# Patient Record
Sex: FEMALE | Race: WHITE | NOT HISPANIC OR LATINO
[De-identification: names, ages, dates, MRNs, and addresses within clinical notes are randomized per-mention and may not be internally consistent; named-entity substitution may affect disease eponyms.]

---

## 2017-03-08 ENCOUNTER — APPOINTMENT (OUTPATIENT)
Dept: MAMMOGRAPHY | Facility: IMAGING CENTER | Age: 77
End: 2017-03-08

## 2017-03-08 ENCOUNTER — OUTPATIENT (OUTPATIENT)
Dept: OUTPATIENT SERVICES | Facility: HOSPITAL | Age: 77
LOS: 1 days | End: 2017-03-08
Payer: MEDICARE

## 2017-03-08 DIAGNOSIS — Z00.8 ENCOUNTER FOR OTHER GENERAL EXAMINATION: ICD-10-CM

## 2017-03-08 PROCEDURE — G0202: CPT | Mod: 26

## 2017-03-08 PROCEDURE — 77063 BREAST TOMOSYNTHESIS BI: CPT | Mod: 26

## 2017-03-08 PROCEDURE — 77067 SCR MAMMO BI INCL CAD: CPT

## 2017-03-08 PROCEDURE — 77063 BREAST TOMOSYNTHESIS BI: CPT

## 2017-05-03 ENCOUNTER — APPOINTMENT (OUTPATIENT)
Dept: OTOLARYNGOLOGY | Facility: CLINIC | Age: 77
End: 2017-05-03

## 2017-05-03 VITALS
SYSTOLIC BLOOD PRESSURE: 131 MMHG | BODY MASS INDEX: 22.5 KG/M2 | DIASTOLIC BLOOD PRESSURE: 63 MMHG | HEART RATE: 85 BPM | HEIGHT: 66 IN | WEIGHT: 140 LBS

## 2017-05-03 DIAGNOSIS — Z87.891 PERSONAL HISTORY OF NICOTINE DEPENDENCE: ICD-10-CM

## 2017-05-03 DIAGNOSIS — E78.00 PURE HYPERCHOLESTEROLEMIA, UNSPECIFIED: ICD-10-CM

## 2017-05-03 DIAGNOSIS — Z87.09 PERSONAL HISTORY OF OTHER DISEASES OF THE RESPIRATORY SYSTEM: ICD-10-CM

## 2017-05-10 ENCOUNTER — APPOINTMENT (OUTPATIENT)
Dept: CT IMAGING | Facility: IMAGING CENTER | Age: 77
End: 2017-05-10

## 2017-05-10 ENCOUNTER — OUTPATIENT (OUTPATIENT)
Dept: OUTPATIENT SERVICES | Facility: HOSPITAL | Age: 77
LOS: 1 days | End: 2017-05-10
Payer: MEDICARE

## 2017-05-10 DIAGNOSIS — Z00.8 ENCOUNTER FOR OTHER GENERAL EXAMINATION: ICD-10-CM

## 2017-05-10 PROCEDURE — 71250 CT THORAX DX C-: CPT

## 2017-05-27 ENCOUNTER — OUTPATIENT (OUTPATIENT)
Dept: OUTPATIENT SERVICES | Facility: HOSPITAL | Age: 77
LOS: 1 days | End: 2017-05-27
Payer: MEDICARE

## 2017-05-27 ENCOUNTER — APPOINTMENT (OUTPATIENT)
Dept: NUCLEAR MEDICINE | Facility: IMAGING CENTER | Age: 77
End: 2017-05-27

## 2017-05-27 DIAGNOSIS — Z00.8 ENCOUNTER FOR OTHER GENERAL EXAMINATION: ICD-10-CM

## 2017-05-27 PROCEDURE — A9552: CPT

## 2017-05-27 PROCEDURE — 78815 PET IMAGE W/CT SKULL-THIGH: CPT

## 2018-02-02 ENCOUNTER — RX RENEWAL (OUTPATIENT)
Age: 78
End: 2018-02-02

## 2018-03-09 ENCOUNTER — OUTPATIENT (OUTPATIENT)
Dept: OUTPATIENT SERVICES | Facility: HOSPITAL | Age: 78
LOS: 1 days | End: 2018-03-09

## 2018-03-09 ENCOUNTER — APPOINTMENT (OUTPATIENT)
Dept: MAMMOGRAPHY | Facility: CLINIC | Age: 78
End: 2018-03-09
Payer: MEDICARE

## 2018-03-09 PROCEDURE — 77067 SCR MAMMO BI INCL CAD: CPT | Mod: 26

## 2018-03-09 PROCEDURE — 77063 BREAST TOMOSYNTHESIS BI: CPT | Mod: 26

## 2018-04-04 ENCOUNTER — NON-APPOINTMENT (OUTPATIENT)
Age: 78
End: 2018-04-04

## 2018-04-04 ENCOUNTER — APPOINTMENT (OUTPATIENT)
Dept: CARDIOLOGY | Facility: CLINIC | Age: 78
End: 2018-04-04
Payer: MEDICARE

## 2018-04-04 VITALS
HEART RATE: 84 BPM | WEIGHT: 140 LBS | SYSTOLIC BLOOD PRESSURE: 129 MMHG | RESPIRATION RATE: 16 BRPM | HEIGHT: 65 IN | DIASTOLIC BLOOD PRESSURE: 79 MMHG | BODY MASS INDEX: 23.32 KG/M2

## 2018-04-04 PROCEDURE — 99213 OFFICE O/P EST LOW 20 MIN: CPT

## 2018-05-03 ENCOUNTER — APPOINTMENT (OUTPATIENT)
Dept: OTOLARYNGOLOGY | Facility: CLINIC | Age: 78
End: 2018-05-03
Payer: MEDICARE

## 2018-05-03 VITALS
HEIGHT: 65 IN | HEART RATE: 80 BPM | DIASTOLIC BLOOD PRESSURE: 73 MMHG | WEIGHT: 140 LBS | BODY MASS INDEX: 23.32 KG/M2 | SYSTOLIC BLOOD PRESSURE: 122 MMHG

## 2018-05-03 PROCEDURE — 92550 TYMPANOMETRY & REFLEX THRESH: CPT

## 2018-05-03 PROCEDURE — 92557 COMPREHENSIVE HEARING TEST: CPT

## 2018-05-03 PROCEDURE — 99214 OFFICE O/P EST MOD 30 MIN: CPT

## 2018-08-14 ENCOUNTER — APPOINTMENT (OUTPATIENT)
Dept: CARDIOLOGY | Facility: CLINIC | Age: 78
End: 2018-08-14
Payer: MEDICARE

## 2018-08-14 VITALS
BODY MASS INDEX: 23.46 KG/M2 | HEART RATE: 76 BPM | WEIGHT: 146 LBS | HEIGHT: 66 IN | DIASTOLIC BLOOD PRESSURE: 79 MMHG | SYSTOLIC BLOOD PRESSURE: 126 MMHG | RESPIRATION RATE: 15 BRPM

## 2018-08-14 PROCEDURE — 99213 OFFICE O/P EST LOW 20 MIN: CPT

## 2018-10-22 ENCOUNTER — APPOINTMENT (OUTPATIENT)
Dept: CARDIOLOGY | Facility: CLINIC | Age: 78
End: 2018-10-22
Payer: MEDICARE

## 2018-10-22 VITALS
DIASTOLIC BLOOD PRESSURE: 78 MMHG | HEIGHT: 66 IN | RESPIRATION RATE: 16 BRPM | BODY MASS INDEX: 23.46 KG/M2 | HEART RATE: 85 BPM | SYSTOLIC BLOOD PRESSURE: 124 MMHG | WEIGHT: 146 LBS

## 2018-10-22 DIAGNOSIS — R68.84 JAW PAIN: ICD-10-CM

## 2018-10-22 PROCEDURE — ZZZZZ: CPT

## 2018-10-22 PROCEDURE — 93015 CV STRESS TEST SUPVJ I&R: CPT

## 2018-10-22 PROCEDURE — 99213 OFFICE O/P EST LOW 20 MIN: CPT | Mod: 25

## 2018-10-29 ENCOUNTER — APPOINTMENT (OUTPATIENT)
Dept: CARDIOLOGY | Facility: CLINIC | Age: 78
End: 2018-10-29
Payer: MEDICARE

## 2018-10-29 PROCEDURE — 78452 HT MUSCLE IMAGE SPECT MULT: CPT

## 2018-10-29 PROCEDURE — 93015 CV STRESS TEST SUPVJ I&R: CPT

## 2018-10-29 PROCEDURE — A9500: CPT

## 2018-11-27 ENCOUNTER — APPOINTMENT (OUTPATIENT)
Dept: CARDIOLOGY | Facility: CLINIC | Age: 78
End: 2018-11-27
Payer: MEDICARE

## 2018-11-27 VITALS
RESPIRATION RATE: 15 BRPM | HEIGHT: 66 IN | DIASTOLIC BLOOD PRESSURE: 80 MMHG | BODY MASS INDEX: 24.21 KG/M2 | HEART RATE: 84 BPM | SYSTOLIC BLOOD PRESSURE: 125 MMHG

## 2018-11-27 VITALS — BODY MASS INDEX: 24.11 KG/M2 | WEIGHT: 150 LBS | HEIGHT: 66 IN

## 2018-11-27 PROCEDURE — 99213 OFFICE O/P EST LOW 20 MIN: CPT

## 2018-11-27 RX ORDER — EZETIMIBE AND SIMVASTATIN 10; 20 MG/1; MG/1
10-20 TABLET ORAL DAILY
Qty: 90 | Refills: 1 | Status: DISCONTINUED | COMMUNITY
Start: 2018-02-02 | End: 2018-11-27

## 2018-11-27 NOTE — REASON FOR VISIT
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

## 2018-11-27 NOTE — PHYSICAL EXAM
[General Appearance - Well Developed] : well developed [Normal Appearance] : normal appearance [Well Groomed] : well groomed [General Appearance - Well Nourished] : well nourished [No Deformities] : no deformities [General Appearance - In No Acute Distress] : no acute distress [Normal Conjunctiva] : the conjunctiva exhibited no abnormalities [Normal Oral Mucosa] : normal oral mucosa [Normal Jugular Venous A Waves Present] : normal jugular venous A waves present [Normal Jugular Venous V Waves Present] : normal jugular venous V waves present [No Jugular Venous Ko A Waves] : no jugular venous ko A waves [Respiration, Rhythm And Depth] : normal respiratory rhythm and effort [Exaggerated Use Of Accessory Muscles For Inspiration] : no accessory muscle use [Auscultation Breath Sounds / Voice Sounds] : lungs were clear to auscultation bilaterally [Bowel Sounds] : normal bowel sounds [Abdomen Soft] : soft [Abnormal Walk] : normal gait [Gait - Sufficient For Exercise Testing] : the gait was sufficient for exercise testing [Nail Clubbing] : no clubbing of the fingernails [Cyanosis, Localized] : no localized cyanosis [Skin Color & Pigmentation] : normal skin color and pigmentation [Skin Turgor] : normal skin turgor [] : no rash [Oriented To Time, Place, And Person] : oriented to person, place, and time [Impaired Insight] : insight and judgment were intact [Affect] : the affect was normal [Mood] : the mood was normal [5th Left ICS - MCL] : palpated at the 5th LICS in the midclavicular line [Normal] : normal [No Precordial Heave] : no precordial heave was noted [Normal Rate] : normal [Rhythm Regular] : regular [Normal S1] : normal S1 [Normal S2] : normal S2 [No Gallop] : no gallop heard [II] : a grade 2 [I] : a grade 1 [2+] : left 2+ [No Abnormalities] : the abdominal aorta was not enlarged and no bruit was heard [No Pitting Edema] : no pitting edema present [S3] : no S3 [S4] : no S4 [Click] : no click [Right Carotid Bruit] : no bruit heard over the right carotid [Left Carotid Bruit] : no bruit heard over the left carotid [Right Femoral Bruit] : no bruit heard over the right femoral artery [Left Femoral Bruit] : no bruit heard over the left femoral artery

## 2018-11-27 NOTE — DISCUSSION/SUMMARY
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 She denies chest pain, shortness of breath, dizziness or syncope. \par \par The patient had a normal nuclear stress test on October 29, 2018. Her primary complaint today is fatigue. I has to change her metoprolol succinate and cholesterol medication to the morning. She also wakes up on a daily basis approximately 3:30 in the morning and can't go back to sleep. I am sure this is contributing to her fatigue factor. She will followup with you regarding her overall medical care. She will followup with me in 4 months.\par The patient understands that aerobic exercises must be increased to 40 minutes 4 times per week. A detailed discussion of lifestyle modification was done today. The patient has a good understanding of the diagnosis, and treatment plan. Lifestyle modification was also outlined.\par She will start on Toprol-XL 25 mg in AM and aspirin 81 mg per day. .\par She's had some personal stress in her life. \par  A detailed discussion of lifestyle modification was done today. The patient has a good understanding of the diagnosis, and treatment plan. Lifestyle modification was also outlined.

## 2019-02-11 ENCOUNTER — RX RENEWAL (OUTPATIENT)
Age: 79
End: 2019-02-11

## 2019-03-11 ENCOUNTER — RESULT REVIEW (OUTPATIENT)
Age: 79
End: 2019-03-11

## 2019-03-12 ENCOUNTER — APPOINTMENT (OUTPATIENT)
Dept: CARDIOLOGY | Facility: CLINIC | Age: 79
End: 2019-03-12

## 2019-04-16 ENCOUNTER — RX RENEWAL (OUTPATIENT)
Age: 79
End: 2019-04-16

## 2019-04-23 ENCOUNTER — APPOINTMENT (OUTPATIENT)
Dept: CARDIOLOGY | Facility: CLINIC | Age: 79
End: 2019-04-23
Payer: MEDICARE

## 2019-04-23 ENCOUNTER — NON-APPOINTMENT (OUTPATIENT)
Age: 79
End: 2019-04-23

## 2019-04-23 VITALS
HEIGHT: 66 IN | RESPIRATION RATE: 15 BRPM | BODY MASS INDEX: 23.63 KG/M2 | DIASTOLIC BLOOD PRESSURE: 82 MMHG | SYSTOLIC BLOOD PRESSURE: 127 MMHG | WEIGHT: 147 LBS | HEART RATE: 78 BPM

## 2019-04-23 PROCEDURE — 93000 ELECTROCARDIOGRAM COMPLETE: CPT

## 2019-04-23 PROCEDURE — 99213 OFFICE O/P EST LOW 20 MIN: CPT

## 2019-04-23 RX ORDER — EZETIMIBE AND SIMVASTATIN 10; 20 MG/1; MG/1
10-20 TABLET ORAL
Qty: 90 | Refills: 1 | Status: COMPLETED | COMMUNITY
Start: 2018-08-14 | End: 2019-04-23

## 2019-04-29 ENCOUNTER — APPOINTMENT (OUTPATIENT)
Dept: MAMMOGRAPHY | Facility: IMAGING CENTER | Age: 79
End: 2019-04-29
Payer: MEDICARE

## 2019-04-29 ENCOUNTER — OUTPATIENT (OUTPATIENT)
Dept: OUTPATIENT SERVICES | Facility: HOSPITAL | Age: 79
LOS: 1 days | End: 2019-04-29
Payer: MEDICARE

## 2019-04-29 DIAGNOSIS — Z00.8 ENCOUNTER FOR OTHER GENERAL EXAMINATION: ICD-10-CM

## 2019-04-29 PROCEDURE — 77063 BREAST TOMOSYNTHESIS BI: CPT

## 2019-04-29 PROCEDURE — 77067 SCR MAMMO BI INCL CAD: CPT

## 2019-04-29 PROCEDURE — 77067 SCR MAMMO BI INCL CAD: CPT | Mod: 26

## 2019-04-29 PROCEDURE — 77063 BREAST TOMOSYNTHESIS BI: CPT | Mod: 26

## 2019-07-01 ENCOUNTER — APPOINTMENT (OUTPATIENT)
Dept: OTOLARYNGOLOGY | Facility: CLINIC | Age: 79
End: 2019-07-01

## 2019-07-10 ENCOUNTER — APPOINTMENT (OUTPATIENT)
Dept: OTOLARYNGOLOGY | Facility: CLINIC | Age: 79
End: 2019-07-10
Payer: MEDICARE

## 2019-07-10 VITALS
DIASTOLIC BLOOD PRESSURE: 58 MMHG | WEIGHT: 147 LBS | HEART RATE: 97 BPM | HEIGHT: 66 IN | BODY MASS INDEX: 23.63 KG/M2 | SYSTOLIC BLOOD PRESSURE: 117 MMHG

## 2019-07-10 PROCEDURE — 99214 OFFICE O/P EST MOD 30 MIN: CPT

## 2019-07-10 NOTE — PHYSICAL EXAM
[Midline] : trachea located in midline position [Normal] : gait was normal [de-identified] : right crepitus [Hearing Loss Right Only] : normal [Hearing Loss Left Only] : normal [Nystagmus] : ~T no ~M nystagmus was seen [Fukuda Step Test] : Fukuda Step Test was Negative [Fistula Sign] : Fistula Sign: Negative

## 2019-07-10 NOTE — ASSESSMENT
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

## 2019-07-10 NOTE — REASON FOR VISIT
[Subsequent Evaluation] : a subsequent evaluation for [FreeTextEntry2] : balance issues and nasal congestion

## 2019-07-10 NOTE — HISTORY OF PRESENT ILLNESS
[No] : patient does not have a  history of radiation therapy [Hearing Loss] : hearing loss [Presbycusis] : presbycusis [None] : No risk factors have been identified. [Nasal Congestion] : nasal congestion [de-identified] : 78 y/o female who came in complaining of R sided jaw pain and chronic nasal congestion who is here for f/u. She was given a prescription nasal spray that was given to her by her PCP. She states these nasal sprays are helping her congestion. She no longer gets sinus infections. She states there are times she becomes off balance. She states her balance becomes worse when her nose and ears become congested. She states her hearing is worse in the L compared to the R. Her hearing test showed b/l SNHL. Pt has no ear pain, ear drainage, tinnitus nasal discharge, epistaxis, sinus infections, facial pain, facial pressure, throat pain, dysphagia or fevers.\par  [Tinnitus] : no tinnitus [Anxiety] : no anxiety [Dizziness] : no dizziness [Headache] : no headache [Otalgia] : no otalgia [Vertigo] : no vertigo [Visual Changes] : no visual changes [Otorrhea] : no otorrhea [Smoking] : no smoking [Loud Noise Exposure] : no history of loud noise exposure [Early Onset Hearing Loss] : no early onset hearing loss [Facial Pain] : no facial pain [Clear Rhinorrhea] : no clear rhinorrhea [Facial Pressure] : no facial pressure [Purulent Rhinorrhea] : no purulent rhinorrhea [Ear Pain] : no ear pain [Postnasal Drainage] : no postnasal drainage [Ear Pressure] : no ear pressure [Diplopia] : no diplopia [Ear Fullness] : no ear fullness [Allergic Rhinitis] : no allergic rhinitis [Environmental Allergies] : no environmental allergies [Seasonal Allergies] : no seasonal allergies [Environmental Allergens] : no environmental allergens [Adenoidectomy] : no adenoidectomy [Allergies] : no allergies [Asthma] : no asthma [Neck Pain] : no neck pain [Neck Mass] : no neck mass [Chills] : no chills [Cold Intolerance] : no cold intolerance [Cough] : no cough [Fatigue] : no fatigue [Hyperthyroidism] : no hyperthyroidism [Heat Intolerance] : no heat intolerance [Sialadenitis] : no sialadenitis [Non-Hodgkin Lymphoma] : no non-hodgkin lymphoma [Hodgkin Disease] : no hodgkin disease [Tobacco Use] : no tobacco use [Alcohol Use] : no alcohol use [Graves Disease] : no graves disease [Thyroid Cancer] : no thyroid cancer

## 2019-07-10 NOTE — REVIEW OF SYSTEMS
[Patient Intake Form Reviewed] : Patient intake form was reviewed [Ear Pain] : ear pain [Dizziness] : dizziness [Negative] : Heme/Lymph [Hearing Loss] : hearing loss [Nasal Congestion] : nasal congestion [As Noted in HPI] : as noted in HPI

## 2019-09-09 ENCOUNTER — APPOINTMENT (OUTPATIENT)
Dept: CARDIOLOGY | Facility: CLINIC | Age: 79
End: 2019-09-09
Payer: MEDICARE

## 2019-09-09 ENCOUNTER — NON-APPOINTMENT (OUTPATIENT)
Age: 79
End: 2019-09-09

## 2019-09-09 VITALS
SYSTOLIC BLOOD PRESSURE: 128 MMHG | DIASTOLIC BLOOD PRESSURE: 76 MMHG | BODY MASS INDEX: 23.3 KG/M2 | RESPIRATION RATE: 15 BRPM | HEART RATE: 84 BPM | WEIGHT: 145 LBS | HEIGHT: 66 IN

## 2019-09-09 PROCEDURE — 93000 ELECTROCARDIOGRAM COMPLETE: CPT

## 2019-09-09 PROCEDURE — 93306 TTE W/DOPPLER COMPLETE: CPT

## 2019-09-09 PROCEDURE — 99213 OFFICE O/P EST LOW 20 MIN: CPT

## 2019-09-09 PROCEDURE — 93880 EXTRACRANIAL BILAT STUDY: CPT

## 2019-10-15 ENCOUNTER — RX RENEWAL (OUTPATIENT)
Age: 79
End: 2019-10-15

## 2019-10-16 ENCOUNTER — RX CHANGE (OUTPATIENT)
Age: 79
End: 2019-10-16

## 2019-11-20 ENCOUNTER — APPOINTMENT (OUTPATIENT)
Dept: CARDIOLOGY | Facility: CLINIC | Age: 79
End: 2019-11-20
Payer: MEDICARE

## 2019-11-20 VITALS
SYSTOLIC BLOOD PRESSURE: 128 MMHG | DIASTOLIC BLOOD PRESSURE: 80 MMHG | BODY MASS INDEX: 23.3 KG/M2 | RESPIRATION RATE: 15 BRPM | WEIGHT: 145 LBS | HEIGHT: 66 IN | HEART RATE: 78 BPM

## 2019-11-20 PROCEDURE — 99213 OFFICE O/P EST LOW 20 MIN: CPT

## 2019-12-16 ENCOUNTER — APPOINTMENT (OUTPATIENT)
Dept: PULMONOLOGY | Facility: CLINIC | Age: 79
End: 2019-12-16
Payer: MEDICARE

## 2019-12-16 VITALS
OXYGEN SATURATION: 98 % | HEART RATE: 79 BPM | SYSTOLIC BLOOD PRESSURE: 140 MMHG | DIASTOLIC BLOOD PRESSURE: 60 MMHG | WEIGHT: 168 LBS | RESPIRATION RATE: 17 BRPM | BODY MASS INDEX: 27 KG/M2 | HEIGHT: 66 IN

## 2019-12-16 PROCEDURE — 94060 EVALUATION OF WHEEZING: CPT

## 2019-12-16 PROCEDURE — 99204 OFFICE O/P NEW MOD 45 MIN: CPT | Mod: 25

## 2019-12-16 PROCEDURE — 94727 GAS DIL/WSHOT DETER LNG VOL: CPT

## 2019-12-16 PROCEDURE — 94729 DIFFUSING CAPACITY: CPT

## 2019-12-16 NOTE — HISTORY OF PRESENT ILLNESS
[FreeTextEntry1] : Patioent is a 79 year old female poor historian Hx mitral valve regurgitation, palpitations, hyperlipidemia, s/p resection adenocarcinoma lung 2017, reactive airway disease, presents to [facility] for evaluation dry cough.  Patient reports she has had cough for "years."  She is followed by Dr. Conroy, Thoracic Surgery at [facility].  She reports she is having CT chest yearly at this time. She is here for symptoms dry cough.  Patient denies fever, chills weight loss, hemoptysis or other complaints.

## 2019-12-16 NOTE — REVIEW OF SYSTEMS
[Cough] : cough [Murmurs] : a ~P ~M murmur was heard [Palpitations] : palpitations [Negative] : Sleep Disorder

## 2019-12-16 NOTE — ASSESSMENT
[FreeTextEntry1] : 79 year old female Hx adenocarcinoma lung s/p resection 2017 presents for evaluation cough, normal spirometry\par \par Trial of Albuterol/Ipratropium every 4-6 hours as needed \par Continue Thoracic Surgery follow up/CT chest per Thoracic Surgery \par Obtain CT chest reports from [facility]\par \par Follow up 3-4 months

## 2019-12-16 NOTE — PHYSICAL EXAM
[General Appearance - Well Developed] : well developed [General Appearance - Well Nourished] : well nourished [General Appearance - In No Acute Distress] : no acute distress [Normal Conjunctiva] : the conjunctiva exhibited no abnormalities [Erythema] : erythema of the pharynx [Heart Sounds] : normal S1 and S2 [Murmurs] : no murmurs present [Respiration, Rhythm And Depth] : normal respiratory rhythm and effort [Auscultation Breath Sounds / Voice Sounds] : lungs were clear to auscultation bilaterally [Abdomen Soft] : soft [Abnormal Walk] : normal gait [Nail Clubbing] : no clubbing of the fingernails [Cyanosis, Localized] : no localized cyanosis [Non-Pitting] : non-pitting [Skin Color & Pigmentation] : normal skin color and pigmentation [Cranial Nerves] : cranial nerves 2-12 were intact [Oriented To Time, Place, And Person] : oriented to person, place, and time

## 2020-01-13 ENCOUNTER — APPOINTMENT (OUTPATIENT)
Dept: OTOLARYNGOLOGY | Facility: CLINIC | Age: 80
End: 2020-01-13
Payer: MEDICARE

## 2020-01-13 VITALS
HEART RATE: 89 BPM | BODY MASS INDEX: 23.14 KG/M2 | SYSTOLIC BLOOD PRESSURE: 112 MMHG | WEIGHT: 144 LBS | HEIGHT: 66 IN | DIASTOLIC BLOOD PRESSURE: 69 MMHG

## 2020-01-13 PROCEDURE — 69210 REMOVE IMPACTED EAR WAX UNI: CPT

## 2020-01-13 PROCEDURE — 99214 OFFICE O/P EST MOD 30 MIN: CPT | Mod: 25

## 2020-01-13 NOTE — HISTORY OF PRESENT ILLNESS
[No] : patient does not have a  history of radiation therapy [Hearing Loss] : hearing loss [Presbycusis] : presbycusis [Stroke] : stroke [Nasal Congestion] : nasal congestion [None] : No risk factors have been identified. [de-identified] : 78 y/o female who came in complaining of chronic nasal congestion and right ear pain who is here for f/u. She states that she is not using nasal sprays or sinus rinse. Pt states that since last visit, balance has resolved. \par Pt has no ear pain, ear drainage, tinnitus nasal discharge, epistaxis, sinus infections, facial pain, facial pressure, throat pain, dysphagia or fevers.\par  [Tinnitus] : no tinnitus [Dizziness] : no dizziness [Anxiety] : no anxiety [Headache] : no headache [Otalgia] : no otalgia [Otorrhea] : no otorrhea [Vertigo] : no vertigo [Recurrent Otitis Media] : no recurrent otitis media [Visual Changes] : no visual changes [Otitis Media with Effusion] : no otitis media with effusion [Congenital Ear Malformation] : no congenital ear malformation [Meniere Disease] : no Meniere disease [Otosclerosis] : no otosclerosis [Perilymphatic Fistula] : no perilymphatic fistula [Hypertension] : no hypertension [Loud Noise Exposure] : no history of loud noise exposure [Smoking] : no smoking [Early Onset Hearing Loss] : no early onset hearing loss [Facial Pain] : no facial pain [Clear Rhinorrhea] : no clear rhinorrhea [Facial Pressure] : no facial pressure [Purulent Rhinorrhea] : no purulent rhinorrhea [Ear Pain] : no ear pain [Postnasal Drainage] : no postnasal drainage [Ear Pressure] : no ear pressure [Ear Fullness] : no ear fullness [Diplopia] : no diplopia [Allergic Rhinitis] : no allergic rhinitis [Environmental Allergies] : no environmental allergies [Seasonal Allergies] : no seasonal allergies [Environmental Allergens] : no environmental allergens [Adenoidectomy] : no adenoidectomy [Asthma] : no asthma [Allergies] : no allergies [Neck Pain] : no neck pain [Chills] : no chills [Neck Mass] : no neck mass [Cough] : no cough [Fatigue] : no fatigue [Cold Intolerance] : no cold intolerance [Hyperthyroidism] : no hyperthyroidism [Heat Intolerance] : no heat intolerance [Sialadenitis] : no sialadenitis [Non-Hodgkin Lymphoma] : no non-hodgkin lymphoma [Hodgkin Disease] : no hodgkin disease [Graves Disease] : no graves disease [Alcohol Use] : no alcohol use [Tobacco Use] : no tobacco use [Thyroid Cancer] : no thyroid cancer

## 2020-01-13 NOTE — REVIEW OF SYSTEMS
[Patient Intake Form Reviewed] : Patient intake form was reviewed [Ear Pain] : ear pain [Hearing Loss] : hearing loss [Dizziness] : dizziness [As Noted in HPI] : as noted in HPI [Nasal Congestion] : nasal congestion [Negative] : Endocrine

## 2020-01-13 NOTE — PHYSICAL EXAM
[Midline] : trachea located in midline position [Normal] : tympanic membranes are normal in both ears [Hearing Loss Left Only] : normal [Nystagmus] : ~T no ~M nystagmus was seen [Hearing Loss Right Only] : normal [Fukuda Step Test] : Fukuda Step Test was Negative [Fistula Sign] : Fistula Sign: Negative [de-identified] : cerumen bilat [] : septum deviated bilaterally [de-identified] : congested

## 2020-01-13 NOTE — REASON FOR VISIT
[Subsequent Evaluation] : a subsequent evaluation for [Ear Pain] : ear pain [FreeTextEntry2] : nasal congestion

## 2020-01-13 NOTE — ASSESSMENT
[FreeTextEntry1] : 80 y/o female w/ R TMJ, chronic rhinitis and ear fullness\par \par wax-\par After informed verbal consent is obtained, cerumen is removed from the right and left  ear canal with a curette and suction.\par Rx: \par Debrox was prescribed and  is to be placed in both ears on a routine basis to keep them free of wax.\par Routine debridement suggested to keep the ears free of wax.\par \par bilateral sensorineural hearing loss-cleared for hearing aids\par \par DNS and Rhinitis\par Rx\par   Steam humidification and hypertonic saline nasal irrigations \par consider Flonase tx\par \par

## 2020-03-16 ENCOUNTER — APPOINTMENT (OUTPATIENT)
Dept: PULMONOLOGY | Facility: CLINIC | Age: 80
End: 2020-03-16
Payer: MEDICARE

## 2020-03-16 VITALS
WEIGHT: 144 LBS | OXYGEN SATURATION: 98 % | BODY MASS INDEX: 23.14 KG/M2 | RESPIRATION RATE: 17 BRPM | HEART RATE: 75 BPM | DIASTOLIC BLOOD PRESSURE: 70 MMHG | SYSTOLIC BLOOD PRESSURE: 122 MMHG | TEMPERATURE: 97.7 F | HEIGHT: 66 IN

## 2020-03-16 PROCEDURE — 99214 OFFICE O/P EST MOD 30 MIN: CPT

## 2020-03-16 NOTE — REASON FOR VISIT
[Follow-Up] : a follow-up visit [Lung Cancer] : lung cancer [Asthma] : asthma [Shortness of Breath] : shortness of breath

## 2020-03-16 NOTE — HISTORY OF PRESENT ILLNESS
[Former] : former [Never] : never [TextBox_4] : 79 year old female poor historian Hx mitral valve regurgitation, palpitations, hyperlipidemia, s/p resection adenocarcinoma lung 2017, reactive airway disease, presents to [facility] for follow up. Patient jethro historian.   Patient reports she does not "like the nebulizer machine."   She has not been using this.  She prefers to restart Breo-Ellipta, she feels this helped her.  She did not send CT chest records but is having another CT next week per patient.  She is here for follow up..

## 2020-03-16 NOTE — ASSESSMENT
[FreeTextEntry1] : 79 year old female Hx adenocarcinoma lung s/p resection 2017 presents for evaluation cough, mild reactive airway disease s/p resection adenocarcinoma lung\par \par \par Initiate trial of Breo-Ellipta 100-25 mcg\par Ventolin 2 pff every 4 hours if needed \par Continue Thoracic Surgery follow up/CT chest per Thoracic Surgery \par Obtain CT chest reports from [facility]\par \par Follow up 3-4 months.

## 2020-05-12 ENCOUNTER — APPOINTMENT (OUTPATIENT)
Dept: OTOLARYNGOLOGY | Facility: CLINIC | Age: 80
End: 2020-05-12
Payer: MEDICARE

## 2020-05-12 DIAGNOSIS — M26.69 OTHER SPECIFIED DISORDERS OF TEMPOROMANDIBULAR JOINT: ICD-10-CM

## 2020-05-12 DIAGNOSIS — H81.92 UNSPECIFIED DISORDER OF VESTIBULAR FUNCTION, LEFT EAR: ICD-10-CM

## 2020-05-12 PROCEDURE — 31231 NASAL ENDOSCOPY DX: CPT

## 2020-05-12 PROCEDURE — 99214 OFFICE O/P EST MOD 30 MIN: CPT | Mod: 25

## 2020-05-12 PROCEDURE — 69210 REMOVE IMPACTED EAR WAX UNI: CPT

## 2020-05-12 NOTE — HISTORY OF PRESENT ILLNESS
[Hearing Loss] : hearing loss [Tinnitus] : tinnitus [Vertigo] : vertigo [Dizziness] : dizziness [Presbycusis] : presbycusis [Nasal Congestion] : nasal congestion [de-identified] : 80 yo female\par Patient with hx of chronic nasal congestion and hearing loss complains of left severe vertigo x several weeks. States its worse when she turns her head to the left, dizziness lasting several minutes. Not using nasal sprays for nasal congestion. \par Pt has no ear pain, ear drainage, nasal discharge, epistaxis, sinus infections, facial pain, facial pressure, throat pain, dysphagia or fevers\par \par  [No] : patient does not have a  history of radiation therapy [Headache] : no headache [Anxiety] : no anxiety [Otitis Media with Effusion] : no otitis media with effusion [Congenital Ear Malformation] : no congenital ear malformation [Meniere Disease] : no Meniere disease [Otosclerosis] : no otosclerosis [Perilymphatic Fistula] : no perilymphatic fistula [Hypertension] : no hypertension [Smoking] : no smoking [Loud Noise Exposure] : no history of loud noise exposure [Stroke] : no stroke [Early Onset Hearing Loss] : no early onset hearing loss [Facial Pressure] : no facial pressure [Facial Pain] : no facial pain [Diplopia] : no diplopia [Ear Fullness] : no ear fullness [Allergic Rhinitis] : no allergic rhinitis [Environmental Allergies] : no environmental allergies [Septal Deviation] : no septal deviation [Maxillary Tooth Infection] : no maxillary tooth infection [Seasonal Allergies] : no seasonal allergies [Environmental Allergens] : no environmental allergens [Nasal FB Removal] : no nasal foreign body removal [Adenoidectomy] : no adenoidectomy [Allergies] : no allergies [Asthma] : no asthma [Neck Mass] : no neck mass [Neck Pain] : no neck pain [Chills] : no chills [Cold Intolerance] : no cold intolerance [Cough] : no cough [Fatigue] : no fatigue [Heat Intolerance] : no heat intolerance [Hyperthyroidism] : no hyperthyroidism [Sialadenitis] : no sialadenitis [Tobacco Use] : no tobacco use [Hodgkin Disease] : no hodgkin disease [Non-Hodgkin Lymphoma] : no non-hodgkin lymphoma [Alcohol Use] : no alcohol use [Thyroid Cancer] : no thyroid cancer [Graves Disease] : no graves disease

## 2020-05-12 NOTE — PHYSICAL EXAM
[] : septum deviated to the right [Nasal Endoscopy Performed] : nasal endoscopy was performed, see procedure section for findings [Fukuda Step Test] : Fukuda Step Test was Negative [Nystagmus] : ~T no ~M nystagmus was seen [FreeTextEntry8] : taylor [Mila-Halltalke] : [place]-Hallpike: Negative [FreeTextEntry9] : taylor  [de-identified] : wnl [de-identified] : congestion  [Midline] : trachea located in midline position [Normal] : no rashes

## 2020-05-12 NOTE — ASSESSMENT
[FreeTextEntry1] : r/o Left BPPV:\par -Redford Hallpick negative \par -Cawthorne's exercise advised\par \par Bilateral SNHL: cleared for hearing aids \par Wax:\par After informed verbal consent is obtained, cerumen is removed from the right and left  ear canal with a curette and suction.\par  \par DNS and Rhinitis\par Rx: Steam humidification and hypertonic saline nasal irrigations \par RX: Azelastine tx\par \par f/u 6 months

## 2020-05-12 NOTE — PROCEDURE
[FreeTextEntry6] : Anterior Rhinoscopy insufficient to account for symptoms.\par \par After informed verbal consent is obtained, the fiberoptic nasal endoscope #29 is passed via the right nasal cavity.\par The following anatomic sites were directly examined in a sequential fashion:\par The scope was introduced in both  nasal passages between the middle and inferior turbinates to exam the inferior portion of the middle meatus and the fontanelle, as well as the maxillary ostia.  Next, the scope was passed medially and posteriorly to the middle turbinates to examine the sphenoethmoid recess and the superior turbinate region.\par  \par \par   There is [ 0 ]% obstruction of the nasopharynx with adenoid tissue.\par \par A deviated nasal septum was noted causing obstruction.\par The turbinates were congested-bilateral.\par \par Right Side:\par ·               Mucosa: wnl\par ·               Mucous: none\par ·               Polyp: none\par ·               Inferior Turbinate: sl congested\par ·               Middle Turbinate:sl congested\par ·               Superior Turbinate: wnl\par ·               Inferior Meatus:clear\par ·               Middle Meatus: clear\par ·               Super Meatus: clear\par ·               Sphenoethmoidal Recess: wnl\par \par \par \par Left Side:\par ·               Mucosa: wnl\par ·               Mucous:none\par ·               Polyp: none\par ·               Inferior Turbinate: sl congested\par ·               Middle Turbinate: sl congested\par ·               Superior Turbinate:wnl\par ·               Inferior Meatus: clear\par ·               Middle Meatus: clear\par ·               Super Meatus:clear\par ·               Sphenoethmoidal Recess: wnl\par \par

## 2020-06-17 ENCOUNTER — APPOINTMENT (OUTPATIENT)
Dept: PULMONOLOGY | Facility: CLINIC | Age: 80
End: 2020-06-17

## 2020-06-22 ENCOUNTER — APPOINTMENT (OUTPATIENT)
Dept: OTOLARYNGOLOGY | Facility: CLINIC | Age: 80
End: 2020-06-22
Payer: MEDICARE

## 2020-06-22 VITALS
HEIGHT: 66 IN | WEIGHT: 144 LBS | DIASTOLIC BLOOD PRESSURE: 62 MMHG | SYSTOLIC BLOOD PRESSURE: 108 MMHG | HEART RATE: 80 BPM | BODY MASS INDEX: 23.14 KG/M2 | TEMPERATURE: 97.9 F

## 2020-06-22 DIAGNOSIS — H61.23 IMPACTED CERUMEN, BILATERAL: ICD-10-CM

## 2020-06-22 PROCEDURE — 99213 OFFICE O/P EST LOW 20 MIN: CPT | Mod: 25

## 2020-06-22 PROCEDURE — 69210 REMOVE IMPACTED EAR WAX UNI: CPT

## 2020-06-22 NOTE — PHYSICAL EXAM
[] : septum deviated to the right [Nasal Endoscopy Performed] : nasal endoscopy was performed, see procedure section for findings [Midline] : trachea located in midline position [de-identified] : congestion  [Nystagmus] : ~T no ~M nystagmus was seen [Mila-Halltalke] : [place]-Hallpike: Negative [Fukuda Step Test] : Fukuda Step Test was Negative [FreeTextEntry8] : wax [FreeTextEntry9] : wax [Normal] : no masses and lesions seen, face is symmetric

## 2020-06-22 NOTE — HISTORY OF PRESENT ILLNESS
[Hearing Loss] : hearing loss [No] : patient does not have a  history of radiation therapy [Tinnitus] : tinnitus [Presbycusis] : presbycusis [Nasal Congestion] : nasal congestion [de-identified] : 81 yo female with hx of chronic nasal congestion, hearing loss and intermittent vertigo presents for follow up. States vertigo has subsided. Otherwise doing well. \par Pt has no ear pain, ear drainage, nasal discharge, epistaxis, sinus infections, facial pain, facial pressure, throat pain, dysphagia or fevers\par \par  [Anxiety] : no anxiety [Dizziness] : no dizziness [Otitis Media with Effusion] : no otitis media with effusion [Headache] : no headache [Congenital Ear Malformation] : no congenital ear malformation [Otosclerosis] : no otosclerosis [Meniere Disease] : no Meniere disease [Hypertension] : no hypertension [Loud Noise Exposure] : no history of loud noise exposure [Perilymphatic Fistula] : no perilymphatic fistula [Smoking] : no smoking [Early Onset Hearing Loss] : no early onset hearing loss [Stroke] : no stroke [Facial Pain] : no facial pain [Facial Pressure] : no facial pressure [Diplopia] : no diplopia [Ear Fullness] : no ear fullness [Allergic Rhinitis] : no allergic rhinitis [Septal Deviation] : no septal deviation [Maxillary Tooth Infection] : no maxillary tooth infection [Environmental Allergies] : no environmental allergies [Environmental Allergens] : no environmental allergens [Seasonal Allergies] : no seasonal allergies [Nasal FB Removal] : no nasal foreign body removal [Adenoidectomy] : no adenoidectomy [Asthma] : no asthma [Allergies] : no allergies [Neck Pain] : no neck pain [Neck Mass] : no neck mass [Chills] : no chills [Cold Intolerance] : no cold intolerance [Cough] : no cough [Fatigue] : no fatigue [Heat Intolerance] : no heat intolerance [Hyperthyroidism] : no hyperthyroidism [Sialadenitis] : no sialadenitis [Hodgkin Disease] : no hodgkin disease [Non-Hodgkin Lymphoma] : no non-hodgkin lymphoma [Graves Disease] : no graves disease [Tobacco Use] : no tobacco use [Alcohol Use] : no alcohol use [Thyroid Cancer] : no thyroid cancer

## 2020-06-22 NOTE — ASSESSMENT
[FreeTextEntry1] : Bilateral SNHL: \par -cleared for hearing aids \par \par Wax:\par -Cerumen is removed from the right and left  ear canal with a curette and suction.\par -Rx:Debrox be placed in both ears on a routine basis to keep them free of wax.\par -Routine debridement suggested to keep the ears free of wax.\par \par f/u 6 months or prn

## 2020-07-08 ENCOUNTER — NON-APPOINTMENT (OUTPATIENT)
Age: 80
End: 2020-07-08

## 2020-07-08 ENCOUNTER — APPOINTMENT (OUTPATIENT)
Dept: CARDIOLOGY | Facility: CLINIC | Age: 80
End: 2020-07-08
Payer: MEDICARE

## 2020-07-08 VITALS
RESPIRATION RATE: 15 BRPM | HEIGHT: 66 IN | SYSTOLIC BLOOD PRESSURE: 127 MMHG | HEART RATE: 70 BPM | WEIGHT: 143 LBS | DIASTOLIC BLOOD PRESSURE: 80 MMHG | BODY MASS INDEX: 22.98 KG/M2

## 2020-07-08 PROCEDURE — 99214 OFFICE O/P EST MOD 30 MIN: CPT

## 2020-07-08 PROCEDURE — 93000 ELECTROCARDIOGRAM COMPLETE: CPT

## 2020-07-08 NOTE — PHYSICAL EXAM
[General Appearance - Well Developed] : well developed [Well Groomed] : well groomed [General Appearance - Well Nourished] : well nourished [Normal Appearance] : normal appearance [General Appearance - In No Acute Distress] : no acute distress [Normal Conjunctiva] : the conjunctiva exhibited no abnormalities [No Deformities] : no deformities [Normal Jugular Venous V Waves Present] : normal jugular venous V waves present [Normal Jugular Venous A Waves Present] : normal jugular venous A waves present [Normal Oral Mucosa] : normal oral mucosa [No Jugular Venous Ko A Waves] : no jugular venous ko A waves [Respiration, Rhythm And Depth] : normal respiratory rhythm and effort [Abdomen Soft] : soft [Bowel Sounds] : normal bowel sounds [Auscultation Breath Sounds / Voice Sounds] : lungs were clear to auscultation bilaterally [Exaggerated Use Of Accessory Muscles For Inspiration] : no accessory muscle use [Gait - Sufficient For Exercise Testing] : the gait was sufficient for exercise testing [Abnormal Walk] : normal gait [Skin Turgor] : normal skin turgor [Cyanosis, Localized] : no localized cyanosis [Skin Color & Pigmentation] : normal skin color and pigmentation [Nail Clubbing] : no clubbing of the fingernails [Impaired Insight] : insight and judgment were intact [] : no rash [Oriented To Time, Place, And Person] : oriented to person, place, and time [Mood] : the mood was normal [Affect] : the affect was normal [5th Left ICS - MCL] : palpated at the 5th LICS in the midclavicular line [Normal Rate] : normal [No Precordial Heave] : no precordial heave was noted [Normal] : normal [No Gallop] : no gallop heard [Normal S2] : normal S2 [Rhythm Regular] : regular [Normal S1] : normal S1 [Click] : no click [S3] : no S3 [S4] : no S4 [II] : a grade 2 [I] : a grade 1 [Left Carotid Bruit] : no bruit heard over the left carotid [Right Carotid Bruit] : no bruit heard over the right carotid [Left Femoral Bruit] : no bruit heard over the left femoral artery [Right Femoral Bruit] : no bruit heard over the right femoral artery [No Pitting Edema] : no pitting edema present [2+] : left 2+ [No Abnormalities] : the abdominal aorta was not enlarged and no bruit was heard

## 2020-07-08 NOTE — REASON FOR VISIT
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 Pt stated she had palpitations in her PCP's office couple of months ago and had dizziness in the past. Pt denies current SOB, SELBY, chest pain, palpitations, or dizziness.

## 2020-07-08 NOTE — DISCUSSION/SUMMARY
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 She denies chest pain, shortness of breath, dizziness or syncope. \par Electrocardiogram done July 8, 2020 demonstrated normal sinus rhythm rate of 72 bpm is otherwise remarkable for left atrial abnormality and a right interventricular conduction delay.\par The patient had blood work today for SMA-20 and lipid panel.  She is currently stable from a cardiac standpoint.  She wants to see a neurologist regarding her headaches.\par She remains on Vytorin 10/40 mg per day, Toprol-XL 25 mg per day and aspirin 81 mg per day. \par The patient has been stable on her current dose of Toprol-XL 25 mg per day.\par The patient had a normal nuclear stress test on October 29, 2018.\par \par The patient understands that aerobic exercises must be increased to 40 minutes 4 times per week. A detailed discussion of lifestyle modification was done today. The patient has a good understanding of the diagnosis, and treatment plan. Lifestyle modification was also outlined.\par \par

## 2020-07-08 NOTE — ASSESSMENT
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 Pt stated she had palpitations in her PCP's office couple of months ago and had dizziness in the past. Pt denies current SOB, SELBY, chest pain, palpitations, or dizziness.

## 2020-07-08 NOTE — REVIEW OF SYSTEMS
[Dyspnea on exertion] : dyspnea during exertion [Shortness Of Breath] : no shortness of breath [Lower Ext Edema] : no extremity edema [Chest  Pressure] : no chest pressure [Chest Pain] : no chest pain [Leg Claudication] : no intermittent leg claudication [Palpitations] : no palpitations [Negative] : Heme/Lymph

## 2020-10-23 ENCOUNTER — APPOINTMENT (OUTPATIENT)
Dept: PULMONOLOGY | Facility: CLINIC | Age: 80
End: 2020-10-23
Payer: MEDICARE

## 2020-10-23 VITALS
HEIGHT: 66 IN | HEART RATE: 73 BPM | OXYGEN SATURATION: 98 % | TEMPERATURE: 96.4 F | WEIGHT: 140 LBS | SYSTOLIC BLOOD PRESSURE: 120 MMHG | DIASTOLIC BLOOD PRESSURE: 80 MMHG | RESPIRATION RATE: 17 BRPM | BODY MASS INDEX: 22.5 KG/M2

## 2020-10-23 PROCEDURE — 99214 OFFICE O/P EST MOD 30 MIN: CPT | Mod: 25

## 2020-10-23 NOTE — ASSESSMENT
[FreeTextEntry1] : 79 year old female Hx adenocarcinoma lung s/p resection 2017 presents for evaluation cough, mild reactive airway disease s/p resection adenocarcinoma lung\par \par \par Initiate trial of Breo-Ellipta 100-25 mcg\par Ventolin 2 pff every 4 hours if needed \par Continue Thoracic Surgery follow up/CT chest per Thoracic Surgery \par \par Follow up 4 months.

## 2020-10-23 NOTE — HISTORY OF PRESENT ILLNESS
[Former] : former [Never] : never [TextBox_4] : 79 year old female poor historian Hx mitral valve regurgitation, palpitations, hyperlipidemia, s/p resection adenocarcinoma lung 2017, reactive airway disease, presents to [facility] for follow up. Patient is using Breo-Ellipta wioth good symptom control.  She is feeling well and reports no increased respiratory complaints.

## 2020-11-18 ENCOUNTER — APPOINTMENT (OUTPATIENT)
Dept: CARDIOLOGY | Facility: CLINIC | Age: 80
End: 2020-11-18
Payer: MEDICARE

## 2020-11-18 VITALS
OXYGEN SATURATION: 96 % | RESPIRATION RATE: 15 BRPM | DIASTOLIC BLOOD PRESSURE: 79 MMHG | TEMPERATURE: 97.2 F | SYSTOLIC BLOOD PRESSURE: 125 MMHG | BODY MASS INDEX: 22.6 KG/M2 | WEIGHT: 140 LBS | HEART RATE: 84 BPM

## 2020-11-18 PROCEDURE — 99213 OFFICE O/P EST LOW 20 MIN: CPT

## 2020-11-19 RX ORDER — AZELASTINE HYDROCHLORIDE 205.5 UG/1
0.15 SPRAY, METERED NASAL
Qty: 1 | Refills: 5 | Status: COMPLETED | COMMUNITY
Start: 2020-05-12 | End: 2020-11-19

## 2020-11-24 ENCOUNTER — NON-APPOINTMENT (OUTPATIENT)
Age: 80
End: 2020-11-24

## 2021-02-19 ENCOUNTER — APPOINTMENT (OUTPATIENT)
Dept: PULMONOLOGY | Facility: CLINIC | Age: 81
End: 2021-02-19

## 2021-02-24 ENCOUNTER — RESULT REVIEW (OUTPATIENT)
Age: 81
End: 2021-02-24

## 2021-03-15 ENCOUNTER — NON-APPOINTMENT (OUTPATIENT)
Age: 81
End: 2021-03-15

## 2021-03-15 ENCOUNTER — APPOINTMENT (OUTPATIENT)
Dept: CARDIOLOGY | Facility: CLINIC | Age: 81
End: 2021-03-15
Payer: MEDICARE

## 2021-03-15 VITALS
RESPIRATION RATE: 16 BRPM | WEIGHT: 147 LBS | HEART RATE: 72 BPM | HEIGHT: 66 IN | BODY MASS INDEX: 23.63 KG/M2 | DIASTOLIC BLOOD PRESSURE: 80 MMHG | SYSTOLIC BLOOD PRESSURE: 124 MMHG

## 2021-03-15 PROCEDURE — 93000 ELECTROCARDIOGRAM COMPLETE: CPT

## 2021-03-15 PROCEDURE — 99213 OFFICE O/P EST LOW 20 MIN: CPT

## 2021-03-16 RX ORDER — FLUTICASONE FUROATE AND VILANTEROL TRIFENATATE 100; 25 UG/1; UG/1
100-25 POWDER RESPIRATORY (INHALATION) DAILY
Qty: 1 | Refills: 3 | Status: COMPLETED | COMMUNITY
Start: 2020-03-16 | End: 2021-03-16

## 2021-04-15 ENCOUNTER — APPOINTMENT (OUTPATIENT)
Dept: PULMONOLOGY | Facility: CLINIC | Age: 81
End: 2021-04-15
Payer: MEDICARE

## 2021-04-15 VITALS
TEMPERATURE: 97.6 F | SYSTOLIC BLOOD PRESSURE: 120 MMHG | DIASTOLIC BLOOD PRESSURE: 74 MMHG | OXYGEN SATURATION: 98 % | HEART RATE: 86 BPM | RESPIRATION RATE: 16 BRPM | BODY MASS INDEX: 23.63 KG/M2 | WEIGHT: 147 LBS | HEIGHT: 66 IN

## 2021-04-15 PROCEDURE — 99214 OFFICE O/P EST MOD 30 MIN: CPT

## 2021-04-22 ENCOUNTER — EMERGENCY (EMERGENCY)
Facility: HOSPITAL | Age: 81
LOS: 1 days | Discharge: HOME | End: 2021-04-22
Attending: EMERGENCY MEDICINE | Admitting: EMERGENCY MEDICINE
Payer: MEDICARE

## 2021-04-22 VITALS
HEART RATE: 80 BPM | OXYGEN SATURATION: 97 % | SYSTOLIC BLOOD PRESSURE: 157 MMHG | RESPIRATION RATE: 15 BRPM | TEMPERATURE: 98 F | DIASTOLIC BLOOD PRESSURE: 87 MMHG

## 2021-04-22 PROCEDURE — 99282 EMERGENCY DEPT VISIT SF MDM: CPT

## 2021-04-22 RX ORDER — ACETAMINOPHEN 500 MG
650 TABLET ORAL ONCE
Refills: 0 | Status: COMPLETED | OUTPATIENT
Start: 2021-04-22 | End: 2021-04-22

## 2021-04-22 RX ORDER — LIDOCAINE 4 G/100G
1 CREAM TOPICAL ONCE
Refills: 0 | Status: COMPLETED | OUTPATIENT
Start: 2021-04-22 | End: 2021-04-22

## 2021-04-22 RX ADMIN — LIDOCAINE 1 PATCH: 4 CREAM TOPICAL at 23:12

## 2021-04-22 RX ADMIN — Medication 650 MILLIGRAM(S): at 23:11

## 2021-04-22 NOTE — ED PROVIDER NOTE - PHYSICAL EXAMINATION
Vital signs reviewed.   CONSTITUTIONAL: Well-appearing; well-nourished; in no apparent distress. Non-toxic appearing.   HEAD: Normocephalic, atraumatic.  EYES: PERRL, EOM intact, visual fields intact, normal conjunctiva and no sclera injection noted  ENT: normal nose; no rhinorrhea  NECK: No midline tenderness. DROM. Tenderness to RT trapezius  CARD: Normal S1, S2  RESP: Normal chest excursion with respiration; breath sounds clear and equal bilaterally.  ABD/GI: soft, non-distended; non-tender.  EXT/MS: moves all extremities; distal pulses are normal, no pedal edema.  SKIN: Normal for age and race; warm; dry; good turgor; no apparent lesions or exudate noted.  NEURO: Awake, alert, oriented x 3, no gross deficits, CN II-XII grossly intact, no motor or sensory deficit noted. Tandem walk intact. Romberg negative. Finger to nose intact  PSYCH: Normal mood; appropriate affect.

## 2021-04-22 NOTE — ED PROVIDER NOTE - NSFOLLOWUPINSTRUCTIONS_ED_ALL_ED_FT
1) Follow up with your provider for further management  2) Return to the ED immediately for new or worsening symptoms including chest pain, shortness of breath, nausea/vomiting or dizziness.  3) Please continue to take any home medications as prescribed. Take Tylenol 325 mg every 4 hours for pain relief/fever control or ibuprofen 600 mg every 6 hours for pain relief/fever control  4) Your test results from your ED visit were discussed with you prior to discharge  5) You were provided with a copy of your test results

## 2021-04-22 NOTE — ED ADULT TRIAGE NOTE - ESI TRIAGE ACUITY LEVEL, MLM
Restorative Specialist Mobility Note       Activity: Other (Comment) (Educated/encouraged pt to ambulate with assistance 3-4 x's/day, pt refused 2* feeling too tired nursing aware  Chair alarm on   Pt callbell, phone/tray within reach )       [employer] BS, Restorative Technician, [employer] 4

## 2021-04-22 NOTE — ED ADULT TRIAGE NOTE - CHIEF COMPLAINT QUOTE
alert oriented c/o left neck pain started this AM   denies injury  no hx neck pain  hx heart murmur  high cholesterol   no c/o weakness dizziness or blurry vision

## 2021-04-22 NOTE — ED PROVIDER NOTE - OBJECTIVE STATEMENT
79 Y/O F w/ PMH of lung nodule presents to ER w/ atraumatic RT sided neck pain. Admits to pain of RT sided of neck after getting up from bed this morning and quickly turning her head. Admits to pain w/ rotation and movement of neck. Took hot shower w/ no relief. No use of OTC medications. No hx of surgical repair/fracture. Denies fever, nausea/vomiting, dizziness, headache, tinnitus, visual/hearing change, scotoma, chest pain, shortness of breath, syncope. No hx of CVA/MI. Not a smoker

## 2021-04-22 NOTE — ED PROVIDER NOTE - CLINICAL SUMMARY MEDICAL DECISION MAKING FREE TEXT BOX
79 Y/O F w/ PMH of lung nodule presents to ER w/ atraumatic RT sided neck pain. 79 Y/O F w/ PMH of lung nodule presents to ER w/ atraumatic RT sided neck pain.  Pain management  Ortho clinic/PMD follow up  Return precautions

## 2021-04-22 NOTE — ED PROVIDER NOTE - NS ED ROS FT
Constitutional: (-) fever   Head: Normal cephalic, Atraumatic  Neck: (+) pain  Eyes/ENT: (-) vision changes  Cardiovascular: (-) chest pain, (-) wheezing  Respiratory: (-) cough, (-) shortness of breath  Gastrointestinal: (-) vomiting, (-) diarrhea, (-) abdominal pain  : (-) dysuria   Musculoskeletal: (-) back pain  Integumentary: (-) rash, (-) edema  Neurological: (-)loc  Allergic/Immunologic: (-) pruritus

## 2021-04-22 NOTE — ED PROVIDER NOTE - ATTENDING CONTRIBUTION TO CARE
Seen and examined, states went to bed with no sx, then awoke when turned head and had sudden severe neck pains, no improvement with shower at home, felt more stiff and came to ED. No focal weakness/numbness, painful movements of neck to rotation and flexion, no radiation of pain down arm or spine, sl. rad. to back of head. Denies trauma/injury, no blood thinner use, MEGAN, EOMI, neck NT spine, mild R paraspinal tenderness, able to rotate to L and extend but painful flexion and R rotation, clear lungs, heart reg, abd soft, NT to palp, no CVAT, ambulates easily, strength 5/5, good pulses.

## 2021-04-22 NOTE — ED PROVIDER NOTE - PATIENT PORTAL LINK FT
You can access the FollowMyHealth Patient Portal offered by [facility] by registering at the following website: [email or web address]. By joining [facility]’s FollowMyHealth portal, you will also be able to view your health information using other applications (apps) compatible with our system.

## 2021-04-27 ENCOUNTER — APPOINTMENT (OUTPATIENT)
Dept: OTOLARYNGOLOGY | Facility: CLINIC | Age: 81
End: 2021-04-27
Payer: MEDICARE

## 2021-04-27 VITALS
HEART RATE: 67 BPM | SYSTOLIC BLOOD PRESSURE: 150 MMHG | WEIGHT: 147 LBS | DIASTOLIC BLOOD PRESSURE: 83 MMHG | BODY MASS INDEX: 23.63 KG/M2 | TEMPERATURE: 97.3 F | HEIGHT: 66 IN

## 2021-04-27 DIAGNOSIS — M25.519 PAIN IN UNSPECIFIED SHOULDER: ICD-10-CM

## 2021-04-27 PROCEDURE — 99213 OFFICE O/P EST LOW 20 MIN: CPT

## 2021-04-27 PROCEDURE — 99072 ADDL SUPL MATRL&STAF TM PHE: CPT

## 2021-04-27 NOTE — HISTORY OF PRESENT ILLNESS
[de-identified] : Patient with hx of hearing loss complains she woke up Saturday morning with pain in her shoulder she couldn’t move or lay down. Went to the the ED, was told that her muscles were frozen, told her to exercise and take Tylenol for pain. She is feeling better today, but pain is still there. Pt has no ear pain, ear drainage, tinnitus, vertigo, nasal congestion, nasal discharge, epistaxis, sinus infections, facial pain, facial pressure, throat pain, dysphagia or fevers\par \par

## 2021-04-27 NOTE — ASSESSMENT
[FreeTextEntry1] : Patient presents with a right-sided the ear pain or shoulder pain neck pain she is not sure woke up and had a which equals her frozen shoulder her examination ENT wise is normal I have recommended strongly to see if she see an orthopedist for further evaluation.\par \par I personally saw and examined [ ] in detail. I have spoken to Estrellita JANE regarding the assessment and plan of care. I reviewed the above assessment and plan of care, and agree. I have made changes in the body of the note where appropriate.\par

## 2021-05-06 ENCOUNTER — INPATIENT (INPATIENT)
Facility: HOSPITAL | Age: 81
LOS: 11 days | Discharge: NURSING, REHAB OR LONG-TERM CARE | DRG: 884 | End: 2021-05-18
Attending: INTERNAL MEDICINE | Admitting: INTERNAL MEDICINE
Payer: MEDICARE

## 2021-05-06 VITALS
RESPIRATION RATE: 18 BRPM | HEIGHT: 67 IN | HEART RATE: 100 BPM | WEIGHT: 149.91 LBS | SYSTOLIC BLOOD PRESSURE: 123 MMHG | OXYGEN SATURATION: 98 % | DIASTOLIC BLOOD PRESSURE: 88 MMHG | TEMPERATURE: 99 F

## 2021-05-06 DIAGNOSIS — R41.82 ALTERED MENTAL STATUS, UNSPECIFIED: ICD-10-CM

## 2021-05-06 LAB
ALBUMIN SERPL ELPH-MCNC: 4.2 G/DL — SIGNIFICANT CHANGE UP (ref 3.3–5)
ALP SERPL-CCNC: 85 U/L — SIGNIFICANT CHANGE UP (ref 40–120)
ALT FLD-CCNC: 23 U/L — SIGNIFICANT CHANGE UP (ref 10–45)
ANION GAP SERPL CALC-SCNC: 16 MMOL/L — SIGNIFICANT CHANGE UP (ref 5–17)
ANION GAP SERPL CALC-SCNC: 19 MMOL/L — HIGH (ref 5–17)
APPEARANCE UR: CLEAR — SIGNIFICANT CHANGE UP
APTT BLD: 25.9 SEC — LOW (ref 27.5–35.5)
AST SERPL-CCNC: 34 U/L — SIGNIFICANT CHANGE UP (ref 10–40)
BASE EXCESS BLDV CALC-SCNC: 3.4 MMOL/L — HIGH (ref -2–2)
BASOPHILS # BLD AUTO: 0.02 K/UL — SIGNIFICANT CHANGE UP (ref 0–0.2)
BASOPHILS NFR BLD AUTO: 0.2 % — SIGNIFICANT CHANGE UP (ref 0–2)
BILIRUB SERPL-MCNC: 1 MG/DL — SIGNIFICANT CHANGE UP (ref 0.2–1.2)
BILIRUB UR-MCNC: NEGATIVE — SIGNIFICANT CHANGE UP
BUN SERPL-MCNC: 40 MG/DL — HIGH (ref 7–23)
BUN SERPL-MCNC: 42 MG/DL — HIGH (ref 7–23)
CA-I SERPL-SCNC: 1.26 MMOL/L — SIGNIFICANT CHANGE UP (ref 1.12–1.3)
CALCIUM SERPL-MCNC: 10.6 MG/DL — HIGH (ref 8.4–10.5)
CALCIUM SERPL-MCNC: 9.8 MG/DL — SIGNIFICANT CHANGE UP (ref 8.4–10.5)
CHLORIDE BLDV-SCNC: 113 MMOL/L — HIGH (ref 96–108)
CHLORIDE SERPL-SCNC: 106 MMOL/L — SIGNIFICANT CHANGE UP (ref 96–108)
CHLORIDE SERPL-SCNC: 110 MMOL/L — HIGH (ref 96–108)
CK SERPL-CCNC: 428 U/L — HIGH (ref 25–170)
CO2 BLDV-SCNC: 30 MMOL/L — SIGNIFICANT CHANGE UP (ref 22–30)
CO2 SERPL-SCNC: 24 MMOL/L — SIGNIFICANT CHANGE UP (ref 22–31)
CO2 SERPL-SCNC: 25 MMOL/L — SIGNIFICANT CHANGE UP (ref 22–31)
COLOR SPEC: YELLOW — SIGNIFICANT CHANGE UP
CREAT SERPL-MCNC: 0.72 MG/DL — SIGNIFICANT CHANGE UP (ref 0.5–1.3)
CREAT SERPL-MCNC: 0.74 MG/DL — SIGNIFICANT CHANGE UP (ref 0.5–1.3)
D DIMER BLD IA.RAPID-MCNC: 3671 NG/ML DDU — HIGH
DIFF PNL FLD: NEGATIVE — SIGNIFICANT CHANGE UP
EOSINOPHIL # BLD AUTO: 0 K/UL — SIGNIFICANT CHANGE UP (ref 0–0.5)
EOSINOPHIL NFR BLD AUTO: 0 % — SIGNIFICANT CHANGE UP (ref 0–6)
GAS PNL BLDV: 143 MMOL/L — SIGNIFICANT CHANGE UP (ref 135–145)
GAS PNL BLDV: SIGNIFICANT CHANGE UP
GLUCOSE BLDV-MCNC: 121 MG/DL — HIGH (ref 70–99)
GLUCOSE SERPL-MCNC: 114 MG/DL — HIGH (ref 70–99)
GLUCOSE SERPL-MCNC: 117 MG/DL — HIGH (ref 70–99)
GLUCOSE UR QL: NEGATIVE — SIGNIFICANT CHANGE UP
HCO3 BLDV-SCNC: 29 MMOL/L — SIGNIFICANT CHANGE UP (ref 21–29)
HCT VFR BLD CALC: 42.3 % — SIGNIFICANT CHANGE UP (ref 34.5–45)
HCT VFR BLDA CALC: 44 % — SIGNIFICANT CHANGE UP (ref 39–50)
HGB BLD CALC-MCNC: 14.4 G/DL — SIGNIFICANT CHANGE UP (ref 11.5–15.5)
HGB BLD-MCNC: 13.7 G/DL — SIGNIFICANT CHANGE UP (ref 11.5–15.5)
IMM GRANULOCYTES NFR BLD AUTO: 0.5 % — SIGNIFICANT CHANGE UP (ref 0–1.5)
INR BLD: 1.13 RATIO — SIGNIFICANT CHANGE UP (ref 0.88–1.16)
KETONES UR-MCNC: ABNORMAL
LACTATE BLDV-MCNC: 3.1 MMOL/L — HIGH (ref 0.7–2)
LEUKOCYTE ESTERASE UR-ACNC: NEGATIVE — SIGNIFICANT CHANGE UP
LYMPHOCYTES # BLD AUTO: 1.33 K/UL — SIGNIFICANT CHANGE UP (ref 1–3.3)
LYMPHOCYTES # BLD AUTO: 10.4 % — LOW (ref 13–44)
MCHC RBC-ENTMCNC: 30.6 PG — SIGNIFICANT CHANGE UP (ref 27–34)
MCHC RBC-ENTMCNC: 32.4 GM/DL — SIGNIFICANT CHANGE UP (ref 32–36)
MCV RBC AUTO: 94.4 FL — SIGNIFICANT CHANGE UP (ref 80–100)
MONOCYTES # BLD AUTO: 1.2 K/UL — HIGH (ref 0–0.9)
MONOCYTES NFR BLD AUTO: 9.4 % — SIGNIFICANT CHANGE UP (ref 2–14)
NEUTROPHILS # BLD AUTO: 10.21 K/UL — HIGH (ref 1.8–7.4)
NEUTROPHILS NFR BLD AUTO: 79.5 % — HIGH (ref 43–77)
NITRITE UR-MCNC: NEGATIVE — SIGNIFICANT CHANGE UP
NRBC # BLD: 0 /100 WBCS — SIGNIFICANT CHANGE UP (ref 0–0)
OTHER CELLS CSF MANUAL: 7 ML/DL — LOW (ref 18–22)
PCO2 BLDV: 50 MMHG — SIGNIFICANT CHANGE UP (ref 35–50)
PH BLDV: 7.38 — SIGNIFICANT CHANGE UP (ref 7.35–7.45)
PH UR: 6 — SIGNIFICANT CHANGE UP (ref 5–8)
PLATELET # BLD AUTO: 270 K/UL — SIGNIFICANT CHANGE UP (ref 150–400)
PO2 BLDV: 23 MMHG — LOW (ref 25–45)
POTASSIUM BLDV-SCNC: 3.4 MMOL/L — LOW (ref 3.5–5.3)
POTASSIUM SERPL-MCNC: 3.5 MMOL/L — SIGNIFICANT CHANGE UP (ref 3.5–5.3)
POTASSIUM SERPL-MCNC: 3.7 MMOL/L — SIGNIFICANT CHANGE UP (ref 3.5–5.3)
POTASSIUM SERPL-SCNC: 3.5 MMOL/L — SIGNIFICANT CHANGE UP (ref 3.5–5.3)
POTASSIUM SERPL-SCNC: 3.7 MMOL/L — SIGNIFICANT CHANGE UP (ref 3.5–5.3)
PROCALCITONIN SERPL-MCNC: 0.13 NG/ML — HIGH (ref 0.02–0.1)
PROT SERPL-MCNC: 7.5 G/DL — SIGNIFICANT CHANGE UP (ref 6–8.3)
PROT UR-MCNC: 100 — SIGNIFICANT CHANGE UP
PROTHROM AB SERPL-ACNC: 13.5 SEC — SIGNIFICANT CHANGE UP (ref 10.6–13.6)
RAPID RVP RESULT: SIGNIFICANT CHANGE UP
RBC # BLD: 4.48 M/UL — SIGNIFICANT CHANGE UP (ref 3.8–5.2)
RBC # FLD: 14 % — SIGNIFICANT CHANGE UP (ref 10.3–14.5)
SAO2 % BLDV: 34 % — LOW (ref 67–88)
SARS-COV-2 RNA SPEC QL NAA+PROBE: SIGNIFICANT CHANGE UP
SODIUM SERPL-SCNC: 150 MMOL/L — HIGH (ref 135–145)
SODIUM SERPL-SCNC: 150 MMOL/L — HIGH (ref 135–145)
SP GR SPEC: 1.03 — HIGH (ref 1.01–1.02)
TROPONIN T, HIGH SENSITIVITY RESULT: 37 NG/L — SIGNIFICANT CHANGE UP (ref 0–51)
UROBILINOGEN FLD QL: NEGATIVE — SIGNIFICANT CHANGE UP
WBC # BLD: 12.83 K/UL — HIGH (ref 3.8–10.5)
WBC # FLD AUTO: 12.83 K/UL — HIGH (ref 3.8–10.5)

## 2021-05-06 PROCEDURE — 93970 EXTREMITY STUDY: CPT | Mod: 26

## 2021-05-06 PROCEDURE — 72192 CT PELVIS W/O DYE: CPT | Mod: 26

## 2021-05-06 PROCEDURE — 99285 EMERGENCY DEPT VISIT HI MDM: CPT | Mod: GC

## 2021-05-06 PROCEDURE — 73502 X-RAY EXAM HIP UNI 2-3 VIEWS: CPT | Mod: 26,RT

## 2021-05-06 PROCEDURE — 72125 CT NECK SPINE W/O DYE: CPT | Mod: 26,MG

## 2021-05-06 PROCEDURE — 72131 CT LUMBAR SPINE W/O DYE: CPT | Mod: 26,MG

## 2021-05-06 PROCEDURE — 71250 CT THORAX DX C-: CPT | Mod: 26,59

## 2021-05-06 PROCEDURE — 71275 CT ANGIOGRAPHY CHEST: CPT | Mod: 26

## 2021-05-06 PROCEDURE — 70450 CT HEAD/BRAIN W/O DYE: CPT | Mod: 26,MG

## 2021-05-06 PROCEDURE — G1004: CPT

## 2021-05-06 PROCEDURE — 93010 ELECTROCARDIOGRAM REPORT: CPT | Mod: GC

## 2021-05-06 PROCEDURE — 71045 X-RAY EXAM CHEST 1 VIEW: CPT | Mod: 26

## 2021-05-06 RX ORDER — METOPROLOL TARTRATE 50 MG
12.5 TABLET ORAL
Refills: 0 | Status: DISCONTINUED | OUTPATIENT
Start: 2021-05-06 | End: 2021-05-18

## 2021-05-06 RX ORDER — SODIUM CHLORIDE 9 MG/ML
2000 INJECTION INTRAMUSCULAR; INTRAVENOUS; SUBCUTANEOUS ONCE
Refills: 0 | Status: COMPLETED | OUTPATIENT
Start: 2021-05-06 | End: 2021-05-06

## 2021-05-06 RX ORDER — ASPIRIN/CALCIUM CARB/MAGNESIUM 324 MG
81 TABLET ORAL DAILY
Refills: 0 | Status: DISCONTINUED | OUTPATIENT
Start: 2021-05-06 | End: 2021-05-18

## 2021-05-06 RX ORDER — SODIUM CHLORIDE 9 MG/ML
1000 INJECTION, SOLUTION INTRAVENOUS
Refills: 0 | Status: DISCONTINUED | OUTPATIENT
Start: 2021-05-06 | End: 2021-05-07

## 2021-05-06 RX ORDER — ATORVASTATIN CALCIUM 80 MG/1
20 TABLET, FILM COATED ORAL AT BEDTIME
Refills: 0 | Status: DISCONTINUED | OUTPATIENT
Start: 2021-05-06 | End: 2021-05-18

## 2021-05-06 RX ORDER — ATORVASTATIN CALCIUM 80 MG/1
10 TABLET, FILM COATED ORAL AT BEDTIME
Refills: 0 | Status: DISCONTINUED | OUTPATIENT
Start: 2021-05-06 | End: 2021-05-06

## 2021-05-06 RX ORDER — ENOXAPARIN SODIUM 100 MG/ML
40 INJECTION SUBCUTANEOUS DAILY
Refills: 0 | Status: DISCONTINUED | OUTPATIENT
Start: 2021-05-06 | End: 2021-05-11

## 2021-05-06 RX ORDER — THIAMINE MONONITRATE (VIT B1) 100 MG
100 TABLET ORAL ONCE
Refills: 0 | Status: COMPLETED | OUTPATIENT
Start: 2021-05-06 | End: 2021-05-06

## 2021-05-06 RX ORDER — PANTOPRAZOLE SODIUM 20 MG/1
40 TABLET, DELAYED RELEASE ORAL DAILY
Refills: 0 | Status: DISCONTINUED | OUTPATIENT
Start: 2021-05-06 | End: 2021-05-18

## 2021-05-06 RX ORDER — SODIUM CHLORIDE 9 MG/ML
1000 INJECTION, SOLUTION INTRAVENOUS
Refills: 0 | Status: DISCONTINUED | OUTPATIENT
Start: 2021-05-06 | End: 2021-05-08

## 2021-05-06 RX ADMIN — SODIUM CHLORIDE 120 MILLILITER(S): 9 INJECTION, SOLUTION INTRAVENOUS at 16:30

## 2021-05-06 RX ADMIN — ENOXAPARIN SODIUM 40 MILLIGRAM(S): 100 INJECTION SUBCUTANEOUS at 23:10

## 2021-05-06 RX ADMIN — Medication 12.5 MILLIGRAM(S): at 22:48

## 2021-05-06 RX ADMIN — ATORVASTATIN CALCIUM 20 MILLIGRAM(S): 80 TABLET, FILM COATED ORAL at 22:45

## 2021-05-06 RX ADMIN — SODIUM CHLORIDE 120 MILLILITER(S): 9 INJECTION, SOLUTION INTRAVENOUS at 22:45

## 2021-05-06 RX ADMIN — SODIUM CHLORIDE 2000 MILLILITER(S): 9 INJECTION INTRAMUSCULAR; INTRAVENOUS; SUBCUTANEOUS at 13:30

## 2021-05-06 RX ADMIN — SODIUM CHLORIDE 2000 MILLILITER(S): 9 INJECTION INTRAMUSCULAR; INTRAVENOUS; SUBCUTANEOUS at 15:00

## 2021-05-06 RX ADMIN — Medication 100 MILLIGRAM(S): at 15:18

## 2021-05-06 NOTE — ED PROVIDER NOTE - PROGRESS NOTE DETAILS
Silvio Gonsalez,  PGY-2: hypernatremic - dc'ed normal saline at 800cc. Started on D50.45NaCl. Ortho paged for fx. Discussed case with Dr. Ortez - agrees with admission. troponin is added.

## 2021-05-06 NOTE — ED PROVIDER NOTE - CLINICAL SUMMARY MEDICAL DECISION MAKING FREE TEXT BOX
Unk pmhx was on floor for at least three days. Unable to lift her RT leg. Sacral ulcer. Eval for rhabdo, head bleed, UTI, bacteremia, lumbar/sacral fx, elextrolyte abnormality. Labs, EKG, CXR, imaging. Will need to be admitted for continue care.

## 2021-05-06 NOTE — CONSULT NOTE ADULT - ASSESSMENT
78F w/ unknown medical history brought in by EMS after being found on the sitting in urine and a fall. EP called for question of syncope. She does not have a known episode of witnessed syncope. ECG showed sinus rhythm with borderline tachycardia, left axis and LVH. Currently, altered and unable to participate in interview.    Recs:  - tele  - echo  - neuro workup  - non-cardiogenic workup for syncope  - please obtain outpatient cardiac records if available  - contact (friend): Gary Baeza - [phone]; [phone]    Fabian Babcock MD  Cardiology Fellow PGY-4  [facility]    Notes are not final until signed by attending  For all consults and questions:  [email or web address]   Login: [employer] 78F w/ unknown medical history brought in by EMS after being found on the sitting in urine and a fall. EP called for question of syncope. She does not have a known episode of witnessed syncope. ECG showed sinus rhythm with borderline tachycardia, left axis and LVH. Currently, altered and unable to participate in interview.    Recs:  - tele  - echo  - neuro/metabolic workup given AMS and hypernatremia  - non-cardiogenic workup for syncope  - please obtain outpatient cardiac records if available  - contact (friend): Gary Baeza - [phone]; [phone]    Fabina Babcock MD  Cardiology Fellow PGY-4  [facility]    Notes are not final until signed by attending  For all consults and questions:  [email or web address]   Login: [email or web address] 78F w/ unknown medical history brought in by EMS after being found on the floor sitting in urine and a fall. EP called for question of syncope. She does not have a known episode of witnessed syncope. ECG showed sinus rhythm with borderline tachycardia, left axis and LVH. Currently, altered and unable to participate in interview.    Recs:  - tele  - echo  - neuro/metabolic workup given AMS and hypernatremia  - non-cardiogenic workup for syncope  - please obtain outpatient cardiac records if available  - contact (friend): Gary Baeza - [phone]; [phone]    Fabian Babcock MD  Cardiology Fellow PGY-4  [facility]    Notes are not final until signed by attending  For all consults and questions:  [email or web address]   Login: [employer]

## 2021-05-06 NOTE — ED PROVIDER NOTE - PHYSICAL EXAMINATION
Gen: non toxic appearing, NAD  Head: NC/NT  Eyes: PERRL  ENT: airway patent, mmm   CV: RRR, +S1/S2   Resp: CTAB, symmetric breath sounds   GI:  abdomen soft non-distended, NTTP, hernia appreciated in epigastric region  Back: no spinal ttp, +about 5*5cm sacral ulcer   Extremities -  no edema, unable to lift the RT lower extremity without ecchymosis or obvious deformity, no ttp  Neuro: A&Ox4, following commands, speech clear, moving all four extremities spontaneously

## 2021-05-06 NOTE — ED PROVIDER NOTE - ATTENDING CONTRIBUTION TO CARE
Attending MD Asher.  Agree with above.  Pt is a 79 yo fem with unknown pmhx on arrival who presents BIBEMS after she was found down on floor covered in urine. Pt making multiple statements re: etiology of fall/hx of events however they vary with relation to time and situation and she is not apparently oriented.  When asked the year pt states 2002.  Pt unclear as to how she ended up on the floor or how long she's been there.  Pt endorses back pain.  On exam pt has no resp distress but is noted to be belly breathing.  She is not hypoxic.  On exam pt has marked weakness of RLE > LLE.  When rolled pt has a large stage II sacral decubitus ulcer c/w acute ulceration.      Family called after initial assessment and state that pt was seen for dinner on Saturday but has not been seen/heard from since Monday (4days) when friend tried to reach her.  Concern for spinal fx 2/2 RLE weakness and back pain with apparent prolonged down time on lower back.  Also concern for UTI on arrival.     Pt with mildly elevated CK inconsistent with severe rhabdo.  Low grade elevated temp.  UA inconsistent with UTI.  RVP pending.  CXR largely non-actionable.  Pt with acute R pubic ramus fx and sacral fx.  Radiology recommending CT of pelvis.  Pt hemodynamically stable at time of signout to incoming team pending CT pelvis.

## 2021-05-06 NOTE — H&P ADULT - HISTORY OF PRESENT ILLNESS
79 yo F with unk pmhx presents with EMS - found on floor sitting in urine. Pt states she was getting out of the bed and fell on the ground. Does not recall how long she was on the ground. Complains of back pain.   History obtained from friend, Gary Baeza - [phone]; [phone]: Saw her on Saturday evening for family dinner. Usually calls every day. Did not return calls since Monday (4days ago). Went to check in on today. Has been forgetful over past some time. MRI and some other tests. Was at [facility] 3 weeks ago for stiff neck - sent home. Goes to a lot of doctors - not sure which ones. Has been following with orthopedics for hip problems. Has not talked to her children for many years. Does not drink.   Dr. Millan - cardiologist, but says she has no cardiac probelms. alert to name and place

## 2021-05-06 NOTE — H&P ADULT - NSHPPHYSICALEXAM_GEN_ALL_CORE
T(F): 97.5 (05-06-21 @ 16:49), Max: 99.3 (05-06-21 @ 13:40)  HR: 92 (05-06-21 @ 16:49) (92 - 100)  BP: 149/56 (05-06-21 @ 16:49) (123/88 - 149/56)  RR: 18 (05-06-21 @ 16:49) (18 - 18)  SpO2: 94% (05-06-21 @ 16:49) (94% - 98%)    PHYSICAL EXAM:  GENERAL: NAD, well-developed  HEAD:  Atraumatic, Normocephalic  EYES: EOMI, PERRLA, conjunctiva and sclera clear  NECK: Supple, No JVD  CHEST/LUNG: Clear to auscultation bilaterally; No wheeze  HEART: Regular rate and rhythm; No murmurs, rubs, or gallops  ABDOMEN: Soft, Nontender, Nondistended; Bowel sounds present  EXTREMITIES:  2+ Peripheral Pulses, No clubbing, cyanosis, or edema  PSYCH: AAOx3  NEUROLOGY: non-focal  SKIN: No rashes or lesions

## 2021-05-06 NOTE — ED ADULT NURSE NOTE - NSIMPLEMENTINTERV_GEN_ALL_ED
Implemented All Fall Risk Interventions:  [place] to call system. Call bell, personal items and telephone within reach. Instruct patient to call for assistance. Room bathroom lighting operational. Non-slip footwear when patient is off stretcher. Physically safe environment: no spills, clutter or unnecessary equipment. Stretcher in lowest position, wheels locked, appropriate side rails in place. Provide visual cue, wrist band, yellow gown, etc. Monitor gait and stability. Monitor for mental status changes and reorient to person, place, and time. Review medications for side effects contributing to fall risk. Reinforce activity limits and safety measures with patient and family.

## 2021-05-06 NOTE — ED ADULT NURSE NOTE - OBJECTIVE STATEMENT
Pt is a 80 year old female BIBA from home, Per EMS pt found down in urine for unknown amount of time with AMS.  Pt a and o x 1, usual baseline a and o 4.  Pt unable to lift right leg or move right leg.  No deficit with right arm.  Pt unable to hold left leg up.  No deficit on left arm.  Pt turned, stage 2 ulcer on sacrum.  Stage 1 all over buttocks thighs, and upper right shoulder.  Pt lined, labs and cultures sent.  Urine sent.  Mon placed per dr's orders.  Pt on cardiac monitor, sinus tach noted.  Code sepsis called, fluid bolus running.

## 2021-05-06 NOTE — ED PROVIDER NOTE - OBJECTIVE STATEMENT
77 yo F with unk pmhx presents with EMS - found on floor sitting in urine. Pt states she was getting out of the bed and fell on the ground. Does not recall how long she was on the ground. Complains of back pain.   History obtained from friend, Gary Baeza - [phone]; [phone]: Saw her on Saturday evening for family dinner. Usually calls every day. Did not return calls since Monday (4ds). Went to check in on today. Has been forgetful over past some time. MRI and some other tests. Was here at [facility] for stiff neck - sent home. Goes to a lot of doctors - not sure which ones. Has been following with orthopedics for hip problems. Has not talked to her children for many years. Does not drink.   Dr. Millan - cardiologist

## 2021-05-06 NOTE — ED ADULT TRIAGE NOTE - CHIEF COMPLAINT QUOTE
fall, c/o RLE pain  Patient unable to recall fall fall, AMS c/o RLE pain  Patient unable to recall fall

## 2021-05-06 NOTE — H&P ADULT - ASSESSMENT
79 yo F with unk pmhx presents with EMS - found on floor sitting in urine. Pt states she was getting out of the bed and fell on the ground. Does not recall how long she was on the ground. Complains of back pain.   History obtained from friend, Gary Baeza - [phone]; [phone]: Saw her on Saturday evening for family dinner. Usually calls every day. Did not return calls since Monday (4days ago). Went to check in on today. Has been forgetful over past some time. MRI and some other tests. Was at [facility] 3 weeks ago for stiff neck - sent home. Goes to a lot of doctors - not sure which ones. Has been following with orthopedics for hip problems. Has not talked to her children for many years. Does not drink.   Dr. Millan - cardiologist, but says she has no cardiac probelms. alert to name and place     ER vitals:  Tmax 99.3, P 100, /88.  WBC 12.8.  PCT 0.13.  Lact 3.1.  UA (-).  RVP (-).  Covid pcr (-).  Ucx (-).  CT chest no pna, partial atelectasis of b/l lower lobes, RML mucoid impaction.  CTA chest limited evaluation for segmental and subsegmental pulmonary embolism. No main, left right, or lobar pulmonary embolism.  LE doppler (-) DVT.  CT pelvis with acute fractures of the right superior and inferior pubic rami and of the right sacral ala.    SIRS:  - Low grade temp, borderline tachycardia (P 100), elevated WBC and lactate.  Thus far infectious w/u neg including Ucx, Covid/RVP testing.  CT chest no pna, shows probably RML mucoid impaction.  - Pt with stage 2 sacral wound, seen by Wound care.  Cont offloading and topical therapy, no acute infection/cellulitis.  - Cont to monitor WBC and temp curve.   - CTA chest and LE doppler (-) for PE/DVT.  - CT pelvis with sacral fracture.  Ortho eval, cont pain control  - Check orthostatics.    Hypernatremia  - cont IVF, cpt BMP pending. volume depleted    Pelvic and Sacral Fractures  - ortho input appreciated, PT ordered    dvt ppx w sc lovenox

## 2021-05-06 NOTE — CONSULT NOTE ADULT - SUBJECTIVE AND OBJECTIVE BOX
Patient seen and evaluated at bedside    Reason for consult: syncope    HPI:  HPI obtained via chart and primary team due to AMS.   78F w/ unknown medical history brought in by EMS after being found on the sitting in urine and a fall. Does not recall how long she was on the ground. Her friend (Gary Baeza - [phone]; [phone]) saw her on Saturday evening for family dinner. She did not receive any calls since 4 days prior. She checked in on her today and called EMS. She is noted to be forgetful over some time. In the ED, she is not able to participate due to AMS. ECG showed sinus tach to 90s, left axis and borderline LVH.     PMHx:   No known PMH        PSHx:   No significant past surgical history        Allergies:  No Known Allergies      Home Meds:    Current Medications:   aspirin enteric coated 81 milliGRAM(s) Oral daily  atorvastatin 20 milliGRAM(s) Oral at bedtime  dextrose 5% + sodium chloride 0.45%. 1000 milliLiter(s) IV Continuous <Continuous>  dextrose 5%. 1000 milliLiter(s) IV Continuous <Continuous>  enoxaparin Injectable 40 milliGRAM(s) SubCutaneous daily  metoprolol tartrate 12.5 milliGRAM(s) Oral two times a day  pantoprazole  Injectable 40 milliGRAM(s) IV Push daily      FAMILY HISTORY:  No pertinent family history in first degree relatives        Social History: NA    Review of Systems:  REVIEW OF SYSTEMS:  [x ] Unable to assess ROS due to AMS    Physical Exam:  T(F): 97.5 (05-06), Max: 99.3 (05-06)  HR: 92 (05-06) (92 - 100)  BP: 149/56 (05-06) (123/88 - 149/56)  RR: 18 (05-06)  SpO2: 94% (05-06)  GENERAL: No acute distress, but appears fixated on wall  HEAD:  Atraumatic, Normocephalic  ENT: conjunctiva and sclera clear, Neck supple, No JVD, moist mucosa  CHEST/LUNG: Clear to auscultation bilaterally; No wheeze, equal breath sounds bilaterally   BACK: No spinal tenderness  HEART: Regular rate and rhythm; No murmurs, rubs, or gallops  ABDOMEN: Soft, Nontender, Nondistended; Bowel sounds present  EXTREMITIES:  No clubbing, cyanosis, or edema  PSYCH: REUBEN  NEUROLOGY: REUBEN  SKIN: Normal color, No rashes or lesions  LINES:    CXR: Personally reviewed    Labs: Personally reviewed                        13.7   12.83 )-----------( 270      ( 06 May 2021 13:56 )             42.3     05-06    150<H>  |  110<H>  |  40<H>  ----------------------------<  114<H>  3.5   |  24  |  0.72    Ca    9.8      06 May 2021 15:32    TPro  7.5  /  Alb  4.2  /  TBili  1.0  /  DBili  x   /  AST  34  /  ALT  23  /  AlkPhos  85  05-06    PT/INR - ( 06 May 2021 13:56 )   PT: 13.5 sec;   INR: 1.13 ratio         PTT - ( 06 May 2021 13:56 )  PTT:25.9 sec         Patient seen and evaluated at bedside    Reason for consult: syncope    HPI:  HPI obtained via chart and primary team due to AMS.   78F w/ unknown medical history brought in by EMS after being found on the floor sitting in urine and a fall. Does not recall how long she was on the ground. Her friend (Gary Baeza - [phone]; [phone]) saw her on Saturday evening for family dinner. She did not receive any calls since 4 days prior. She checked in on her today and called EMS. She is noted to be forgetful over some time. In the ED, she is not able to participate due to AMS. ECG showed sinus tach to 90s, left axis and borderline LVH.     PMHx:   No known PMH        PSHx:   No significant past surgical history        Allergies:  No Known Allergies      Home Meds:    Current Medications:   aspirin enteric coated 81 milliGRAM(s) Oral daily  atorvastatin 20 milliGRAM(s) Oral at bedtime  dextrose 5% + sodium chloride 0.45%. 1000 milliLiter(s) IV Continuous <Continuous>  dextrose 5%. 1000 milliLiter(s) IV Continuous <Continuous>  enoxaparin Injectable 40 milliGRAM(s) SubCutaneous daily  metoprolol tartrate 12.5 milliGRAM(s) Oral two times a day  pantoprazole  Injectable 40 milliGRAM(s) IV Push daily      FAMILY HISTORY:  No pertinent family history in first degree relatives        Social History: NA    Review of Systems:  REVIEW OF SYSTEMS:  [x ] Unable to assess ROS due to AMS    Physical Exam:  T(F): 97.5 (05-06), Max: 99.3 (05-06)  HR: 92 (05-06) (92 - 100)  BP: 149/56 (05-06) (123/88 - 149/56)  RR: 18 (05-06)  SpO2: 94% (05-06)  GENERAL: No acute distress, but appears fixated on wall  HEAD:  Atraumatic, Normocephalic  ENT: conjunctiva and sclera clear, Neck supple, No JVD, moist mucosa  CHEST/LUNG: Clear to auscultation bilaterally; No wheeze, equal breath sounds bilaterally   BACK: No spinal tenderness  HEART: Regular rate and rhythm; No murmurs, rubs, or gallops  ABDOMEN: Soft, Nontender, Nondistended; Bowel sounds present  EXTREMITIES:  No clubbing, cyanosis, or edema  PSYCH: REUBEN  NEUROLOGY: REUBEN  SKIN: Normal color, No rashes or lesions  LINES:    CXR: Personally reviewed    Labs: Personally reviewed                        13.7   12.83 )-----------( 270      ( 06 May 2021 13:56 )             42.3     05-06    150<H>  |  110<H>  |  40<H>  ----------------------------<  114<H>  3.5   |  24  |  0.72    Ca    9.8      06 May 2021 15:32    TPro  7.5  /  Alb  4.2  /  TBili  1.0  /  DBili  x   /  AST  34  /  ALT  23  /  AlkPhos  85  05-06    PT/INR - ( 06 May 2021 13:56 )   PT: 13.5 sec;   INR: 1.13 ratio         PTT - ( 06 May 2021 13:56 )  PTT:25.9 sec

## 2021-05-06 NOTE — ED PROVIDER NOTE - CARE PLAN
Principal Discharge DX:	AMS (altered mental status)   Principal Discharge DX:	AMS (altered mental status)  Secondary Diagnosis:	Pubic ramus fracture, right, closed, initial encounter  Secondary Diagnosis:	Sacral fracture, closed

## 2021-05-07 ENCOUNTER — NON-APPOINTMENT (OUTPATIENT)
Age: 81
End: 2021-05-07

## 2021-05-07 DIAGNOSIS — S32.10XA UNSPECIFIED FRACTURE OF SACRUM, INITIAL ENCOUNTER FOR CLOSED FRACTURE: ICD-10-CM

## 2021-05-07 DIAGNOSIS — R41.82 ALTERED MENTAL STATUS, UNSPECIFIED: ICD-10-CM

## 2021-05-07 LAB
A1C WITH ESTIMATED AVERAGE GLUCOSE RESULT: 6.1 % — HIGH (ref 4–5.6)
CHOLEST SERPL-MCNC: 172 MG/DL — SIGNIFICANT CHANGE UP
CK SERPL-CCNC: 316 U/L — HIGH (ref 25–170)
COVID-19 SPIKE DOMAIN AB INTERP: POSITIVE
COVID-19 SPIKE DOMAIN ANTIBODY RESULT: >250 U/ML — HIGH
CULTURE RESULTS: NO GROWTH — SIGNIFICANT CHANGE UP
ESTIMATED AVERAGE GLUCOSE: 128 MG/DL — HIGH (ref 68–114)
HDLC SERPL-MCNC: 50 MG/DL — LOW
LIPID PNL WITH DIRECT LDL SERPL: 105 MG/DL — HIGH
NON HDL CHOLESTEROL: 122 MG/DL — SIGNIFICANT CHANGE UP
SARS-COV-2 IGG+IGM SERPL QL IA: >250 U/ML — HIGH
SARS-COV-2 IGG+IGM SERPL QL IA: POSITIVE
SPECIMEN SOURCE: SIGNIFICANT CHANGE UP
TRIGL SERPL-MCNC: 83 MG/DL — SIGNIFICANT CHANGE UP
TROPONIN T, HIGH SENSITIVITY RESULT: 35 NG/L — SIGNIFICANT CHANGE UP (ref 0–51)
TSH SERPL-MCNC: 1.91 UIU/ML — SIGNIFICANT CHANGE UP (ref 0.27–4.2)

## 2021-05-07 PROCEDURE — 72190 X-RAY EXAM OF PELVIS: CPT | Mod: 26

## 2021-05-07 PROCEDURE — 99222 1ST HOSP IP/OBS MODERATE 55: CPT

## 2021-05-07 PROCEDURE — 93306 TTE W/DOPPLER COMPLETE: CPT | Mod: 26

## 2021-05-07 RX ORDER — ACETAMINOPHEN 500 MG
650 TABLET ORAL EVERY 6 HOURS
Refills: 0 | Status: COMPLETED | OUTPATIENT
Start: 2021-05-07 | End: 2021-05-10

## 2021-05-07 RX ADMIN — Medication 650 MILLIGRAM(S): at 22:40

## 2021-05-07 RX ADMIN — SODIUM CHLORIDE 100 MILLILITER(S): 9 INJECTION, SOLUTION INTRAVENOUS at 17:39

## 2021-05-07 RX ADMIN — SODIUM CHLORIDE 100 MILLILITER(S): 9 INJECTION, SOLUTION INTRAVENOUS at 22:35

## 2021-05-07 RX ADMIN — ATORVASTATIN CALCIUM 20 MILLIGRAM(S): 80 TABLET, FILM COATED ORAL at 22:40

## 2021-05-07 RX ADMIN — PANTOPRAZOLE SODIUM 40 MILLIGRAM(S): 20 TABLET, DELAYED RELEASE ORAL at 17:39

## 2021-05-07 RX ADMIN — Medication 12.5 MILLIGRAM(S): at 17:39

## 2021-05-07 RX ADMIN — ENOXAPARIN SODIUM 40 MILLIGRAM(S): 100 INJECTION SUBCUTANEOUS at 17:39

## 2021-05-07 RX ADMIN — Medication 81 MILLIGRAM(S): at 17:38

## 2021-05-07 RX ADMIN — SODIUM CHLORIDE 100 MILLILITER(S): 9 INJECTION, SOLUTION INTRAVENOUS at 05:50

## 2021-05-07 RX ADMIN — SODIUM CHLORIDE 120 MILLILITER(S): 9 INJECTION, SOLUTION INTRAVENOUS at 05:25

## 2021-05-07 RX ADMIN — Medication 12.5 MILLIGRAM(S): at 05:25

## 2021-05-07 RX ADMIN — Medication 650 MILLIGRAM(S): at 23:30

## 2021-05-07 NOTE — PATIENT PROFILE ADULT - [id number]_GEN_A_NUR
unknown medical history, pt confused and forgetful, unable to obtain information unknown medical history, pt confused and forgetful, unable to obtain information/None

## 2021-05-07 NOTE — PROGRESS NOTE ADULT - SUBJECTIVE AND OBJECTIVE BOX
24H hour events: "I was dropped off here and my kids went to a party"     MEDICATIONS:  aspirin enteric coated 81 milliGRAM(s) Oral daily  enoxaparin Injectable 40 milliGRAM(s) SubCutaneous daily  metoprolol tartrate 12.5 milliGRAM(s) Oral two times a day    pantoprazole  Injectable 40 milliGRAM(s) IV Push daily    atorvastatin 20 milliGRAM(s) Oral at bedtime    dextrose 5%. 1000 milliLiter(s) IV Continuous <Continuous>      REVIEW OF SYSTEMS:  Complete 10point ROS negative.    PHYSICAL EXAM:  T(C): 36.5 (05-07-21 @ 08:02), Max: 37.4 (05-06-21 @ 13:40)  HR: 76 (05-07-21 @ 08:02) (76 - 100)  BP: 131/76 (05-07-21 @ 08:02) (122/72 - 157/61)  RR: 18 (05-07-21 @ 08:02) (15 - 18)  SpO2: 98% (05-07-21 @ 08:02) (94% - 98%)    06 May 2021 07:01  -  07 May 2021 07:00  --------------------------------------------------------  IN: 0 mL / OUT: 1200 mL / NET: -1200 mL    Appearance: Confused   Cardiovascular: Normal S1 S2, regular. No JVD, No murmurs, No edema  Respiratory: Lungs clear to auscultation	  Psychiatry: A & O x 1   Gastrointestinal:  Soft, Non-tender, + BS	  Extremities: Normal range of motion, No clubbing, cyanosis or edema. Moving all extremities. Strength 3/5 throughout   Mon with clear yellow urine   Vascular: Peripheral pulses palpable 2+ bilaterally      LABS:	 	    CBC Full  -  ( 06 May 2021 13:56 )  WBC Count : 12.83 K/uL  Hemoglobin : 13.7 g/dL  Hematocrit : 42.3 %  Platelet Count - Automated : 270 K/uL  Mean Cell Volume : 94.4 fl  Mean Cell Hemoglobin : 30.6 pg  Mean Cell Hemoglobin Concentration : 32.4 gm/dL  Auto Neutrophil # : 10.21 K/uL  Auto Lymphocyte # : 1.33 K/uL  Auto Monocyte # : 1.20 K/uL  Auto Eosinophil # : 0.00 K/uL  Auto Basophil # : 0.02 K/uL  Auto Neutrophil % : 79.5 %  Auto Lymphocyte % : 10.4 %  Auto Monocyte % : 9.4 %  Auto Eosinophil % : 0.0 %  Auto Basophil % : 0.2 %    05-06    150<H>  |  110<H>  |  40<H>  ----------------------------<  114<H>  3.5   |  24  |  0.72  05-06    150<H>  |  106  |  42<H>  ----------------------------<  117<H>  3.7   |  25  |  0.74    Ca    9.8      06 May 2021 15:32  Ca    10.6<H>      06 May 2021 13:56    TPro  7.5  /  Alb  4.2  /  TBili  1.0  /  DBili  x   /  AST  34  /  ALT  23  /  AlkPhos  85  05-06      Creatine Kinase, Serum: 316 U/L (05-07-21 @ 06:16)  Creatine Kinase, Serum: 428 U/L (05-06-21 @ 13:56)      TELEMETRY: 	  NSR 70's   EKG: NSR 95bpm, left axis, LVH        24H hour events: "I was dropped off here and my kids went to a party"     Out patient Cardiologist Dr. Mario Millan  Out patient PMD Dr. Mickey Nunez     MEDICATIONS:  aspirin enteric coated 81 milliGRAM(s) Oral daily  enoxaparin Injectable 40 milliGRAM(s) SubCutaneous daily  metoprolol tartrate 12.5 milliGRAM(s) Oral two times a day    pantoprazole  Injectable 40 milliGRAM(s) IV Push daily    atorvastatin 20 milliGRAM(s) Oral at bedtime    dextrose 5%. 1000 milliLiter(s) IV Continuous <Continuous>      REVIEW OF SYSTEMS:  Complete 10point ROS negative.    PHYSICAL EXAM:  T(C): 36.5 (05-07-21 @ 08:02), Max: 37.4 (05-06-21 @ 13:40)  HR: 76 (05-07-21 @ 08:02) (76 - 100)  BP: 131/76 (05-07-21 @ 08:02) (122/72 - 157/61)  RR: 18 (05-07-21 @ 08:02) (15 - 18)  SpO2: 98% (05-07-21 @ 08:02) (94% - 98%)    06 May 2021 07:01  -  07 May 2021 07:00  --------------------------------------------------------  IN: 0 mL / OUT: 1200 mL / NET: -1200 mL    Appearance: Confused   Cardiovascular: Normal S1 S2, regular. No JVD, No murmurs, No edema  Respiratory: Lungs clear to auscultation	  Psychiatry: A & O x 1   Gastrointestinal:  Soft, Non-tender, + BS	  Extremities: Normal range of motion, No clubbing, cyanosis or edema. Moving all extremities. Strength 3/5 throughout   Mon with clear yellow urine   Vascular: Peripheral pulses palpable 2+ bilaterally      LABS:	 	    CBC Full  -  ( 06 May 2021 13:56 )  WBC Count : 12.83 K/uL  Hemoglobin : 13.7 g/dL  Hematocrit : 42.3 %  Platelet Count - Automated : 270 K/uL  Mean Cell Volume : 94.4 fl  Mean Cell Hemoglobin : 30.6 pg  Mean Cell Hemoglobin Concentration : 32.4 gm/dL  Auto Neutrophil # : 10.21 K/uL  Auto Lymphocyte # : 1.33 K/uL  Auto Monocyte # : 1.20 K/uL  Auto Eosinophil # : 0.00 K/uL  Auto Basophil # : 0.02 K/uL  Auto Neutrophil % : 79.5 %  Auto Lymphocyte % : 10.4 %  Auto Monocyte % : 9.4 %  Auto Eosinophil % : 0.0 %  Auto Basophil % : 0.2 %    05-06    150<H>  |  110<H>  |  40<H>  ----------------------------<  114<H>  3.5   |  24  |  0.72  05-06    150<H>  |  106  |  42<H>  ----------------------------<  117<H>  3.7   |  25  |  0.74    Ca    9.8      06 May 2021 15:32  Ca    10.6<H>      06 May 2021 13:56    TPro  7.5  /  Alb  4.2  /  TBili  1.0  /  DBili  x   /  AST  34  /  ALT  23  /  AlkPhos  85  05-06      Creatine Kinase, Serum: 316 U/L (05-07-21 @ 06:16)  Creatine Kinase, Serum: 428 U/L (05-06-21 @ 13:56)      TELEMETRY: 	  NSR 70's   EKG: NSR 95bpm, left axis, LVH     Out patient Nuclear Stress Test 2018: Reportedly Normal per Dr. Millan office NP

## 2021-05-07 NOTE — CONSULT NOTE ADULT - SUBJECTIVE AND OBJECTIVE BOX
80y Female presents with imaging findings of R LC1 fracture. Found down, unable to obtain history.     HEALTH ISSUES - PROBLEM Dx:  AMS (altered mental status)  AMS (altered mental status)    Sacral fracture, closed  Sacral fracture, closed          MEDICATIONS  (STANDING):  aspirin enteric coated 81 milliGRAM(s) Oral daily  atorvastatin 20 milliGRAM(s) Oral at bedtime  dextrose 5%. 1000 milliLiter(s) IV Continuous <Continuous>  enoxaparin Injectable 40 milliGRAM(s) SubCutaneous daily  metoprolol tartrate 12.5 milliGRAM(s) Oral two times a day  pantoprazole  Injectable 40 milliGRAM(s) IV Push daily    Allergies    No Known Allergies    Intolerances                              13.7   12.83 )-----------( 270      ( 06 May 2021 13:56 )             42.3     06 May 2021 15:32    150    |  110    |  40     ----------------------------<  114    3.5     |  24     |  0.72     Ca    9.8        06 May 2021 15:32    TPro  7.5    /  Alb  4.2    /  TBili  1.0    /  DBili  x      /  AST  34     /  ALT  23     /  AlkPhos  85     06 May 2021 13:56    PT/INR - ( 06 May 2021 13:56 )   PT: 13.5 sec;   INR: 1.13 ratio         PTT - ( 06 May 2021 13:56 )  PTT:25.9 sec      Vital Signs Last 24 Hrs  T(C): 36.7 (05-07-21 @ 14:33), Max: 37.1 (05-07-21 @ 04:16)  T(F): 98 (05-07-21 @ 14:33), Max: 98.8 (05-07-21 @ 04:16)  HR: 83 (05-07-21 @ 14:33) (76 - 90)  BP: 139/75 (05-07-21 @ 14:33) (122/72 - 157/61)  BP(mean): --  RR: 18 (05-07-21 @ 14:33) (15 - 18)  SpO2: 95% (05-07-21 @ 14:33) (94% - 98%)  Gen: NAD  BLLE:   Skin intact,  TTP over groin  Negative log roll/heel strike  Unable to cooperate with neuro exam  DP/PT 2+  Compartments soft and compressible    RUE: No bony tenderness or deformity, skin intact  LUE: No bony tenderness or deformity, skin intact    A/P: 80y Female w R LC1 fracture  Pain control  DVT ppx  WBAT BLLE  FU labs/imaging  No acute ortho surgical intervention  Follow up with Dr. Valle as outpatient in 7-10 days, call office for appointment  Ortho stable

## 2021-05-07 NOTE — CONSULT NOTE ADULT - SUBJECTIVE AND OBJECTIVE BOX
Patient is a 80y old  Female who presents with a chief complaint of metabolic encephalopathy, SIRS, NSTEMI, hypernatremia, (07 May 2021 14:39)      HPI:    79 yo F with unk pmhx presents with EMS - found on floor sitting in urine. Pt states she was getting out of the bed and fell on the ground. Does not recall how long she was on the ground. Complains of back pain.   History obtained from friend, Gary Baeza - [phone]; [phone]: Saw her on Saturday evening for family dinner. Usually calls every day. Did not return calls since Monday (4days ago). Went to check in on today. Has been forgetful over past some time. MRI and some other tests. Was at [facility] 3 weeks ago for stiff neck - sent home. Goes to a lot of doctors - not sure which ones. Has been following with orthopedics for hip problems. Has not talked to her children for many years. Does not drink.   Dr. Millan - cardiologist, but says she has no cardiac probelms. alert to name and place (06 May 2021 17:34)    ER vitals:  Tmax 99.3, P 100, /88.  WBC 12.8.  PCT 0.13.  Lact 3.1.  UA (-).  RVP (-).  Covid pcr (-).  Ucx (-).  CT chest no pna, partial atelectasis of b/l lower lobes, RML mucoid impaction.  CTA chest limited evaluation for segmental and subsegmental pulmonary embolism. No main, left right, or lobar pulmonary embolism.  LE doppler (-) DVT.      REVIEW OF SYSTEMS:    CONSTITUTIONAL: No fever, weight loss, or fatigue  EYES: No eye pain, visual disturbances, or discharge  ENMT:  No sore throat  NECK: No pain or stiffness  RESPIRATORY: No cough, wheezing, chills or hemoptysis; No shortness of breath  CARDIOVASCULAR: No chest pain, palpitations, dizziness, or leg swelling  GASTROINTESTINAL: No abdominal or epigastric pain. No nausea, vomiting, or hematemesis; No diarrhea or constipation. No melena or hematochezia.  GENITOURINARY: No dysuria, frequency, hematuria, or incontinence  NEUROLOGICAL: No headaches, memory loss, loss of strength, numbness, or tremors  SKIN: No itching, burning, rashes, or lesions   LYMPH NODES: No enlarged glands  MUSCULOSKELETAL: No joint pain or swelling; No muscle, back, or extremity pain      PAST MEDICAL & SURGICAL HISTORY:  No pertinent past medical history    No significant past surgical history        Allergies    No Known Allergies    Intolerances        FAMILY HISTORY:  No pertinent family history in first degree relatives        SOCIAL HISTORY:        MEDICATIONS  (STANDING):  aspirin enteric coated 81 milliGRAM(s) Oral daily  atorvastatin 20 milliGRAM(s) Oral at bedtime  dextrose 5%. 1000 milliLiter(s) (100 mL/Hr) IV Continuous <Continuous>  enoxaparin Injectable 40 milliGRAM(s) SubCutaneous daily  metoprolol tartrate 12.5 milliGRAM(s) Oral two times a day  pantoprazole  Injectable 40 milliGRAM(s) IV Push daily    MEDICATIONS  (PRN):      Vital Signs Last 24 Hrs  T(C): 36.7 (07 May 2021 14:33), Max: 37.1 (07 May 2021 04:16)  T(F): 98 (07 May 2021 14:33), Max: 98.8 (07 May 2021 04:16)  HR: 83 (07 May 2021 14:33) (76 - 92)  BP: 139/75 (07 May 2021 14:33) (122/72 - 157/61)  BP(mean): --  RR: 18 (07 May 2021 14:33) (15 - 18)  SpO2: 95% (07 May 2021 14:33) (94% - 98%)    PHYSICAL EXAM:    GENERAL: NAD, well-groomed  HEAD:  Atraumatic, Normocephalic  EYES: EOMI, PERRLA, conjunctiva and sclera clear  ENMT: No tonsillar erythema, exudates, or enlargement; Moist mucous membranes  NECK: Supple, No JVD  CHEST/LUNG: Clear to percussion bilaterally; No rales, rhonchi, wheezing, or rubs  HEART: Regular rate and rhythm; No murmurs, rubs, or gallops  ABDOMEN: Soft, Nontender, Nondistended; Bowel sounds present  EXTREMITIES:  2+ Peripheral Pulses, No clubbing, cyanosis, or edema  LYMPH: No lymphadenopathy noted  SKIN: No rashes or lesions    LABS:  CBC Full  -  ( 06 May 2021 13:56 )  WBC Count : 12.83 K/uL  RBC Count : 4.48 M/uL  Hemoglobin : 13.7 g/dL  Hematocrit : 42.3 %  Platelet Count - Automated : 270 K/uL  Mean Cell Volume : 94.4 fl  Mean Cell Hemoglobin : 30.6 pg  Mean Cell Hemoglobin Concentration : 32.4 gm/dL  Auto Neutrophil # : 10.21 K/uL  Auto Lymphocyte # : 1.33 K/uL  Auto Monocyte # : 1.20 K/uL  Auto Eosinophil # : 0.00 K/uL  Auto Basophil # : 0.02 K/uL  Auto Neutrophil % : 79.5 %  Auto Lymphocyte % : 10.4 %  Auto Monocyte % : 9.4 %  Auto Eosinophil % : 0.0 %  Auto Basophil % : 0.2 %          150<H>  |  110<H>  |  40<H>  ----------------------------<  114<H>  3.5   |  24  |  0.72    Ca    9.8      06 May 2021 15:32    TPro  7.5  /  Alb  4.2  /  TBili  1.0  /  DBili  x   /  AST  34  /  ALT  23  /  AlkPhos  85        LIVER FUNCTIONS - ( 06 May 2021 13:56 )  Alb: 4.2 g/dL / Pro: 7.5 g/dL / ALK PHOS: 85 U/L / ALT: 23 U/L / AST: 34 U/L / GGT: x                 MICROBIOLOGY:        Urinalysis Basic - ( 06 May 2021 13:48 )    Color: Yellow / Appearance: Clear / S.028 / pH: x  Gluc: x / Ketone: Small  / Bili: Negative / Urobili: Negative   Blood: x / Protein: 100 / Nitrite: Negative   Leuk Esterase: Negative / RBC: x / WBC x   Sq Epi: x / Non Sq Epi: x / Bacteria: x    Culture - Urine (21 @ 18:12)   Specimen Source: .Urine Clean Catch (Midstream)   Culture Results:   No growth             RADIOLOGY:      < from: CT Chest No Cont (21 @ 23:44) >  FINDINGS:    LUNGS AND AIRWAYS: Patent central airways.  Partial atelectasis of both lower lobes. Coarse calcifications in the right lower lobe atelectasis. Right middle lobe tubular/branching opacity likely represent mucoid impaction.  PLEURA: No pleural effusion.  MEDIASTINUM AND TERENCE: No lymphadenopathy.  VESSELS: Normal caliber aorta and main pulmonary artery. Aortic and coronary artery calcifications.  HEART: The heart is mildly enlarged. No pericardial effusion.  CHEST WALL AND LOWER NECK: Within normal limits.  VISUALIZED UPPER ABDOMEN: Unchanged left adrenal nodularity.  BONES: Degenerative changes of the spine.    IMPRESSION:    No pneumonia. Partial atelectasis of both lower lobes.    < end of copied text >        < from: CT Angio Chest w/ IV Cont (21 @ 23:34) >  EXAM:  CT ANGIO CHEST (W)AW IC                            PROCEDURE DATE:  2021            INTERPRETATION:  EXAMINATION: CT ANGIO CHEST WITHOUT AND OR WITH IV CONTRAST    CLINICAL INDICATION: Elevated D-dimer; AMS    TECHNIQUE: CTA of the chest was performed for evaluation of pulmonary embolism after administration of 59 ml of Omnipaque-350, 41 ml discarded.  MIP images were reconstructed.    COMPARISON: Multiple CT, most recent 2021.    FINDINGS:    PULMONARY ARTERIES: Limited evaluation for segmental and subsegmental pulmonary embolism. No main, left right, or lobar pulmonary embolism.    AIRWAYS AND LUNGS: The central tracheobronchial tree is patent.  Partial atelectasis both lower lobes. Right lower lobe atelectasis contains coarse calcifications. Right middle lobe tubular/branching opacity likely represents mucoid impaction.    MEDIASTINUM AND PLEURA: There are no enlarged mediastinal, hilar or axillary lymph nodes. The visualized portion of the thyroid gland is unremarkable. There is no pleural effusion. There is no pneumothorax.    HEART AND VESSELS: There is mild cardiomegaly. There are atherosclerotic calcifications of the aorta and coronary arteries.  There is no pericardial effusion.    UPPER ABDOMEN: Images of the upper abdomen demonstrate left adrenal nodularity is unchanged..    BONES AND SOFT TISSUES: The bones are unremarkable.  The soft tissues are unremarkable.    TUBES/LINES: None.    IMPRESSION:  Limited evaluation for segmental and subsegmental pulmonary embolism. No main, left right, or lobar pulmonary embolism.    < end of copied text >      < from: VA Duplex Lower Ext Vein Scan, Bilat (21 @ 19:05) >    FINDINGS:    RIGHT:  Normal compressibility of the RIGHT common femoral, femoral and popliteal veins.  Doppler examination shows normal spontaneous and phasic flow.  No RIGHT calf vein thrombosis is detected.    LEFT:  Normal compressibility of the LEFT common femoral, femoral and popliteal veins.  Doppler examination shows normal spontaneous and phasic flow.  No LEFT calf vein thrombosis is detected.    IMPRESSION:  No evidence of deep venous thrombosis in either lower extremity.    < end of copied text >      < from: CT 3D Reconstruct w/ Workstation (21 @ 16:21) >    IMPRESSION: Acute fractures of the right superior and inferior pubic rami and of the right sacral ala.    < end of copied text >        < from: CT Pelvis Bony Only No Cont (21 @ 16:21) >    EXAM:  CT PELVIS BONY ONLY                          [facility]                            PROCEDURE DATE:  2021            INTERPRETATION:  EXAMINATION: CT of the pelvis without contrast    CLINICAL INFORMATION: Status post fall. Right hip pain.    TECHNIQUE: Axial CT images were obtained through the pelvis. Coronal and sagittal reformatted images were made. 3-D reconstruction images were also performed.    FINDINGS: There is acute comminuted mildly displaced fracture of the medial aspect of the right superior pubic ramus and in there is also an acute mildly displaced fracture of the right inferior pubic ramus. There is regional myofascial edema and hematoma. There is also an acute minimally displaced fracture of the right sacral ala.  No other fractures are demonstrated. The bones are diffusely demineralized, limiting evaluation for acute nondisplaced fractures.    There are no advanced arthritic changes at the hips. There is mild pubic symphysis arthrosis. The bladder is collapsed around a Mon catheter balloon.    The 3-D reconstruction images confirm the above fractures.    IMPRESSION: Acute fractures of the right superior and inferior pubic rami and of the right sacral ala.    < end of copied text >        < from: CT Lumbar Spine No Cont (21 @ 14:41) >  FINDINGS:  BONES AND DISCS:  There is an acute fracture involving the right sacral ala with extension into the right sacroiliac joints. Please note the sacrum is incompletely visualized on this lumbar spine study.    No additional fractures are identified.    No evidence of large disc protrusion or critical spinal stenosis is noted. MRI may be obtained, if the patient is MRI compatible, if further information regarding spinal canal soft tissue contents is required.    SOFT TISSUES:  Paraspinal soft tissues are unremarkable.    OTHER:  Punctate nonobstructing calculus in the right renal midpole.    IMPRESSION:  -Acute fracture involving the right sacral ala, likely insufficiency fracture. Please note the sacrum is incompletely visualized on this lumbar spine study. Recommend dedicated CT of the pelvis for full evaluation of the pelvic osseous and soft tissue structures.    < end of copied text >        < from: CT Cervical Spine No Cont (21 @ 14:41) >  FINDINGS:    CT HEAD:  No acute transcortical infarct or intracranial hemorrhage.    White matter hypoattenuating foci are noted, nonspecific but likely representing small vessel disease.    The ventricles are normal without evidence of hydrocephalus. There are no extra-axial fluid collections.    The visualized intraorbital contents are unremarkable. The imaged portions of the paranasal sinuses are clear. The mastoid air cells are clear. The visualized soft tissues and osseous structures appear normal.    CT CERVICAL SPINE:  No acute fracture or acute subluxation.  No evidence of large disc protrusion or critical spinal stenosis.MRI may be obtained, if the patient is MRI compatible if further information regarding spinal canal soft tissue contents is required.    There is no prevertebral or paraspinal soft tissue swelling.    Included lung apices are clear.    IMPRESSION:  CT head:  -No acute intracranial findings.    CT cervical spine:  -No acute fracture or dislocation.    < end of copied text >        < from: CT Head No Cont (21 @ 14:40) >  IMPRESSION:  CT head:  -No acute intracranial findings.    CT cervical spine:  -No acute fracture or dislocation.    < end of copied text >      < from: Xray Hip 2-3 Views, Right (21 @ 13:53) >  IMPRESSION:  Slightly comminuted and displaced distal right superior and mid right inferior pubic rami fractures.    Intact right femoral head neck and proximal femoral shaft.    Minimal degenerative spurring along lateral right hip joint margin otherwise preserved hip joint space.    Generalized osteopenia otherwise no discrete lytic or blastic lesions.    < end of copied text >        < from: Xray Chest 1 View- PORTABLE-Urgent (21 @ 13:52) >    IMPRESSION:  New left retrocardiac opacity which could be due to subsegmental atelectasis or pneumonia. A CT scan of the chest could be performed for further evaluation, including to evaluate for a central obstructing process, if felt to be clinically indicated.    New platelike atelectasis at lateral left base.    < end of copied text >     Patient is a 80y old  Female who presents with a chief complaint of metabolic encephalopathy, SIRS, NSTEMI, hypernatremia, (07 May 2021 14:39)      HPI:    77 yo F with unk pmhx presents with EMS - found on floor sitting in urine. Pt states she was getting out of the bed and fell on the ground. Does not recall how long she was on the ground. Complains of back pain.   History obtained from friend, Gary Baeza - [phone]; [phone]: Saw her on Saturday evening for family dinner. Usually calls every day. Did not return calls since Monday (4days ago). Went to check in on today. Has been forgetful over past some time. MRI and some other tests. Was at [facility] 3 weeks ago for stiff neck - sent home. Goes to a lot of doctors - not sure which ones. Has been following with orthopedics for hip problems. Has not talked to her children for many years. Does not drink.   Dr. Millan - cardiologist, but says she has no cardiac probelms. alert to name and place (06 May 2021 17:34)    ER vitals:  Tmax 99.3, P 100, /88.  WBC 12.8.  PCT 0.13.  Lact 3.1.  UA (-).  RVP (-).  Covid pcr (-).  Ucx (-).  CT chest no pna, partial atelectasis of b/l lower lobes, RML mucoid impaction.  CTA chest limited evaluation for segmental and subsegmental pulmonary embolism. No main, left right, or lobar pulmonary embolism.  LE doppler (-) DVT.  CT pelvis with acute fractures of the right superior and inferior pubic rami and of the right sacral ala.    REVIEW OF SYSTEMS:    CONSTITUTIONAL: No fever, weight loss, or fatigue  EYES: No eye pain, visual disturbances, or discharge  ENMT:  No sore throat  NECK: No pain or stiffness  RESPIRATORY: No cough, wheezing, chills or hemoptysis; No shortness of breath  CARDIOVASCULAR: No chest pain, palpitations, dizziness, or leg swelling  GASTROINTESTINAL: No abdominal or epigastric pain. No nausea, vomiting, or hematemesis; No diarrhea or constipation. No melena or hematochezia.  GENITOURINARY: No dysuria, frequency, hematuria, or incontinence  NEUROLOGICAL: No headaches, memory loss, loss of strength, numbness, or tremors  SKIN: No itching, burning, rashes, or lesions   LYMPH NODES: No enlarged glands  MUSCULOSKELETAL: c/o back pain when moved      PAST MEDICAL & SURGICAL HISTORY:  No pertinent past medical history    No significant past surgical history        Allergies    No Known Allergies    Intolerances        FAMILY HISTORY:  No pertinent family history in first degree relatives        SOCIAL HISTORY:    Denies smoking, ivdu, etoh    MEDICATIONS  (STANDING):  aspirin enteric coated 81 milliGRAM(s) Oral daily  atorvastatin 20 milliGRAM(s) Oral at bedtime  dextrose 5%. 1000 milliLiter(s) (100 mL/Hr) IV Continuous <Continuous>  enoxaparin Injectable 40 milliGRAM(s) SubCutaneous daily  metoprolol tartrate 12.5 milliGRAM(s) Oral two times a day  pantoprazole  Injectable 40 milliGRAM(s) IV Push daily    MEDICATIONS  (PRN):      Vital Signs Last 24 Hrs  T(C): 36.7 (07 May 2021 14:33), Max: 37.1 (07 May 2021 04:16)  T(F): 98 (07 May 2021 14:33), Max: 98.8 (07 May 2021 04:16)  HR: 83 (07 May 2021 14:33) (76 - 92)  BP: 139/75 (07 May 2021 14:33) (122/72 - 157/61)  BP(mean): --  RR: 18 (07 May 2021 14:33) (15 - 18)  SpO2: 95% (07 May 2021 14:33) (94% - 98%)    PHYSICAL EXAM:    GENERAL: NAD, well-groomed  HEAD:  Atraumatic, Normocephalic  EYES: EOMI, PERRLA, conjunctiva and sclera clear  ENMT: No tonsillar erythema, exudates, or enlargement; Moist mucous membranes  NECK: Supple, No JVD  CHEST/LUNG: Clear to percussion bilaterally; No rales, rhonchi, wheezing, or rubs  HEART: Regular rate and rhythm; No murmurs, rubs, or gallops  ABDOMEN: Soft, Nontender, Nondistended; Bowel sounds present  EXTREMITIES:  2+ Peripheral Pulses, No clubbing, cyanosis, or edema  LYMPH: No lymphadenopathy noted  SKIN: No rashes or lesions  :  ingram draining clear yellow urine    LABS:  CBC Full  -  ( 06 May 2021 13:56 )  WBC Count : 12.83 K/uL  RBC Count : 4.48 M/uL  Hemoglobin : 13.7 g/dL  Hematocrit : 42.3 %  Platelet Count - Automated : 270 K/uL  Mean Cell Volume : 94.4 fl  Mean Cell Hemoglobin : 30.6 pg  Mean Cell Hemoglobin Concentration : 32.4 gm/dL  Auto Neutrophil # : 10.21 K/uL  Auto Lymphocyte # : 1.33 K/uL  Auto Monocyte # : 1.20 K/uL  Auto Eosinophil # : 0.00 K/uL  Auto Basophil # : 0.02 K/uL  Auto Neutrophil % : 79.5 %  Auto Lymphocyte % : 10.4 %  Auto Monocyte % : 9.4 %  Auto Eosinophil % : 0.0 %  Auto Basophil % : 0.2 %          150<H>  |  110<H>  |  40<H>  ----------------------------<  114<H>  3.5   |  24  |  0.72    Ca    9.8      06 May 2021 15:32    TPro  7.5  /  Alb  4.2  /  TBili  1.0  /  DBili  x   /  AST  34  /  ALT  23  /  AlkPhos  85  -06      LIVER FUNCTIONS - ( 06 May 2021 13:56 )  Alb: 4.2 g/dL / Pro: 7.5 g/dL / ALK PHOS: 85 U/L / ALT: 23 U/L / AST: 34 U/L / GGT: x                 MICROBIOLOGY:        Urinalysis Basic - ( 06 May 2021 13:48 )    Color: Yellow / Appearance: Clear / S.028 / pH: x  Gluc: x / Ketone: Small  / Bili: Negative / Urobili: Negative   Blood: x / Protein: 100 / Nitrite: Negative   Leuk Esterase: Negative / RBC: x / WBC x   Sq Epi: x / Non Sq Epi: x / Bacteria: x    Culture - Urine (21 @ 18:12)   Specimen Source: .Urine Clean Catch (Midstream)   Culture Results:   No growth             RADIOLOGY:      < from: CT Chest No Cont (21 @ 23:44) >  FINDINGS:    LUNGS AND AIRWAYS: Patent central airways.  Partial atelectasis of both lower lobes. Coarse calcifications in the right lower lobe atelectasis. Right middle lobe tubular/branching opacity likely represent mucoid impaction.  PLEURA: No pleural effusion.  MEDIASTINUM AND TERENCE: No lymphadenopathy.  VESSELS: Normal caliber aorta and main pulmonary artery. Aortic and coronary artery calcifications.  HEART: The heart is mildly enlarged. No pericardial effusion.  CHEST WALL AND LOWER NECK: Within normal limits.  VISUALIZED UPPER ABDOMEN: Unchanged left adrenal nodularity.  BONES: Degenerative changes of the spine.    IMPRESSION:    No pneumonia. Partial atelectasis of both lower lobes.    < end of copied text >        < from: CT Angio Chest w/ IV Cont (21 @ 23:34) >  EXAM:  CT ANGIO CHEST (W)AW IC                            PROCEDURE DATE:  2021            INTERPRETATION:  EXAMINATION: CT ANGIO CHEST WITHOUT AND OR WITH IV CONTRAST    CLINICAL INDICATION: Elevated D-dimer; AMS    TECHNIQUE: CTA of the chest was performed for evaluation of pulmonary embolism after administration of 59 ml of Omnipaque-350, 41 ml discarded.  MIP images were reconstructed.    COMPARISON: Multiple CT, most recent 2021.    FINDINGS:    PULMONARY ARTERIES: Limited evaluation for segmental and subsegmental pulmonary embolism. No main, left right, or lobar pulmonary embolism.    AIRWAYS AND LUNGS: The central tracheobronchial tree is patent.  Partial atelectasis both lower lobes. Right lower lobe atelectasis contains coarse calcifications. Right middle lobe tubular/branching opacity likely represents mucoid impaction.    MEDIASTINUM AND PLEURA: There are no enlarged mediastinal, hilar or axillary lymph nodes. The visualized portion of the thyroid gland is unremarkable. There is no pleural effusion. There is no pneumothorax.    HEART AND VESSELS: There is mild cardiomegaly. There are atherosclerotic calcifications of the aorta and coronary arteries.  There is no pericardial effusion.    UPPER ABDOMEN: Images of the upper abdomen demonstrate left adrenal nodularity is unchanged..    BONES AND SOFT TISSUES: The bones are unremarkable.  The soft tissues are unremarkable.    TUBES/LINES: None.    IMPRESSION:  Limited evaluation for segmental and subsegmental pulmonary embolism. No main, left right, or lobar pulmonary embolism.    < end of copied text >      < from: VA Duplex Lower Ext Vein Scan, Bilat (21 @ 19:05) >    FINDINGS:    RIGHT:  Normal compressibility of the RIGHT common femoral, femoral and popliteal veins.  Doppler examination shows normal spontaneous and phasic flow.  No RIGHT calf vein thrombosis is detected.    LEFT:  Normal compressibility of the LEFT common femoral, femoral and popliteal veins.  Doppler examination shows normal spontaneous and phasic flow.  No LEFT calf vein thrombosis is detected.    IMPRESSION:  No evidence of deep venous thrombosis in either lower extremity.    < end of copied text >      < from: CT 3D Reconstruct w/ Workstation (21 @ 16:21) >    IMPRESSION: Acute fractures of the right superior and inferior pubic rami and of the right sacral ala.    < end of copied text >        < from: CT Pelvis Bony Only No Cont (21 @ 16:21) >    EXAM:  CT PELVIS BONY ONLY                          [facility]  [facility]                            PROCEDURE DATE:  2021            INTERPRETATION:  EXAMINATION: CT of the pelvis without contrast    CLINICAL INFORMATION: Status post fall. Right hip pain.    TECHNIQUE: Axial CT images were obtained through the pelvis. Coronal and sagittal reformatted images were made. 3-D reconstruction images were also performed.    FINDINGS: There is acute comminuted mildly displaced fracture of the medial aspect of the right superior pubic ramus and in there is also an acute mildly displaced fracture of the right inferior pubic ramus. There is regional myofascial edema and hematoma. There is also an acute minimally displaced fracture of the right sacral ala.  No other fractures are demonstrated. The bones are diffusely demineralized, limiting evaluation for acute nondisplaced fractures.    There are no advanced arthritic changes at the hips. There is mild pubic symphysis arthrosis. The bladder is collapsed around a Ingram catheter balloon.    The 3-D reconstruction images confirm the above fractures.    IMPRESSION: Acute fractures of the right superior and inferior pubic rami and of the right sacral ala.    < end of copied text >        < from: CT Lumbar Spine No Cont (21 @ 14:41) >  FINDINGS:  BONES AND DISCS:  There is an acute fracture involving the right sacral ala with extension into the right sacroiliac joints. Please note the sacrum is incompletely visualized on this lumbar spine study.    No additional fractures are identified.    No evidence of large disc protrusion or critical spinal stenosis is noted. MRI may be obtained, if the patient is MRI compatible, if further information regarding spinal canal soft tissue contents is required.    SOFT TISSUES:  Paraspinal soft tissues are unremarkable.    OTHER:  Punctate nonobstructing calculus in the right renal midpole.    IMPRESSION:  -Acute fracture involving the right sacral ala, likely insufficiency fracture. Please note the sacrum is incompletely visualized on this lumbar spine study. Recommend dedicated CT of the pelvis for full evaluation of the pelvic osseous and soft tissue structures.    < end of copied text >        < from: CT Cervical Spine No Cont (21 @ 14:41) >  FINDINGS:    CT HEAD:  No acute transcortical infarct or intracranial hemorrhage.    White matter hypoattenuating foci are noted, nonspecific but likely representing small vessel disease.    The ventricles are normal without evidence of hydrocephalus. There are no extra-axial fluid collections.    The visualized intraorbital contents are unremarkable. The imaged portions of the paranasal sinuses are clear. The mastoid air cells are clear. The visualized soft tissues and osseous structures appear normal.    CT CERVICAL SPINE:  No acute fracture or acute subluxation.  No evidence of large disc protrusion or critical spinal stenosis.MRI may be obtained, if the patient is MRI compatible if further information regarding spinal canal soft tissue contents is required.    There is no prevertebral or paraspinal soft tissue swelling.    Included lung apices are clear.    IMPRESSION:  CT head:  -No acute intracranial findings.    CT cervical spine:  -No acute fracture or dislocation.    < end of copied text >        < from: CT Head No Cont (21 @ 14:40) >  IMPRESSION:  CT head:  -No acute intracranial findings.    CT cervical spine:  -No acute fracture or dislocation.    < end of copied text >      < from: Xray Hip 2-3 Views, Right (21 @ 13:53) >  IMPRESSION:  Slightly comminuted and displaced distal right superior and mid right inferior pubic rami fractures.    Intact right femoral head neck and proximal femoral shaft.    Minimal degenerative spurring along lateral right hip joint margin otherwise preserved hip joint space.    Generalized osteopenia otherwise no discrete lytic or blastic lesions.    < end of copied text >        < from: Xray Chest 1 View- PORTABLE-Urgent (21 @ 13:52) >    IMPRESSION:  New left retrocardiac opacity which could be due to subsegmental atelectasis or pneumonia. A CT scan of the chest could be performed for further evaluation, including to evaluate for a central obstructing process, if felt to be clinically indicated.    New platelike atelectasis at lateral left base.    < end of copied text >

## 2021-05-07 NOTE — CONSULT NOTE ADULT - SUBJECTIVE AND OBJECTIVE BOX
[facility]        Patient is a 80y old  Female who presents with a chief complaint of metabolic encephalopathy, SIRS, NSTEMI, hypernatremia, (07 May 2021 16:49)    Excerpt from H&P,"  77 yo F with unk pmhx presents with EMS - found on floor sitting in urine. Pt states she was getting out of the bed and fell on the ground. Does not recall how long she was on the ground. Complains of back pain.   History obtained from friend, Gary Baeza - [phone]; [phone]: Saw her on Saturday evening for family dinner. Usually calls every day. Did not return calls since Monday (4days ago). Went to check in on today. Has been forgetful over past some time. MRI and some other tests. Was at [facility] 3 weeks ago for stiff neck - sent home. Goes to a lot of doctors - not sure which ones. Has been following with orthopedics for hip problems. Has not talked to her children for many years. Does not drink.   Dr. Millan - cardiologist, but says she has no cardiac probelms. alert to name and place (06 May 2021 17:34)  limited history obtained          *****PAST MEDICAL / Surgical  HISTORY:  PAST MEDICAL & SURGICAL HISTORY:  No pertinent past medical history    No significant past surgical history             *****FAMILY HISTORY:  FAMILY HISTORY:  No pertinent family history in first degree relatives             *****SOCIAL HISTORY:  Alcohol: None  Smoking: None         *****ALLERGIES:   Allergies    No Known Allergies    Intolerances             *****MEDICATIONS: current medication reviewed and documented.   MEDICATIONS  (STANDING):  aspirin enteric coated 81 milliGRAM(s) Oral daily  atorvastatin 20 milliGRAM(s) Oral at bedtime  dextrose 5%. 1000 milliLiter(s) (100 mL/Hr) IV Continuous <Continuous>  enoxaparin Injectable 40 milliGRAM(s) SubCutaneous daily  metoprolol tartrate 12.5 milliGRAM(s) Oral two times a day  pantoprazole  Injectable 40 milliGRAM(s) IV Push daily    MEDICATIONS  (PRN):  acetaminophen    Suspension .. 650 milliGRAM(s) Oral every 6 hours PRN Temp greater or equal to 38C (100.4F), Mild Pain (1 - 3)           *****REVIEW OF SYSTEM: unable to obtain        *****VITAL SIGNS:  T(C): 36.4 (21 @ 21:29), Max: 37.1 (21 @ 04:16)  HR: 88 (21 @ 21:29) (76 - 88)  BP: 118/69 (21 @ 21:29) (118/69 - 147/76)  RR: 18 (21 @ 21:29) (18 - 18)  SpO2: 98% (21 @ 21:29) (94% - 98%)  Wt(kg): --     @ 07:01  -   @ 07:00  --------------------------------------------------------  IN: 0 mL / OUT: 1200 mL / NET: -1200 mL             *****PHYSICAL EXAM:  uncooperative               *****LAB AND IMAGIN.7   12.83 )-----------( 270      ( 06 May 2021 13:56 )             42.3               05-    150<H>  |  110<H>  |  40<H>  ----------------------------<  114<H>  3.5   |  24  |  0.72    Ca    9.8      06 May 2021 15:32    TPro  7.5  /  Alb  4.2  /  TBili  1.0  /  DBili  x   /  AST  34  /  ALT  23  /  AlkPhos  85  05-06    PT/INR - ( 06 May 2021 13:56 )   PT: 13.5 sec;   INR: 1.13 ratio         PTT - ( 06 May 2021 13:56 )  PTT:25.9 sec            CARDIAC MARKERS ( 07 May 2021 06:16 )  x     / x     / 316 U/L / x     / x      CARDIAC MARKERS ( 06 May 2021 13:56 )  x     / x     / 428 U/L / x     / x                  Urinalysis Basic - ( 06 May 2021 13:48 )    Color: Yellow / Appearance: Clear / S.028 / pH: x  Gluc: x / Ketone: Small  / Bili: Negative / Urobili: Negative   Blood: x / Protein: 100 / Nitrite: Negative   Leuk Esterase: Negative / RBC: x / WBC x   Sq Epi: x / Non Sq Epi: x / Bacteria: x        [All pertinent recent Imaging reports reviewed]         *****A S S E S S M E N T   A N D   P L A N :        77 yo F with unk pmhx presents with EMS - found on floor sitting in urine. Pt states she was getting out of the bed and fell on the ground. Does not recall how long she was on the ground. Complains of back pain.   History obtained from friend, Gary Baeza - [phone]; [phone]: Saw her on Saturday evening for family dinner. Usually calls every day. Did not return calls since Monday (4days ago). Went to check in on today. Has been forgetful over past some time. MRI and some other tests. Was at [facility] 3 weeks ago for stiff neck - sent home. Goes to a lot of doctors - not sure which ones. Has been following with orthopedics for hip problems. Has not talked to her children for many years. Does not drink.   Dr. Millan - cardiologist, but says she has no cardiac probelms. alert to name and place (06 May 2021 17:34)  limited history obtained   Problem/Recommendations 1:  ams of unclear etiology   metabolic encephalopathy due to pain related delirium, hypernatremia, prerenal state   r/o infectious process   cpk ok   cannot r/o sz   eeg   Problem/Recommendations 2: pelvic fracture   ortho input appreciated no interventions        ___________________________  Will follow with you.  Thank you,  Rhina Almaguer MD  Diplomate of the American Board of Neurology and Psychiatry.  Diplomate of the American Board of Vascular Neurology.   [facility] ([facility]), [facility]   Ph: [phone]    Differential diagnosis and plan of care discussed with patient after the evaluation.   Advanced care planning options discussed.   Pain assessed and judicious use of narcotics when appropriate was discussed.  Importance of Fall prevention discussed.  Counseling on Smoking and Alcohol cessation was offered when appropriate.  Counseling on Diet, exercise, and medication compliance was done.   83 minutes spent on the total encounter;  more than 50 % of the visit was spent on counseling  and or coordinating care by the attending physician.    Thank you for allowing me to participate in the care of this saige patient. Please do not hesitate to call me if you have any questions.     This and subsequent notes  will  inherently be subject to errors including those of syntax and substitutions which may escape proofreading. In such instances original meaning may be extrapolated by contextual derivation.

## 2021-05-07 NOTE — CONSULT NOTE ADULT - ASSESSMENT
78F w/ unknown medical history brought in by EMS after being found on the floor sitting in urine and a fall. Cardiology called for question of syncope. She does not have a known episode of witnessed syncope. ECG showed sinus rhythm with borderline tachycardia, left axis and LVH. Currently, altered and unable to participate in interview.

## 2021-05-07 NOTE — PHYSICAL THERAPY INITIAL EVALUATION ADULT - GENERAL OBSERVATIONS, REHAB EVAL
pt gerda 30 min bedside eval poor. pt rec'd in bed, peripheral IV line, ingram, tele, NAD, agreed to session, A&Ox1-2, poor historian, confused, fluctuates between wanting to walk and then expressing wishes to rest. pt a/w R sided sacral fx, WBAT BLE per ortho. ROM/MMT assessed. rolling in bed, max Ax1 for skin/hygiene check. further activity deferred 2/2 transport arriving to take pt to MRI. pt left in care of transport, all lines intact, all needs met, NAD Prenatal and birth history reviewed events post delivery with respiratory distress grunting treated with CPAP subsequently resolved and doing well  Patient examined no significant finding  counseling parents at great length  Nicolas Pandya MD, FAAP  office [phone]  cell [phone]

## 2021-05-07 NOTE — PROGRESS NOTE ADULT - ASSESSMENT
78 year old Female resides at [facility] with history per friend of:  Lung Cancer s/p Lobectomy and recent forgetfulness possible Dementia per friend Jerri Das, brought in by EMS after being found on the floor sitting in urine and a possible fall.  ECG showed sinus rhythm with borderline tachycardia, left axis and LVH. Currently, remains with altered mental status.  Per friend she follows with Dr. Mario Millan Cardiology.  Office called, awaiting office records.  Per patient's friend, she is estranged from her 3 children. (2 are Attorneys and 1 Orthopedic Surgeon).     1. Syncope  2. Altered Mental Status   3. Hypernatremia     - telemetry monitoring   - Follow up echocardiogram   - neuro/metabolic workup given AMS and hypernatremia  - non-cardiogenic workup for syncope    - contact (friend): Gary Baeza - [phone]; [phone] or Jerri Das [phone]     [facility]  [phone]  78 year old Female resides at [facility] with history (per Dr. Millan office NP and per friend) of: Mitral Regurgitation, Adenocarcinoma Lung Cancer s/p Lobectomy, hyperlipidemia, and recent forgetfulness possible Dementia per friend Jerri Das, brought in by EMS after being found on the floor sitting in urine and a possible fall.  ECG showed sinus rhythm with borderline tachycardia, left axis and LVH. Currently, remains with altered mental status.  Per patient's friend Jerri, she is estranged from her 3 children. (2 are Attorneys and 1 Orthopedic Surgeon).     1. Syncope  2. Altered Mental Status   3. Hypernatremia     - telemetry monitoring   - Follow up echocardiogram   - neuro/metabolic workup given AMS and hypernatremia  - non-cardiogenic workup for syncope    - contact (friend): Gary Baeza - [phone]; [phone] or Jerri Das [phone]     [facility]  [phone]  78 year old Female resides at [facility] with history (per Dr. Millan office NP and per friend) of: Mitral Regurgitation, Adenocarcinoma Lung Cancer s/p Lobectomy, hyperlipidemia, and recent forgetfulness possible Dementia per friend Jerri Das, brought in by EMS after being found on the floor sitting in urine and a possible fall.  ECG showed sinus rhythm with borderline tachycardia, left axis and LVH. Currently, remains with altered mental status.  Per patient's friend Jerri, she is estranged from her 3 children. (2 are Attorneys and 1 Orthopedic Surgeon).     1. Syncope  2. Altered Mental Status   3. Hypernatremia     - telemetry monitoring   - Follow up echocardiogram   - neuro/metabolic workup in progress given AMS and hypernatremia  - non-cardiogenic workup for syncope    - contact (friend): Gary Baeza - [phone]; [phone] or Jerri Das [phone]     [facility]  [phone]     Addendum- EP will sign off, reconsult as needed.  [facility]

## 2021-05-07 NOTE — CONSULT NOTE ADULT - SUBJECTIVE AND OBJECTIVE BOX
CHIEF COMPLAINT:    HISTORY OF PRESENT ILLNESS:    PAST MEDICAL & SURGICAL HISTORY:  HPI obtained via chart and primary team due to AMS.   78F w/ unknown medical history brought in by EMS after being found on the floor sitting in urine and a fall. Does not recall how long she was on the ground. Her friend (Gary Baeza - [phone]; [phone]) saw her on Saturday evening for family dinner. She did not receive any calls since 4 days prior. She checked in on her today and called EMS. She is noted to be forgetful over some time. In the ED, she is not able to participate due to AMS. ECG showed sinus tach to 90s, left axis and borderline LVH.     No pertinent past medical history    No significant past surgical history            MEDICATIONS:  aspirin enteric coated 81 milliGRAM(s) Oral daily  enoxaparin Injectable 40 milliGRAM(s) SubCutaneous daily  metoprolol tartrate 12.5 milliGRAM(s) Oral two times a day          pantoprazole  Injectable 40 milliGRAM(s) IV Push daily    atorvastatin 20 milliGRAM(s) Oral at bedtime    dextrose 5%. 1000 milliLiter(s) IV Continuous <Continuous>      FAMILY HISTORY:  No pertinent family history in first degree relatives        SOCIAL HISTORY:    [ ] Non-smoker  [ ] Smoker  [ ] Alcohol    Allergies    No Known Allergies    Intolerances    	    REVIEW OF SYSTEMS:	    [ ] All others negative	  [ xx] Unable to obtain    PHYSICAL EXAM:  T(C): 36.5 (05-07-21 @ 08:02), Max: 37.4 (05-06-21 @ 13:40)  HR: 76 (05-07-21 @ 08:02) (76 - 100)  BP: 131/76 (05-07-21 @ 08:02) (122/72 - 157/61)  RR: 18 (05-07-21 @ 08:02) (15 - 18)  SpO2: 98% (05-07-21 @ 08:02) (94% - 98%)  Wt(kg): --  I&O's Summary    06 May 2021 07:01  -  07 May 2021 07:00  --------------------------------------------------------  IN: 0 mL / OUT: 1200 mL / NET: -1200 mL        Appearance: NAD  HEENT: Dry  oral mucosa, PERRL, EOMI	  Lymphatic: No lymphadenopathy  Cardiovascular: Normal S1 S2, No JVD, No murmurs, No edema  Respiratory: Lungs clear to auscultation	  Psychiatry: A & O x 0  Gastrointestinal:  Soft, Non-tender, + BS	  Skin: No rashes, No ecchymoses, No cyanosis	  Neurologic: Non-focal  Extremities: Normal range of motion, No clubbing, cyanosis or edema  Vascular: Peripheral pulses palpable 2+ bilaterally    TELEMETRY: 	    ECG:  	 sinus tach to 90s, left axis and borderline LVH.     RADIOLOGY:  < from: Xray Hip 2-3 Views, Right (05.06.21 @ 13:53) >    EXAM:  XR HIP 2-3V RT                            PROCEDURE DATE:  05/06/2021            INTERPRETATION:  CLINICAL INDICATION: right hip pain    EXAM:  AP and crosstable lateral right hip from 5/6/2021 at 1353. No similar prior studies available forcomparison.    IMPRESSION:  Slightly comminuted and displaced distal right superior and mid right inferior pubic rami fractures.    Intact right femoral head neck and proximal femoral shaft.    Minimal degenerative spurring along lateral right hip joint margin otherwise preserved hip joint space.    Generalized osteopenia otherwise no discrete lytic or blastic lesions.                ARSENIO VANN M.D., ATTENDING RADIOLOGIST  This document has been electronically signed. May  6 2021  3:53PM    < end of copied text >  < from: Xray Chest 1 View- PORTABLE-Urgent (05.06.21 @ 13:52) >    EXAM:  XR CHEST PORTABLE URGENT 1V                            PROCEDURE DATE:  05/06/2021            INTERPRETATION:  TIME OF EXAM: May 6, 2021 at 1:39 PM.    CLINICAL INFORMATION: Sepsis.    COMPARISON:  CT chest from May 10, 2017.    TECHNIQUE:AP Portable chest x-ray.    INTERPRETATION:    Heart size and the mediastinum cannot be accurately evaluated on this projection.  The right lung is clear.  There is new left retrocardiac opacity which could be due to subsegmental atelectasis or pneumonia.  There is new platelike atelectasis at the lateral left base.  No pleural effusion or pneumothorax is seen.  No acute bony abnormality.        IMPRESSION:  New left retrocardiac opacity which could be due to subsegmental atelectasis or pneumonia. A CT scan of the chest could be performed for further evaluation, including to evaluate for a central obstructing process, if felt to be clinically indicated.    New platelike atelectasis at lateral left base.                LATRELL WALKER MD; Attending Radiologist  This document has been electronically signed. May  6 2021  3:28PM    < end of copied text >    OTHER: 	  	  LABS:	 	    CARDIAC MARKERS:        Troponin T, High Sensitivity Result: 35: * Troponin T, High Sensitivity Result: 37: * Troponin T, High Sensitivity Result: 32: *                           13.7   12.83 )-----------( 270      ( 06 May 2021 13:56 )             42.3     05-06    150<H>  |  110<H>  |  40<H>  ----------------------------<  114<H>  3.5   |  24  |  0.72    Ca    9.8      06 May 2021 15:32    TPro  7.5  /  Alb  4.2  /  TBili  1.0  /  DBili  x   /  AST  34  /  ALT  23  /  AlkPhos  85  05-06    proBNP:   Lipid Profile:   HgA1c:   TSH: Thyroid Stimulating Hormone, Serum: 1.91 uIU/mL (05-07 @ 05:19)

## 2021-05-07 NOTE — PROGRESS NOTE ADULT - REASON FOR ADMISSION
metabolic encephalopathy, SIRS, NSTEMI, hypernatremia, possible Syncope   Out patient Cardiologist Dr. Mario Millan metabolic encephalopathy, hypernatremia, possible Syncope   Out patient Cardiologist Dr. Mario Millan  Out patient PMD Dr. Mickey Nunez

## 2021-05-07 NOTE — CONSULT NOTE ADULT - ASSESSMENT
77 yo F with unk pmhx presents with EMS - found on floor sitting in urine. Pt states she was getting out of the bed and fell on the ground. Does not recall how long she was on the ground. Complains of back pain.   History obtained from friend, Gary Baeza - [phone]; [phone]: Saw her on Saturday evening for family dinner. Usually calls every day. Did not return calls since Monday (4days ago). Went to check in on today. Has been forgetful over past some time. MRI and some other tests. Was at [facility] 3 weeks ago for stiff neck - sent home. Goes to a lot of doctors - not sure which ones. Has been following with orthopedics for hip problems. Has not talked to her children for many years. Does not drink.   Dr. Millan - cardiologist, but says she has no cardiac probelms. alert to name and place (06 May 2021 17:34)    ER vitals:  Tmax 99.3, P 100, /88.  WBC 12.8.  PCT 0.13.  Lact 3.1.  UA (-).  RVP (-).  Covid pcr (-).  Ucx (-).  CT chest no pna, partial atelectasis of b/l lower lobes, RML mucoid impaction.  CTA chest limited evaluation for segmental and subsegmental pulmonary embolism. No main, left right, or lobar pulmonary embolism.  LE doppler (-) DVT.  CT pelvis with acute fractures of the right superior and inferior pubic rami and of the right sacral ala.    SIRS:    - Low grade temp, borderline tachycardia (P 100), elevated WBC and lactate.  Thus far infectious w/u neg including Ucx, Covid/RVP testing.  CT chest no pna, shows probably RML mucoid impaction.    - Pt with stage 2 sacral wound, seen by Wound care.  Cont offloading and topical therapy, no acute infection/cellulitis.    - Cont to monitor WBC and temp curve.     - CTA chest and LE doppler (-) for PE/DVT.    - CT pelvis with sacral fracture.  Ortho eval, cont pain control    - Check orthostatics.    * No indication for abx at this time.      Indira Aquino  [phone]      ([employer] ID covering on 5/8 and 5/9) [phone]

## 2021-05-07 NOTE — PROGRESS NOTE ADULT - SUBJECTIVE AND OBJECTIVE BOX
Patient is a 80y old  Female who presents with a chief complaint of metabolic encephalopathy, SIRS, NSTEMI, hypernatremia, (07 May 2021 16:49)      SUBJECTIVE / OVERNIGHT EVENTS: ptn is more awake, more alert today    MEDICATIONS  (STANDING):  aspirin enteric coated 81 milliGRAM(s) Oral daily  atorvastatin 20 milliGRAM(s) Oral at bedtime  dextrose 5%. 1000 milliLiter(s) (100 mL/Hr) IV Continuous <Continuous>  enoxaparin Injectable 40 milliGRAM(s) SubCutaneous daily  metoprolol tartrate 12.5 milliGRAM(s) Oral two times a day  pantoprazole  Injectable 40 milliGRAM(s) IV Push daily    MEDICATIONS  (PRN):  acetaminophen    Suspension .. 650 milliGRAM(s) Oral every 6 hours PRN Temp greater or equal to 38C (100.4F), Mild Pain (1 - 3)      Vital Signs Last 24 Hrs  T(F): 97.6 (21 @ 21:29), Max: 98.8 (21 @ 04:16)  HR: 88 (21 @ 21:29) (76 - 88)  BP: 118/69 (21 @ 21:29) (118/69 - 147/76)  RR: 18 (21 @ 21:29) (18 - 18)  SpO2: 98% (21 @ 21:29) (94% - 98%)  Telemetry:   CAPILLARY BLOOD GLUCOSE        I&O's Summary    06 May 2021 07:01  -  07 May 2021 07:00  --------------------------------------------------------  IN: 0 mL / OUT: 1200 mL / NET: -1200 mL        PHYSICAL EXAM:  GENERAL: NAD, well-developed  HEAD:  Atraumatic, Normocephalic  EYES: EOMI, PERRLA, conjunctiva and sclera clear  NECK: Supple, No JVD  CHEST/LUNG: Clear to auscultation bilaterally; No wheeze  HEART: Regular rate and rhythm; No murmurs, rubs, or gallops  ABDOMEN: Soft, Nontender, Nondistended; Bowel sounds present  EXTREMITIES:  2+ Peripheral Pulses, No clubbing, cyanosis, or edema  PSYCH: AAOx3  NEUROLOGY: non-focal  SKIN: No rashes or lesions    LABS:                        13.7   12.83 )-----------( 270      ( 06 May 2021 13:56 )             42.3     05-    150<H>  |  110<H>  |  40<H>  ----------------------------<  114<H>  3.5   |  24  |  0.72    Ca    9.8      06 May 2021 15:32    TPro  7.5  /  Alb  4.2  /  TBili  1.0  /  DBili  x   /  AST  34  /  ALT  23  /  AlkPhos  85  -    PT/INR - ( 06 May 2021 13:56 )   PT: 13.5 sec;   INR: 1.13 ratio         PTT - ( 06 May 2021 13:56 )  PTT:25.9 sec  CARDIAC MARKERS ( 07 May 2021 06:16 )  x     / x     / 316 U/L / x     / x      CARDIAC MARKERS ( 06 May 2021 13:56 )  x     / x     / 428 U/L / x     / x          Urinalysis Basic - ( 06 May 2021 13:48 )    Color: Yellow / Appearance: Clear / S.028 / pH: x  Gluc: x / Ketone: Small  / Bili: Negative / Urobili: Negative   Blood: x / Protein: 100 / Nitrite: Negative   Leuk Esterase: Negative / RBC: x / WBC x   Sq Epi: x / Non Sq Epi: x / Bacteria: x        RADIOLOGY & ADDITIONAL TESTS:    Imaging Personally Reviewed:    Consultant(s) Notes Reviewed:      Care Discussed with Consultants/Other Providers:

## 2021-05-07 NOTE — PHYSICAL THERAPY INITIAL EVALUATION ADULT - PRECAUTIONS/LIMITATIONS, REHAB EVAL
HISTORY AND RESULTS CONTINUED: CT HEAD (-). CT CERVICAL SPINE (-). CT LUMBAR SPINE: Acute fracture involving the right sacral ala, likely insufficiency fracture. CT PELVIS: Acute fractures of the right superior and inferior pubic rami and of the right sacral ala. CTA CHEST: Limited evaluation for segmental and subsegmental pulmonary embolism. No main, left right, or lobar pulmonary embolism. CT CHEST: No pneumonia. Partial atelectasis of both lower lobes. DUPLEX BLE: (-). XRAY R HIP: Slightly comminuted and displaced distal right superior and mid right inferior pubic rami fractures. Intact right femoral head neck and proximal femoral shaft. Minimal degenerative spurring along lateral right hip joint margin otherwise preserved hip joint space. Generalized osteopenia otherwise no discrete lytic or blastic lesions. XRAY PELVIS: Again noted is a comminuted, mildly displaced fracture of the medial aspect of the right superior pubic ramus and mildly displaced fracture of the right inferior pubic ramus. There is also cortical irregularity of the right sacral ngozi, consistent with fracture which was better seen on the CT scan performed yesterday. CHEST XRAY: New left retrocardiac opacity which could be due to subsegmental atelectasis or pneumonia. A CT scan of the chest could be performed for further evaluation, including to evaluate for a central obstructing process, if felt to be clinically indicated. New platelike atelectasis at lateral left base./fall precautions

## 2021-05-07 NOTE — PHYSICAL THERAPY INITIAL EVALUATION ADULT - PERTINENT HX OF CURRENT PROBLEM, REHAB EVAL
79 yo F with unk pmhx presents with EMS - found on floor sitting in urine. Pt states she was getting out of the bed and fell on the ground. Pt with acute R pubic ramus fx and sacral fx. 77 yo F uncertain PMH, ?dementia, presents after being found down sitting in urine. pt a/w metabolic encephalopathy and R pubic rami and sacral fx, WBAT BLE per ortho.

## 2021-05-07 NOTE — CONSULT NOTE ADULT - SUBJECTIVE AND OBJECTIVE BOX
Wound SURGERY CONSULT NOTE    HPI:  77 yo F with unk pmhx presents with EMS - found on floor sitting in urine. Pt states she was getting out of the bed and fell on the ground. Does not recall how long she was on the ground. Complains of back pain. pt noted w/ Mitral Regurgitation, Adenocarcinoma Lung Cancer s/p Lobectomy, s/p Cataracts, hyperlipidemia, and possible Dementia as she has been more forgetful. History obtained from medical chart and pt's friend, Gary Baeza who saw pt on Saturday evening for dinner. Usually calls every day. Did not return calls since Monday (4days ago). Went to check in on thursday. Has been forgetful recently.   Unclear exact problems, but pt had MRI and some other tests.  Was at [facility] 3 weeks ago for stiff neck - sent home. Goes to a lot of doctors - not sure which ones. Has been following with orthopedics for hip problems. Has not talked to her children for many years. Does not drink. Dr. Millan - cardiologist, but says she has no cardiac probelms.  Pt w/o N/V/D,  palp/ sob/dyspnea/ cp, or  F/C/S currently.  Wound consult requested to assist w/ management of  pressure injury. Pt w/o c/o pain, drainage, odor, color change, worsening leg swelling.  Pt had been independent and continent prior to this episode.  no confused and can't really answer question.  pt Incontinent of urine & stool.  Incontinence and offloading protocols initiated.  Allevyn used while awaiting consult.  pt's appetite had been good w/o weight loss.       Current Diet: Diet, Dysphagia 1 Pureed-Nectar Consistency Fluid (05-06-21 @ 16:54)      PAST MEDICAL & SURGICAL HISTORY:  Mitral Regurgitation  Adenocarcinoma Lung Cancer s/p Lobectomy,   s/p Cataracts,   hyperlipidemia,   possible Dementia      REVIEW OF SYSTEMS: Pt unable to offer as pt confused    MEDICATIONS  (STANDING):  aspirin enteric coated 81 milliGRAM(s) Oral daily  atorvastatin 20 milliGRAM(s) Oral at bedtime  dextrose 5%. 1000 milliLiter(s) (100 mL/Hr) IV Continuous <Continuous>  enoxaparin Injectable 40 milliGRAM(s) SubCutaneous daily  metoprolol tartrate 12.5 milliGRAM(s) Oral two times a day  pantoprazole  Injectable 40 milliGRAM(s) IV Push daily    No Known Allergies    SOCIAL HISTORY:  single; estranged from 3adult children, unknown smoking, ETOH, drugs hx    FAMILY HISTORY:  No pertinent family history in first degree relatives        PHYSICAL EXAM:  Vital Signs Last 24 Hrs  T(C): 36.7 (07 May 2021 14:33), Max: 37.1 (07 May 2021 04:16)  T(F): 98 (07 May 2021 14:33), Max: 98.8 (07 May 2021 04:16)  HR: 83 (07 May 2021 14:33) (76 - 92)  BP: 139/75 (07 May 2021 14:33) (122/72 - 157/61)  BP(mean): --  RR: 18 (07 May 2021 14:33) (15 - 18)  SpO2: 95% (07 May 2021 14:33) (94% - 98%)    NAD,  A&Ox2, Confused, WD/ WN/ WG  Versa Care P500 bed  HEENT:  NC/AT, PERRL, EOMI, mucosa moist, throat clear, trachea midline, neck supple  Cardiovascular: RRR   Respiratory: CTA  Gastrointestinal soft NT/ND (+)BS   Neurology:  weakened strength & sensation grossly intact  Musculoskeletal:  FROM, no deformities/ contractures  Vascular: BLE equally warm,  no cyanosis, clubbing, edema      DP/PT pulses palpable     no acute ischemia noted  Skin:  moist w/ good turgor  Sacrum into Rt buttock w/ evolving DTI hyperpigmented skin w/ epidermolysis      periwound w/ blanchable erythema and mild induration-   Buttocks below w/ pale blanchable erythema w/ denuded skin from what appears to be scratches     no blistering or other discrete wounds - moisture dermatitis vs deep tissue damage in evolution  no increased warmth, drainage, odor, tenderness, fluctuance    LABS/ CULTURES/ RADIOLOGY:                        13.7   12.83 )-----------( 270      ( 06 May 2021 13:56 )             42.3       150  |  110  |  40  ----------------------------<  114      [05-06-21 @ 15:32]  3.5   |  24  |  0.72        Ca     9.8     [05-06-21 @ 15:32]    TPro  7.5  /  Alb  4.2  /  TBili  1.0  /  DBili  x   /  AST  34  /  ALT  23  /  AlkPhos  85  [05-06-21 @ 13:56]    PT/INR: PT 13.5 , INR 1.13       [05-06-21 @ 13:56]  PTT: 25.9       [05-06-21 @ 13:56]          [05-07-21 @ 06:16]      < from: [facility] Duplex Lower Ext Vein Scan, Bilat (05.06.21 @ 19:05) >  TECHNIQUE: Duplex sonography of the BILATERAL LOWER extremity veins with color and spectral Doppler, with and without compression.    FINDINGS:    RIGHT:  Normal compressibility of the RIGHT common femoral, femoral and popliteal veins.  Doppler examination shows normal spontaneous and phasic flow.  No RIGHT calf vein thrombosis is detected.    LEFT:  Normal compressibility of the LEFT common femoral, femoral and popliteal veins.  Doppler examination shows normal spontaneous and phasic flow.  No LEFT calf vein thrombosis is detected.    IMPRESSION:  No evidence of deep venous thrombosis in either lower extremity.    < from: CT Lumbar Spine No Cont (05.06.21 @ 14:41) >  FINDINGS:  BONES AND DISCS:  There is an acute fracture involving the right sacral ala with extension into the right sacroiliac joints. Please note the sacrum is incompletely visualized on this lumbar spine study.    No additional fractures are identified.    No evidence of large disc protrusion or critical spinal stenosis is noted. MRI may be obtained, if the patient is MRI compatible, if further information regarding spinal canal soft tissue contents is required.    SOFT TISSUES:  Paraspinal soft tissues are unremarkable.    OTHER:  Punctate non obstructing calculus in the right renal midpole.    IMPRESSION:  -Acute fracture involving the right sacral ala, likely insufficiency fracture. Please note the sacrum is incompletely visualized on this lumbar spine study. Recommend dedicated CT of the pelvis for full evaluation of the pelvic osseous and soft tissue structures.

## 2021-05-07 NOTE — PHYSICAL THERAPY INITIAL EVALUATION ADULT - MANUAL MUSCLE TESTING RESULTS, REHAB EVAL
BUE grossly at least 3-/5, BLE grossly at least 2/5 (exam limited by pt cognition)/grossly assessed due to

## 2021-05-07 NOTE — CONSULT NOTE ADULT - PROBLEM SELECTOR RECOMMENDATION 9
? syncope   Monitor on Tele   Check TTE   Check D Dimer. if elevated, would check CTA rule out PE  Check orthostatics   Infectious workup   Obtain any previous records

## 2021-05-07 NOTE — CONSULT NOTE ADULT - ASSESSMENT
A/P: 78F w/ PMH/of: Mitral Regurgitation, Adenocarcinoma Lung Cancer s/p Lobectomy, s/p Cataracts, hyperlipidemia, and possible Dementia brought in by EMS after being found on the floor sitting in urine and a fall for Syncope & R/o MI w/u.    Wound Consult requested to assist w/ management of  Sacral/ buttocks DTI POA  Buttocks w/ moisture dermatitis w/ abrasions and possible deep tissue damage   Incontinence of urine and stool    Sacral wound- medihoney dressing  BLE Duplex w/o DVT noted  CT  shows sacral fracture- consider dedicated pelvic study CT vs MRI     Consider Ortho eval  BLE elevation  Abx per Medicine/ ID  Moisturize intact skin w/ SWEEN cream BID  Nutrition Consult for optimization   Continue turning and positioning w/ offloading assistive devices as per protocol  Continue w/ Pericare as per protocol  Waffle Cushion to chair when oob to chair  Continue w/ low air loss fluidized bed surface   Care as per medicine, will follow w/ you  Upon discharge f/u as outpatient at [facility] [phone]   D/w team & attng  Thank you for this consult  Eden Perla PA-C [id number]

## 2021-05-07 NOTE — PROGRESS NOTE ADULT - ASSESSMENT
79 yo F with unk pmhx presents with EMS - found on floor sitting in urine. Pt states she was getting out of the bed and fell on the ground. Does not recall how long she was on the ground. Complains of back pain.   History obtained from friend, Gary Baeza - [phone]; [phone]: Saw her on Saturday evening for family dinner. Usually calls every day. Did not return calls since Monday (4days ago). Went to check in on today. Has been forgetful over past some time. MRI and some other tests. Was at [facility] 3 weeks ago for stiff neck - sent home. Goes to a lot of doctors - not sure which ones. Has been following with orthopedics for hip problems. Has not talked to her children for many years. Does not drink.   Dr. Millan - cardiologist, but says she has no cardiac probelms. alert to name and place     ER vitals:  Tmax 99.3, P 100, /88.  WBC 12.8.  PCT 0.13.  Lact 3.1.  UA (-).  RVP (-).  Covid pcr (-).  Ucx (-).  CT chest no pna, partial atelectasis of b/l lower lobes, RML mucoid impaction.  CTA chest limited evaluation for segmental and subsegmental pulmonary embolism. No main, left right, or lobar pulmonary embolism.  LE doppler (-) DVT.  CT pelvis with acute fractures of the right superior and inferior pubic rami and of the right sacral ala.    SIRS:  - Low grade temp, borderline tachycardia (P 100), elevated WBC and lactate.  Thus far infectious w/u neg including Ucx, Covid/RVP testing.  CT chest no pna, shows probably RML mucoid impaction.  - Pt with stage 2 sacral wound, seen by Wound care.  Cont offloading and topical therapy, no acute infection/cellulitis.  - Cont to monitor WBC and temp curve.   - CTA chest and LE doppler (-) for PE/DVT.  - CT pelvis with sacral fracture.  Ortho eval, cont pain control  - Check orthostatics.    Hypernatremia  - cont IVF, cpt BMP pending. volume depleted    Pelvic and Sacral Fractures  - ortho input appreciated, PT ordered    dvt ppx w sc lovenox

## 2021-05-08 LAB
ANION GAP SERPL CALC-SCNC: 10 MMOL/L — SIGNIFICANT CHANGE UP (ref 5–17)
ANION GAP SERPL CALC-SCNC: 15 MMOL/L — SIGNIFICANT CHANGE UP (ref 5–17)
BASE EXCESS BLDV CALC-SCNC: 4 MMOL/L — HIGH (ref -2–2)
BUN SERPL-MCNC: 29 MG/DL — HIGH (ref 7–23)
BUN SERPL-MCNC: 29 MG/DL — HIGH (ref 7–23)
CA-I SERPL-SCNC: 1.26 MMOL/L — SIGNIFICANT CHANGE UP (ref 1.12–1.3)
CALCIUM SERPL-MCNC: 9.2 MG/DL — SIGNIFICANT CHANGE UP (ref 8.4–10.5)
CALCIUM SERPL-MCNC: 9.5 MG/DL — SIGNIFICANT CHANGE UP (ref 8.4–10.5)
CHLORIDE BLDV-SCNC: 104 MMOL/L — SIGNIFICANT CHANGE UP (ref 96–108)
CHLORIDE SERPL-SCNC: 101 MMOL/L — SIGNIFICANT CHANGE UP (ref 96–108)
CHLORIDE SERPL-SCNC: 101 MMOL/L — SIGNIFICANT CHANGE UP (ref 96–108)
CO2 BLDV-SCNC: 30 MMOL/L — SIGNIFICANT CHANGE UP (ref 22–30)
CO2 SERPL-SCNC: 23 MMOL/L — SIGNIFICANT CHANGE UP (ref 22–31)
CO2 SERPL-SCNC: 28 MMOL/L — SIGNIFICANT CHANGE UP (ref 22–31)
CREAT SERPL-MCNC: 0.7 MG/DL — SIGNIFICANT CHANGE UP (ref 0.5–1.3)
CREAT SERPL-MCNC: 0.74 MG/DL — SIGNIFICANT CHANGE UP (ref 0.5–1.3)
GAS PNL BLDV: 135 MMOL/L — SIGNIFICANT CHANGE UP (ref 135–145)
GAS PNL BLDV: SIGNIFICANT CHANGE UP
GAS PNL BLDV: SIGNIFICANT CHANGE UP
GLUCOSE BLDV-MCNC: 138 MG/DL — HIGH (ref 70–99)
GLUCOSE SERPL-MCNC: 122 MG/DL — HIGH (ref 70–99)
GLUCOSE SERPL-MCNC: 130 MG/DL — HIGH (ref 70–99)
HCO3 BLDV-SCNC: 29 MMOL/L — SIGNIFICANT CHANGE UP (ref 21–29)
HCT VFR BLD CALC: 35.8 % — SIGNIFICANT CHANGE UP (ref 34.5–45)
HCT VFR BLDA CALC: 37 % — LOW (ref 39–50)
HGB BLD CALC-MCNC: 12.1 G/DL — SIGNIFICANT CHANGE UP (ref 11.5–15.5)
HGB BLD-MCNC: 11.5 G/DL — SIGNIFICANT CHANGE UP (ref 11.5–15.5)
LACTATE BLDV-MCNC: 1.9 MMOL/L — SIGNIFICANT CHANGE UP (ref 0.7–2)
MAGNESIUM SERPL-MCNC: 2.2 MG/DL — SIGNIFICANT CHANGE UP (ref 1.6–2.6)
MAGNESIUM SERPL-MCNC: 2.2 MG/DL — SIGNIFICANT CHANGE UP (ref 1.6–2.6)
MCHC RBC-ENTMCNC: 30.7 PG — SIGNIFICANT CHANGE UP (ref 27–34)
MCHC RBC-ENTMCNC: 32.1 GM/DL — SIGNIFICANT CHANGE UP (ref 32–36)
MCV RBC AUTO: 95.5 FL — SIGNIFICANT CHANGE UP (ref 80–100)
NRBC # BLD: 0 /100 WBCS — SIGNIFICANT CHANGE UP (ref 0–0)
OTHER CELLS CSF MANUAL: 13 ML/DL — LOW (ref 18–22)
PCO2 BLDV: 47 MMHG — SIGNIFICANT CHANGE UP (ref 35–50)
PH BLDV: 7.4 — SIGNIFICANT CHANGE UP (ref 7.35–7.45)
PLATELET # BLD AUTO: 233 K/UL — SIGNIFICANT CHANGE UP (ref 150–400)
PO2 BLDV: 43 MMHG — SIGNIFICANT CHANGE UP (ref 25–45)
POTASSIUM BLDV-SCNC: 2.9 MMOL/L — CRITICAL LOW (ref 3.5–5.3)
POTASSIUM SERPL-MCNC: 3.3 MMOL/L — LOW (ref 3.5–5.3)
POTASSIUM SERPL-MCNC: 3.3 MMOL/L — LOW (ref 3.5–5.3)
POTASSIUM SERPL-SCNC: 3.3 MMOL/L — LOW (ref 3.5–5.3)
POTASSIUM SERPL-SCNC: 3.3 MMOL/L — LOW (ref 3.5–5.3)
RBC # BLD: 3.75 M/UL — LOW (ref 3.8–5.2)
RBC # FLD: 14.2 % — SIGNIFICANT CHANGE UP (ref 10.3–14.5)
SAO2 % BLDV: 77 % — SIGNIFICANT CHANGE UP (ref 67–88)
SODIUM SERPL-SCNC: 139 MMOL/L — SIGNIFICANT CHANGE UP (ref 135–145)
SODIUM SERPL-SCNC: 139 MMOL/L — SIGNIFICANT CHANGE UP (ref 135–145)
WBC # BLD: 8.99 K/UL — SIGNIFICANT CHANGE UP (ref 3.8–10.5)
WBC # FLD AUTO: 8.99 K/UL — SIGNIFICANT CHANGE UP (ref 3.8–10.5)

## 2021-05-08 PROCEDURE — 99221 1ST HOSP IP/OBS SF/LOW 40: CPT

## 2021-05-08 PROCEDURE — 70547 MR ANGIOGRAPHY NECK W/O DYE: CPT | Mod: 26

## 2021-05-08 PROCEDURE — 70544 MR ANGIOGRAPHY HEAD W/O DYE: CPT | Mod: 26,59

## 2021-05-08 PROCEDURE — 70551 MRI BRAIN STEM W/O DYE: CPT | Mod: 26

## 2021-05-08 RX ORDER — POTASSIUM CHLORIDE 20 MEQ
40 PACKET (EA) ORAL EVERY 4 HOURS
Refills: 0 | Status: COMPLETED | OUTPATIENT
Start: 2021-05-08 | End: 2021-05-08

## 2021-05-08 RX ORDER — NYSTATIN CREAM 100000 [USP'U]/G
1 CREAM TOPICAL
Refills: 0 | Status: DISCONTINUED | OUTPATIENT
Start: 2021-05-08 | End: 2021-05-18

## 2021-05-08 RX ORDER — POTASSIUM CHLORIDE 20 MEQ
20 PACKET (EA) ORAL DAILY
Refills: 0 | Status: COMPLETED | OUTPATIENT
Start: 2021-05-08 | End: 2021-05-11

## 2021-05-08 RX ADMIN — Medication 81 MILLIGRAM(S): at 13:47

## 2021-05-08 RX ADMIN — ATORVASTATIN CALCIUM 20 MILLIGRAM(S): 80 TABLET, FILM COATED ORAL at 21:43

## 2021-05-08 RX ADMIN — Medication 12.5 MILLIGRAM(S): at 06:44

## 2021-05-08 RX ADMIN — ENOXAPARIN SODIUM 40 MILLIGRAM(S): 100 INJECTION SUBCUTANEOUS at 13:47

## 2021-05-08 RX ADMIN — SODIUM CHLORIDE 100 MILLILITER(S): 9 INJECTION, SOLUTION INTRAVENOUS at 08:00

## 2021-05-08 RX ADMIN — Medication 40 MILLIEQUIVALENT(S): at 21:43

## 2021-05-08 RX ADMIN — SODIUM CHLORIDE 100 MILLILITER(S): 9 INJECTION, SOLUTION INTRAVENOUS at 06:44

## 2021-05-08 RX ADMIN — PANTOPRAZOLE SODIUM 40 MILLIGRAM(S): 20 TABLET, DELAYED RELEASE ORAL at 13:48

## 2021-05-08 RX ADMIN — Medication 40 MILLIEQUIVALENT(S): at 17:57

## 2021-05-08 RX ADMIN — SODIUM CHLORIDE 100 MILLILITER(S): 9 INJECTION, SOLUTION INTRAVENOUS at 19:55

## 2021-05-08 RX ADMIN — Medication 12.5 MILLIGRAM(S): at 17:57

## 2021-05-08 RX ADMIN — NYSTATIN CREAM 1 APPLICATION(S): 100000 CREAM TOPICAL at 23:14

## 2021-05-08 NOTE — SWALLOW BEDSIDE ASSESSMENT ADULT - COMMENTS
On admit pt AAOx3.   Pt admitted with metabolic encephalopathy, SIRS, NSTEMI, hypernatremia  +SIRS - Low grade temp, borderline tachycardia (P 100), elevated WBC and lactate.  Thus far infectious w/u neg including Ucx, Covid/RVP testing.  CT chest no pna, shows probably RML mucoid impaction.  - Pt with stage 2 sacral wound.  EP following for syncope, Cardiology following for NSTEMI. ID following, no indication for abx.   Neuro following ams of unclear etiology   metabolic encephalopathy due to pain related delirium, hypernatremia, prerenal state   r/o infectious process   Ortho consulted for R LC1 fracture. No acute intervention.

## 2021-05-08 NOTE — SWALLOW BEDSIDE ASSESSMENT ADULT - PHARYNGEAL PHASE
Delayed pharyngeal swallow/Cough post oral intake Delayed pharyngeal swallow Within functional limits

## 2021-05-08 NOTE — PROGRESS NOTE ADULT - SUBJECTIVE AND OBJECTIVE BOX
[facility]      Seen earlier today, and examined.  - Today, patient is without complaints.           *****MEDICATIONS: Current medication reviewed and documented.    MEDICATIONS  (STANDING):  aspirin enteric coated 81 milliGRAM(s) Oral daily  atorvastatin 20 milliGRAM(s) Oral at bedtime  dextrose 5%. 1000 milliLiter(s) (100 mL/Hr) IV Continuous <Continuous>  enoxaparin Injectable 40 milliGRAM(s) SubCutaneous daily  metoprolol tartrate 12.5 milliGRAM(s) Oral two times a day  pantoprazole  Injectable 40 milliGRAM(s) IV Push daily    MEDICATIONS  (PRN):  acetaminophen    Suspension .. 650 milliGRAM(s) Oral every 6 hours PRN Temp greater or equal to 38C (100.4F), Mild Pain (1 - 3)          ***** VITAL SIGNS:  T(F): 98.6 (21 @ 04:04), Max: 98.6 (21 @ 04:04)  HR: 74 (21 @ 04:04) (74 - 88)  BP: 155/77 (21 @ 04:04) (107/65 - 155/77)  RR: 18 (21 @ 04:04) (18 - 18)  SpO2: 98% (21 @ 04:04) (95% - 98%)  Wt(kg): --  ,   I&O's Summary    07 May 2021 07:01  -  08 May 2021 07:00  --------------------------------------------------------  IN: 1320 mL / OUT: 400 mL / NET: 920 mL             *****PHYSICAL EXAM:pleasant smiling    alert oriented x 2 poor insight   attention poor    comprehension   fair. able to tell me the name of current and previous president   Able to name, repeat.     EOmi fundi not visualized   no nystagmus VFF to confrontation  Tongue is midline  Palate elevates symmetrically   Moving both upper ext antigravity   lE with prox 3-/5  hamstrings/quad 4/5   dorsiflexion/plantarflexion 4/5       Gait not assessed.            *****LAB AND IMAGIN.5   8.99  )-----------( 233      ( 08 May 2021 07:25 )             35.8               05-08    139  |  101  |  29<H>  ----------------------------<  130<H>  3.3<L>   |  28  |  0.74    Ca    9.5      08 May 2021 08:12  Mg     2.2     05-    TPro  7.5  /  Alb  4.2  /  TBili  1.0  /  DBili  x   /  AST  34  /  ALT  23  /  AlkPhos  85  05-06    PT/INR - ( 06 May 2021 13:56 )   PT: 13.5 sec;   INR: 1.13 ratio         PTT - ( 06 May 2021 13:56 )  PTT:25.9 sec       CARDIAC MARKERS ( 07 May 2021 06:16 )  x     / x     / 316 U/L / x     / x      CARDIAC MARKERS ( 06 May 2021 13:56 )  x     / x     / 428 U/L / x     / x                  Urinalysis Basic - ( 06 May 2021 13:48 )    Color: Yellow / Appearance: Clear / S.028 / pH: x  Gluc: x / Ketone: Small  / Bili: Negative / Urobili: Negative   Blood: x / Protein: 100 / Nitrite: Negative   Leuk Esterase: Negative / RBC: x / WBC x   Sq Epi: x / Non Sq Epi: x / Bacteria: x      [All pertinent recent Imaging/Reports reviewed]           *****A S S E S S M E N T   A N D   P L A N :        77 yo F with unk pmhx presents with EMS - found on floor sitting in urine. Pt states she was getting out of the bed and fell on the ground. Does not recall how long she was on the ground. Complains of back pain.   History obtained from friend, Gary Baeza - [phone]; [phone]: Saw her on Saturday evening for family dinner. Usually calls every day. Did not return calls since Monday (4days ago). Went to check in on today. Has been forgetful over past some time. MRI and some other tests. Was at [facility] 3 weeks ago for stiff neck - sent home. Goes to a lot of doctors - not sure which ones. Has been following with orthopedics for hip problems. Has not talked to her children for many years. Does not drink.   Dr. Millan - cardiologist, but says she has no cardiac probelms. alert to name and place (06 May 2021 17:34)  limited history obtained   Problem/Recommendations 1:  ams of unclear etiology   metabolic encephalopathy due to pain related delirium, hypernatremia, prerenal state  exacerbating underlying cognitive impairment   r/o infectious process   cpk ok   cannot r/o sz   eeg prefer to do in hospital     Problem/Recommendations 2: pelvic fracture   ortho input appreciated no interventions   pt eval     Problem/Recommendations 3 :prox LE weakness   likely deconditioning   pt reports that she is supposed to use walker but hasnt been   fall likely related to not using assistive device   She states that she slid off the bed       Thank you for allowing me to participate in the care of this patient. Will continue to follow patient periodically. Please do not hesitate to call me if you have any  questions or if there has been a change in patients neurological status     ________________  Rhina Almaguer MD  [facility] ([facility]  [phone]      33 minutes spent on total encounter; more than 50 % of the visit was  spent counseling about plan of care, compliance to diet/exercise and medication regimen and or  coordinating care by the attending physician.      It is advised that stroke patients follow up with JULIO CESAR Johnston @ [phone] in 1- 2 weeks.   Others please follow up with Dr. Michael Nissenbaum [phone]

## 2021-05-08 NOTE — PROGRESS NOTE ADULT - SUBJECTIVE AND OBJECTIVE BOX
Patient is a 80y old  Female who presents with a chief complaint of metabolic encephalopathy, SIRS, NSTEMI, hypernatremia, (08 May 2021 22:12)      SUBJECTIVE / OVERNIGHT EVENTS: ptn is awake alert, MS at baseline    MEDICATIONS  (STANDING):  aspirin enteric coated 81 milliGRAM(s) Oral daily  atorvastatin 20 milliGRAM(s) Oral at bedtime  dextrose 5%. 1000 milliLiter(s) (100 mL/Hr) IV Continuous <Continuous>  enoxaparin Injectable 40 milliGRAM(s) SubCutaneous daily  metoprolol tartrate 12.5 milliGRAM(s) Oral two times a day  pantoprazole  Injectable 40 milliGRAM(s) IV Push daily    MEDICATIONS  (PRN):  acetaminophen    Suspension .. 650 milliGRAM(s) Oral every 6 hours PRN Temp greater or equal to 38C (100.4F), Mild Pain (1 - 3)  nystatin Powder 1 Application(s) Topical two times a day PRN after each BM      Vital Signs Last 24 Hrs  T(F): 98 (05-08-21 @ 20:59), Max: 98.6 (05-08-21 @ 04:04)  HR: 84 (05-08-21 @ 20:59) (74 - 96)  BP: 118/70 (05-08-21 @ 20:59) (107/65 - 155/77)  RR: 17 (05-08-21 @ 20:59) (17 - 18)  SpO2: 97% (05-08-21 @ 20:59) (95% - 98%)  Telemetry:   CAPILLARY BLOOD GLUCOSE        I&O's Summary    07 May 2021 07:01  -  08 May 2021 07:00  --------------------------------------------------------  IN: 1320 mL / OUT: 400 mL / NET: 920 mL    08 May 2021 07:01  -  08 May 2021 23:04  --------------------------------------------------------  IN: 100 mL / OUT: 1150 mL / NET: -1050 mL        PHYSICAL EXAM:  GENERAL: NAD, well-developed  HEAD:  Atraumatic, Normocephalic  EYES: EOMI, PERRLA, conjunctiva and sclera clear  NECK: Supple, No JVD  CHEST/LUNG: Clear to auscultation bilaterally; No wheeze  HEART: Regular rate and rhythm; No murmurs, rubs, or gallops  ABDOMEN: Soft, Nontender, Nondistended; Bowel sounds present  EXTREMITIES:  2+ Peripheral Pulses, No clubbing, cyanosis, or edema  PSYCH: AAOx3  NEUROLOGY: non-focal  SKIN: No rashes or lesions    LABS:                        11.5   8.99  )-----------( 233      ( 08 May 2021 07:25 )             35.8     05-08    139  |  101  |  29<H>  ----------------------------<  130<H>  3.3<L>   |  28  |  0.74    Ca    9.5      08 May 2021 08:12  Mg     2.2     05-08        CARDIAC MARKERS ( 07 May 2021 06:16 )  x     / x     / 316 U/L / x     / x              RADIOLOGY & ADDITIONAL TESTS:    Imaging Personally Reviewed:    Consultant(s) Notes Reviewed:      Care Discussed with Consultants/Other Providers:

## 2021-05-08 NOTE — SWALLOW BEDSIDE ASSESSMENT ADULT - SLP GENERAL OBSERVATIONS
Pt awake in bed, oriented x2 (name, hospital, month not year). Pleasant, able to communicate needs and follow directions for exam. + Distractible. Confused at times. + Cognitive linguistic impairments. Phonemic paraphasias.

## 2021-05-08 NOTE — SWALLOW BEDSIDE ASSESSMENT ADULT - ASR SWALLOW ASPIRATION MONITOR
Monitor for s/s aspiration/laryngeal penetration. If noted:  D/C p.o. intake, provide non-oral nutrition/hydration/meds, and contact this service @ [phone]/change of breathing pattern/cough/gurgly voice/fever/pneumonia/throat clearing/upper respiratory infection

## 2021-05-08 NOTE — PROGRESS NOTE ADULT - SUBJECTIVE AND OBJECTIVE BOX
Subjective: Patient seen and examined. No new events except as noted.     REVIEW OF SYSTEMS:    CONSTITUTIONAL: No weakness, fevers or chills  EYES/ENT: No visual changes;  No vertigo or throat pain   NECK: No pain or stiffness  RESPIRATORY: No cough, wheezing, hemoptysis; No shortness of breath  CARDIOVASCULAR: No chest pain or palpitations  GASTROINTESTINAL: No abdominal or epigastric pain. No nausea, vomiting, or hematemesis; No diarrhea or constipation. No melena or hematochezia.  GENITOURINARY: No dysuria, frequency or hematuria  NEUROLOGICAL: No numbness or weakness  SKIN: No itching, burning, rashes, or lesions   All other review of systems is negative unless indicated above.    MEDICATIONS:  MEDICATIONS  (STANDING):  aspirin enteric coated 81 milliGRAM(s) Oral daily  atorvastatin 20 milliGRAM(s) Oral at bedtime  dextrose 5%. 1000 milliLiter(s) (100 mL/Hr) IV Continuous <Continuous>  enoxaparin Injectable 40 milliGRAM(s) SubCutaneous daily  metoprolol tartrate 12.5 milliGRAM(s) Oral two times a day  pantoprazole  Injectable 40 milliGRAM(s) IV Push daily      PHYSICAL EXAM:  T(C): 36.7 (05-08-21 @ 20:59), Max: 37 (05-08-21 @ 04:04)  HR: 84 (05-08-21 @ 20:59) (74 - 96)  BP: 118/70 (05-08-21 @ 20:59) (107/65 - 155/77)  RR: 17 (05-08-21 @ 20:59) (17 - 18)  SpO2: 97% (05-08-21 @ 20:59) (95% - 98%)  Wt(kg): --  I&O's Summary    07 May 2021 07:01  -  08 May 2021 07:00  --------------------------------------------------------  IN: 1320 mL / OUT: 400 mL / NET: 920 mL    08 May 2021 07:01  -  08 May 2021 22:12  --------------------------------------------------------  IN: 100 mL / OUT: 700 mL / NET: -600 mL              Appearance: NAD  HEENT: Dry  oral mucosa, PERRL, EOMI	  Lymphatic: No lymphadenopathy  Cardiovascular: Normal S1 S2, No JVD, No murmurs, No edema  Respiratory: Lungs clear to auscultation	  Psychiatry: A & O x 0  Gastrointestinal:  Soft, Non-tender, + BS	  Skin: No rashes, No ecchymoses, No cyanosis	  Neurologic: Non-focal  Extremities: Normal range of motion, No clubbing, cyanosis or edema  Vascular: Peripheral pulses palpable 2+ bilaterally        LABS:    CARDIAC MARKERS:  CARDIAC MARKERS ( 07 May 2021 06:16 )  x     / x     / 316 U/L / x     / x      CARDIAC MARKERS ( 06 May 2021 13:56 )  x     / x     / 428 U/L / x     / x                                    11.5   8.99  )-----------( 233      ( 08 May 2021 07:25 )             35.8     05-08    139  |  101  |  29<H>  ----------------------------<  130<H>  3.3<L>   |  28  |  0.74    Ca    9.5      08 May 2021 08:12  Mg     2.2     05-08      proBNP:   Lipid Profile:   HgA1c:   TSH:                 TELEMETRY: 	  SR   ECG:  	  RADIOLOGY: < from: CT Angio Chest w/ IV Cont (05.06.21 @ 23:34) >    EXAM:  CT ANGIO CHEST (W)AW IC                            PROCEDURE DATE:  05/06/2021            INTERPRETATION:  EXAMINATION: CT ANGIO CHEST WITHOUT AND OR WITH IV CONTRAST    CLINICAL INDICATION: Elevated D-dimer; AMS    TECHNIQUE: CTA of the chest was performed for evaluation of pulmonary embolism after administration of 59 ml of Omnipaque-350, 41 ml discarded.  MIP images were reconstructed.    COMPARISON: Multiple CT, most recent 5/6/2021.    FINDINGS:    PULMONARY ARTERIES: Limited evaluation for segmental and subsegmental pulmonary embolism. No main, left right, or lobar pulmonary embolism.    AIRWAYS AND LUNGS: The central tracheobronchial tree is patent.  Partial atelectasis both lower lobes. Right lower lobe atelectasis contains coarse calcifications. Right middle lobe tubular/branching opacity likely represents mucoid impaction.    MEDIASTINUM AND PLEURA: There are no enlarged mediastinal, hilar or axillary lymph nodes. The visualized portion of the thyroid gland is unremarkable. There is no pleural effusion. There is no pneumothorax.    HEART AND VESSELS: There is mild cardiomegaly. There are atherosclerotic calcifications of the aorta and coronary arteries.  There is no pericardial effusion.    UPPER ABDOMEN: Images of the upper abdomen demonstrate left adrenal nodularity is unchanged..    BONES AND SOFT TISSUES: The bones are unremarkable.  The soft tissues are unremarkable.    TUBES/LINES: None.    IMPRESSION:  Limited evaluation for segmental and subsegmental pulmonary embolism. No main, left right, or lobar pulmonary embolism.                VISHAL CHRISTOPHER MD; Attending Radiologist  This document has been electronically signed. May  7 2021  8:25AM    < end of copied text >  DIAGNOSTIC TESTING:  [ ] Echocardiogram:  [ ]  Catheterization:  [ ] Stress Test:    OTHER:

## 2021-05-08 NOTE — SWALLOW BEDSIDE ASSESSMENT ADULT - SLP PERTINENT HISTORY OF CURRENT PROBLEM
79 yo F with unk pmhx presents with EMS - found on floor sitting in urine. Pt states she was getting out of the bed and fell on the ground. Does not recall how long she was on the ground. Complains of back pain. History obtained from friend, Gary Baeza - [phone]; [phone]: Saw her on Saturday evening for family dinner. Usually calls every day. Did not return calls since Monday (4days ago). Went to check in on today. Has been forgetful over past some time. MRI and some other tests. Was at [facility] 3 weeks ago for stiff neck - sent home. Goes to a lot of doctors - not sure which ones. Has been following with orthopedics for hip problems. Has not talked to her children for many years. Does not drink.  Dr. Millan - cardiologist, but says she has no cardiac probelms. alert to name and place

## 2021-05-08 NOTE — SWALLOW BEDSIDE ASSESSMENT ADULT - SWALLOW EVAL: RECOMMENDED FEEDING/EATING TECHNIQUES
crush medication (when feasible)/maintain upright posture during/after eating for 30 mins/position upright (90 degrees)/small sips/bites

## 2021-05-08 NOTE — SWALLOW BEDSIDE ASSESSMENT ADULT - SWALLOW EVAL: DIAGNOSIS
Pt 79 y/o F admitted with metabolic encephalopathy, SIRS, NSTEMI, hypernatremia. Now presenting with 1. an oral and suspected pharyngeal dysphagia marked by prolonged but functional mastication, suspected uncontrolled loss, delayed pharyngeal swallow, and coughing post PO intake of thin liquid suggestive of laryngeal penetration/aspiration. 2. 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

## 2021-05-09 LAB
ANION GAP SERPL CALC-SCNC: 12 MMOL/L — SIGNIFICANT CHANGE UP (ref 5–17)
BUN SERPL-MCNC: 16 MG/DL — SIGNIFICANT CHANGE UP (ref 7–23)
CALCIUM SERPL-MCNC: 9.2 MG/DL — SIGNIFICANT CHANGE UP (ref 8.4–10.5)
CHLORIDE SERPL-SCNC: 104 MMOL/L — SIGNIFICANT CHANGE UP (ref 96–108)
CO2 SERPL-SCNC: 25 MMOL/L — SIGNIFICANT CHANGE UP (ref 22–31)
CREAT SERPL-MCNC: 0.66 MG/DL — SIGNIFICANT CHANGE UP (ref 0.5–1.3)
GLUCOSE SERPL-MCNC: 105 MG/DL — HIGH (ref 70–99)
HCT VFR BLD CALC: 35.1 % — SIGNIFICANT CHANGE UP (ref 34.5–45)
HGB BLD-MCNC: 11.4 G/DL — LOW (ref 11.5–15.5)
MAGNESIUM SERPL-MCNC: 2.2 MG/DL — SIGNIFICANT CHANGE UP (ref 1.6–2.6)
MCHC RBC-ENTMCNC: 30.7 PG — SIGNIFICANT CHANGE UP (ref 27–34)
MCHC RBC-ENTMCNC: 32.5 GM/DL — SIGNIFICANT CHANGE UP (ref 32–36)
MCV RBC AUTO: 94.6 FL — SIGNIFICANT CHANGE UP (ref 80–100)
NRBC # BLD: 0 /100 WBCS — SIGNIFICANT CHANGE UP (ref 0–0)
PLATELET # BLD AUTO: 258 K/UL — SIGNIFICANT CHANGE UP (ref 150–400)
POTASSIUM SERPL-MCNC: 4.1 MMOL/L — SIGNIFICANT CHANGE UP (ref 3.5–5.3)
POTASSIUM SERPL-SCNC: 4.1 MMOL/L — SIGNIFICANT CHANGE UP (ref 3.5–5.3)
RBC # BLD: 3.71 M/UL — LOW (ref 3.8–5.2)
RBC # FLD: 13.9 % — SIGNIFICANT CHANGE UP (ref 10.3–14.5)
SODIUM SERPL-SCNC: 141 MMOL/L — SIGNIFICANT CHANGE UP (ref 135–145)
WBC # BLD: 8.94 K/UL — SIGNIFICANT CHANGE UP (ref 3.8–10.5)
WBC # FLD AUTO: 8.94 K/UL — SIGNIFICANT CHANGE UP (ref 3.8–10.5)

## 2021-05-09 RX ADMIN — Medication 12.5 MILLIGRAM(S): at 06:25

## 2021-05-09 RX ADMIN — ENOXAPARIN SODIUM 40 MILLIGRAM(S): 100 INJECTION SUBCUTANEOUS at 11:44

## 2021-05-09 RX ADMIN — ATORVASTATIN CALCIUM 20 MILLIGRAM(S): 80 TABLET, FILM COATED ORAL at 22:35

## 2021-05-09 RX ADMIN — PANTOPRAZOLE SODIUM 40 MILLIGRAM(S): 20 TABLET, DELAYED RELEASE ORAL at 11:44

## 2021-05-09 RX ADMIN — Medication 20 MILLIEQUIVALENT(S): at 11:44

## 2021-05-09 RX ADMIN — Medication 650 MILLIGRAM(S): at 23:50

## 2021-05-09 RX ADMIN — Medication 650 MILLIGRAM(S): at 22:34

## 2021-05-09 RX ADMIN — Medication 81 MILLIGRAM(S): at 11:44

## 2021-05-09 NOTE — DIETITIAN INITIAL EVALUATION ADULT. - ORAL INTAKE PTA/DIET HISTORY
Pt reports good appetite PTA. Overall, pt poor historian and unable to assess what pt was eating PTA. Confirms no known food allergies which is consistent with chart. Unclear Hx of chewing or swallowing issues. Pt status post speech therapist evaluation on 5/8 with dysphagia 3 + nectar thickened liquids being recommended. Pt thinks she takes women's multivitamin PTA.

## 2021-05-09 NOTE — PROGRESS NOTE ADULT - SUBJECTIVE AND OBJECTIVE BOX
Patient is a 80y old  Female who presents with a chief complaint of metabolic encephalopathy, SIRS, NSTEMI, hypernatremia, (09 May 2021 14:27)      SUBJECTIVE / OVERNIGHT EVENTS: ptn states she doesnt want to go to [facility], not sure if she has help at home/family. would be not a safe DC to DC home without help. will do TOV in am    MEDICATIONS  (STANDING):  aspirin enteric coated 81 milliGRAM(s) Oral daily  atorvastatin 20 milliGRAM(s) Oral at bedtime  enoxaparin Injectable 40 milliGRAM(s) SubCutaneous daily  metoprolol tartrate 12.5 milliGRAM(s) Oral two times a day  pantoprazole  Injectable 40 milliGRAM(s) IV Push daily  potassium chloride    Tablet ER 20 milliEquivalent(s) Oral daily    MEDICATIONS  (PRN):  acetaminophen    Suspension .. 650 milliGRAM(s) Oral every 6 hours PRN Temp greater or equal to 38C (100.4F), Mild Pain (1 - 3)  nystatin Powder 1 Application(s) Topical two times a day PRN after each BM      Vital Signs Last 24 Hrs  T(F): 97.6 (05-09-21 @ 21:58), Max: 98.7 (05-09-21 @ 04:56)  HR: 89 (05-09-21 @ 21:58) (89 - 92)  BP: 125/77 (05-09-21 @ 21:58) (104/67 - 132/73)  RR: 18 (05-09-21 @ 21:58) (18 - 18)  SpO2: 98% (05-09-21 @ 21:58) (95% - 98%)  Telemetry:   CAPILLARY BLOOD GLUCOSE        I&O's Summary    08 May 2021 07:01  -  09 May 2021 07:00  --------------------------------------------------------  IN: 100 mL / OUT: 1400 mL / NET: -1300 mL    09 May 2021 07:01  -  09 May 2021 22:17  --------------------------------------------------------  IN: 300 mL / OUT: 550 mL / NET: -250 mL        PHYSICAL EXAM:  GENERAL: NAD, well-developed  HEAD:  Atraumatic, Normocephalic  EYES: EOMI, PERRLA, conjunctiva and sclera clear  NECK: Supple, No JVD  CHEST/LUNG: Clear to auscultation bilaterally; No wheeze  HEART: Regular rate and rhythm; No murmurs, rubs, or gallops  ABDOMEN: Soft, Nontender, Nondistended; Bowel sounds present  EXTREMITIES:  2+ Peripheral Pulses, No clubbing, cyanosis, or edema  PSYCH: AAOx3  NEUROLOGY: non-focal  SKIN: No rashes or lesions    LABS:                        11.4   8.94  )-----------( 258      ( 09 May 2021 07:11 )             35.1     05-09    141  |  104  |  16  ----------------------------<  105<H>  4.1   |  25  |  0.66    Ca    9.2      09 May 2021 07:11  Mg     2.2     05-09                RADIOLOGY & ADDITIONAL TESTS:    Imaging Personally Reviewed:    Consultant(s) Notes Reviewed:      Care Discussed with Consultants/Other Providers:

## 2021-05-09 NOTE — DIETITIAN INITIAL EVALUATION ADULT. - CHIEF COMPLAINT
"78F w/ unknown medical history brought in by EMS after being found on the floor sitting in urine and a fall. Cardiology called for question of syncope. She does not have a known episode of witnessed syncope. ECG showed sinus rhythm with borderline tachycardia, left axis and LVH"

## 2021-05-09 NOTE — DIETITIAN INITIAL EVALUATION ADULT. - OTHER INFO
Upon RD visit, pt reports appetite is fair. RD observed pt consumed ~50% of lunch meal today. Pt denies nausea, vomiting, constipation or diarrhea at this time although last bowel movement unclear at this time. Pt not on bowel regimen at this time.    Pt reports UBW is ~140 pounds. Noted dosing weight of 150 pounds (5/6/21). Per Dustin DRAKE, weights ~144-147 pounds over past 1 year. Will continue to monitor trends as able.    Nutrition education not appropriate at this time, though pt agreeable to trying mighty shakes.

## 2021-05-09 NOTE — DIETITIAN INITIAL EVALUATION ADULT. - REASON INDICATOR FOR ASSESSMENT
Nutrition Consult for Pressure Injury Stage 2 or >  Source: patient (confused per flow sheets but able to somewhat participate in RD interview), medical record

## 2021-05-09 NOTE — DIETITIAN INITIAL EVALUATION ADULT. - ADD RECOMMEND
3. Monitor diet, PO intake, GI status, weight, labs, skin integrity 4. Continue with diet as tolerated; defer altered consistency to speech therapist 5. Recommend adding multivitamin + Vit C daily for wound healing if no contraindications

## 2021-05-09 NOTE — DIETITIAN INITIAL EVALUATION ADULT. - PERTINENT MEDS FT
MEDICATIONS  (STANDING):  aspirin enteric coated 81 milliGRAM(s) Oral daily  atorvastatin 20 milliGRAM(s) Oral at bedtime  enoxaparin Injectable 40 milliGRAM(s) SubCutaneous daily  metoprolol tartrate 12.5 milliGRAM(s) Oral two times a day  pantoprazole  Injectable 40 milliGRAM(s) IV Push daily  potassium chloride    Tablet ER 20 milliEquivalent(s) Oral daily    MEDICATIONS  (PRN):  acetaminophen    Suspension .. 650 milliGRAM(s) Oral every 6 hours PRN Temp greater or equal to 38C (100.4F), Mild Pain (1 - 3)  nystatin Powder 1 Application(s) Topical two times a day PRN after each BM

## 2021-05-09 NOTE — PROGRESS NOTE ADULT - ASSESSMENT
79 yo F with unk pmhx presents with EMS - found on floor sitting in urine. Pt states she was getting out of the bed and fell on the ground. Does not recall how long she was on the ground. Complains of back pain.   History obtained from friend, Gary Baeza - [phone]; [phone]: Saw her on Saturday evening for family dinner. Usually calls every day. Did not return calls since Monday (4days ago). Went to check in on today. Has been forgetful over past some time. MRI and some other tests. Was at [facility] 3 weeks ago for stiff neck - sent home. Goes to a lot of doctors - not sure which ones. Has been following with orthopedics for hip problems. Has not talked to her children for many years. Does not drink.   Dr. Millan - cardiologist, but says she has no cardiac probelms. alert to name and place     ER vitals:  Tmax 99.3, P 100, /88.  WBC 12.8.  PCT 0.13.  Lact 3.1.  UA (-).  RVP (-).  Covid pcr (-).  Ucx (-).  CT chest no pna, partial atelectasis of b/l lower lobes, RML mucoid impaction.  CTA chest limited evaluation for segmental and subsegmental pulmonary embolism. No main, left right, or lobar pulmonary embolism.  LE doppler (-) DVT.  CT pelvis with acute fractures of the right superior and inferior pubic rami and of the right sacral ala.    SIRS:  -resolved  -s/p Low grade temp, borderline tachycardia (P 100), elevated WBC and lactate have resolved, infectious etiology has been ruled out.   - all Cx NTD, pan CT neg for infectious focus.  - Pt with stage 2 sacral wound, seen by Wound care.  Cont offloading and topical therapy, no acute infection/cellulitis.  - Cont to monitor WBC and temp curve.   - CTA chest and LE doppler (-) for PE/DVT.  - CT pelvis with sacral fracture.  Ortho eval, cont pain control. DC to [facility]  Urinary Retention  - will DC ingram and do TOV    Hypernatremia  - resolved  - DC IVF,  euvolemic    Pelvic and Sacral Fractures  - ortho input appreciated, PT ordered. ptn states she doesnt want to go to [facility]. will d/w     Hypokalemia  - due to total body depletion, now resolved, on daily supplements    DC planning    dvt ppx w sc lovenox

## 2021-05-09 NOTE — DIETITIAN INITIAL EVALUATION ADULT. - PHYSCIAL ASSESSMENT
111% IBW  Skin: pt with right upper buttocks unstageable pressure injury per flow sheets well nourished

## 2021-05-09 NOTE — PROGRESS NOTE ADULT - SUBJECTIVE AND OBJECTIVE BOX
Subjective: Patient seen and examined. No new events except as noted.     REVIEW OF SYSTEMS:    CONSTITUTIONAL: + weakness, fevers or chills  EYES/ENT: No visual changes;  No vertigo or throat pain   NECK: No pain or stiffness  RESPIRATORY: No cough, wheezing, hemoptysis; No shortness of breath  CARDIOVASCULAR: No chest pain or palpitations  GASTROINTESTINAL: No abdominal or epigastric pain. No nausea, vomiting, or hematemesis; No diarrhea or constipation. No melena or hematochezia.  GENITOURINARY: No dysuria, frequency or hematuria  NEUROLOGICAL: No numbness or weakness  SKIN: No itching, burning, rashes, or lesions   All other review of systems is negative unless indicated above.    MEDICATIONS:  MEDICATIONS  (STANDING):  aspirin enteric coated 81 milliGRAM(s) Oral daily  atorvastatin 20 milliGRAM(s) Oral at bedtime  enoxaparin Injectable 40 milliGRAM(s) SubCutaneous daily  metoprolol tartrate 12.5 milliGRAM(s) Oral two times a day  pantoprazole  Injectable 40 milliGRAM(s) IV Push daily  potassium chloride    Tablet ER 20 milliEquivalent(s) Oral daily      PHYSICAL EXAM:  T(C): 37.1 (05-09-21 @ 04:56), Max: 37.1 (05-09-21 @ 04:56)  HR: 91 (05-09-21 @ 04:56) (84 - 96)  BP: 122/71 (05-09-21 @ 04:56) (118/70 - 134/73)  RR: 18 (05-09-21 @ 04:56) (17 - 18)  SpO2: 95% (05-09-21 @ 04:56) (95% - 97%)  Wt(kg): --  I&O's Summary    08 May 2021 07:01  -  09 May 2021 07:00  --------------------------------------------------------  IN: 100 mL / OUT: 1400 mL / NET: -1300 mL          Appearance: NAD	  HEENT:   Normal oral mucosa, PERRL, EOMI	  Lymphatic: No lymphadenopathy , no edema  Cardiovascular: Normal S1 S2, No JVD, No murmurs , Peripheral pulses palpable 2+ bilaterally  Respiratory: Lungs clear to auscultation, normal effort 	  Gastrointestinal:  Soft, Non-tender, + BS	  Skin: No rashes, No ecchymoses, No cyanosis, warm to touch  Musculoskeletal: Normal range of motion, normal strength  Psychiatry: sleepy    Ext: No edema      LABS:    CARDIAC MARKERS:  CARDIAC MARKERS ( 07 May 2021 06:16 )  x     / x     / 316 U/L / x     / x      CARDIAC MARKERS ( 06 May 2021 13:56 )  x     / x     / 428 U/L / x     / x                                    11.4   8.94  )-----------( 258      ( 09 May 2021 07:11 )             35.1     05-09    141  |  104  |  16  ----------------------------<  105<H>  4.1   |  25  |  0.66    Ca    9.2      09 May 2021 07:11  Mg     2.2     05-09      proBNP:   Lipid Profile:   HgA1c:   TSH:          TELEMETRY: 	    ECG:  	  RADIOLOGY: < from: [facility] Duplex Lower Ext Vein Scan, Bilat (05.06.21 @ 19:05) >    EXAM:  DUPLEX SCAN EXT VEINS LOWER BI                            PROCEDURE DATE:  05/06/2021            INTERPRETATION:  CLINICAL INFORMATION: Status post fall, bilateral leg swelling.    COMPARISON: None available.    TECHNIQUE: Duplex sonography of the BILATERAL LOWER extremity veins with color and spectral Doppler, with and without compression.    FINDINGS:    RIGHT:  Normal compressibility of the RIGHT common femoral, femoral and popliteal veins.  Doppler examination shows normal spontaneous and phasic flow.  No RIGHT calf vein thrombosis is detected.    LEFT:  Normal compressibility of the LEFT common femoral, femoral and popliteal veins.  Doppler examination shows normal spontaneous and phasic flow.  No LEFT calf vein thrombosis is detected.    IMPRESSION:  No evidence of deep venous thrombosis in either lower extremity.                    RADHA ZACARIAS MD; Attending Radiologist  This document has been electronically signed. May  7 2021  8:29AM    < end of copied text >    DIAGNOSTIC TESTING:  [ ] Echocardiogram:  [ ]  Catheterization:  [ ] Stress Test:    OTHER:

## 2021-05-10 PROCEDURE — 95816 EEG AWAKE AND DROWSY: CPT | Mod: 26

## 2021-05-10 RX ORDER — DIVALPROEX SODIUM 500 MG/1
125 TABLET, DELAYED RELEASE ORAL
Refills: 0 | Status: DISCONTINUED | OUTPATIENT
Start: 2021-05-10 | End: 2021-05-12

## 2021-05-10 RX ADMIN — DIVALPROEX SODIUM 125 MILLIGRAM(S): 500 TABLET, DELAYED RELEASE ORAL at 23:20

## 2021-05-10 RX ADMIN — Medication 650 MILLIGRAM(S): at 21:58

## 2021-05-10 RX ADMIN — DIVALPROEX SODIUM 125 MILLIGRAM(S): 500 TABLET, DELAYED RELEASE ORAL at 13:58

## 2021-05-10 RX ADMIN — ENOXAPARIN SODIUM 40 MILLIGRAM(S): 100 INJECTION SUBCUTANEOUS at 13:55

## 2021-05-10 RX ADMIN — NYSTATIN CREAM 1 APPLICATION(S): 100000 CREAM TOPICAL at 05:42

## 2021-05-10 RX ADMIN — Medication 12.5 MILLIGRAM(S): at 17:29

## 2021-05-10 RX ADMIN — Medication 650 MILLIGRAM(S): at 05:42

## 2021-05-10 RX ADMIN — Medication 12.5 MILLIGRAM(S): at 05:42

## 2021-05-10 RX ADMIN — Medication 650 MILLIGRAM(S): at 23:10

## 2021-05-10 RX ADMIN — ATORVASTATIN CALCIUM 20 MILLIGRAM(S): 80 TABLET, FILM COATED ORAL at 21:57

## 2021-05-10 RX ADMIN — Medication 20 MILLIEQUIVALENT(S): at 13:55

## 2021-05-10 RX ADMIN — Medication 81 MILLIGRAM(S): at 13:55

## 2021-05-10 RX ADMIN — PANTOPRAZOLE SODIUM 40 MILLIGRAM(S): 20 TABLET, DELAYED RELEASE ORAL at 13:56

## 2021-05-10 NOTE — PROGRESS NOTE ADULT - ASSESSMENT
79 yo F with unk pmhx presents with EMS - found on floor sitting in urine. Pt states she was getting out of the bed and fell on the ground. Does not recall how long she was on the ground. Complains of back pain.   History obtained from friend, Gary Baeza - [phone]; [phone]: Saw her on Saturday evening for family dinner. Usually calls every day. Did not return calls since Monday (4days ago). Went to check in on today. Has been forgetful over past some time. MRI and some other tests. Was at [facility] 3 weeks ago for stiff neck - sent home. Goes to a lot of doctors - not sure which ones. Has been following with orthopedics for hip problems. Has not talked to her children for many years. Does not drink.   Dr. Millan - cardiologist, but says she has no cardiac probelms. alert to name and place     ER vitals:  Tmax 99.3, P 100, /88.  WBC 12.8.  PCT 0.13.  Lact 3.1.  UA (-).  RVP (-).  Covid pcr (-).  Ucx (-).  CT chest no pna, partial atelectasis of b/l lower lobes, RML mucoid impaction.  CTA chest limited evaluation for segmental and subsegmental pulmonary embolism. No main, left right, or lobar pulmonary embolism.  LE doppler (-) DVT.  CT pelvis with acute fractures of the right superior and inferior pubic rami and of the right sacral ala.    SIRS:  -resolved  -s/p Low grade temp, borderline tachycardia (P 100), elevated WBC and lactate have resolved, infectious etiology has been ruled out.   - all Cx NTD, pan CT neg for infectious focus.  - Pt with stage 2 sacral wound, seen by Wound care.  Cont offloading and topical therapy, no acute infection/cellulitis.  - Cont to monitor WBC and temp curve.   - CTA chest and LE doppler (-) for PE/DVT.  - CT pelvis with sacral fracture.  Ortho eval, cont pain control. DC to [facility]  Urinary Retention  - Ingram DCed, PVR greater than 300 cc, was straight cathed, will prob need a ingram on rpt bladder scan if still retaining. in rehab once ptn is more mobile maybe will be able to remove it.    Hypernatremia  - resolved  - now  euvolemic    Pelvic and Sacral Fractures  - ortho input appreciated, PT ordered. ptn states she doesnt want to go to [facility]. will d/w     Hypokalemia  - due to total body depletion, now resolved, on daily supplements    DC planning  nephew at bedside, filling out HCP paperwork. ptn is confused though recognizes her nephew. awaiting [facility] placement.  dvt ppx w sc lovenox

## 2021-05-10 NOTE — PROGRESS NOTE ADULT - SUBJECTIVE AND OBJECTIVE BOX
Infectious Diseases progress note:    Subjective: NAD, afebrile.  Awake, alert.  No new somatic complaints.     ROS:  CONSTITUTIONAL:  No fever, chills, rigors  CARDIOVASCULAR:  No chest pain or palpitations  RESPIRATORY:   No SOB, cough, dyspnea on exertion.  No wheezing  GASTROINTESTINAL:  No abd pain, N/V, diarrhea/constipation  EXTREMITIES:  No swelling or joint pain  GENITOURINARY:  No burning on urination, increased frequency or urgency.  No flank pain  NEUROLOGIC:  No HA, visual disturbances  SKIN: No rashes    Allergies    No Known Allergies    Intolerances        ANTIBIOTICS/RELEVANT:  antimicrobials    immunologic:    OTHER:  aspirin enteric coated 81 milliGRAM(s) Oral daily  atorvastatin 20 milliGRAM(s) Oral at bedtime  diVALproex  milliGRAM(s) Oral two times a day  enoxaparin Injectable 40 milliGRAM(s) SubCutaneous daily  metoprolol tartrate 12.5 milliGRAM(s) Oral two times a day  nystatin Powder 1 Application(s) Topical two times a day PRN  pantoprazole  Injectable 40 milliGRAM(s) IV Push daily  potassium chloride    Tablet ER 20 milliEquivalent(s) Oral daily      Objective:  Vital Signs Last 24 Hrs  T(C): 36.9 (10 May 2021 20:20), Max: 36.9 (10 May 2021 20:20)  T(F): 98.4 (10 May 2021 20:20), Max: 98.4 (10 May 2021 20:20)  HR: 94 (10 May 2021 20:20) (85 - 104)  BP: 127/78 (10 May 2021 20:20) (124/67 - 132/67)  BP(mean): --  RR: 18 (10 May 2021 20:20) (18 - 18)  SpO2: 96% (10 May 2021 20:20) (96% - 96%)    PHYSICAL EXAM:  Constitutional:NAD  Eyes:PETAR, EOMI  Ear/Nose/Throat: no thrush, mucositis.  Moist mucous membranes	  Neck:no JVD, no lymphadenopathy, supple  Respiratory: CTA mayi  Cardiovascular: S1S2 RRR, no murmurs  Gastrointestinal:soft, nontender,  nondistended (+) BS  Extremities:no e/e/c  Skin:  no rashes, open wounds or ulcerations        LABS:                        11.4   8.94  )-----------( 258      ( 09 May 2021 07:11 )             35.1     05-09    141  |  104  |  16  ----------------------------<  105<H>  4.1   |  25  |  0.66    Ca    9.2      09 May 2021 07:11  Mg     2.2     05-09            Procalcitonin, Serum: 0.13 (05-06 @ 22:29)            Rapid RVP Result: [facility]          MICROBIOLOGY:    Culture - Urine (05.06.21 @ 18:12)   Specimen Source: .Urine Clean Catch (Midstream)   Culture Results:   No growth     Culture - Blood (05.06.21 @ 18:12)   Specimen Source: .Blood Blood-Peripheral   Culture Results:   No growth to date.       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    No acute intracranial hemorrhage, vasogenic edema, or acute infarction.    MRA BRAIN:  Limited by motion and slab artifact.  No flow-limiting stenosis or vascular aneurysm.    MRA NECK:  Limited by motion.  No flow-limiting stenosis.  Origins of the common carotid and vertebral arteries are not well evaluated without intravenous contrast.    < end of copied text >

## 2021-05-10 NOTE — PROGRESS NOTE ADULT - SUBJECTIVE AND OBJECTIVE BOX
[facility]      Seen earlier today, and examined.  - Today, patient is without complaints.           *****MEDICATIONS: Current medication reviewed and documented.    MEDICATIONS  (STANDING):  aspirin enteric coated 81 milliGRAM(s) Oral daily  atorvastatin 20 milliGRAM(s) Oral at bedtime  diVALproex  milliGRAM(s) Oral two times a day  enoxaparin Injectable 40 milliGRAM(s) SubCutaneous daily  metoprolol tartrate 12.5 milliGRAM(s) Oral two times a day  pantoprazole  Injectable 40 milliGRAM(s) IV Push daily  potassium chloride    Tablet ER 20 milliEquivalent(s) Oral daily    MEDICATIONS  (PRN):  acetaminophen    Suspension .. 650 milliGRAM(s) Oral every 6 hours PRN Temp greater or equal to 38C (100.4F), Mild Pain (1 - 3)  nystatin Powder 1 Application(s) Topical two times a day PRN after each BM          ***** VITAL SIGNS:  T(F): 98 (05-10-21 @ 11:55), Max: 98 (05-10-21 @ 11:55)  HR: 85 (05-10-21 @ 11:55) (85 - 90)  BP: 129/70 (05-10-21 @ 11:55) (104/67 - 129/70)  RR: 18 (05-10-21 @ 11:55) (18 - 18)  SpO2: 96% (05-10-21 @ 11:55) (96% - 98%)  Wt(kg): --  ,   I&O's Summary    09 May 2021 07:  -  10 May 2021 07:00  --------------------------------------------------------  IN: 300 mL / OUT: 975 mL / NET: -675 mL    10 May 2021 07:  -  10 May 2021 15:27  --------------------------------------------------------  IN: 420 mL / OUT: 300 mL / NET: 120 mL             *****PHYSICAL EXAM:   alert oriented x 3 attention comprehension are fair.  Able to name, repeat.   EOmi fundi not visualized   no nystagmus VFF to confrontation  Tongue is midline  Palate elevates symmetrically   Moving all 4 ext spontaneously no drift appreciated    Gait not assessed.            *****LAB AND IMAGIN.4   8.94  )-----------( 258      ( 09 May 2021 07:11 )             35.1   < from: MR Angio Neck No Cont (21 @ 14:27) >  Findings suggesting the presence of mild-to-moderate normal pressure hydrocephalus.    No acute intracranial hemorrhage, vasogenic edema, or acute infarction.    MRA BRAIN:  Limited by motion and slab artifact.  No flow-limiting stenosis or vascular aneurysm.    MRA NECK:  Limited by motion.  No flow-limiting stenosis.  Origins of the common carotid and vertebral arteries are not well evaluated without intravenous contrast.        < end of copied text >                  141  |  104  |  16  ----------------------------<  105<H>  4.1   |  25  |  0.66    Ca    9.2      09 May 2021 07:11  Mg     2.2                                [All pertinent recent Imaging/Reports reviewed]           *****A S S E S S M E N T   A N D   P L A N :      79 yo F with unk pmhx presents with EMS - found on floor sitting in urine. Pt states she was getting out of the bed and fell on the ground. Does not recall how long she was on the ground. Complains of back pain.   History obtained from friend, Gary Baeza - [phone]; [phone]: Saw her on Saturday evening for family dinner. Usually calls every day. Did not return calls since Monday (4days ago). Went to check in on today. Has been forgetful over past some time. MRI and some other tests. Was at [facility] 3 weeks ago for stiff neck - sent home. Goes to a lot of doctors - not sure which ones. Has been following with orthopedics for hip problems. Has not talked to her children for many years. Does not drink.   Dr. Yana - cardiologist, but says she has no cardiac probelms. alert to name and place (06 May 2021 17:34)  limited history obtained   Problem/Recommendations 1:     ams waxing and waning  now more likely related to dementia related delirium   would require increased supervision if discharged home   mri neg for acute infarct    eeg still pending       infectious workup neg   cpk ok   cannot r/o sz   mri neg for acute infarct   b12 added    depakote 125 bid for agitation if needed     Problem/Recommendations 2: pelvic fracture   ortho input appreciated no interventions        Problem/Recommendations 3 :prox LE weakness   likely deconditioning   pt reports that she is supposed to use walker but hasnt been   fall likely related to not using assistive device   She states that she slid off the bed   pt eval appreciated recommending mata     Thank you for allowing me to participate in the care of this patient. Will continue to follow patient periodically. Please do not hesitate to call me if you have any  questions or if there has been a change in patients neurological status     ________________  Rhina Almaguer MD  [facility] ([facility]  [phone]      33 minutes spent on total encounter; more than 50 % of the visit was  spent counseling about plan of care, compliance to diet/exercise and medication regimen and or  coordinating care by the attending physician.      It is advised that stroke patients follow up with JULIO CESAR Johnston @ [phone] in 1- 2 weeks.   Others please follow up with Dr. Michael Nissenbaum [phone]

## 2021-05-10 NOTE — PROGRESS NOTE ADULT - SUBJECTIVE AND OBJECTIVE BOX
Patient is a 80y old  Female who presents with a chief complaint of metabolic encephalopathy, SIRS, NSTEMI, hypernatremia, (10 May 2021 15:27)      SUBJECTIVE / OVERNIGHT EVENTS: nephew at bedside, filling out HCP paperwork. ptn is confused though recognizes her nephew. awaiting [facility] placement. Ingram DCed, PVR greater than 300 cc, was straight cathed, will prob need a ingram on rpt bladder scan if still retaining. in rehab once ptn is more mobile maybe will be able to remove it.     MEDICATIONS  (STANDING):  aspirin enteric coated 81 milliGRAM(s) Oral daily  atorvastatin 20 milliGRAM(s) Oral at bedtime  diVALproex  milliGRAM(s) Oral two times a day  enoxaparin Injectable 40 milliGRAM(s) SubCutaneous daily  metoprolol tartrate 12.5 milliGRAM(s) Oral two times a day  pantoprazole  Injectable 40 milliGRAM(s) IV Push daily  potassium chloride    Tablet ER 20 milliEquivalent(s) Oral daily    MEDICATIONS  (PRN):  acetaminophen    Suspension .. 650 milliGRAM(s) Oral every 6 hours PRN Temp greater or equal to 38C (100.4F), Mild Pain (1 - 3)  nystatin Powder 1 Application(s) Topical two times a day PRN after each BM      Vital Signs Last 24 Hrs  T(F): 98 (05-10-21 @ 11:55), Max: 98 (05-10-21 @ 11:55)  HR: 104 (05-10-21 @ 17:28) (85 - 104)  BP: 132/67 (05-10-21 @ 17:28) (124/67 - 132/67)  RR: 18 (05-10-21 @ 11:55) (18 - 18)  SpO2: 96% (05-10-21 @ 11:55) (96% - 98%)  Telemetry:   CAPILLARY BLOOD GLUCOSE        I&O's Summary    09 May 2021 07:01  -  10 May 2021 07:00  --------------------------------------------------------  IN: 300 mL / OUT: 975 mL / NET: -675 mL    10 May 2021 07:01  -  10 May 2021 19:30  --------------------------------------------------------  IN: 420 mL / OUT: 300 mL / NET: 120 mL        PHYSICAL EXAM:  GENERAL: NAD, well-developed  HEAD:  Atraumatic, Normocephalic  EYES: EOMI, PERRLA, conjunctiva and sclera clear  NECK: Supple, No JVD  CHEST/LUNG: Clear to auscultation bilaterally; No wheeze  HEART: Regular rate and rhythm; No murmurs, rubs, or gallops  ABDOMEN: Soft, Nontender, Nondistended; Bowel sounds present  EXTREMITIES:  2+ Peripheral Pulses, No clubbing, cyanosis, or edema  PSYCH: AAOx3  NEUROLOGY: non-focal  SKIN: No rashes or lesions    LABS:                        11.4   8.94  )-----------( 258      ( 09 May 2021 07:11 )             35.1     05-09    141  |  104  |  16  ----------------------------<  105<H>  4.1   |  25  |  0.66    Ca    9.2      09 May 2021 07:11  Mg     2.2     05-09                RADIOLOGY & ADDITIONAL TESTS:    Imaging Personally Reviewed:    Consultant(s) Notes Reviewed:      Care Discussed with Consultants/Other Providers:

## 2021-05-10 NOTE — PROGRESS NOTE ADULT - SUBJECTIVE AND OBJECTIVE BOX
Subjective: Patient seen and examined. No new events except as noted.     REVIEW OF SYSTEMS:    CONSTITUTIONAL: + weakness, fevers or chills  EYES/ENT: No visual changes;  No vertigo or throat pain   NECK: No pain or stiffness  RESPIRATORY: No cough, wheezing, hemoptysis; No shortness of breath  CARDIOVASCULAR: No chest pain or palpitations  GASTROINTESTINAL: No abdominal or epigastric pain. No nausea, vomiting, or hematemesis; No diarrhea or constipation. No melena or hematochezia.  GENITOURINARY: No dysuria, frequency or hematuria  NEUROLOGICAL: No numbness or weakness  SKIN: No itching, burning, rashes, or lesions   All other review of systems is negative unless indicated above.    MEDICATIONS:  MEDICATIONS  (STANDING):  aspirin enteric coated 81 milliGRAM(s) Oral daily  atorvastatin 20 milliGRAM(s) Oral at bedtime  enoxaparin Injectable 40 milliGRAM(s) SubCutaneous daily  metoprolol tartrate 12.5 milliGRAM(s) Oral two times a day  pantoprazole  Injectable 40 milliGRAM(s) IV Push daily  potassium chloride    Tablet ER 20 milliEquivalent(s) Oral daily      PHYSICAL EXAM:  T(C): 36.4 (05-10-21 @ 04:08), Max: 36.8 (05-09-21 @ 12:10)  HR: 87 (05-10-21 @ 04:08) (87 - 92)  BP: 124/67 (05-10-21 @ 04:08) (104/67 - 132/73)  RR: 18 (05-10-21 @ 04:08) (18 - 18)  SpO2: 96% (05-10-21 @ 04:08) (96% - 98%)  Wt(kg): --  I&O's Summary    09 May 2021 07:01  -  10 May 2021 07:00  --------------------------------------------------------  IN: 300 mL / OUT: 975 mL / NET: -675 mL          Appearance: NAD  HEENT: Dry oral mucosa, PERRL, EOMI	  Lymphatic: No lymphadenopathy , no edema  Cardiovascular: Normal S1 S2, No JVD, No murmurs , Peripheral pulses palpable 2+ bilaterally  Respiratory: Lungs clear to auscultation, normal effort 	  Gastrointestinal:  Soft, Non-tender, + BS	  Skin: No rashes, No ecchymoses, No cyanosis, warm to touch  Musculoskeletal: Normal range of motion, normal strength  Psychiatry:  Mood & affect appropriate  Ext: No edema  +ingram      LABS:    CARDIAC MARKERS:                                11.4   8.94  )-----------( 258      ( 09 May 2021 07:11 )             35.1     05-09    141  |  104  |  16  ----------------------------<  105<H>  4.1   |  25  |  0.66    Ca    9.2      09 May 2021 07:11  Mg     2.2     05-09      proBNP:   Lipid Profile:   HgA1c:   TSH:             TELEMETRY: 	  SR   ECG:  	  RADIOLOGY:   DIAGNOSTIC TESTING:  [ ] Echocardiogram:  e  [ ]  Catheterization:  [ ] Stress Test:    OTHER:

## 2021-05-10 NOTE — PROGRESS NOTE ADULT - ASSESSMENT
79 yo F with unk pmhx presents with EMS - found on floor sitting in urine. Pt states she was getting out of the bed and fell on the ground. Does not recall how long she was on the ground. Complains of back pain.   History obtained from friend, Gary Baeza - [phone]; [phone]: Saw her on Saturday evening for family dinner. Usually calls every day. Did not return calls since Monday (4days ago). Went to check in on today. Has been forgetful over past some time. MRI and some other tests. Was at [facility] 3 weeks ago for stiff neck - sent home. Goes to a lot of doctors - not sure which ones. Has been following with orthopedics for hip problems. Has not talked to her children for many years. Does not drink.   Dr. Millan - cardiologist, but says she has no cardiac probelms. alert to name and place (06 May 2021 17:34)    ER vitals:  Tmax 99.3, P 100, /88.  WBC 12.8.  PCT 0.13.  Lact 3.1.  UA (-).  RVP (-).  Covid pcr (-).  Ucx (-).  CT chest no pna, partial atelectasis of b/l lower lobes, RML mucoid impaction.  CTA chest limited evaluation for segmental and subsegmental pulmonary embolism. No main, left right, or lobar pulmonary embolism.  LE doppler (-) DVT.  CT pelvis with acute fractures of the right superior and inferior pubic rami and of the right sacral ala.    SIRS:    - Low grade temp, borderline tachycardia (P 100), elevated WBC and lactate.  Thus far infectious w/u neg including Ucx, Covid/RVP testing.  CT chest no pna, shows probably RML mucoid impaction.    - Pt with stage 2 sacral wound, seen by Wound care.  Cont offloading and topical therapy, no acute infection/cellulitis.    - Cont to monitor WBC and temp curve.     - CTA chest and LE doppler (-) for PE/DVT.    - CT pelvis with sacral fracture.  Ortho eval, cont pain control    - Check orthostatics.    * No indication for abx at this time.  Blood and Ucx negative.       Indira Aquino  [phone]

## 2021-05-11 LAB
ANION GAP SERPL CALC-SCNC: 12 MMOL/L — SIGNIFICANT CHANGE UP (ref 5–17)
APPEARANCE UR: ABNORMAL
BILIRUB UR-MCNC: ABNORMAL
BLD GP AB SCN SERPL QL: NEGATIVE — SIGNIFICANT CHANGE UP
BUN SERPL-MCNC: 17 MG/DL — SIGNIFICANT CHANGE UP (ref 7–23)
CALCIUM SERPL-MCNC: 9.4 MG/DL — SIGNIFICANT CHANGE UP (ref 8.4–10.5)
CHLORIDE SERPL-SCNC: 103 MMOL/L — SIGNIFICANT CHANGE UP (ref 96–108)
CO2 SERPL-SCNC: 23 MMOL/L — SIGNIFICANT CHANGE UP (ref 22–31)
COLOR SPEC: ABNORMAL
CREAT SERPL-MCNC: 0.6 MG/DL — SIGNIFICANT CHANGE UP (ref 0.5–1.3)
CULTURE RESULTS: SIGNIFICANT CHANGE UP
CULTURE RESULTS: SIGNIFICANT CHANGE UP
DIFF PNL FLD: ABNORMAL
GLUCOSE SERPL-MCNC: 138 MG/DL — HIGH (ref 70–99)
GLUCOSE UR QL: ABNORMAL
HCT VFR BLD CALC: 37.2 % — SIGNIFICANT CHANGE UP (ref 34.5–45)
HCT VFR BLD CALC: 37.4 % — SIGNIFICANT CHANGE UP (ref 34.5–45)
HGB BLD-MCNC: 12 G/DL — SIGNIFICANT CHANGE UP (ref 11.5–15.5)
HGB BLD-MCNC: 12.3 G/DL — SIGNIFICANT CHANGE UP (ref 11.5–15.5)
KETONES UR-MCNC: ABNORMAL
LEUKOCYTE ESTERASE UR-ACNC: ABNORMAL
MCHC RBC-ENTMCNC: 30.3 PG — SIGNIFICANT CHANGE UP (ref 27–34)
MCHC RBC-ENTMCNC: 30.9 PG — SIGNIFICANT CHANGE UP (ref 27–34)
MCHC RBC-ENTMCNC: 32.1 GM/DL — SIGNIFICANT CHANGE UP (ref 32–36)
MCHC RBC-ENTMCNC: 33.1 GM/DL — SIGNIFICANT CHANGE UP (ref 32–36)
MCV RBC AUTO: 93.5 FL — SIGNIFICANT CHANGE UP (ref 80–100)
MCV RBC AUTO: 94.4 FL — SIGNIFICANT CHANGE UP (ref 80–100)
NITRITE UR-MCNC: POSITIVE
NRBC # BLD: 0 /100 WBCS — SIGNIFICANT CHANGE UP (ref 0–0)
NRBC # BLD: 0 /100 WBCS — SIGNIFICANT CHANGE UP (ref 0–0)
PH UR: 9 — HIGH (ref 5–8)
PLATELET # BLD AUTO: 366 K/UL — SIGNIFICANT CHANGE UP (ref 150–400)
PLATELET # BLD AUTO: 399 K/UL — SIGNIFICANT CHANGE UP (ref 150–400)
POTASSIUM SERPL-MCNC: 4.6 MMOL/L — SIGNIFICANT CHANGE UP (ref 3.5–5.3)
POTASSIUM SERPL-SCNC: 4.6 MMOL/L — SIGNIFICANT CHANGE UP (ref 3.5–5.3)
PROT UR-MCNC: ABNORMAL
RBC # BLD: 3.96 M/UL — SIGNIFICANT CHANGE UP (ref 3.8–5.2)
RBC # BLD: 3.98 M/UL — SIGNIFICANT CHANGE UP (ref 3.8–5.2)
RBC # FLD: 13.7 % — SIGNIFICANT CHANGE UP (ref 10.3–14.5)
RBC # FLD: 13.7 % — SIGNIFICANT CHANGE UP (ref 10.3–14.5)
RH IG SCN BLD-IMP: POSITIVE — SIGNIFICANT CHANGE UP
SODIUM SERPL-SCNC: 138 MMOL/L — SIGNIFICANT CHANGE UP (ref 135–145)
SP GR SPEC: 1.03 — HIGH (ref 1.01–1.02)
SPECIMEN SOURCE: SIGNIFICANT CHANGE UP
SPECIMEN SOURCE: SIGNIFICANT CHANGE UP
UROBILINOGEN FLD QL: ABNORMAL
VIT B12 SERPL-MCNC: 1120 PG/ML — SIGNIFICANT CHANGE UP (ref 232–1245)
WBC # BLD: 12.31 K/UL — HIGH (ref 3.8–10.5)
WBC # BLD: 15.34 K/UL — HIGH (ref 3.8–10.5)
WBC # FLD AUTO: 12.31 K/UL — HIGH (ref 3.8–10.5)
WBC # FLD AUTO: 15.34 K/UL — HIGH (ref 3.8–10.5)

## 2021-05-11 PROCEDURE — 74230 X-RAY XM SWLNG FUNCJ C+: CPT | Mod: 26

## 2021-05-11 RX ORDER — THIAMINE MONONITRATE (VIT B1) 100 MG
500 TABLET ORAL THREE TIMES A DAY
Refills: 0 | Status: COMPLETED | OUTPATIENT
Start: 2021-05-11 | End: 2021-05-12

## 2021-05-11 RX ADMIN — ATORVASTATIN CALCIUM 20 MILLIGRAM(S): 80 TABLET, FILM COATED ORAL at 21:48

## 2021-05-11 RX ADMIN — Medication 12.5 MILLIGRAM(S): at 17:41

## 2021-05-11 RX ADMIN — PANTOPRAZOLE SODIUM 40 MILLIGRAM(S): 20 TABLET, DELAYED RELEASE ORAL at 12:56

## 2021-05-11 RX ADMIN — Medication 105 MILLIGRAM(S): at 14:52

## 2021-05-11 RX ADMIN — Medication 12.5 MILLIGRAM(S): at 06:54

## 2021-05-11 RX ADMIN — Medication 105 MILLIGRAM(S): at 21:47

## 2021-05-11 RX ADMIN — DIVALPROEX SODIUM 125 MILLIGRAM(S): 500 TABLET, DELAYED RELEASE ORAL at 21:48

## 2021-05-11 NOTE — CONSULT NOTE ADULT - ATTENDING COMMENTS
stable fx.  no surgical intervention.  WBAT. PT. pain control. DVT ppx
agree with assessment and plan. For worsening hematuria or difficult voiding, will consider CT cystogram

## 2021-05-11 NOTE — PROGRESS NOTE ADULT - ASSESSMENT
77 yo F with unk pmhx presents with EMS - found on floor sitting in urine. Pt states she was getting out of the bed and fell on the ground. Does not recall how long she was on the ground. Complains of back pain.   History obtained from friend, Gary Baeza - [phone]; [phone]: Saw her on Saturday evening for family dinner. Usually calls every day. Did not return calls since Monday (4days ago). Went to check in on today. Has been forgetful over past some time. MRI and some other tests. Was at [facility] 3 weeks ago for stiff neck - sent home. Goes to a lot of doctors - not sure which ones. Has been following with orthopedics for hip problems. Has not talked to her children for many years. Does not drink.   Dr. Millan - cardiologist, but says she has no cardiac probelms. alert to name and place     ER vitals:  Tmax 99.3, P 100, /88.  WBC 12.8.  PCT 0.13.  Lact 3.1.  UA (-).  RVP (-).  Covid pcr (-).  Ucx (-).  CT chest no pna, partial atelectasis of b/l lower lobes, RML mucoid impaction.  CTA chest limited evaluation for segmental and subsegmental pulmonary embolism. No main, left right, or lobar pulmonary embolism.  LE doppler (-) DVT.  CT pelvis with acute fractures of the right superior and inferior pubic rami and of the right sacral ala.    SIRS:  -resolved  -s/p Low grade temp, borderline tachycardia (P 100), elevated WBC and lactate have resolved, infectious etiology has been ruled out.   - all Cx NTD, pan CT neg for infectious focus.  - Pt with stage 2 sacral wound, seen by Wound care.  Cont offloading and topical therapy, no acute infection/cellulitis.  - Cont to monitor WBC and temp curve.   - CTA chest and LE doppler (-) for PE/DVT.  - CT pelvis with sacral fracture.  Ortho eval, cont pain control. DC to [facility]  Urinary Retention  - Ingram DCed, PVR greater than 300 cc, was straight cathed, will prob need a ingram on rpt bladder scan if still retaining. in rehab once ptn is more mobile maybe will be able to remove it.    Hypernatremia  - resolved  - now  euvolemic    Pelvic and Sacral Fractures  - ortho input appreciated, PT ordered. ptn states she doesnt want to go to [facility]. will d/w     Hypokalemia  - due to total body depletion, now resolved, on daily supplements    Hematuria  gross hematuria, possibly 2/2 straight cath prior. will watch for urine output if decreased will do a bladder scan and place ingram if necessary, if still gross hematuria, call  for consult. ptn is asymptomatic        DC planning  dc sc Lovenox 2/2 gross hematuria

## 2021-05-11 NOTE — SWALLOW VFSS/MBS ASSESSMENT ADULT - ORAL PHASE COMMENTS
easily distracted; min spillage to the valleculae during prolonged oral holding; min oral residue cleared on delayed repeat swallow oral holding-min oral holding; delayed a-p transfer; benefits from verbal cues to swallow

## 2021-05-11 NOTE — PROVIDER CONTACT NOTE (OTHER) - ASSESSMENT
Patient Aox2-3. Confused. Patient feels the urge to urinate and is unable to do so.
Patient is Aox1-2. confused.
Patient confused. Noted to be lethargic at this moment. Able to follows commands, open her eyes and answer questions. Temp 98.2 F, /79,  sinus, RR 17, O2 96% on room air. Mon removed 5/10

## 2021-05-11 NOTE — PROGRESS NOTE ADULT - SUBJECTIVE AND OBJECTIVE BOX
Subjective: Patient seen and examined. No new events except as noted.     REVIEW OF SYSTEMS:    CONSTITUTIONAL:+weakness, fevers or chills  EYES/ENT: No visual changes;  No vertigo or throat pain   NECK: No pain or stiffness  RESPIRATORY: No cough, wheezing, hemoptysis; No shortness of breath  CARDIOVASCULAR: No chest pain or palpitations  GASTROINTESTINAL: No abdominal or epigastric pain. No nausea, vomiting, or hematemesis; No diarrhea or constipation. No melena or hematochezia.  GENITOURINARY: No dysuria, frequency or hematuria  NEUROLOGICAL: No numbness or weakness  SKIN: No itching, burning, rashes, or lesions   All other review of systems is negative unless indicated above.    MEDICATIONS:  MEDICATIONS  (STANDING):  aspirin enteric coated 81 milliGRAM(s) Oral daily  atorvastatin 20 milliGRAM(s) Oral at bedtime  diVALproex  milliGRAM(s) Oral two times a day  enoxaparin Injectable 40 milliGRAM(s) SubCutaneous daily  metoprolol tartrate 12.5 milliGRAM(s) Oral two times a day  pantoprazole  Injectable 40 milliGRAM(s) IV Push daily  potassium chloride    Tablet ER 20 milliEquivalent(s) Oral daily      PHYSICAL EXAM:  T(C): 36.7 (21 @ 04:44), Max: 36.9 (05-10-21 @ 20:20)  HR: 84 (21 @ 04:44) (84 - 104)  BP: 150/66 (21 @ 04:44) (127/78 - 150/66)  RR: 18 (21 @ 04:44) (18 - 18)  SpO2: 98% (21 @ 04:44) (96% - 98%)  Wt(kg): --  I&O's Summary    10 May 2021 07:01  -  11 May 2021 07:00  --------------------------------------------------------  IN: 420 mL / OUT: 300 mL / NET: 120 mL          Appearance: NAD  HEENT: Dry oral mucosa, PERRL, EOMI	  Lymphatic: No lymphadenopathy , no edema  Cardiovascular: Normal S1 S2, No JVD, No murmurs , Peripheral pulses palpable 2+ bilaterally  Respiratory: Lungs clear to auscultation, normal effort 	  Gastrointestinal:  Soft, Non-tender, + BS	  Skin: No rashes, No ecchymoses, No cyanosis, warm to touch  Musculoskeletal: Normal range of motion, normal strength  Psychiatry:  Mood & affect appropriate  Ext: No edema  +ingram        LABS:    CARDIAC MARKERS:                  proBNP:   Lipid Profile:   HgA1c:   TSH:     TELEMETRY: SR 	    ECG:  	  RADIOLOGY:   DIAGNOSTIC TESTING:  [ ] Echocardiogram:  [ ]  Catheterization:  [ ] Stress Test:    OTHER: 	  eEEG REPORT:   EEG Report:  · EEG Report	  [facility]   REPORT OF ROUTINE VIDEO EEG     [facility]: [address] [place] [address], [place] [address], Ph#: [phone]  [facility]: [id number] [place] [id number], Ph#: [phone]  [facility]: [address] [place] [address], Ph#: [phone]    Patient Name: DIAMOND LAGUNAS  Age and : 80y (42)  MRN #: [id number]  Location: [address]   Referring Physician: Rachelle De Leon    Study Date: 21    _____________________________________________________________  TECHNICAL INFORMATION    Placement and Labeling of Electrodes:  The EEG was performed utilizing 20 channels referential EEG connections (coronal over temporal over parasagittal montage) using all standard 10-20 electrode placements with EKG.  Recording was at a sampling rate of 256 samples per second per channel.  Time synchronized digital video recording was done simultaneously with EEG recording.  A low light infrared camera was used for low light recording.  Manjit and seizure detection algorithms were utilized.    _____________________________________________________________  HISTORY    Patient is a 80y old  Female who presents with a chief complaint of metabolic encephalopathy, SIRS, NSTEMI, hypernatremia, (10 May 2021 20:08)      PERTINENT MEDICATION:  diVALproex  milliGRAM(s) Oral two times a day  _____________________________________________________________  STUDY INTERPRETATION    Findings: The background was continuous, spontaneously variable and reactive. During wakefulness, No posterior dominant rhythm seen with 7hz activity to 30uV at times.    Background Slowing:  Diffuse theta and polymorphic delta slowing.  GRDA to 1.5hz at times    Focal Slowing:   None were present.    Sleep Background:  Drowsiness was characterized by fragmentation, attenuation, and slowing of the background activity.   Stage II sleep transients were not recorded.    Other Non-Epileptiform Findings:  None were present.    Interictal Epileptiform Activity:   None were present.    Events:  Clinical events: None recorded.  Seizures: None recorded.    Activation Procedures:   Hyperventilation was not performed.    Photic stimulation was not performed.     Artifacts:  Intermittent myogenic and movement artifacts were noted.    ECG:  The heart rate on single channel ECG was predominantly between 70-80 BPM.    _____________________________________________________________  EEG SUMMARY/CLASSIFICATION    Abnormal EEG in the awake, drowsy astates.  - Moderate generalized slowing.  Intermittent GRDA    _____________________________________________________________  EEG IMPRESSION/CLINICAL CORRELATE    Abnormal EEG study.   Moderate nonspecific diffuse or multifocal cerebral dysfunction.   No epileptiform pattern or seizure seen.    David To MD PGY-5  Epilepsy Fellow      Reading Room: [phone]  On Call Service After Hours: [phone]      Electronic Signatures:  ToDavid pinto)  (Signed 11-May-2021 10:25)  	Authored: EEG REPORT  Quincy Rodriguez)  (Signed 11-May-2021 11:02)  	Authored: EEG REPORT  	Co-Signer: EEG REPORT      Last Updated: 11-May-2021 11:02 by Quincy Rodriguez)

## 2021-05-11 NOTE — CONSULT NOTE ADULT - CONSULT REQUESTED DATE/TIME
07-May-2021 16:49
07-May-2021 15:00
06-May-2021 18:49
07-May-2021 22:26
07-May-2021 10:36
07-May-2021 14:39
11-May-2021 18:58

## 2021-05-11 NOTE — PROGRESS NOTE ADULT - SUBJECTIVE AND OBJECTIVE BOX
Patient is a 80y old  Female who presents with a chief complaint of metabolic encephalopathy, SIRS, NSTEMI, hypernatremia, (11 May 2021 18:57)      SUBJECTIVE / OVERNIGHT EVENTS: gross hematuria, possibly 2/2 straight cath prior. will watch for urine output if decreased will do a bladder scan and place ingram if necessary, if still gross hematuria, call  for consult. ptn is asymptomatic    MEDICATIONS  (STANDING):  aspirin enteric coated 81 milliGRAM(s) Oral daily  atorvastatin 20 milliGRAM(s) Oral at bedtime  diVALproex  milliGRAM(s) Oral two times a day  enoxaparin Injectable 40 milliGRAM(s) SubCutaneous daily  metoprolol tartrate 12.5 milliGRAM(s) Oral two times a day  pantoprazole  Injectable 40 milliGRAM(s) IV Push daily  thiamine IVPB 500 milliGRAM(s) IV Intermittent three times a day    MEDICATIONS  (PRN):  nystatin Powder 1 Application(s) Topical two times a day PRN after each BM      Vital Signs Last 24 Hrs  T(F): 98.2 (05-11-21 @ 13:25), Max: 98.4 (05-10-21 @ 20:20)  HR: 100 (05-11-21 @ 17:41) (84 - 103)  BP: 129/66 (05-11-21 @ 17:41) (127/78 - 150/66)  RR: 16 (05-11-21 @ 13:25) (16 - 18)  SpO2: 96% (05-11-21 @ 13:25) (96% - 98%)  Telemetry:   CAPILLARY BLOOD GLUCOSE        I&O's Summary    10 May 2021 07:01  -  11 May 2021 07:00  --------------------------------------------------------  IN: 420 mL / OUT: 300 mL / NET: 120 mL    11 May 2021 07:01  -  11 May 2021 20:10  --------------------------------------------------------  IN: 300 mL / OUT: 0 mL / NET: 300 mL        PHYSICAL EXAM:  GENERAL: NAD, well-developed  HEAD:  Atraumatic, Normocephalic  EYES: EOMI, PERRLA, conjunctiva and sclera clear  NECK: Supple, No JVD  CHEST/LUNG: Clear to auscultation bilaterally; No wheeze  HEART: Regular rate and rhythm; No murmurs, rubs, or gallops  ABDOMEN: Soft, Nontender, Nondistended; Bowel sounds present  EXTREMITIES:  2+ Peripheral Pulses, No clubbing, cyanosis, or edema  PSYCH: AAOx3  NEUROLOGY: non-focal  SKIN: No rashes or lesions    LABS:                        12.3   15.34 )-----------( 399      ( 11 May 2021 19:31 )             37.2     05-11    138  |  103  |  17  ----------------------------<  138<H>  4.6   |  23  |  0.60    Ca    9.4      11 May 2021 13:35                RADIOLOGY & ADDITIONAL TESTS:    Imaging Personally Reviewed:    Consultant(s) Notes Reviewed:      Care Discussed with Consultants/Other Providers:

## 2021-05-11 NOTE — SWALLOW VFSS/MBS ASSESSMENT ADULT - NS SWALLOW VFSS REC ASPIR MON
Monitor for s/s aspiration/laryngeal penetration. If noted:  D/C p.o. intake, provide non-oral nutrition/hydration/meds, and contact this service @ [phone]/change of breathing pattern/cough/gurgly voice/fever/pneumonia/throat clearing/upper respiratory infection

## 2021-05-11 NOTE — PROVIDER CONTACT NOTE (OTHER) - ACTION/TREATMENT ORDERED:
NP made aware. Blood work ordered. Will continue to monitor patient.
NP made aware. NP ordered to straight cath instead. Will continue to monitor patient.  s/p 300 ml urine straight cath
NP made aware. Wound care consult ordered.

## 2021-05-11 NOTE — PROGRESS NOTE ADULT - SUBJECTIVE AND OBJECTIVE BOX
[facility] Neurological Care [facility]      Seen earlier today, and examined.  - Today, patient is without complaints.           *****MEDICATIONS: Current medication reviewed and documented.    MEDICATIONS  (STANDING):  aspirin enteric coated 81 milliGRAM(s) Oral daily  atorvastatin 20 milliGRAM(s) Oral at bedtime  cefTRIAXone   IVPB      diVALproex  milliGRAM(s) Oral two times a day  metoprolol tartrate 12.5 milliGRAM(s) Oral two times a day  pantoprazole  Injectable 40 milliGRAM(s) IV Push daily    MEDICATIONS  (PRN):  acetaminophen    Suspension .. 650 milliGRAM(s) Oral every 6 hours PRN Temp greater or equal to 38C (100.4F), Mild Pain (1 - 3)  nystatin Powder 1 Application(s) Topical two times a day PRN after each BM          ***** VITAL SIGNS:  T(F): 97.6 (21 @ 04:46), Max: 98.2 (21 @ 11:53)  HR: 104 (21 @ 04:46) (99 - 104)  BP: 111/64 (21 @ 04:46) (111/64 - 137/79)  RR: 18 (21 @ 04:46) (16 - 18)  SpO2: 95% (21 @ 04:46) (95% - 97%)  Wt(kg): --  ,   I&O's Summary    11 May 2021 07:01  -  12 May 2021 07:00  --------------------------------------------------------  IN: 720 mL / OUT: 400 mL / NET: 320 mL             *****PHYSICAL EXAM:   alert oriented x 3 attention comprehension are fair.  Able to name, repeat.   EOmi fundi not visualized   no nystagmus VFF to confrontation  Tongue is midline  Palate elevates symmetrically   Moving all 4 ext spontaneously no drift appreciated    Gait not assessed.            *****LAB AND IMAGIN.0   13.59 )-----------( 377      ( 12 May 2021 07:08 )             37.0               05-11    138  |  103  |  17  ----------------------------<  138<H>  4.6   |  23  |  0.60    Ca    9.4      11 May 2021 13:35             EEG SUMMARY/CLASSIFICATION    Abnormal EEG in the awake, drowsy astates.  - Moderate generalized slowing.  Intermittent GRDA    _____________________________________________________________  EEG IMPRESSION/CLINICAL CORRELATE    Abnormal EEG study.   Moderate nonspecific diffuse or multifocal cerebral dysfunction.   No epileptiform pattern or seizure seen.            Urinalysis Basic - ( 11 May 2021 22:19 )    Color: Marti / Appearance: Turbid / S.034 / pH: x  Gluc: x / Ketone: Moderate  / Bili: Large / Urobili: >8mg/dL   Blood: x / Protein: >600 mg/dL / Nitrite: Positive   Leuk Esterase: Large / RBC: >50 /hpf / WBC 10 /HPF   Sq Epi: x / Non Sq Epi: 2 / Bacteria: Many      [All pertinent recent Imaging/Reports reviewed]           *****A S S E S S M E N T   A N D   P L A N :79 yo F with unk pmhx presents with EMS - found on floor sitting in urine. Pt states she was getting out of the bed and fell on the ground. Does not recall how long she was on the ground. Complains of back pain.   History obtained from friend, Gary Baeza - [phone]; [phone]: Saw her on Saturday evening for family dinner. Usually calls every day. Did not return calls since Monday (4days ago). Went to check in on today. Has been forgetful over past some time. MRI and some other tests. Was at [facility] 3 weeks ago for stiff neck - sent home. Goes to a lot of doctors - not sure which ones. Has been following with orthopedics for hip problems. Has not talked to her children for many years. Does not drink.   Dr. Millan - cardiologist, but says she has no cardiac probelms. alert to name and place (06 May 2021 17:34)  limited history obtained   Problem/Recommendations 1:     ams waxing and waning  now more likely related to dementia related delirium   would require increased supervision if discharged home   mri neg for acute infarct    eeg still pending   cpk ok   cannot r/o sz eee  mri neg for acute infarct   b12 added    depakote 125 bid for agitation if needed    waxing and waning mental status   thiamine 500 iv     Problem/Recommendations 2: pelvic fracture   ortho input appreciated no interventions        Problem/Recommendations 3 :prox LE weakness   likely deconditioning   pt reports that she is supposed to use walker but hasn't been   fall likely related to not using assistive device   She states that she slid off the bed   pt eval appreciated recommending mata           Thank you for allowing me to participate in the care of this patient. Will continue to follow patient periodically. Please do not hesitate to call me if you have any  questions or if there has been a change in patients neurological status     ________________  Rhina Almaguer MD  [facility] Neurological [facility] ([facility]  [phone]      33 minutes spent on total encounter; more than 50 % of the visit was  spent counseling about plan of care, compliance to diet/exercise and medication regimen and or  coordinating care by the attending physician.      It is advised that stroke patients follow up with JULIO CESAR Johnston @ [phone] in 1- 2 weeks.   Others please follow up with Dr. Michael Nissenbaum [phone] [facility] Neurological Care [facility]      Seen earlier today, and examined.  - Today, patient is without complaints.           *****MEDICATIONS: Current medication reviewed and documented.    MEDICATIONS  (STANDING):  aspirin enteric coated 81 milliGRAM(s) Oral daily  atorvastatin 20 milliGRAM(s) Oral at bedtime  cefTRIAXone   IVPB      diVALproex  milliGRAM(s) Oral two times a day  metoprolol tartrate 12.5 milliGRAM(s) Oral two times a day  pantoprazole  Injectable 40 milliGRAM(s) IV Push daily    MEDICATIONS  (PRN):  acetaminophen    Suspension .. 650 milliGRAM(s) Oral every 6 hours PRN Temp greater or equal to 38C (100.4F), Mild Pain (1 - 3)  nystatin Powder 1 Application(s) Topical two times a day PRN after each BM          ***** VITAL SIGNS:  T(F): 97.6 (21 @ 04:46), Max: 98.2 (21 @ 11:53)  HR: 104 (21 @ 04:46) (99 - 104)  BP: 111/64 (21 @ 04:46) (111/64 - 137/79)  RR: 18 (21 @ 04:46) (16 - 18)  SpO2: 95% (21 @ 04:46) (95% - 97%)  Wt(kg): --  ,   I&O's Summary    11 May 2021 07:01  -  12 May 2021 07:00  --------------------------------------------------------  IN: 720 mL / OUT: 400 mL / NET: 320 mL             *****PHYSICAL EXAM:pleasant    alert oriented x 1 attention comprehension are poor     Able to name, repeat.   EOmi fundi not visualized    blinks to threat   Tongue is midline   Moving all 4 ext spontaneously no drift appreciated    Gait not assessed.            *****LAB AND IMAGIN.0   13.59 )-----------( 377      ( 12 May 2021 07:08 )             37.0               05-11    138  |  103  |  17  ----------------------------<  138<H>  4.6   |  23  |  0.60    Ca    9.4      11 May 2021 13:35             EEG SUMMARY/CLASSIFICATION    Abnormal EEG in the awake, drowsy astates.  - Moderate generalized slowing.  Intermittent GRDA    _____________________________________________________________  EEG IMPRESSION/CLINICAL CORRELATE    Abnormal EEG study.   Moderate nonspecific diffuse or multifocal cerebral dysfunction.   No epileptiform pattern or seizure seen.            Urinalysis Basic - ( 11 May 2021 22:19 )    Color: Marti / Appearance: Turbid / S.034 / pH: x  Gluc: x / Ketone: Moderate  / Bili: Large / Urobili: >8mg/dL   Blood: x / Protein: >600 mg/dL / Nitrite: Positive   Leuk Esterase: Large / RBC: >50 /hpf / WBC 10 /HPF   Sq Epi: x / Non Sq Epi: 2 / Bacteria: Many      [All pertinent recent Imaging/Reports reviewed]           *****A S S E S S M E N T   A N D   P L A N :79 yo F with unk pmhx presents with EMS - found on floor sitting in urine. Pt states she was getting out of the bed and fell on the ground. Does not recall how long she was on the ground. Complains of back pain.   History obtained from friend, Gary Baeza - [phone]; [phone]: Saw her on Saturday evening for family dinner. Usually calls every day. Did not return calls since Monday (4days ago). Went to check in on today. Has been forgetful over past some time. MRI and some other tests. Was at [facility] 3 weeks ago for stiff neck - sent home. Goes to a lot of doctors - not sure which ones. Has been following with orthopedics for hip problems. Has not talked to her children for many years. Does not drink.   Dr. Millan - cardiologist, but says she has no cardiac probelms. alert to name and place (06 May 2021 17:34)  limited history obtained   Problem/Recommendations 1:     ams waxing and waning  now more likely related to dementia related delirium   would require increased supervision if discharged home   mri neg for acute infarct    eeg still pending   cpk ok   cannot r/o sz eee  mri neg for acute infarct   b12 added    depakote 125 bid for agitation if needed    waxing and waning mental status   likely hospitalization related sundowning due to underlying dementia   regulate sleep wake cycle   thiamine 500 iv     Problem/Recommendations 2: pelvic fracture   ortho input appreciated no interventions        Problem/Recommendations 3 :prox LE weakness   likely deconditioning   pt reports that she is supposed to use walker but hasn't been   fall likely related to not using assistive device   She states that she slid off the bed   pt eval appreciated recommending mata           Thank you for allowing me to participate in the care of this patient. Will continue to follow patient periodically. Please do not hesitate to call me if you have any  questions or if there has been a change in patients neurological status     ________________  Rhina Almaguer MD  [facility] Neurological [facility] ([facility]  [phone]      33 minutes spent on total encounter; more than 50 % of the visit was  spent counseling about plan of care, compliance to diet/exercise and medication regimen and or  coordinating care by the attending physician.      It is advised that stroke patients follow up with JULIO CESAR Johnston @ [phone] in 1- 2 weeks.   Others please follow up with Dr. Michael Nissenbaum [phone]

## 2021-05-11 NOTE — SWALLOW VFSS/MBS ASSESSMENT ADULT - COMMENTS
On admit pt AAOx3.   Pt admitted with metabolic encephalopathy, SIRS, NSTEMI, hypernatremia  +SIRS - Low grade temp, borderline tachycardia (P 100), elevated WBC and lactate.  Thus far infectious w/u neg including Ucx, Covid/RVP testing.  CT chest no pna, shows probably RML mucoid impaction. - Pt with stage 2 sacral wound. EP following for syncope, Cardiology following for NSTEMI. ID following, no indication for abx. Neuro following ams of unclear etiology; metabolic encephalopathy due to pain related delirium, hypernatremia, prerenal state; r/o infectious process; Ortho consulted for R LC1 fracture. No acute intervention.  MRI 5/08: Findings suggesting the presence of mild-to-moderate normal pressure hydrocephalus.

## 2021-05-11 NOTE — SWALLOW VFSS/MBS ASSESSMENT ADULT - ORAL PHASE
Delayed oral transit time Delayed oral transit time/Uncontrolled bolus / spillover in nevaeh-pharynx

## 2021-05-11 NOTE — SWALLOW VFSS/MBS ASSESSMENT ADULT - SLP PERTINENT HISTORY OF CURRENT PROBLEM
79 yo F with unk pmhx presents with EMS - found on floor sitting in urine. Pt states she was getting out of the bed and fell on the ground. Does not recall how long she was on the ground. Complains of back pain. History obtained from friend, Gary Baeza - [phone]; [phone]: Saw her on Saturday evening for family dinner. Usually calls every day. Did not return calls since Monday (4days ago). Went to check in on today. Has been forgetful over past some time. MRI and some other tests. Was at [facility] 3 weeks ago for stiff neck - sent home. Goes to a lot of doctors - not sure which ones. Has been following with orthopedics for hip problems. Has not talked to her children for many years. Does not drink.  Dr. Millan - cardiologist, but says she has no cardiac probelms. alert to name and place

## 2021-05-11 NOTE — SWALLOW VFSS/MBS ASSESSMENT ADULT - DIAGNOSTIC IMPRESSIONS
81 yo F admitted s/p fall, found to have NPH. Patient presents on modified barium swallow study with a mild oropharyngeal dysphagia negatively impacted by cognitive impairment. 79 yo F admitted s/p fall, found to have NPH. Patient presents on modified barium swallow study with a mild oropharyngeal dysphagia negatively impacted by cognitive impairment. Oral holding/delayed oral transfer is noted across consistencies trialed. Premature spillage to the oropharynx is noted inconsistently. Pharyngeal residue is noted across consistencies in the valleculae and along the a-e folds, cleared on spontaneous although delayed repeat swallow during which passive spillage of additional material advances to the hypopharynx. Patient benefits from verbal cues in order to facilitate timely primary and secondary swallows. There is no evidence of laryngeal penetration or aspiration across all consistencies trialed.

## 2021-05-11 NOTE — SWALLOW VFSS/MBS ASSESSMENT ADULT - PHARYNGEAL PHASE COMMENTS
cleared on subsequent bolus pharyngeal residue cleared on delayed repeat swallow pharyngeal residue cleared on repeat swallows and/or subsequent boluses

## 2021-05-11 NOTE — PROVIDER CONTACT NOTE (OTHER) - RECOMMENDATIONS
Assess patient and STAT CBC, BMP, Type and screen
Wound care consult
There is provider to RN order to reinsert Mon if bladder scan shows more than 200cc.

## 2021-05-11 NOTE — EEG REPORT - NS EEG TEXT BOX
[facility]   COMPREHENSIVE EPILEPSY CENTER   REPORT OF ROUTINE VIDEO EEG     [facility]: [address] [place] [address] [address], [place] [address], Ph#: [phone]  [facility]: [address], [place] [address], Ph#: [phone]  [facility]: [address] [place] [address], Ph#: [phone]    Patient Name: DIAMOND LAGUNAS  Age and : 80y (42)  MRN #: [id number]  Location: [address]  Referring Physician: Rachelle De Leon    Study Date: 21    _____________________________________________________________  TECHNICAL INFORMATION    Placement and Labeling of Electrodes:  The EEG was performed utilizing 20 channels referential EEG connections (coronal over temporal over parasagittal montage) using all standard 10-20 electrode placements with EKG.  Recording was at a sampling rate of 256 samples per second per channel.  Time synchronized digital video recording was done simultaneously with EEG recording.  A low light infrared camera was used for low light recording.  Manjit and seizure detection algorithms were utilized.    _____________________________________________________________  HISTORY    Patient is a 80y old  Female who presents with a chief complaint of metabolic encephalopathy, SIRS, NSTEMI, hypernatremia, (10 May 2021 20:08)      PERTINENT MEDICATION:  diVALproex  milliGRAM(s) Oral two times a day  _____________________________________________________________  STUDY INTERPRETATION    Findings: The background was continuous, spontaneously variable and reactive. During wakefulness, No posterior dominant rhythm seen.    Background Slowing:  Diffuse theta and polymorphic delta slowing.    Focal Slowing:   None were present.    Sleep Background:  Drowsiness was characterized by fragmentation, attenuation, and slowing of the background activity.   Stage II sleep transients were not recorded.    Other Non-Epileptiform Findings:  None were present.    Interictal Epileptiform Activity:   None were present.    Events:  Clinical events: None recorded.  Seizures: None recorded.    Activation Procedures:   Hyperventilation was not performed.    Photic stimulation was not performed.     Artifacts:  Intermittent myogenic and movement artifacts were noted.    ECG:  The heart rate on single channel ECG was predominantly between 70-80 BPM.    _____________________________________________________________  EEG SUMMARY/CLASSIFICATION    Abnormal EEG in the awake, drowsy astates.  - Moderate generalized slowing.    _____________________________________________________________  EEG IMPRESSION/CLINICAL CORRELATE    Abnormal EEG study.   Moderate nonspecific diffuse or multifocal cerebral dysfunction.   No epileptiform pattern or seizure seen.    David To MD PGY-5  Epilepsy Fellow    This Preliminary report is based on fellow review. Final report pending attending review.    Reading Room: [phone]  On Call Service After Hours: [phone] [facility] [place]   COMPREHENSIVE EPILEPSY CENTER   REPORT OF ROUTINE VIDEO EEG     [facility]: [address] [place] [address] [address], [place] [address], Ph#: [phone]  [facility]: [address], [place] [address], Ph#: [phone]  [facility]: [address] [place] [address], Ph#: [phone]    Patient Name: DIAMOND LAGUNAS  Age and : 80y (42)  MRN #: [id number]  Location: [address]  Referring Physician: Rachelle De Leon    Study Date: 21    _____________________________________________________________  TECHNICAL INFORMATION    Placement and Labeling of Electrodes:  The EEG was performed utilizing 20 channels referential EEG connections (coronal over temporal over parasagittal montage) using all standard 10-20 electrode placements with EKG.  Recording was at a sampling rate of 256 samples per second per channel.  Time synchronized digital video recording was done simultaneously with EEG recording.  A low light infrared camera was used for low light recording.  Manjit and seizure detection algorithms were utilized.    _____________________________________________________________  HISTORY    Patient is a 80y old  Female who presents with a chief complaint of metabolic encephalopathy, SIRS, NSTEMI, hypernatremia, (10 May 2021 20:08)      PERTINENT MEDICATION:  diVALproex  milliGRAM(s) Oral two times a day  _____________________________________________________________  STUDY INTERPRETATION    Findings: The background was continuous, spontaneously variable and reactive. During wakefulness, No posterior dominant rhythm seen with 6-6.5hz activity to 30uV at times.    Background Slowing:  Diffuse theta and polymorphic delta slowing.    Focal Slowing:   None were present.    Sleep Background:  Drowsiness was characterized by fragmentation, attenuation, and slowing of the background activity.   Stage II sleep transients were not recorded.    Other Non-Epileptiform Findings:  None were present.    Interictal Epileptiform Activity:   None were present.    Events:  Clinical events: None recorded.  Seizures: None recorded.    Activation Procedures:   Hyperventilation was not performed.    Photic stimulation was not performed.     Artifacts:  Intermittent myogenic and movement artifacts were noted.    ECG:  The heart rate on single channel ECG was predominantly between 70-80 BPM.    _____________________________________________________________  EEG SUMMARY/CLASSIFICATION    Abnormal EEG in the awake, drowsy astates.  - Moderate generalized slowing.    _____________________________________________________________  EEG IMPRESSION/CLINICAL CORRELATE    Abnormal EEG study.   Moderate nonspecific diffuse or multifocal cerebral dysfunction.   No epileptiform pattern or seizure seen.    David To MD PGY-5  Epilepsy Fellow      Reading Room: [phone]  On Call Service After Hours: [phone] [facility]   COMPREHENSIVE EPILEPSY CENTER   REPORT OF ROUTINE VIDEO EEG     [facility]: [address] [place] [address] [address], [place] [address], Ph#: [phone]  [facility]: [address], [place] [address], Ph#: [phone]  [facility]: [address] [place] [address], Ph#: [phone]    Patient Name: DIAMOND LAGUNAS  Age and : 80y (42)  MRN #: [id number]  Location: [address]  Referring Physician: Rachelle De Leon    Study Date: 21    _____________________________________________________________  TECHNICAL INFORMATION    Placement and Labeling of Electrodes:  The EEG was performed utilizing 20 channels referential EEG connections (coronal over temporal over parasagittal montage) using all standard 10-20 electrode placements with EKG.  Recording was at a sampling rate of 256 samples per second per channel.  Time synchronized digital video recording was done simultaneously with EEG recording.  A low light infrared camera was used for low light recording.  Manjit and seizure detection algorithms were utilized.    _____________________________________________________________  HISTORY    Patient is a 80y old  Female who presents with a chief complaint of metabolic encephalopathy, SIRS, NSTEMI, hypernatremia, (10 May 2021 20:08)      PERTINENT MEDICATION:  diVALproex  milliGRAM(s) Oral two times a day  _____________________________________________________________  STUDY INTERPRETATION    Findings: The background was continuous, spontaneously variable and reactive. During wakefulness, No posterior dominant rhythm seen with 7hz activity to 30uV at times.    Background Slowing:  Diffuse theta and polymorphic delta slowing.  GRDA to 1.5hz at times    Focal Slowing:   None were present.    Sleep Background:  Drowsiness was characterized by fragmentation, attenuation, and slowing of the background activity.   Stage II sleep transients were not recorded.    Other Non-Epileptiform Findings:  None were present.    Interictal Epileptiform Activity:   None were present.    Events:  Clinical events: None recorded.  Seizures: None recorded.    Activation Procedures:   Hyperventilation was not performed.    Photic stimulation was not performed.     Artifacts:  Intermittent myogenic and movement artifacts were noted.    ECG:  The heart rate on single channel ECG was predominantly between 70-80 BPM.    _____________________________________________________________  EEG SUMMARY/CLASSIFICATION    Abnormal EEG in the awake, drowsy astates.  - Moderate generalized slowing.  Intermittent GRDA    _____________________________________________________________  EEG IMPRESSION/CLINICAL CORRELATE    Abnormal EEG study.   Moderate nonspecific diffuse or multifocal cerebral dysfunction.   No epileptiform pattern or seizure seen.    David To MD PGY-5  Epilepsy Fellow      Reading Room: [phone]  On Call Service After Hours: [phone]

## 2021-05-11 NOTE — CONSULT NOTE ADULT - SUBJECTIVE AND OBJECTIVE BOX
80yFemale with unkown pmhx brought to ED by EMS after fall and AMS, admitted for metabolic encephalopathy, SIRS, NSTEMI, hypernatremia and found to have a R rami Fx. Pt had foely placed in ED, unknown to team why, suggest due to AMS. Mon was OH'ed and patient was retaining 300cc and was straight catheterized yesterday after which prima fit was placed. Nurse noted clots and hematuria in cannister today. Pt confused and a poor historian. Pt is on ASA and lovenox, currently being held due to hematuria. H/H is stable 12.3/37.4. UA and UCx from 5/6 negative. Random PVR this evening was 60cc. As per team, patient was a bit lethargic today with a poor diet. Pt denies abdominal pain/pressure, dysuria, fever, chills, h/o hematuria or kidney stones. Pt denies every seeing a urologist.      PAST MEDICAL & SURGICAL HISTORY:  No pertinent past medical history    No significant past surgical history        MEDICATIONS  (STANDING):  aspirin enteric coated 81 milliGRAM(s) Oral daily  atorvastatin 20 milliGRAM(s) Oral at bedtime  diVALproex  milliGRAM(s) Oral two times a day  enoxaparin Injectable 40 milliGRAM(s) SubCutaneous daily  metoprolol tartrate 12.5 milliGRAM(s) Oral two times a day  pantoprazole  Injectable 40 milliGRAM(s) IV Push daily  thiamine IVPB 500 milliGRAM(s) IV Intermittent three times a day    MEDICATIONS  (PRN):  nystatin Powder 1 Application(s) Topical two times a day PRN after each BM      FAMILY HISTORY:  No pertinent family history in first degree relatives        Allergies    No Known Allergies    Intolerances        SOCIAL HISTORY:    REVIEW OF SYSTEMS: Otherwise negative as stated in HPI    Physical Exam  Vital signs  T(C): 36.8 (05-11-21 @ 13:25), Max: 36.9 (05-10-21 @ 20:20)  HR: 100 (05-11-21 @ 17:41)  BP: 129/66 (05-11-21 @ 17:41)  SpO2: 96% (05-11-21 @ 13:25)  Wt(kg): --        Gen:  AWAKE ALERT NAD AXOX3    Pulm:  NO RESP DISTRESS  	  GI:  SOFT NT/ND    :  primafit in place draining wine colored urine in cannister without clots                           	      LABS:      05-11 @ 13:35    WBC 12.31 / Hct 37.4  / SCr 0.60     05-11    138  |  103  |  17  ----------------------------<  138<H>  4.6   |  23  |  0.60    Ca    9.4      11 May 2021 13:35            Urine Cx:  Blood Cx:    RADIOLOGY:     80yFemale with unkown pmhx brought to ED by EMS after fall and AMS, admitted for metabolic encephalopathy, SIRS, hypernatremia and found to have a R rami Fx. Pt had foely placed in ED, unknown to team why, suggest due to AMS. Mon was AK'ed and patient was retaining 300cc and was straight catheterized yesterday after which prima fit was placed. Nurse noted clots and hematuria in cannister today. Pt confused and a poor historian. Pt is on ASA and lovenox, currently being held due to hematuria. H/H is stable 12.3/37.4. UA and UCx from 5/6 negative. Random PVR this evening was 60cc. As per team, patient was a bit lethargic today with a poor diet. Pt denies abdominal pain/pressure, dysuria, fever, chills, h/o hematuria or kidney stones. Pt denies every seeing a urologist.      PAST MEDICAL & SURGICAL HISTORY:  No pertinent past medical history    No significant past surgical history        MEDICATIONS  (STANDING):  aspirin enteric coated 81 milliGRAM(s) Oral daily  atorvastatin 20 milliGRAM(s) Oral at bedtime  diVALproex  milliGRAM(s) Oral two times a day  enoxaparin Injectable 40 milliGRAM(s) SubCutaneous daily  metoprolol tartrate 12.5 milliGRAM(s) Oral two times a day  pantoprazole  Injectable 40 milliGRAM(s) IV Push daily  thiamine IVPB 500 milliGRAM(s) IV Intermittent three times a day    MEDICATIONS  (PRN):  nystatin Powder 1 Application(s) Topical two times a day PRN after each BM      FAMILY HISTORY:  No pertinent family history in first degree relatives        Allergies    No Known Allergies    Intolerances        SOCIAL HISTORY:    REVIEW OF SYSTEMS: Otherwise negative as stated in HPI    Physical Exam  Vital signs  T(C): 36.8 (05-11-21 @ 13:25), Max: 36.9 (05-10-21 @ 20:20)  HR: 100 (05-11-21 @ 17:41)  BP: 129/66 (05-11-21 @ 17:41)  SpO2: 96% (05-11-21 @ 13:25)  Wt(kg): --        Gen:  AWAKE ALERT NAD AXOX3    Pulm:  NO RESP DISTRESS  	  GI:  SOFT NT/ND    :  primafit in place draining wine colored urine in cannister without clots                           	      LABS:      05-11 @ 13:35    WBC 12.31 / Hct 37.4  / SCr 0.60     05-11    138  |  103  |  17  ----------------------------<  138<H>  4.6   |  23  |  0.60    Ca    9.4      11 May 2021 13:35            Urine Cx:  Blood Cx:    RADIOLOGY:

## 2021-05-11 NOTE — CONSULT NOTE ADULT - ASSESSMENT
80yFemale with unknown pmhx brought to ED by EMS after fall and AMS, admitted for metabolic encephalopathy, SIRS, NSTEMI, hypernatremia and found to have a R rami Fx consulted for hematuria s/p ingram placement and straight catheterization 1x. Pt is on ASA and lovenox, currently being held due to hematuria. H/H is stable 12.3/37.4. UA and UCx from 5/6 negative. Random PVR this evening was 60cc.    -may continue bladder scan q8h to ensure adequate emptying   -no need for ingram at this time  -encourage increased hydration  -send repeat UA and UCx   -if patient with PVR's >300, distended bladder, worsening urine color or noticing blood clots please page urology at [phone]     FINAL PLAN PENDING   80yFemale with unknown pmhx brought to ED by EMS after fall and AMS, admitted for metabolic encephalopathy, SIRS, NSTEMI, hypernatremia and found to have a R rami Fx consulted for hematuria s/p ingram placement and straight catheterization 1x. Pt is on ASA and lovenox, currently being held due to hematuria. H/H is stable 12.3/37.4. UA and UCx from 5/6 negative. Random PVR this evening was 60cc.    -may continue bladder scan q8h to ensure adequate emptying   -no need for ingram at this time  -encourage increased hydration  -send repeat UA and UCx   -continue abx   -if patient with PVR's >300, distended bladder, worsening urine color or noticing blood clots please page urology at [phone]     case discussed with Dr. Cates  80yFemale with unknown pmhx brought to ED by EMS after fall and AMS, admitted for metabolic encephalopathy, SIRS, NSTEMI, hypernatremia and found to have a R rami Fx consulted for hematuria s/p ingram placement and straight catheterization 1x. Pt is on ASA and lovenox, currently being held due to hematuria. H/H is stable 12.3/37.4. UA and UCx from 5/6 negative. Random bladder scan this evening was 60cc.    -may continue bladder scan q8h to ensure adequate emptying   -no need for ingram at this time  -encourage increased hydration  -send repeat UA and UCx   -continue abx   -if patient with PVR's >300, distended bladder, worsening urine color or noticing blood clots please page urology at [phone]     case discussed with Dr. Cates

## 2021-05-11 NOTE — CONSULT NOTE ADULT - REASON FOR ADMISSION
metabolic encephalopathy, SIRS, NSTEMI, hypernatremia,

## 2021-05-11 NOTE — SWALLOW VFSS/MBS ASSESSMENT ADULT - ADDITIONAL RECOMMENDATIONS
Formal speech/language evaluation. Expressive language deficits noted during exam: word finding difficulties; paraphasias; confusion vs mild receptive deficits; patient appears to have some awareness of expressive deficits; some lability noted incongruous to situation (laughing)

## 2021-05-11 NOTE — PROVIDER CONTACT NOTE (OTHER) - SITUATION
Suspect stage 1 vs blister on right heel  Stage 3 noted on right elbow
Large amount of hematuria with clots noted
Mon taken out around 6AM. Patient with no urine output. Bladder scan of 305 ml residual urine.

## 2021-05-11 NOTE — SWALLOW VFSS/MBS ASSESSMENT ADULT - MODE OF PRESENTATION
spoon/fed by clinician fed by clinician cup/spoon/self fed/fed by clinician self fed/fed by clinician

## 2021-05-11 NOTE — PROVIDER CONTACT NOTE (OTHER) - BACKGROUND
Dx - syncope, acute fracture right superior and inferior pubic rami and right sacral, hypernatremia
Dx - Syncope, acute fracture of right superior and inferior pubic rami and right sacral, hypernatremia, Altered mental status
Dx - Syncope, acute fracture of right superior and inferior pubic rami and right sacral, hypernatremia, Altered mental status

## 2021-05-11 NOTE — SWALLOW VFSS/MBS ASSESSMENT ADULT - SLP GENERAL OBSERVATIONS
Pt received in radiology secured in ERIC chair. AA+Ox2 to name and place; "1940" (paraphasia vs disoriention); noted lability

## 2021-05-12 LAB
HCT VFR BLD CALC: 37 % — SIGNIFICANT CHANGE UP (ref 34.5–45)
HGB BLD-MCNC: 12 G/DL — SIGNIFICANT CHANGE UP (ref 11.5–15.5)
MCHC RBC-ENTMCNC: 30.2 PG — SIGNIFICANT CHANGE UP (ref 27–34)
MCHC RBC-ENTMCNC: 32.4 GM/DL — SIGNIFICANT CHANGE UP (ref 32–36)
MCV RBC AUTO: 93.2 FL — SIGNIFICANT CHANGE UP (ref 80–100)
NRBC # BLD: 0 /100 WBCS — SIGNIFICANT CHANGE UP (ref 0–0)
PLATELET # BLD AUTO: 377 K/UL — SIGNIFICANT CHANGE UP (ref 150–400)
RBC # BLD: 3.97 M/UL — SIGNIFICANT CHANGE UP (ref 3.8–5.2)
RBC # FLD: 13.9 % — SIGNIFICANT CHANGE UP (ref 10.3–14.5)
WBC # BLD: 13.59 K/UL — HIGH (ref 3.8–10.5)
WBC # FLD AUTO: 13.59 K/UL — HIGH (ref 3.8–10.5)

## 2021-05-12 PROCEDURE — 99222 1ST HOSP IP/OBS MODERATE 55: CPT

## 2021-05-12 RX ORDER — CEFTRIAXONE 500 MG/1
1000 INJECTION, POWDER, FOR SOLUTION INTRAMUSCULAR; INTRAVENOUS EVERY 24 HOURS
Refills: 0 | Status: DISCONTINUED | OUTPATIENT
Start: 2021-05-13 | End: 2021-05-18

## 2021-05-12 RX ORDER — CEFTRIAXONE 500 MG/1
1000 INJECTION, POWDER, FOR SOLUTION INTRAMUSCULAR; INTRAVENOUS ONCE
Refills: 0 | Status: COMPLETED | OUTPATIENT
Start: 2021-05-12 | End: 2021-05-12

## 2021-05-12 RX ORDER — CEFTRIAXONE 500 MG/1
INJECTION, POWDER, FOR SOLUTION INTRAMUSCULAR; INTRAVENOUS
Refills: 0 | Status: DISCONTINUED | OUTPATIENT
Start: 2021-05-12 | End: 2021-05-18

## 2021-05-12 RX ORDER — ACETAMINOPHEN 500 MG
650 TABLET ORAL EVERY 6 HOURS
Refills: 0 | Status: COMPLETED | OUTPATIENT
Start: 2021-05-12 | End: 2021-05-14

## 2021-05-12 RX ORDER — SODIUM CHLORIDE 9 MG/ML
1000 INJECTION, SOLUTION INTRAVENOUS
Refills: 0 | Status: DISCONTINUED | OUTPATIENT
Start: 2021-05-12 | End: 2021-05-14

## 2021-05-12 RX ADMIN — PANTOPRAZOLE SODIUM 40 MILLIGRAM(S): 20 TABLET, DELAYED RELEASE ORAL at 11:17

## 2021-05-12 RX ADMIN — Medication 12.5 MILLIGRAM(S): at 17:38

## 2021-05-12 RX ADMIN — DIVALPROEX SODIUM 125 MILLIGRAM(S): 500 TABLET, DELAYED RELEASE ORAL at 11:17

## 2021-05-12 RX ADMIN — Medication 81 MILLIGRAM(S): at 11:16

## 2021-05-12 RX ADMIN — CEFTRIAXONE 100 MILLIGRAM(S): 500 INJECTION, POWDER, FOR SOLUTION INTRAMUSCULAR; INTRAVENOUS at 00:41

## 2021-05-12 RX ADMIN — ATORVASTATIN CALCIUM 20 MILLIGRAM(S): 80 TABLET, FILM COATED ORAL at 21:05

## 2021-05-12 RX ADMIN — Medication 12.5 MILLIGRAM(S): at 05:24

## 2021-05-12 RX ADMIN — Medication 105 MILLIGRAM(S): at 05:24

## 2021-05-12 RX ADMIN — SODIUM CHLORIDE 75 MILLILITER(S): 9 INJECTION, SOLUTION INTRAVENOUS at 23:31

## 2021-05-12 NOTE — PROGRESS NOTE ADULT - ASSESSMENT
79 yo F with unk pmhx presents with EMS - found on floor sitting in urine. Pt states she was getting out of the bed and fell on the ground. Does not recall how long she was on the ground. Complains of back pain.   History obtained from friend, Gary Baeza - [phone]; [phone]: Saw her on Saturday evening for family dinner. Usually calls every day. Did not return calls since Monday (4days ago). Went to check in on today. Has been forgetful over past some time. MRI and some other tests. Was at [facility] 3 weeks ago for stiff neck - sent home. Goes to a lot of doctors - not sure which ones. Has been following with orthopedics for hip problems. Has not talked to her children for many years. Does not drink.   Dr. Millan - cardiologist, but says she has no cardiac probelms. alert to name and place     ER vitals:  Tmax 99.3, P 100, /88.  WBC 12.8.  PCT 0.13.  Lact 3.1.  UA (-).  RVP (-).  Covid pcr (-).  Ucx (-).  CT chest no pna, partial atelectasis of b/l lower lobes, RML mucoid impaction.  CTA chest limited evaluation for segmental and subsegmental pulmonary embolism. No main, left right, or lobar pulmonary embolism.  LE doppler (-) DVT.  CT pelvis with acute fractures of the right superior and inferior pubic rami and of the right sacral ala.    SIRS:  -resolved  -s/p Low grade temp, borderline tachycardia (P 100), elevated WBC and lactate have resolved, infectious etiology has been ruled out.   - all Cx NTD, pan CT neg for infectious focus.  - Pt with stage 2 sacral wound, seen by Wound care.  Cont offloading and topical therapy, no acute infection/cellulitis.  - Cont to monitor WBC and temp curve.   - CTA chest and LE doppler (-) for PE/DVT.  - CT pelvis with sacral fracture.  Ortho eval, cont pain control. DC to [facility]  Urinary Retention  - Ingram DCed, PVR greater than 300 cc, was straight cathed, developed hematuria, with leukocytosis, Cx sent, on CTX for UTI    Hypernatremia  - resolved  - now  euvolemic    Pelvic and Sacral Fractures  - ortho input appreciated, PT ordered. ptn states she doesnt want to go to [facility]. will d/w     Hypokalemia  - due to total body depletion, now resolved, on daily supplements    Hematuria  gross hematuria, possibly 2/2 straight cath prior. will watch for urine output if decreased will do a bladder scan and place ingram if necessary, if still gross hematuria, call  for consult. ptn is asymptomatic        DC planning  dc sc Lovenox 2/2 gross hematuria

## 2021-05-12 NOTE — PROGRESS NOTE ADULT - SUBJECTIVE AND OBJECTIVE BOX
Subjective: Patient seen and examined. No new events except as noted.     REVIEW OF SYSTEMS:    CONSTITUTIONAL: No weakness, fevers or chills  EYES/ENT: No visual changes;  No vertigo or throat pain   NECK: No pain or stiffness  RESPIRATORY: No cough, wheezing, hemoptysis; No shortness of breath  CARDIOVASCULAR: No chest pain or palpitations  GASTROINTESTINAL: No abdominal or epigastric pain. No nausea, vomiting, or hematemesis; No diarrhea or constipation. No melena or hematochezia.  GENITOURINARY: No dysuria, frequency or hematuria  NEUROLOGICAL: No numbness or weakness  SKIN: No itching, burning, rashes, or lesions   All other review of systems is negative unless indicated above.    MEDICATIONS:  MEDICATIONS  (STANDING):  aspirin enteric coated 81 milliGRAM(s) Oral daily  atorvastatin 20 milliGRAM(s) Oral at bedtime  cefTRIAXone   IVPB      diVALproex  milliGRAM(s) Oral two times a day  metoprolol tartrate 12.5 milliGRAM(s) Oral two times a day  pantoprazole  Injectable 40 milliGRAM(s) IV Push daily      PHYSICAL EXAM:  T(C): 36.4 (05-12-21 @ 04:46), Max: 36.8 (05-11-21 @ 11:53)  HR: 104 (05-12-21 @ 04:46) (99 - 104)  BP: 111/64 (05-12-21 @ 04:46) (111/64 - 137/79)  RR: 18 (05-12-21 @ 04:46) (16 - 18)  SpO2: 95% (05-12-21 @ 04:46) (95% - 97%)  Wt(kg): --  I&O's Summary    11 May 2021 07:01  -  12 May 2021 07:00  --------------------------------------------------------  IN: 720 mL / OUT: 400 mL / NET: 320 mL          Appearance: Normal	  HEENT:   Normal oral mucosa, PERRL, EOMI	  Lymphatic: No lymphadenopathy , no edema  Cardiovascular: Normal S1 S2, No JVD, No murmurs , Peripheral pulses palpable 2+ bilaterally  Respiratory: Lungs clear to auscultation, normal effort 	  Gastrointestinal:  Soft, Non-tender, + BS	  Skin: No rashes, No ecchymoses, No cyanosis, warm to touch  Musculoskeletal: Normal range of motion, normal strength  Psychiatry:  Mood & affect appropriate  Ext: No edema      LABS:    CARDIAC MARKERS:                                12.0   13.59 )-----------( 377      ( 12 May 2021 07:08 )             37.0     05-11    138  |  103  |  17  ----------------------------<  138<H>  4.6   |  23  |  0.60    Ca    9.4      11 May 2021 13:35      proBNP:   Lipid Profile:   HgA1c:   TSH:     Moderate          TELEMETRY: 	    ECG:  	  RADIOLOGY:   DIAGNOSTIC TESTING:  [ ] Echocardiogram:  < from: Transthoracic Echocardiogram (05.07.21 @ 13:37) >    Patient name: DIAMOND LAGUNAS  YOB: 1940   Age: 80 (F)   MR#: [id number]  Study Date: 5/7/2021  [facility]  Study quality: Technically fair  Referring Physician: Rachelle De Leon MD  Blood Pressure: 130/80 mmHg  Height: 170 cm  Weight: 68 kg  BSA: 1.8 m2  Heart Rate: 87 mmHg  ------------------------------------------------------------------------  PROCEDURE: Transthoracic echocardiogram with 2-D, M-Mode  and complete spectral and color flow Doppler.  INDICATION: Subsequent non-ST elevation (NSTEMI) myocardial  infarction (I22.2)  ------------------------------------------------------------------------  Dimensions:    Normal Values:  LA:     2.9    2.0 - 4.0 cm  Ao:     3.1    2.0 - 3.8 cm  SEPTUM: 0.8    0.6 - 1.2 cm  PWT:    0.6    0.6 - 1.1 cm  LVIDd:  3.6    3.0 - 5.6 cm  LVIDs:  2.5    1.8 - 4.0 cm  Derived variables:  LVMI: 37 g/m2  RWT: 0.33  Fractional short: 31 %  EF (Visual Estimate): 65-70 %  ------------------------------------------------------------------------  Observations:  Mitral Valve: Normal mitral valve. Minimal mitral  regurgitation.  Aortic Valve/Aorta: Normal trileaflet aortic valve. No  aortic valve regurgitation seen.  Aortic Root: 3.1 cm.  Left Atrium: Normal left atrium.  LA volume index = 25  cc/m2.  Left Ventricle: Normal left ventricular systolic function.  No segmental wall motion abnormalities. Normal left  ventricular internal dimensions and wall thicknesses. Mild  diastolic dysfunction (Stage I).  Right Heart: Normal right atrium. Normal right ventricular  size and function. Normal tricuspid valve. Mild tricuspid  regurgitation. Normal pulmonic valve. Mild pulmonic  regurgitation.  Pericardium/Pleura: Normal pericardium with no pericardial  effusion.  Hemodynamic: Estimated right ventricular systolic pressure  equals 44 mm Hg, assuming right atrial pressure equals 8 mm  Hg, consistent with mild pulmonary hypertension. Color  Doppler demonstrates no evidence of a patent foramen ovale.  ------------------------------------------------------------------------  Conclusions:  1. Normal mitral valve. Minimal mitral regurgitation.  2. Normal trileaflet aortic valve. No aortic valve  regurgitation seen.  3. Normal left ventricular systolic function. No segmental  wall motion abnormalities.  4. Mild diastolic dysfunction (Stage I).  5. Normal right ventricular size and function.  *** No previous Echo exam.  ------------------------------------------------------------------------  Confirmed on  5/7/2021 - 16:57:52 by Dawson Porter M.D.  ------------------------------------------------------------------------    < end of copied text >    [ ]  Catheterization:  [ ] Stress Test:    OTHER:

## 2021-05-12 NOTE — PROGRESS NOTE ADULT - ASSESSMENT
79 yo F with unk pmhx presents with EMS - found on floor sitting in urine. Pt states she was getting out of the bed and fell on the ground. Does not recall how long she was on the ground. Complains of back pain.   History obtained from friend, Gary Baeza - [phone]; [phone]: Saw her on Saturday evening for family dinner. Usually calls every day. Did not return calls since Monday (4days ago). Went to check in on today. Has been forgetful over past some time. MRI and some other tests. Was at [facility] 3 weeks ago for stiff neck - sent home. Goes to a lot of doctors - not sure which ones. Has been following with orthopedics for hip problems. Has not talked to her children for many years. Does not drink.   Dr. Millan - cardiologist, but says she has no cardiac probelms. alert to name and place (06 May 2021 17:34)    ER vitals:  Tmax 99.3, P 100, /88.  WBC 12.8.  PCT 0.13.  Lact 3.1.  UA (-).  RVP (-).  Covid pcr (-).  Ucx (-).  CT chest no pna, partial atelectasis of b/l lower lobes, RML mucoid impaction.  CTA chest limited evaluation for segmental and subsegmental pulmonary embolism. No main, left right, or lobar pulmonary embolism.  LE doppler (-) DVT.  CT pelvis with acute fractures of the right superior and inferior pubic rami and of the right sacral ala.    SIRS:    - Low grade temp, borderline tachycardia (P 100), elevated WBC and lactate.  Thus far infectious w/u neg including Ucx, Covid/RVP testing.  CT chest no pna, shows probably RML mucoid impaction.    - Pt with stage 2 sacral wound, seen by Wound care.  Cont offloading and topical therapy, no acute infection/cellulitis.    - Cont to monitor WBC and temp curve.     - CTA chest and LE doppler (-) for PE/DVT.    - CT pelvis with sacral fracture.  Ortho eval, cont pain control    - Check orthostatics.    UTI:    - Pt with urinary retention, s/p straight cath, found to have hematuria, UA (+), WBC trending upwards.   Started on rocephin    - f/u ucx.  Adjust abx accordingly.    - s/p Urology evaluation, check serial bladder scan        Indira Aquino  [phone]

## 2021-05-12 NOTE — PROGRESS NOTE ADULT - SUBJECTIVE AND OBJECTIVE BOX
Patient is a 80y old  Female who presents with a chief complaint of metabolic encephalopathy, SIRS, NSTEMI, hypernatremia, (12 May 2021 20:56)      SUBJECTIVE / OVERNIGHT EVENTS:    MEDICATIONS  (STANDING):  aspirin enteric coated 81 milliGRAM(s) Oral daily  atorvastatin 20 milliGRAM(s) Oral at bedtime  cefTRIAXone   IVPB      metoprolol tartrate 12.5 milliGRAM(s) Oral two times a day  pantoprazole  Injectable 40 milliGRAM(s) IV Push daily    MEDICATIONS  (PRN):  acetaminophen    Suspension .. 650 milliGRAM(s) Oral every 6 hours PRN Temp greater or equal to 38C (100.4F), Mild Pain (1 - 3)  nystatin Powder 1 Application(s) Topical two times a day PRN after each BM      Vital Signs Last 24 Hrs  T(F): 98.2 (21 @ 20:40), Max: 98.3 (21 @ 12:18)  HR: 95 (21 @ 20:40) (88 - 106)  BP: 111/72 (21 @ 20:40) (109/65 - 129/79)  RR: 17 (21 @ 20:40) (17 - 18)  SpO2: 96% (21 @ 20:40) (95% - 98%)  Telemetry:   CAPILLARY BLOOD GLUCOSE        I&O's Summary    11 May 2021 07:  -  12 May 2021 07:00  --------------------------------------------------------  IN: 720 mL / OUT: 400 mL / NET: 320 mL    12 May 2021 07:01  -  12 May 2021 22:34  --------------------------------------------------------  IN: 420 mL / OUT: 350 mL / NET: 70 mL        PHYSICAL EXAM:  GENERAL: NAD, well-developed  HEAD:  Atraumatic, Normocephalic  EYES: EOMI, PERRLA, conjunctiva and sclera clear  NECK: Supple, No JVD  CHEST/LUNG: Clear to auscultation bilaterally; No wheeze  HEART: Regular rate and rhythm; No murmurs, rubs, or gallops  ABDOMEN: Soft, Nontender, Nondistended; Bowel sounds present  EXTREMITIES:  2+ Peripheral Pulses, No clubbing, cyanosis, or edema  PSYCH: AAOx3  NEUROLOGY: non-focal  SKIN: No rashes or lesions    LABS:                        12.0   13.59 )-----------( 377      ( 12 May 2021 07:08 )             37.0     05-11    138  |  103  |  17  ----------------------------<  138<H>  4.6   |  23  |  0.60    Ca    9.4      11 May 2021 13:35            Urinalysis Basic - ( 11 May 2021 22:19 )    Color: Marti / Appearance: Turbid / S.034 / pH: x  Gluc: x / Ketone: Moderate  / Bili: Large / Urobili: >8mg/dL   Blood: x / Protein: >600 mg/dL / Nitrite: Positive   Leuk Esterase: Large / RBC: >50 /hpf / WBC 10 /HPF   Sq Epi: x / Non Sq Epi: 2 / Bacteria: Many        RADIOLOGY & ADDITIONAL TESTS:    Imaging Personally Reviewed:    Consultant(s) Notes Reviewed:      Care Discussed with Consultants/Other Providers:

## 2021-05-12 NOTE — PROGRESS NOTE ADULT - ASSESSMENT
Impression:    Sacral unstageable pressure ulcer present on admission  RIght elbow traumatic wound  Right lateral heel wound unclear etiology at this time  Incontinence of bowel  Incontinence of bladder  Incontinence dermatitis    Recommend:  1.) topical therapy: sacral wound - cleanse with NS, pat dry, apply medihoney, cover with allevyn foam dressing daily  right elbow wound - cleanse with NS, pat dry, apply allevyn foam dressing every other day  Right lateral heel wound - gentle skin care, keep clean and dry, open to air, offload at all times  2.) Maintain on an alternating air with low air loss surface  3.) turn and reposition Q 2 hours  4.) Nutrition optimization  5.) Incontinence management - incontinence cleanser, pads, pericare BID, external female urinary catheter  6.) Complete care air fluidized boots  7.) chair cushion for chair sitting    Care as per medicine will follow w/ you.  Upon [facility] [address] [place] [address] [phone]  Seen and discussed with clinical nurse  Thank you for this consult  Paula Parker, NP-C, CWOCN [id number]

## 2021-05-12 NOTE — PROGRESS NOTE ADULT - SUBJECTIVE AND OBJECTIVE BOX
Wound Surgery Progress Note:    HPI:  77 yo F with unk pmhx presents with EMS - found on floor sitting in urine. Pt states she was getting out of the bed and fell on the ground. Does not recall how long she was on the ground. Complains of back pain.   History obtained from friend, Gary Baeza - [phone]; [phone]: Saw her on Saturday evening for family dinner. Usually calls every day. Did not return calls since Monday (4days ago). Went to check in on today. Has been forgetful over past some time. MRI and some other tests. Was at [facility] 3 weeks ago for stiff neck - sent home. Goes to a lot of doctors - not sure which ones. Has been following with orthopedics for hip problems. Has not talked to her children for many years. Does not drink.   Dr. Millan - cardiologist, but says she has no cardiac probelms. alert to name and place (06 May 2021 17:34)    Follow up visit to patient for wound management. Ms. Webb was encountered on an alternating air with low air loss surface. She was extremely confused. She was trying to use the TV remote as a telephone. Her answers to questions seemed incorrect. She stated that she could turn for assessment of her sacral skin because her right leg was broken. The medical record does not confirm this. She could not give any information about her fall or the time she spent on the floor.    PAST MEDICAL & SURGICAL HISTORY:  No pertinent past medical history    No significant past surgical history        REVIEW OF SYSTEMS  Unable to obtain due to patient's unreliability    MEDICATIONS  (STANDING):  aspirin enteric coated 81 milliGRAM(s) Oral daily  atorvastatin 20 milliGRAM(s) Oral at bedtime  cefTRIAXone   IVPB      diVALproex  milliGRAM(s) Oral two times a day  metoprolol tartrate 12.5 milliGRAM(s) Oral two times a day  pantoprazole  Injectable 40 milliGRAM(s) IV Push daily    MEDICATIONS  (PRN):  acetaminophen    Suspension .. 650 milliGRAM(s) Oral every 6 hours PRN Temp greater or equal to 38C (100.4F), Mild Pain (1 - 3)  nystatin Powder 1 Application(s) Topical two times a day PRN after each BM      Allergies    No Known Allergies    Intolerances    SOCIAL HISTORY: states that she lives alone. Unable to obtain further information due to patient's unreliability    FAMILY HISTORY:  No pertinent family history in first degree relatives    Vital Signs Last 24 Hrs  T(C): 36.4 (12 May 2021 04:46), Max: 36.8 (11 May 2021 11:53)  T(F): 97.6 (12 May 2021 04:46), Max: 98.2 (11 May 2021 11:53)  HR: 104 (12 May 2021 04:46) (99 - 104)  BP: 111/64 (12 May 2021 04:46) (111/64 - 137/79)  BP(mean): --  RR: 18 (12 May 2021 04:46) (16 - 18)  SpO2: 95% (12 May 2021 04:46) (95% - 97%)    Physical Exam:  General: confused, thin, frail   Respiratory: no SOB on room air  Gastrointestinal: soft NT/ND  Neurology: weakened strength & sensation grossly intact  Musculoskeletal: no contractures  Vascular: no BLE edema  Skin:  Sacral wound with evolving necrotic tissue L 6cm x W 6cm x D unknown - moderate serosanguinous drainage, periwound skin with peeling with slight erythema  Right elbow wound - L 1cm x 1cm x D unknown - wound is 100% covered with yellow slough, moderate serosanguinous drainage, periwound with slight erythema  RIght lateral heel clear fluid filled intact blister L 1.5cm x D 0.2cm, no erythema or drainage  No odor, increased warmth, tenderness, induration, fluctuance    LABS:      138  |  103  |  17  ----------------------------<  138<H>  4.6   |  23  |  0.60    Ca    9.4      11 May 2021 13:35                            12.0   13.59 )-----------( 377      ( 12 May 2021 07:08 )             37.0       Urinalysis Basic - ( 11 May 2021 22:19 )    Color: Marti / Appearance: Turbid / S.034 / pH: x  Gluc: x / Ketone: Moderate  / Bili: Large / Urobili: >8mg/dL   Blood: x / Protein: >600 mg/dL / Nitrite: Positive   Leuk Esterase: Large / RBC: >50 /hpf / WBC 10 /HPF   Sq Epi: x / Non Sq Epi: 2 / Bacteria: Many        RADIOLOGY & ADDITIONAL STUDIES:    EXAM:  CT PELVIS BONY ONLY                        EXAM:  CT 3D RECONSTRUCT W DARRELL                        PROCEDURE DATE:  2021    INTERPRETATION:  EXAMINATION: CT of the pelvis without contrast  CLINICAL INFORMATION: Status post fall. Right hip pain.    TECHNIQUE: Axial CT images were obtained through the pelvis. Coronal and sagittal reformatted images were made. 3-D reconstruction images were also performed.    FINDINGS: There is acute comminuted mildly displaced fracture of the medial aspect of the right superior pubic ramus and in there is also an acute mildly displaced fracture of the right inferior pubic ramus. There is regional myofascial edema and hematoma. There is also an acute minimally displaced fracture of the right sacral ala.  No other fractures are demonstrated. The bones are diffusely demineralized, limiting evaluation for acute nondisplaced fractures.    There are no advanced arthritic changes at the hips. There is mild pubic symphysis arthrosis. The bladder is collapsed around a Mon catheter balloon.    The 3-D reconstruction images confirm the above fractures.    IMPRESSION: Acute fractures of the right superior and inferior pubic rami and of the right sacral ala.

## 2021-05-12 NOTE — PROGRESS NOTE ADULT - SUBJECTIVE AND OBJECTIVE BOX
[facility]      Seen earlier today, and examined.  - Today, patient is without complaints.           *****MEDICATIONS: Current medication reviewed and documented.    MEDICATIONS  (STANDING):  aspirin enteric coated 81 milliGRAM(s) Oral daily  atorvastatin 20 milliGRAM(s) Oral at bedtime  cefTRIAXone   IVPB      metoprolol tartrate 12.5 milliGRAM(s) Oral two times a day  pantoprazole  Injectable 40 milliGRAM(s) IV Push daily    MEDICATIONS  (PRN):  acetaminophen    Suspension .. 650 milliGRAM(s) Oral every 6 hours PRN Temp greater or equal to 38C (100.4F), Mild Pain (1 - 3)  nystatin Powder 1 Application(s) Topical two times a day PRN after each BM          ***** VITAL SIGNS:  T(F): 98.2 (21 @ 20:40), Max: 98.3 (21 @ 12:18)  HR: 95 (21 @ 20:40) (88 - 106)  BP: 111/72 (21 @ 20:40) (109/65 - 129/79)  RR: 17 (21 @ 20:40) (17 - 18)  SpO2: 96% (21 @ 20:40) (95% - 98%)  Wt(kg): --  ,   I&O's Summary    11 May 2021 07:  -  12 May 2021 07:00  --------------------------------------------------------  IN: 720 mL / OUT: 400 mL / NET: 320 mL    12 May 2021 07:  -  12 May 2021 22:39  --------------------------------------------------------  IN: 420 mL / OUT: 350 mL / NET: 70 mL             *****PHYSICAL EXAM:   alert oriented x 3 attention comprehension are fair.  Able to name, repeat.   EOmi fundi not visualized   no nystagmus VFF to confrontation  Tongue is midline  Palate elevates symmetrically   Moving all 4 ext spontaneously no drift appreciated    Gait not assessed.            *****LAB AND IMAGIN.0   13.59 )-----------( 377      ( 12 May 2021 07:08 )             37.0               05-11    138  |  103  |  17  ----------------------------<  138<H>  4.6   |  23  |  0.60    Ca    9.4      11 May 2021 13:35                         Urinalysis Basic - ( 11 May 2021 22:19 )    Color: Marti / Appearance: Turbid / S.034 / pH: x  Gluc: x / Ketone: Moderate  / Bili: Large / Urobili: >8mg/dL   Blood: x / Protein: >600 mg/dL / Nitrite: Positive   Leuk Esterase: Large / RBC: >50 /hpf / WBC 10 /HPF   Sq Epi: x / Non Sq Epi: 2 / Bacteria: Many      [All pertinent recent Imaging/Reports reviewed]           *****A S S E S S M E N T   A N D   P L A N :77 yo F with unk pmhx presents with EMS - found on floor sitting in urine. Pt states she was getting out of the bed and fell on the ground. Does not recall how long she was on the ground. Complains of back pain.   History obtained from friend, Gary Baeza - [phone]; [phone]: Saw her on Saturday evening for family dinner. Usually calls every day. Did not return calls since Monday (4days ago). Went to check in on today. Has been forgetful over past some time. MRI and some other tests. Was at [facility] 3 weeks ago for stiff neck - sent home. Goes to a lot of doctors - not sure which ones. Has been following with orthopedics for hip problems. Has not talked to her children for many years. Does not drink.   Dr. Millan - cardiologist, but says she has no cardiac probelms. alert to name and place (06 May 2021 17:34)  limited history obtained   Problem/Recommendations 1:     ams waxing and waning  now more likely related to dementia related delirium   would require increased supervision if discharged home   mri neg for acute infarct    eeg still pending   cpk ok   cannot r/o sz eee  mri neg for acute infarct   b12 added    depakote 125 bid for agitation if needed    waxing and waning mental status   likely hospitalization related sundowning due to underlying dementia   regulate sleep wake cycle   thiamine 500 iv     Problem/Recommendations 2: pelvic fracture   ortho input appreciated no interventions        Problem/Recommendations 3 :prox LE weakness   likely deconditioning   pt reports that she is supposed to use walker but hasn't been   fall likely related to not using assistive device   She states that she slid off the bed   pt chiloal appreciated recommending mata             Thank you for allowing me to participate in the care of this patient. Will continue to follow patient periodically. Please do not hesitate to call me if you have any  questions or if there has been a change in patients neurological status     ________________  Rhina Almaguer MD  [facility] ([facility]  [phone]      33 minutes spent on total encounter; more than 50 % of the visit was  spent counseling about plan of care, compliance to diet/exercise and medication regimen and or  coordinating care by the attending physician.      It is advised that stroke patients follow up with JULIO CESAR Johnston @ [phone] in 1- 2 weeks.   Others please follow up with Dr. Michael Nissenbaum [phone] [facility]      Seen earlier today, and examined.  - Today, patient is without complaints.           *****MEDICATIONS: Current medication reviewed and documented.    MEDICATIONS  (STANDING):  aspirin enteric coated 81 milliGRAM(s) Oral daily  atorvastatin 20 milliGRAM(s) Oral at bedtime  cefTRIAXone   IVPB      metoprolol tartrate 12.5 milliGRAM(s) Oral two times a day  pantoprazole  Injectable 40 milliGRAM(s) IV Push daily    MEDICATIONS  (PRN):  acetaminophen    Suspension .. 650 milliGRAM(s) Oral every 6 hours PRN Temp greater or equal to 38C (100.4F), Mild Pain (1 - 3)  nystatin Powder 1 Application(s) Topical two times a day PRN after each BM          ***** VITAL SIGNS:  T(F): 98.2 (21 @ 20:40), Max: 98.3 (21 @ 12:18)  HR: 95 (21 @ 20:40) (88 - 106)  BP: 111/72 (21 @ 20:40) (109/65 - 129/79)  RR: 17 (21 @ 20:40) (17 - 18)  SpO2: 96% (21 @ 20:40) (95% - 98%)  Wt(kg): --  ,   I&O's Summary    11 May 2021 07:  -  12 May 2021 07:00  --------------------------------------------------------  IN: 720 mL / OUT: 400 mL / NET: 320 mL    12 May 2021 07:01  -  12 May 2021 22:39  --------------------------------------------------------  IN: 420 mL / OUT: 350 mL / NET: 70 mL             *****PHYSICAL EXAM: sleeping soundly              *****LAB AND IMAGIN.0   13.59 )-----------( 377      ( 12 May 2021 07:08 )             37.0               05-11    138  |  103  |  17  ----------------------------<  138<H>  4.6   |  23  |  0.60    Ca    9.4      11 May 2021 13:35                         Urinalysis Basic - ( 11 May 2021 22:19 )    Color: Marti / Appearance: Turbid / S.034 / pH: x  Gluc: x / Ketone: Moderate  / Bili: Large / Urobili: >8mg/dL   Blood: x / Protein: >600 mg/dL / Nitrite: Positive   Leuk Esterase: Large / RBC: >50 /hpf / WBC 10 /HPF   Sq Epi: x / Non Sq Epi: 2 / Bacteria: Many      [All pertinent recent Imaging/Reports reviewed]           *****A S S E S S M E N T   A N D   P L A N :79 yo F with unk pmhx presents with EMS - found on floor sitting in urine. Pt states she was getting out of the bed and fell on the ground. Does not recall how long she was on the ground. Complains of back pain.   History obtained from friend, Gary Baeza - [phone]; [phone]: Saw her on Saturday evening for family dinner. Usually calls every day. Did not return calls since Monday (4days ago). Went to check in on today. Has been forgetful over past some time. MRI and some other tests. Was at [facility] 3 weeks ago for stiff neck - sent home. Goes to a lot of doctors - not sure which ones. Has been following with orthopedics for hip problems. Has not talked to her children for many years. Does not drink.   Dr. Millan - cardiologist, but says she has no cardiac probelms. alert to name and place (06 May 2021 17:34)  limited history obtained   Problem/Recommendations 1:     ams waxing and waning  now more likely related to dementia related delirium   would require increased supervision if discharged home   mri neg for acute infarct    eeg still pending   cpk ok   cannot r/o sz eee  mri neg for acute infarct   b12 added    depakote 125 bid for agitation if needed    waxing and waning mental status   likely hospitalization related sundowning due to underlying dementia   regulate sleep wake cycle   thiamine 500 iv   encourage po intake     Problem/Recommendations 2: pelvic fracture   ortho input appreciated no interventions        Problem/Recommendations 3 :prox LE weakness   likely deconditioning   pt reports that she is supposed to use walker but hasn't been   fall likely related to not using assistive device   She states that she slid off the bed   pt chiloal appreciated recommending mata             Thank you for allowing me to participate in the care of this patient. Will continue to follow patient periodically. Please do not hesitate to call me if you have any  questions or if there has been a change in patients neurological status     ________________  Rhina Almaguer MD  [facility] ([facility]  [phone]      33 minutes spent on total encounter; more than 50 % of the visit was  spent counseling about plan of care, compliance to diet/exercise and medication regimen and or  coordinating care by the attending physician.      It is advised that stroke patients follow up with JULIO CESAR Johnston @ [phone] in 1- 2 weeks.   Others please follow up with Dr. Michael Nissenbaum [phone]

## 2021-05-12 NOTE — PROGRESS NOTE ADULT - SUBJECTIVE AND OBJECTIVE BOX
Infectious Diseases progress note:    Subjective: Events noted, pt with urinary retention, s/p straight cath with hematuria.  UA (+), started on abx.  WBC 13 <-- 15    ROS:  CONSTITUTIONAL:  No fever, chills, rigors  CARDIOVASCULAR:  No chest pain or palpitations  RESPIRATORY:   No SOB, cough, dyspnea on exertion.  No wheezing  GASTROINTESTINAL:  No abd pain, N/V, diarrhea/constipation  EXTREMITIES:  No swelling or joint pain  GENITOURINARY:  No burning on urination, increased frequency or urgency.  No flank pain  NEUROLOGIC:  No HA, visual disturbances  SKIN: No rashes    Allergies    No Known Allergies    Intolerances        ANTIBIOTICS/RELEVANT:  antimicrobials  cefTRIAXone   IVPB        immunologic:    OTHER:  acetaminophen    Suspension .. 650 milliGRAM(s) Oral every 6 hours PRN  aspirin enteric coated 81 milliGRAM(s) Oral daily  atorvastatin 20 milliGRAM(s) Oral at bedtime  metoprolol tartrate 12.5 milliGRAM(s) Oral two times a day  nystatin Powder 1 Application(s) Topical two times a day PRN  pantoprazole  Injectable 40 milliGRAM(s) IV Push daily      Objective:  Vital Signs Last 24 Hrs  T(C): 36.8 (12 May 2021 20:40), Max: 36.8 (12 May 2021 12:18)  T(F): 98.2 (12 May 2021 20:40), Max: 98.3 (12 May 2021 12:18)  HR: 95 (12 May 2021 20:40) (88 - 106)  BP: 111/72 (12 May 2021 20:40) (109/65 - 129/79)  BP(mean): --  RR: 17 (12 May 2021 20:40) (17 - 18)  SpO2: 96% (12 May 2021 20:40) (95% - 98%)    PHYSICAL EXAM:  Constitutional:NAD  Eyes:PETAR, EOMI  Ear/Nose/Throat: no thrush, mucositis.  Moist mucous membranes	  Neck:no JVD, no lymphadenopathy, supple  Respiratory: CTA mayi  Cardiovascular: S1S2 RRR, no murmurs  Gastrointestinal:soft, nontender,  nondistended (+) BS  Extremities:no e/e/c  Skin:  no rashes, open wounds or ulcerations        LABS:                        12.0   13.59 )-----------( 377      ( 12 May 2021 07:08 )             37.0     05-11    138  |  103  |  17  ----------------------------<  138<H>  4.6   |  23  |  0.60    Ca    9.4      11 May 2021 13:35        Urinalysis Basic - ( 11 May 2021 22:19 )    Color: Marti / Appearance: Turbid / S.034 / pH: x  Gluc: x / Ketone: Moderate  / Bili: Large / Urobili: >8mg/dL   Blood: x / Protein: >600 mg/dL / Nitrite: Positive   Leuk Esterase: Large / RBC: >50 /hpf / WBC 10 /HPF   Sq Epi: x / Non Sq Epi: 2 / Bacteria: Many        Procalcitonin, Serum: 0.13 ( @ 22:29)            Rapid RVP Result: [facility]          MICROBIOLOGY:      Culture - Urine (21 @ 18:12)   Specimen Source: .Urine Clean Catch (Midstream)   Culture Results:   No growth     Culture - Blood (21 @ 18:12)   Specimen Source: .Blood Blood-Peripheral   Culture Results:   No Growth Final     RADIOLOGY & ADDITIONAL STUDIES:    < from: Xray Cinesophagram Swallow Function w/ Contrast (21 @ 10:01) >    EXAM:  XR SWAL FUNC BRANDON VID CON STDY                            PROCEDURE DATE:  2021            INTERPRETATION:  CLINICAL INFORMATION:  dysphagia    TECHNIQUE: Various foods with different consistencies were mixed with barium and patient wasasked to consume them under direct fluoroscopic evaluation.  The study was performed in cooperation with the speech pathologist.    FLUOROSCOPIC TIME: 178.8 seconds    COMPARISON: None available.      IMPRESSION: No penetration was seen.  No aspiration was seen. A full report will be issued by the department of speech and hearing.    < end of copied text >

## 2021-05-13 ENCOUNTER — TRANSCRIPTION ENCOUNTER (OUTPATIENT)
Age: 81
End: 2021-05-13

## 2021-05-13 LAB
CULTURE RESULTS: SIGNIFICANT CHANGE UP
HCT VFR BLD CALC: 34.1 % — LOW (ref 34.5–45)
HGB BLD-MCNC: 11.1 G/DL — LOW (ref 11.5–15.5)
MCHC RBC-ENTMCNC: 30.5 PG — SIGNIFICANT CHANGE UP (ref 27–34)
MCHC RBC-ENTMCNC: 32.6 GM/DL — SIGNIFICANT CHANGE UP (ref 32–36)
MCV RBC AUTO: 93.7 FL — SIGNIFICANT CHANGE UP (ref 80–100)
NRBC # BLD: 0 /100 WBCS — SIGNIFICANT CHANGE UP (ref 0–0)
PLATELET # BLD AUTO: 394 K/UL — SIGNIFICANT CHANGE UP (ref 150–400)
RBC # BLD: 3.64 M/UL — LOW (ref 3.8–5.2)
RBC # FLD: 14.1 % — SIGNIFICANT CHANGE UP (ref 10.3–14.5)
SPECIMEN SOURCE: SIGNIFICANT CHANGE UP
WBC # BLD: 10.7 K/UL — HIGH (ref 3.8–10.5)
WBC # FLD AUTO: 10.7 K/UL — HIGH (ref 3.8–10.5)

## 2021-05-13 RX ADMIN — PANTOPRAZOLE SODIUM 40 MILLIGRAM(S): 20 TABLET, DELAYED RELEASE ORAL at 11:30

## 2021-05-13 RX ADMIN — ATORVASTATIN CALCIUM 20 MILLIGRAM(S): 80 TABLET, FILM COATED ORAL at 21:38

## 2021-05-13 RX ADMIN — Medication 12.5 MILLIGRAM(S): at 05:15

## 2021-05-13 RX ADMIN — CEFTRIAXONE 100 MILLIGRAM(S): 500 INJECTION, POWDER, FOR SOLUTION INTRAMUSCULAR; INTRAVENOUS at 00:12

## 2021-05-13 RX ADMIN — Medication 81 MILLIGRAM(S): at 11:30

## 2021-05-13 RX ADMIN — Medication 12.5 MILLIGRAM(S): at 17:36

## 2021-05-13 NOTE — PROGRESS NOTE ADULT - SUBJECTIVE AND OBJECTIVE BOX
Infectious Diseases progress note:    Subjective:  NAD, afebrile.  WBC downtrending, ucx contaminated.      ROS:  CONSTITUTIONAL:  No fever, chills, rigors  CARDIOVASCULAR:  No chest pain or palpitations  RESPIRATORY:   No SOB, cough, dyspnea on exertion.  No wheezing  GASTROINTESTINAL:  No abd pain, N/V, diarrhea/constipation  EXTREMITIES:  No swelling or joint pain  GENITOURINARY:  No burning on urination, increased frequency or urgency.  No flank pain  NEUROLOGIC:  No HA, visual disturbances  SKIN: No rashes    Allergies    No Known Allergies    Intolerances        ANTIBIOTICS/RELEVANT:  antimicrobials  cefTRIAXone   IVPB      cefTRIAXone   IVPB 1000 milliGRAM(s) IV Intermittent every 24 hours    immunologic:    OTHER:  acetaminophen    Suspension .. 650 milliGRAM(s) Oral every 6 hours PRN  aspirin enteric coated 81 milliGRAM(s) Oral daily  atorvastatin 20 milliGRAM(s) Oral at bedtime  dextrose 5% + lactated ringers. 1000 milliLiter(s) IV Continuous <Continuous>  metoprolol tartrate 12.5 milliGRAM(s) Oral two times a day  nystatin Powder 1 Application(s) Topical two times a day PRN  pantoprazole  Injectable 40 milliGRAM(s) IV Push daily      Objective:  Vital Signs Last 24 Hrs  T(C): 36.8 (13 May 2021 21:15), Max: 36.8 (13 May 2021 08:58)  T(F): 98.3 (13 May 2021 21:15), Max: 98.3 (13 May 2021 21:15)  HR: 83 (13 May 2021 21:15) (83 - 102)  BP: 101/63 (13 May 2021 21:15) (101/63 - 131/72)  BP(mean): --  RR: 18 (13 May 2021 21:15) (17 - 18)  SpO2: 95% (13 May 2021 21:15) (95% - 98%)    PHYSICAL EXAM:  Constitutional:NAD  Eyes:PETAR, EOMI  Ear/Nose/Throat: no thrush, mucositis.  Moist mucous membranes	  Neck:no JVD, no lymphadenopathy, supple  Respiratory: CTA mayi  Cardiovascular: S1S2 RRR, no murmurs  Gastrointestinal:soft, nontender,  nondistended (+) BS  Extremities:no e/e/c  Skin:  no rashes, open wounds or ulcerations        LABS:                        11.1   10.70 )-----------( 394      ( 13 May 2021 06:30 )             34.1                 Procalcitonin, Serum: 0.13 (05-06 @ 22:29)            Rapid RVP Result: [facility]          MICROBIOLOGY:    Culture - Urine (05.12.21 @ 04:36)   Specimen Source: .Urine Clean Catch (Midstream)   Culture Results:   >=3 organisms. Probable collection contamination.           RADIOLOGY & ADDITIONAL STUDIES:    < from: Xray Cinesophagram Swallow Function w/ Contrast (05.11.21 @ 10:01) >  IMPRESSION: No penetration was seen.  No aspiration was seen. A full report will be issued by the department of speech and hearing.    < end of copied text >

## 2021-05-13 NOTE — DISCHARGE NOTE PROVIDER - CARE PROVIDER_API CALL
Radames Valle)  Orthopaedic Surgery  [address] [place], [facility] [address]  [place] [address]  Phone: [phone]  Fax: [phone]  Follow Up Time: 1 week    Mario Millan)  Cardiology; Cardiovascular Disease; Internal Medicine  [address] [place], [facility] [address]  [place] [address]  Phone: [phone]  Fax: [phone]  Follow Up Time: 2 weeks

## 2021-05-13 NOTE — PROGRESS NOTE ADULT - ASSESSMENT
79 yo F with unk pmhx presents with EMS - found on floor sitting in urine. Pt states she was getting out of the bed and fell on the ground. Does not recall how long she was on the ground. Complains of back pain.   History obtained from friend, Gary Baeza - [phone]; [phone]: Saw her on Saturday evening for family dinner. Usually calls every day. Did not return calls since Monday (4days ago). Went to check in on today. Has been forgetful over past some time. MRI and some other tests. Was at [facility] 3 weeks ago for stiff neck - sent home. Goes to a lot of doctors - not sure which ones. Has been following with orthopedics for hip problems. Has not talked to her children for many years. Does not drink.   Dr. Millan - cardiologist, but says she has no cardiac probelms. alert to name and place     ER vitals:  Tmax 99.3, P 100, /88.  WBC 12.8.  PCT 0.13.  Lact 3.1.  UA (-).  RVP (-).  Covid pcr (-).  Ucx (-).  CT chest no pna, partial atelectasis of b/l lower lobes, RML mucoid impaction.  CTA chest limited evaluation for segmental and subsegmental pulmonary embolism. No main, left right, or lobar pulmonary embolism.  LE doppler (-) DVT.  CT pelvis with acute fractures of the right superior and inferior pubic rami and of the right sacral ala.    SIRS:  -resolved  -s/p Low grade temp, borderline tachycardia (P 100), elevated WBC and lactate have resolved, infectious etiology has been ruled out.   - all Cx NTD, pan CT neg for infectious focus.  - Pt with stage 2 sacral wound, seen by Wound care.  Cont offloading and topical therapy, no acute infection/cellulitis.  - Cont to monitor WBC and temp curve.   - CTA chest and LE doppler (-) for PE/DVT.  - CT pelvis with sacral fracture.  Ortho eval, cont pain control. DC to [facility]  Urinary Retention  - Ingram DCed, PVR greater than 300 cc, was straight cathed, developed hematuria, with leukocytosis, Cx sent, on CTX for UTI    Hypernatremia  - resolved  - now  euvolemic    Pelvic and Sacral Fractures  - ortho input appreciated, PT ordered. ptn states she doesnt want to go to [facility]. will d/w     Hypokalemia  - due to total body depletion, now resolved, on daily supplements    Hematuria  gross hematuria, possibly 2/2 straight cath prior. will watch for urine output if decreased will do a bladder scan and place ingram if necessary, if still gross hematuria, call  for consult. ptn is asymptomatic        DC planning. ptn is calm, awake, alert, nutrition consult and calorie count ordered, getting IVF 2/2 poor po intake, confused at baseline. social work trying to find her children for consent for transfer to [facility]. by default may have to be the decision of her niece/nephew who are working on establishing HCP documents     dc sc Lovenox 2/2 gross hematuria

## 2021-05-13 NOTE — PROGRESS NOTE ADULT - ASSESSMENT
77 yo F with unk pmhx presents with EMS - found on floor sitting in urine. Pt states she was getting out of the bed and fell on the ground. Does not recall how long she was on the ground. Complains of back pain.   History obtained from friend, Gary Baeza - [phone]; [phone]: Saw her on Saturday evening for family dinner. Usually calls every day. Did not return calls since Monday (4days ago). Went to check in on today. Has been forgetful over past some time. MRI and some other tests. Was at [facility] 3 weeks ago for stiff neck - sent home. Goes to a lot of doctors - not sure which ones. Has been following with orthopedics for hip problems. Has not talked to her children for many years. Does not drink.   Dr. Millan - cardiologist, but says she has no cardiac probelms. alert to name and place (06 May 2021 17:34)    ER vitals:  Tmax 99.3, P 100, /88.  WBC 12.8.  PCT 0.13.  Lact 3.1.  UA (-).  RVP (-).  Covid pcr (-).  Ucx (-).  CT chest no pna, partial atelectasis of b/l lower lobes, RML mucoid impaction.  CTA chest limited evaluation for segmental and subsegmental pulmonary embolism. No main, left right, or lobar pulmonary embolism.  LE doppler (-) DVT.  CT pelvis with acute fractures of the right superior and inferior pubic rami and of the right sacral ala.    SIRS:    - Low grade temp, borderline tachycardia (P 100), elevated WBC and lactate.  Thus far infectious w/u neg including Ucx, Covid/RVP testing.  CT chest no pna, shows probably RML mucoid impaction.    - Pt with stage 2 sacral wound, seen by Wound care.  Cont offloading and topical therapy, no acute infection/cellulitis.    - Cont to monitor WBC and temp curve.     - CTA chest and LE doppler (-) for PE/DVT.    - CT pelvis with sacral fracture.  Ortho eval, cont pain control    - Check orthostatics.    UTI:    - Pt with urinary retention, s/p straight cath, found to have hematuria, UA (+), WBC trending upwards.   Started on rocephin, WBC now improving.      - Ucx are contaminated.  Repeat ucx will be low yield post abx.  Cont rocephin, as WBC improving, no new fevers.  Complete total 7 day course.     - s/p Urology evaluation, check serial bladder scan, straight cath protocol as needed.         Indira Aquino  [phone]

## 2021-05-13 NOTE — DISCHARGE NOTE PROVIDER - PROVIDER TOKENS
PROVIDER:[TOKEN:[[id number]],FOLLOWUP:[1 week]],PROVIDER:[TOKEN:[[id number]],FOLLOWUP:[2 weeks]]

## 2021-05-13 NOTE — PROGRESS NOTE ADULT - SUBJECTIVE AND OBJECTIVE BOX
Subjective: Patient seen and examined. No new events except as noted.     REVIEW OF SYSTEMS:    CONSTITUTIONAL: +weakness, fevers or chills  EYES/ENT: No visual changes;  No vertigo or throat pain   NECK: No pain or stiffness  RESPIRATORY: No cough, wheezing, hemoptysis; No shortness of breath  CARDIOVASCULAR: No chest pain or palpitations  GASTROINTESTINAL: No abdominal or epigastric pain. No nausea, vomiting, or hematemesis; No diarrhea or constipation. No melena or hematochezia.  GENITOURINARY: No dysuria, frequency or hematuria  NEUROLOGICAL: No numbness or weakness  SKIN: No itching, burning, rashes, or lesions   All other review of systems is negative unless indicated above.    MEDICATIONS:  MEDICATIONS  (STANDING):  aspirin enteric coated 81 milliGRAM(s) Oral daily  atorvastatin 20 milliGRAM(s) Oral at bedtime  cefTRIAXone   IVPB      cefTRIAXone   IVPB 1000 milliGRAM(s) IV Intermittent every 24 hours  dextrose 5% + lactated ringers. 1000 milliLiter(s) (75 mL/Hr) IV Continuous <Continuous>  metoprolol tartrate 12.5 milliGRAM(s) Oral two times a day  pantoprazole  Injectable 40 milliGRAM(s) IV Push daily      PHYSICAL EXAM:  T(C): 36.8 (05-13-21 @ 08:58), Max: 36.8 (05-12-21 @ 12:18)  HR: 91 (05-13-21 @ 04:51) (88 - 106)  BP: 131/72 (05-13-21 @ 04:51) (109/65 - 131/72)  RR: 18 (05-13-21 @ 08:58) (17 - 18)  SpO2: 98% (05-13-21 @ 08:58) (96% - 98%)  Wt(kg): --  I&O's Summary    12 May 2021 07:01  -  13 May 2021 07:00  --------------------------------------------------------  IN: 1475 mL / OUT: 600 mL / NET: 875 mL          Appearance: NAD	  HEENT:   Dry oral mucosa, PERRL, EOMI	  Lymphatic: No lymphadenopathy , no edema  Cardiovascular: Normal S1 S2, No JVD, No murmurs , Peripheral pulses palpable 2+ bilaterally  Respiratory: Lungs clear to auscultation, normal effort 	  Gastrointestinal:  Soft, Non-tender, + BS	  Skin: No rashes, No ecchymoses, No cyanosis, warm to touch  Musculoskeletal: Normal range of motion, normal strength  Psychiatry:  Mood & affect appropriate  Ext: No edema      LABS:    CARDIAC MARKERS:                                11.1   10.70 )-----------( 394      ( 13 May 2021 06:30 )             34.1     05-11    138  |  103  |  17  ----------------------------<  138<H>  4.6   |  23  |  0.60    Ca    9.4      11 May 2021 13:35      proBNP:   Lipid Profile:   HgA1c:   TSH:          TELEMETRY: 	    ECG:  	  RADIOLOGY:   DIAGNOSTIC TESTING:  [ ] Echocardiogram:  [ ]  Catheterization:  [ ] Stress Test:    OTHER:

## 2021-05-13 NOTE — DISCHARGE NOTE PROVIDER - NSDCFUSCHEDAPPT_GEN_ALL_CORE_FT
DIAMOND LAGUNAS R ; 05/17/2021 ; [facility] [address] [facility]  DIAMOND LAGUNAS R ; 05/17/2021 ; [facility] [address] [facility]  DIAMOND LAGUNAS R ; 05/17/2021 ; [facility] [address] [facility]  DIAMOND LAGUNAS R ; 05/17/2021 ; [facility] [address] [facility] DIAMOND LAGUNAS ; 08/19/2021 ; [facility] [address]

## 2021-05-13 NOTE — DISCHARGE NOTE PROVIDER - NSDCMRMEDTOKEN_GEN_ALL_CORE_FT
metoprolol succinate 25 mg oral tablet, extended release: 1 tab(s) orally once a day  Vytorin 10 mg-40 mg oral tablet: 1 tab(s) orally once a day   ascorbic acid 500 mg oral tablet: 1 tab(s) orally once a day  aspirin 81 mg oral delayed release tablet: 1 tab(s) orally once a day  metoprolol: 12.5 milligram(s) orally 2 times a day  Multiple Vitamins with Minerals oral tablet: 1 tab(s) orally once a day  nystatin 100,000 units/g topical powder: 1 application topically 2 times a day, As needed, after each BM  QUEtiapine: 12.5 milligram(s) orally once a day (at bedtime)  Vytorin 10 mg-40 mg oral tablet: 1 tab(s) orally once a day

## 2021-05-13 NOTE — DISCHARGE NOTE PROVIDER - HOSPITAL COURSE
79 yo F with unk pmhx presents with EMS - found on floor sitting in urine. Pt states she was getting out of the bed and fell on the ground. Does not recall how long she was on the ground. Complains of back pain.   History obtained from friend, Gary Baeza - [phone]; [phone]: Saw her on Saturday evening for family dinner. Usually calls every day. Did not return calls since Monday (4days ago). Went to check in on today. Has been forgetful over past some time. MRI and some other tests. Was at [facility] 3 weeks ago for stiff neck - sent home. Goes to a lot of doctors - not sure which ones. Has been following with orthopedics for hip problems. Has not talked to her children for many years. Does not drink.   Dr. Millan - cardiologist, but says she has no cardiac probelms. alert to name and place     ER vitals:  Tmax 99.3, P 100, /88.  WBC 12.8.  PCT 0.13.  Lact 3.1.  UA (-).  RVP (-).  Covid pcr (-).  Ucx (-).  CT chest no pna, partial atelectasis of b/l lower lobes, RML mucoid impaction.  CTA chest limited evaluation for segmental and subsegmental pulmonary embolism. No main, left right, or lobar pulmonary embolism.  LE doppler (-) DVT.  CT pelvis with acute fractures of the right superior and inferior pubic rami and of the right sacral ala.    SIRS:  -resolved  -s/p Low grade temp, borderline tachycardia (P 100), elevated WBC and lactate have resolved, infectious etiology has been ruled out.   - all Cx NTD, pan CT neg for infectious focus.  - Pt with stage 2 sacral wound, seen by Wound care.  Cont offloading and topical therapy, no acute infection/cellulitis.  - Cont to monitor WBC and temp curve.   - CTA chest and LE doppler (-) for PE/DVT.  - CT pelvis with sacral fracture.  Ortho eval, cont pain control. DC to [facility]  Urinary Retention  - Ingram DCed, PVR greater than 300 cc, was straight cathed, developed hematuria, with leukocytosis, Cx sent, on CTX for UTI    Hypernatremia  - resolved  - now  euvolemic    Pelvic and Sacral Fractures  - ortho input appreciated, PT ordered. ptn states she doesnt want to go to [facility]. will d/w     Hypokalemia  - due to total body depletion, now resolved, on daily supplements    Hematuria  gross hematuria, possibly 2/2 straight cath prior. will watch for urine output if decreased will do a bladder scan and place ingram if necessary, if still gross hematuria, call  for consult. ptn is asymptomatic        DC planning  dc sc Lovenox 2/2 gross hematuria   77 yo F with unk pmhx presents with EMS - found on floor sitting in urine. Pt states she was getting out of the bed and fell on the ground. Does not recall how long she was on the ground. Complains of back pain.   Per family has been forgetful over past some time. Had MRI and some other tests. Was at [facility] 3 weeks ago for stiff neck - sent home. Dr. Millan - cardiologist, but says she has no cardiac probelms. alert to name and place     ER vitals:  Tmax 99.3, P 100, /88.  WBC 12.8.  PCT 0.13.  Lact 3.1.  UA (-).  RVP (-).  Covid pcr (-).  Ucx (-).   Admitted with SIRS: -s/p Low grade temp, borderline tachycardia (P 100), elevated WBC and lactate have resolved, infectious etiology has been ruled out.  all Cx NTD, pan CT neg for infectious focus.  Pt with stage 2 sacral wound, seen by Wound care.  Cont offloading and topical therapy, no acute infection/cellulitis.  CT chest no pna, partial atelectasis of b/l lower lobes, RML mucoid impaction.  CTA chest limited evaluation for segmental and subsegmental pulmonary embolism. No main, left right, or lobar pulmonary embolism.  LE doppler (-) DVT.  CT pelvis with acute fractures of the right superior and inferior pubic rami and of the right sacral ala. CTA chest and LE doppler (-) for PE/DVT.  CT pelvis with sacral fracture.  Ortho eval, cont pain control.   developed Urinary Retention.  PVR greater than 300 cc, was straight cathed, developed hematuria, with leukocytosis, Cx sent, on CTX for UTI. completed 5 days. Failed TOV. Mon remains. Hypernatremia  resolved.  now  euvolemic  Hypokalemia  due to total body depletion, now resolved, on daily supplements. SIRS resolved. PT recommends [facility]. Discharged to [facility]     79 yo F with unk pmhx presents with EMS - found on floor sitting in urine. Pt states she was getting out of the bed and fell on the ground. Does not recall how long she was on the ground. Complains of back pain.   Per family has been forgetful over past some time. Had MRI and some other tests. Was at [facility] 3 weeks ago for stiff neck - sent home. Dr. Millan - cardiologist, but says she has no cardiac probelms. alert to name and place     ER vitals:  Tmax 99.3, P 100, /88.  WBC 12.8.  PCT 0.13.  Lact 3.1.  UA (-).  RVP (-).  Covid pcr (-).  Ucx (-).   Admitted with Metabolic encephalopathy 2/2 delirium 2/2  SIRS: -s/p Low grade temp, borderline tachycardia (P 100), elevated WBC and lactate have resolved, infectious etiology has been ruled out.  all Cx NTD, pan CT neg for infectious focus.  Pt with stage 2 sacral wound, seen by Wound care.  Cont offloading and topical therapy, no acute infection/cellulitis.  CT chest no pna, partial atelectasis of b/l lower lobes, RML mucoid impaction.  CTA chest limited evaluation for segmental and subsegmental pulmonary embolism. No main, left right, or lobar pulmonary embolism.  LE doppler (-) DVT.  CT pelvis with acute fractures of the right superior and inferior pubic rami and of the right sacral ala. CTA chest and LE doppler (-) for PE/DVT.  CT pelvis with sacral fracture.  Ortho eval, cont pain control.   developed Urinary Retention.  PVR greater than 300 cc, was straight cathed, developed hematuria, with leukocytosis, Cx sent, on CTX for UTI. completed 5 days. Failed TOV. Mon remains. Hypernatremia  resolved.  now  euvolemic  Hypokalemia  due to total body depletion, now resolved, on daily supplements. SIRS resolved. PT recommends [facility]. Discharged to [facility]

## 2021-05-13 NOTE — DISCHARGE NOTE PROVIDER - NSDCFUADDINST_GEN_ALL_CORE_FT
Sacral Wound DTI- Continue with Medihoney  Right Elbow- Continue with Saulo  Right Heel Continue with Allevyn  con't offloading  con't Nuturition  con't Pericare

## 2021-05-13 NOTE — DISCHARGE NOTE PROVIDER - NSDCCPCAREPLAN_GEN_ALL_CORE_FT
PRINCIPAL DISCHARGE DIAGNOSIS  Diagnosis: Syncope  Assessment and Plan of Treatment: HOME CARE INSTRUCTIONS  Have someone stay with you until you feel stable.  Do not drive, operate machinery, or play sports until your caregiver says it is okay.  Keep all follow-up appointments as directed by your caregiver.   Lie down right away if you start feeling like you might faint. Breathe deeply and steadily. Wait until all the symptoms have passed.Drink enough fluids to keep your urine clear or pale yellow.  If you are taking blood pressure or heart medicine, get up slowly, taking several minutes to sit and then stand. This can reduce dizziness.  SEEK IMMEDIATE MEDICAL CARE IF:  You have a severe headache.  You have unusual pain in the chest, abdomen, or back.  You are bleeding from the mouth or rectum, or you have black or tarry stool.  You have an irregular or very fast heartbeat.  You have pain with breathing.  You have repeated fainting or seizure-like jerking during an episode.  You faint when sitting or lying down.  You have confusion.  You have difficulty walking.  You have severe weakness.  You have vision problems.  If you fainted, call your local emergency services (_____________________). Do not drive yourself to the hospital        SECONDARY DISCHARGE DIAGNOSES  Diagnosis: Systemic inflammatory response syndrome (SIRS)  Assessment and Plan of Treatment: SIRS resolved  Initially no evidnece of infection  later developed UTI; completed antibiotics    Diagnosis: AMS (altered mental status)  Assessment and Plan of Treatment: ams waxing and waning  now more likely related to dementia related delirium ; mri neg for acute infarct; likely hospitalization related sundowning due to underlying dementia   regulate sleep wake cycle   s/p thiamine supplementation    Diagnosis: Pubic ramus fracture, right, closed, initial encounter  Assessment and Plan of Treatment: WBAT BLLE  No acute ortho surgical intervention  Follow up with Dr. Valle as outpatient in 7-10 days, call office for appointment  Ortho stable      Diagnosis: Sacral fracture, closed  Assessment and Plan of Treatment: Follow up with orthopedic    Diagnosis: Ulcer of sacral region, stage 2  Assessment and Plan of Treatment: Sacral Wound DTI- Continue with Medihoney  Right Elbow- Continue with Allevyn  Right Heel Continue with Allevyn  con't offloading  con't Nuturition  con't Pericare  Con't per Medicine  F/u as outpatient in [facility] [address] [place], [place] [id number]      Diagnosis: Urinary retention  Assessment and Plan of Treatment: Continue with Mon catheter  Catheter placed 5/13/21  Follow up with urology for TOV

## 2021-05-14 LAB
HCT VFR BLD CALC: 33.8 % — LOW (ref 34.5–45)
HGB BLD-MCNC: 11.1 G/DL — LOW (ref 11.5–15.5)
MCHC RBC-ENTMCNC: 31 PG — SIGNIFICANT CHANGE UP (ref 27–34)
MCHC RBC-ENTMCNC: 32.8 GM/DL — SIGNIFICANT CHANGE UP (ref 32–36)
MCV RBC AUTO: 94.4 FL — SIGNIFICANT CHANGE UP (ref 80–100)
NRBC # BLD: 0 /100 WBCS — SIGNIFICANT CHANGE UP (ref 0–0)
PLATELET # BLD AUTO: 417 K/UL — HIGH (ref 150–400)
RBC # BLD: 3.58 M/UL — LOW (ref 3.8–5.2)
RBC # FLD: 14.2 % — SIGNIFICANT CHANGE UP (ref 10.3–14.5)
WBC # BLD: 8.53 K/UL — SIGNIFICANT CHANGE UP (ref 3.8–10.5)
WBC # FLD AUTO: 8.53 K/UL — SIGNIFICANT CHANGE UP (ref 3.8–10.5)

## 2021-05-14 PROCEDURE — 99222 1ST HOSP IP/OBS MODERATE 55: CPT

## 2021-05-14 RX ORDER — MULTIVIT-MIN/FERROUS GLUCONATE 9 MG/15 ML
1 LIQUID (ML) ORAL DAILY
Refills: 0 | Status: DISCONTINUED | OUTPATIENT
Start: 2021-05-14 | End: 2021-05-18

## 2021-05-14 RX ORDER — QUETIAPINE FUMARATE 200 MG/1
12.5 TABLET, FILM COATED ORAL AT BEDTIME
Refills: 0 | Status: DISCONTINUED | OUTPATIENT
Start: 2021-05-14 | End: 2021-05-18

## 2021-05-14 RX ORDER — ASCORBIC ACID 60 MG
500 TABLET,CHEWABLE ORAL DAILY
Refills: 0 | Status: DISCONTINUED | OUTPATIENT
Start: 2021-05-14 | End: 2021-05-18

## 2021-05-14 RX ORDER — HALOPERIDOL DECANOATE 100 MG/ML
0.5 INJECTION INTRAMUSCULAR EVERY 8 HOURS
Refills: 0 | Status: DISCONTINUED | OUTPATIENT
Start: 2021-05-14 | End: 2021-05-18

## 2021-05-14 RX ADMIN — CEFTRIAXONE 100 MILLIGRAM(S): 500 INJECTION, POWDER, FOR SOLUTION INTRAMUSCULAR; INTRAVENOUS at 00:08

## 2021-05-14 RX ADMIN — ATORVASTATIN CALCIUM 20 MILLIGRAM(S): 80 TABLET, FILM COATED ORAL at 20:51

## 2021-05-14 RX ADMIN — PANTOPRAZOLE SODIUM 40 MILLIGRAM(S): 20 TABLET, DELAYED RELEASE ORAL at 11:50

## 2021-05-14 RX ADMIN — SODIUM CHLORIDE 75 MILLILITER(S): 9 INJECTION, SOLUTION INTRAVENOUS at 02:00

## 2021-05-14 RX ADMIN — Medication 12.5 MILLIGRAM(S): at 17:29

## 2021-05-14 RX ADMIN — Medication 650 MILLIGRAM(S): at 20:52

## 2021-05-14 RX ADMIN — Medication 650 MILLIGRAM(S): at 21:25

## 2021-05-14 RX ADMIN — QUETIAPINE FUMARATE 12.5 MILLIGRAM(S): 200 TABLET, FILM COATED ORAL at 20:51

## 2021-05-14 RX ADMIN — Medication 81 MILLIGRAM(S): at 11:50

## 2021-05-14 RX ADMIN — Medication 12.5 MILLIGRAM(S): at 06:11

## 2021-05-14 NOTE — PROGRESS NOTE ADULT - SUBJECTIVE AND OBJECTIVE BOX
Infectious Diseases progress note:    Subjective:  Pt seen earlier today.  Awake, alert, pleasantly confused.  s/p Ingram placement.  No new fevers, WBC normalized.     ROS:  CONSTITUTIONAL:  No fever, chills, rigors  CARDIOVASCULAR:  No chest pain or palpitations  RESPIRATORY:   No SOB, cough, dyspnea on exertion.  No wheezing  GASTROINTESTINAL:  No abd pain, N/V, diarrhea/constipation  EXTREMITIES:  No swelling or joint pain  GENITOURINARY:  No burning on urination, increased frequency or urgency.  No flank pain  NEUROLOGIC:  No HA, visual disturbances  SKIN: No rashes    Allergies    No Known Allergies    Intolerances        ANTIBIOTICS/RELEVANT:  antimicrobials  cefTRIAXone   IVPB      cefTRIAXone   IVPB 1000 milliGRAM(s) IV Intermittent every 24 hours    immunologic:    OTHER:  ascorbic acid 500 milliGRAM(s) Oral daily  aspirin enteric coated 81 milliGRAM(s) Oral daily  atorvastatin 20 milliGRAM(s) Oral at bedtime  haloperidol    Injectable 0.5 milliGRAM(s) IntraMuscular every 8 hours PRN  metoprolol tartrate 12.5 milliGRAM(s) Oral two times a day  multivitamin/minerals 1 Tablet(s) Oral daily  nystatin Powder 1 Application(s) Topical two times a day PRN  pantoprazole  Injectable 40 milliGRAM(s) IV Push daily  QUEtiapine 12.5 milliGRAM(s) Oral at bedtime      Objective:  Vital Signs Last 24 Hrs  T(C): 36.6 (14 May 2021 20:02), Max: 36.8 (14 May 2021 04:38)  T(F): 97.9 (14 May 2021 20:02), Max: 98.2 (14 May 2021 04:38)  HR: 88 (14 May 2021 20:02) (88 - 112)  BP: 122/79 (14 May 2021 20:02) (120/66 - 129/74)  BP(mean): --  RR: 18 (14 May 2021 20:02) (18 - 18)  SpO2: 97% (14 May 2021 20:02) (97% - 98%)    PHYSICAL EXAM:  Constitutional:NAD  Eyes:PETAR, EOMI  Ear/Nose/Throat: no thrush, mucositis.  Moist mucous membranes	  Neck:no JVD, no lymphadenopathy, supple  Respiratory: CTA mayi  Cardiovascular: S1S2 RRR, no murmurs  Gastrointestinal:soft, nontender,  nondistended (+) BS  Extremities:no e/e/c  Skin:  no rashes, open wounds or ulcerations  : ingram draining clear yellow urine        LABS:                        11.1   8.53  )-----------( 417      ( 14 May 2021 07:15 )             33.8                 Procalcitonin, Serum: 0.13 (05-06 @ 22:29)            Rapid RVP Result: [facility]          MICROBIOLOGY:    Culture - Urine (05.12.21 @ 04:36)   Specimen Source: .Urine Clean Catch (Midstream)   Culture Results:   >=3 organisms. Probable collection contamination.     RADIOLOGY & ADDITIONAL STUDIES:    < from: Xray Cinesophagram Swallow Function w/ Contrast (05.11.21 @ 10:01) >  IMPRESSION: No penetration was seen.  No aspiration was seen. A full report will be issued by the department of speech and hearing.    < end of copied text >
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
## 2021-05-14 NOTE — BH CONSULTATION LIAISON ASSESSMENT NOTE - HPI (INCLUDE ILLNESS QUALITY, SEVERITY, DURATION, TIMING, CONTEXT, MODIFYING FACTORS, ASSOCIATED SIGNS AND SYMPTOMS)
80F, lives alone in apartment in [place],  ~15y with 3 children, not currently employed with no alcohol or substance use, no known PPH, no known PMH (lung nodule per chart), BIBEMS for syncope, SIRS, UTI. Psychiatry consulted for delirium.  Patient seen and examined at bedside. On exam, patient AOx3 (wrong day of month), friendly, and cooperative with interview. Patients recent and remote memory impaired. Patient frequently had to pause and think about words. Per patient, she was completely independent in ADLs before her recent fall. Patient unable to copy intersecting polygons. Patient had impaired naming. Patient karina a clock with poor planning (15 numbers initially drawn, 13-15 were scribbled out). Reading and comprehension intact. Abstraction partially intact (train and bicycle both move, watch and ruler both tell time). MOCA 15/30. No disorganization, delusions, AVH, SI/HI/intent/plan.   Patient gave consent for collateral, but was unable to provide any numbers. Both numbers in the chart were attempted, but both were out of service. The pt is a 80 woman, lives alone in apartment in [place],  ~15y with 3 children, not currently employed with no alcohol or substance use, no known PPH, no known PMH (lung nodule per chart), BIBEMS for syncope, SIRS, UTI. Psychiatry consulted for delirium.  Patient seen and examined at bedside. On exam, patient AOx3 (wrong day of month), friendly, and cooperative with interview. Patients recent and remote memory impaired. Patient frequently had to pause and think about words. Per patient, she was completely independent in ADLs before her recent fall. Patient unable to copy intersecting polygons. Patient had impaired naming. Patient karina a clock with poor planning (15 numbers initially drawn, 13-15 were scribbled out). Reading and comprehension intact. Abstraction partially intact (train and bicycle both move, watch and ruler both tell time). MOCA 15/30. No disorganization, delusions, AVH, SI/HI/intent/plan.   Patient gave consent for collateral, but was unable to provide any numbers. Both numbers in the chart were attempted, but both were out of service.

## 2021-05-14 NOTE — SPEECH LANGUAGE PATHOLOGY EVALUATION - SLP GENERAL OBSERVATIONS
Pt awake in bed, grossly oriented x2 (name and hospital), able to communicate needs and follow simple directions for exam. + Cognitive linguistic impairments, reduced attention, + distractibility. Pt noted with episodes of anomia and phonemic paraphasias.

## 2021-05-14 NOTE — SPEECH LANGUAGE PATHOLOGY EVALUATION - COMMENTS
On admit pt AAOx3.   Pt admitted with metabolic encephalopathy, SIRS, NSTEMI, hypernatremia  +SIRS - Low grade temp, borderline tachycardia (P 100), elevated WBC and lactate.  Thus far infectious w/u neg including Ucx, Covid/RVP testing.  CT chest no pna, shows probably RML mucoid impaction. - Pt with stage 2 sacral wound. EP following for syncope, Cardiology following for NSTEMI. ID following, no indication for abx. Neuro following ams of unclear etiology; metabolic encephalopathy due to pain related delirium, hypernatremia, prerenal state; r/o infectious process; Ortho consulted for R LC1 fracture. No acute intervention.  MRI 5/08: Findings suggesting the presence of mild-to-moderate normal pressure hydrocephalus. Clock drawing task revealed conceptual deficits (reduced ability to access knowledge of attributes, features, meaning of clock, and misrepresentation of clock and time) and spatial/planning deficits with errors in number placement and gaps in spacing.

## 2021-05-14 NOTE — BH CONSULTATION LIAISON ASSESSMENT NOTE - CASE SUMMARY
80F, lives alone in apartment in [place],  ~16y with 3 children, not currently employed with no alcohol or substance use, no known PPH, no known PMH (lung nodule per chart), BIBEMS for syncope, SIRS, UTI. Psychiatry consulted for delirium.  Patient seen and examined at bedside. On exam, patient AOx3 (wrong day of month), friendly, and cooperative with interview. Patients recent and remote memory impaired. Patient frequently had to pause and think about words. Per patient, she was completely independent in ADLs before her recent fall. Patient unable to copy intersecting polygons. Patient had impaired naming. Patient karina a clock with poor planning (15 numbers initially drawn, 13-15 were scribbled out). Reading and comprehension intact. Abstraction partially intact (train and bicycle both move, watch and ruler both tell time). MOCA 15/30. No disorganization, delusions, AVH, SI/HI/intent/plan.   Impression: worsening dementia agree with recommendations

## 2021-05-14 NOTE — BH CONSULTATION LIAISON ASSESSMENT NOTE - NSBHCONSULTPRIMARYDISCUSSYES_PSY_A_CORE FT
discussed assessment, recommendations, and concerns with mariluz discussed assessment, recommendations, and concerns with Sonya HARRELL

## 2021-05-14 NOTE — SPEECH LANGUAGE PATHOLOGY EVALUATION - SLP CONVERSATIONAL SPEECH
Conversational speech is marked by fluent verbal output with well formed sentences and normal utterance length. Pt noted with episodes of anomia and reduced recall specifically during divergent naming tasks. Phonemic paraphasias were also observed and pt appeared aware of errors in verbal expression. Auditory comprehension is good for everyday conversation however difficulty with complex syntax noted (suspect performance impacted by cognitive linguistic deficits including impaired attention). Repetition and naming were mostly preserved however one episode of anomia was noted during confrontational naming task. Cognitive linguistic impairments were marked by confusion, reduced attention to tasks presented, distractibility, and reduced safety insight/awareness. Errors in clock drawing task were noted including conceptual and spacial/planning deficits. Motor speech and vocal quality appeared grossly within functional limits.

## 2021-05-14 NOTE — SPEECH LANGUAGE PATHOLOGY EVALUATION - CONFRONTATIONAL NAMING
90% correct for high frequency items presented. One episode of anomia was observed however pt spontaneously self-corrected.

## 2021-05-14 NOTE — SPEECH LANGUAGE PATHOLOGY EVALUATION - 2-STEP
3/5 for increasingly complex two-step commands. Responses were reversed and incomplete. Performance improved to 5/5 with repetition of task prompts. Suspect reduced attention to tasks impacted performance.

## 2021-05-14 NOTE — BH CONSULTATION LIAISON ASSESSMENT NOTE - NSBHCONSULTRECOMMENDOTHER_PSY_A_CORE FT
Consider seroquel 12.5 PRN at bedtime for dementia.   Consider haldol 0.5mg PRN for agitation.  If medicating, recommend weekly EKG to monitor QTc. QTc < 500 per last EKG. Consider Seroquel 12.5 PRN at bedtime for dementia. Hold if QTC is greater than 500ms and if acceptable with family.   Consider Remeron 7.5mg po qhs for sleep if needed.  Consider haldol 0.5mg PRN for agitation.  If medicating, recommend weekly EKG to monitor QTc. QTc < 500 per last EKG.

## 2021-05-14 NOTE — BH CONSULTATION LIAISON ASSESSMENT NOTE - SUMMARY
80F, lives alone in apartment in [place],  ~15y with 3 children, not currently employed with no alcohol or substance use, no known PPH, no known PMH (lung nodule per chart), BIBEMS for syncope, SIRS, UTI. Psychiatry consulted for delirium.  Patient seen and examined at bedside. On exam, patient AOx3 (wrong day of month), friendly, and cooperative with interview. Patients recent and remote memory impaired. Patient frequently had to pause and think about words. Per patient, she was completely independent in ADLs before her recent fall. Patient unable to copy intersecting polygons. Patient had impaired naming. Patient karina a clock with poor planning (15 numbers initially drawn, 13-15 were scribbled out). Reading and comprehension intact. Abstraction partially intact (train and bicycle both move, watch and ruler both tell time). MOCA 15/30. No disorganization, delusions, AVH, SI/HI/intent/plan.   Patient gave consent for collateral, but was unable to provide any numbers. Both numbers in the chart were attempted, but both were out of service.  Patient's presentation is consistent with dementia.

## 2021-05-14 NOTE — PROGRESS NOTE ADULT - SUBJECTIVE AND OBJECTIVE BOX
Patient is a 80y old  Female who presents with a chief complaint of metabolic encephalopathy, SIRS, NSTEMI, hypernatremia, (14 May 2021 12:48)      SUBJECTIVE / OVERNIGHT EVENTS: no new c/o, awaiting transfer to [facility]    MEDICATIONS  (STANDING):  ascorbic acid 500 milliGRAM(s) Oral daily  aspirin enteric coated 81 milliGRAM(s) Oral daily  atorvastatin 20 milliGRAM(s) Oral at bedtime  cefTRIAXone   IVPB      cefTRIAXone   IVPB 1000 milliGRAM(s) IV Intermittent every 24 hours  metoprolol tartrate 12.5 milliGRAM(s) Oral two times a day  multivitamin/minerals 1 Tablet(s) Oral daily  pantoprazole  Injectable 40 milliGRAM(s) IV Push daily  QUEtiapine 12.5 milliGRAM(s) Oral at bedtime    MEDICATIONS  (PRN):  acetaminophen    Suspension .. 650 milliGRAM(s) Oral every 6 hours PRN Temp greater or equal to 38C (100.4F), Mild Pain (1 - 3)  haloperidol    Injectable 0.5 milliGRAM(s) IntraMuscular every 8 hours PRN Agitation  nystatin Powder 1 Application(s) Topical two times a day PRN after each BM      Vital Signs Last 24 Hrs  T(F): 98.1 (05-14-21 @ 13:09), Max: 98.3 (05-13-21 @ 21:15)  HR: 112 (05-14-21 @ 13:09) (83 - 112)  BP: 120/66 (05-14-21 @ 13:09) (101/63 - 129/74)  RR: 18 (05-14-21 @ 13:09) (18 - 18)  SpO2: 97% (05-14-21 @ 13:09) (95% - 98%)  Telemetry:   CAPILLARY BLOOD GLUCOSE        I&O's Summary    13 May 2021 07:01  -  14 May 2021 07:00  --------------------------------------------------------  IN: 2428 mL / OUT: 2450 mL / NET: -22 mL    14 May 2021 07:01  -  14 May 2021 20:04  --------------------------------------------------------  IN: 1510 mL / OUT: 500 mL / NET: 1010 mL        PHYSICAL EXAM:  GENERAL: NAD, well-developed  HEAD:  Atraumatic, Normocephalic  EYES: EOMI, PERRLA, conjunctiva and sclera clear  NECK: Supple, No JVD  CHEST/LUNG: Clear to auscultation bilaterally; No wheeze  HEART: Regular rate and rhythm; No murmurs, rubs, or gallops  ABDOMEN: Soft, Nontender, Nondistended; Bowel sounds present  EXTREMITIES:  2+ Peripheral Pulses, No clubbing, cyanosis, or edema  PSYCH: AAOx3  NEUROLOGY: non-focal  SKIN: No rashes or lesions    LABS:                        11.1   8.53  )-----------( 417      ( 14 May 2021 07:15 )             33.8                     RADIOLOGY & ADDITIONAL TESTS:    Imaging Personally Reviewed:    Consultant(s) Notes Reviewed:      Care Discussed with Consultants/Other Providers:

## 2021-05-14 NOTE — BH CONSULTATION LIAISON ASSESSMENT NOTE - CURRENT MEDICATION
MEDICATIONS  (STANDING):  ascorbic acid 500 milliGRAM(s) Oral daily  aspirin enteric coated 81 milliGRAM(s) Oral daily  atorvastatin 20 milliGRAM(s) Oral at bedtime  cefTRIAXone   IVPB      cefTRIAXone   IVPB 1000 milliGRAM(s) IV Intermittent every 24 hours  dextrose 5% + lactated ringers. 1000 milliLiter(s) (75 mL/Hr) IV Continuous <Continuous>  metoprolol tartrate 12.5 milliGRAM(s) Oral two times a day  multivitamin/minerals 1 Tablet(s) Oral daily  pantoprazole  Injectable 40 milliGRAM(s) IV Push daily    MEDICATIONS  (PRN):  acetaminophen    Suspension .. 650 milliGRAM(s) Oral every 6 hours PRN Temp greater or equal to 38C (100.4F), Mild Pain (1 - 3)  nystatin Powder 1 Application(s) Topical two times a day PRN after each BM

## 2021-05-14 NOTE — PROGRESS NOTE ADULT - ASSESSMENT
77 yo F with unk pmhx presents with EMS - found on floor sitting in urine. Pt states she was getting out of the bed and fell on the ground. Does not recall how long she was on the ground. Complains of back pain.   History obtained from friend, Gary Baeza - [phone]; [phone]: Saw her on Saturday evening for family dinner. Usually calls every day. Did not return calls since Monday (4days ago). Went to check in on today. Has been forgetful over past some time. MRI and some other tests. Was at [facility] 3 weeks ago for stiff neck - sent home. Goes to a lot of doctors - not sure which ones. Has been following with orthopedics for hip problems. Has not talked to her children for many years. Does not drink.   Dr. Millan - cardiologist, but says she has no cardiac probelms. alert to name and place (06 May 2021 17:34)    ER vitals:  Tmax 99.3, P 100, /88.  WBC 12.8.  PCT 0.13.  Lact 3.1.  UA (-).  RVP (-).  Covid pcr (-).  Ucx (-).  CT chest no pna, partial atelectasis of b/l lower lobes, RML mucoid impaction.  CTA chest limited evaluation for segmental and subsegmental pulmonary embolism. No main, left right, or lobar pulmonary embolism.  LE doppler (-) DVT.  CT pelvis with acute fractures of the right superior and inferior pubic rami and of the right sacral ala.    SIRS:    - Low grade temp, borderline tachycardia (P 100), elevated WBC and lactate.  Thus far infectious w/u neg including Ucx, Covid/RVP testing.  CT chest no pna, shows probably RML mucoid impaction.    - Pt with stage 2 sacral wound, seen by Wound care.  Cont offloading and topical therapy, no acute infection/cellulitis.    - Cont to monitor WBC and temp curve.     - CTA chest and LE doppler (-) for PE/DVT.    - CT pelvis with sacral fracture.  Ortho eval, cont pain control    - Check orthostatics.    UTI:    - Pt with urinary retention, s/p straight cath, found to have hematuria, UA (+), WBC trending upwards.   Started on rocephin, WBC now improving.      - Ucx are contaminated.  Repeat ucx will be low yield post abx.  Cont rocephin, as WBC improving, no new fevers.  Complete total 7 day course through 5/18.    - s/p Urology evaluation, s/p bladder scan and straight cath protocol, now with Mon placed for continued retention.       [facility]/16.  [phone]

## 2021-05-14 NOTE — BH CONSULTATION LIAISON ASSESSMENT NOTE - DESCRIPTION
80F, lives alone in apartment in [place],  ~15y with 3 children, not currently employed with no alcohol or substance use.

## 2021-05-14 NOTE — PROGRESS NOTE ADULT - SUBJECTIVE AND OBJECTIVE BOX
Subjective: Patient seen and examined. No new events except as noted.     REVIEW OF SYSTEMS:    CONSTITUTIONAL: No weakness, fevers or chills  EYES/ENT: No visual changes;  No vertigo or throat pain   NECK: No pain or stiffness  RESPIRATORY: No cough, wheezing, hemoptysis; No shortness of breath  CARDIOVASCULAR: No chest pain or palpitations  GASTROINTESTINAL: No abdominal or epigastric pain. No nausea, vomiting, or hematemesis; No diarrhea or constipation. No melena or hematochezia.  GENITOURINARY: No dysuria, frequency or hematuria  NEUROLOGICAL: No numbness or weakness  SKIN: No itching, burning, rashes, or lesions   All other review of systems is negative unless indicated above.    MEDICATIONS:  MEDICATIONS  (STANDING):  aspirin enteric coated 81 milliGRAM(s) Oral daily  atorvastatin 20 milliGRAM(s) Oral at bedtime  cefTRIAXone   IVPB      cefTRIAXone   IVPB 1000 milliGRAM(s) IV Intermittent every 24 hours  dextrose 5% + lactated ringers. 1000 milliLiter(s) (75 mL/Hr) IV Continuous <Continuous>  metoprolol tartrate 12.5 milliGRAM(s) Oral two times a day  pantoprazole  Injectable 40 milliGRAM(s) IV Push daily      PHYSICAL EXAM:  T(C): 36.8 (05-14-21 @ 04:38), Max: 36.8 (05-13-21 @ 21:15)  HR: 88 (05-14-21 @ 04:38) (83 - 102)  BP: 129/74 (05-14-21 @ 04:38) (101/63 - 129/74)  RR: 18 (05-14-21 @ 04:38) (18 - 18)  SpO2: 98% (05-14-21 @ 04:38) (95% - 98%)  Wt(kg): --  I&O's Summary    13 May 2021 07:01  -  14 May 2021 07:00  --------------------------------------------------------  IN: 2428 mL / OUT: 2450 mL / NET: -22 mL    14 May 2021 07:01  -  14 May 2021 12:48  --------------------------------------------------------  IN: 440 mL / OUT: 0 mL / NET: 440 mL          Appearance: Normal	  HEENT:   Normal oral mucosa, PERRL, EOMI	  Lymphatic: No lymphadenopathy , no edema  Cardiovascular: Normal S1 S2, No JVD, No murmurs , Peripheral pulses palpable 2+ bilaterally  Respiratory: Lungs clear to auscultation, normal effort 	  Gastrointestinal:  Soft, Non-tender, + BS	  Skin: No rashes, No ecchymoses, No cyanosis, warm to touch  Musculoskeletal: Normal range of motion, normal strength  Psychiatry:  Mood & affect appropriate  Ext: No edema      LABS:    CARDIAC MARKERS:                                11.1   8.53  )-----------( 417      ( 14 May 2021 07:15 )             33.8           proBNP:   Lipid Profile:   HgA1c:   TSH:     Moderate          TELEMETRY: 	    ECG:  	  RADIOLOGY:   DIAGNOSTIC TESTING:  [ ] Echocardiogram:  [ ]  Catheterization:  [ ] Stress Test:    OTHER:

## 2021-05-14 NOTE — BH CONSULTATION LIAISON ASSESSMENT NOTE - NSBHCHARTREVIEWVS_PSY_A_CORE FT
Vital Signs Last 24 Hrs  T(C): 36.7 (14 May 2021 13:09), Max: 36.8 (13 May 2021 21:15)  T(F): 98.1 (14 May 2021 13:09), Max: 98.3 (13 May 2021 21:15)  HR: 112 (14 May 2021 13:09) (83 - 112)  BP: 120/66 (14 May 2021 13:09) (101/63 - 129/74)  BP(mean): --  RR: 18 (14 May 2021 13:09) (18 - 18)  SpO2: 97% (14 May 2021 13:09) (95% - 98%)

## 2021-05-14 NOTE — BH CONSULTATION LIAISON ASSESSMENT NOTE - NSBHCHARTREVIEWINVESTIGATE_PSY_A_CORE FT
< from: 12 Lead ECG (05.06.21 @ 14:51) >    Ventricular Rate 95 BPM    Atrial Rate 95 BPM    P-R Interval 150 ms    QRS Duration 76 ms    Q-T Interval 368 ms    QTC Calculation(Bazett) 462 ms    P Axis 65 degrees    R Axis -26 degrees    T Axis 78 degrees    Diagnosis Line NORMAL SINUS RHYTHM  ST depression, consider subendocardial injury  ABNORMAL ECG  NO PREVIOUS ECGS AVAILABLE  Confirmed by CHRISTINA Bradford Zlata ([id number]) on 5/7/2021 4:58:58 PM    < end of copied text >

## 2021-05-14 NOTE — PROGRESS NOTE ADULT - ASSESSMENT
77 yo F with unk pmhx presents with EMS - found on floor sitting in urine. Pt states she was getting out of the bed and fell on the ground. Does not recall how long she was on the ground. Complains of back pain.   History obtained from friend, Gary Baeza - [phone]; [phone]: Saw her on Saturday evening for family dinner. Usually calls every day. Did not return calls since Monday (4days ago). Went to check in on today. Has been forgetful over past some time. MRI and some other tests. Was at [facility] 3 weeks ago for stiff neck - sent home. Goes to a lot of doctors - not sure which ones. Has been following with orthopedics for hip problems. Has not talked to her children for many years. Does not drink.   Dr. Millan - cardiologist, but says she has no cardiac probelms. alert to name and place     ER vitals:  Tmax 99.3, P 100, /88.  WBC 12.8.  PCT 0.13.  Lact 3.1.  UA (-).  RVP (-).  Covid pcr (-).  Ucx (-).  CT chest no pna, partial atelectasis of b/l lower lobes, RML mucoid impaction.  CTA chest limited evaluation for segmental and subsegmental pulmonary embolism. No main, left right, or lobar pulmonary embolism.  LE doppler (-) DVT.  CT pelvis with acute fractures of the right superior and inferior pubic rami and of the right sacral ala.    SIRS:  -resolved  -s/p Low grade temp, borderline tachycardia (P 100), elevated WBC and lactate have resolved, infectious etiology has been ruled out.   - all Cx NTD, pan CT neg for infectious focus.  - Pt with stage 2 sacral wound, seen by Wound care.  Cont offloading and topical therapy, no acute infection/cellulitis.  - Cont to monitor WBC and temp curve.   - CTA chest and LE doppler (-) for PE/DVT.  - CT pelvis with sacral fracture.  Ortho eval, cont pain control. DC to [facility]  Urinary Retention  - Ingram DCed, PVR greater than 300 cc, was straight cathed, developed hematuria, with leukocytosis, Cx sent, on CTX for UTI    Hypernatremia  - resolved  - now  euvolemic    Pelvic and Sacral Fractures  - ortho input appreciated, PT ordered. ptn states she doesnt want to go to [facility]. will d/w     Hypokalemia  - due to total body depletion, now resolved, on daily supplements    Hematuria  gross hematuria, possibly 2/2 straight cath prior. will watch for urine output if decreased will do a bladder scan and place ingram if necessary, if still gross hematuria, call  for consult. ptn is asymptomatic        DC planning. ptn is calm, awake, alert, nutrition consult and calorie count ordered, getting IVF 2/2 poor po intake, confused at baseline. social work trying to find her children for consent for transfer to [facility]. by default may have to be the decision of her niece/nephew who are working on establishing HCP documents     dc sc Lovenox 2/2 gross hematuria

## 2021-05-14 NOTE — SPEECH LANGUAGE PATHOLOGY EVALUATION - SLP DIAGNOSIS
Pt admitted with metabolic encephalopathy, SIRS, NSTEMI, hypernatremia. Now presenting with cognitive linguistic impairments marked by reduced attention to tasks, episodes of anomia, phonemic paraphasias, confusion, distractibility, and poor safety insight.

## 2021-05-14 NOTE — BH CONSULTATION LIAISON ASSESSMENT NOTE - PREPARATORY ACTS:
I don't think this call was supposed to be sent to [facility]. Could you send to Dr Lizbeth Rodas office? None known

## 2021-05-14 NOTE — SPEECH LANGUAGE PATHOLOGY EVALUATION - SLP PERTINENT HISTORY OF CURRENT PROBLEM
77 yo F with unk pmhx presents with EMS - found on floor sitting in urine. Pt states she was getting out of the bed and fell on the ground. Does not recall how long she was on the ground. Complains of back pain. History obtained from friend, Gary Baeza - [phone]; [phone]: Saw her on Saturday evening for family dinner. Usually calls every day. Did not return calls since Monday (4days ago). Went to check in on today. Has been forgetful over past some time. MRI and some other tests. Was at [facility] 3 weeks ago for stiff neck - sent home. Goes to a lot of doctors - not sure which ones. Has been following with orthopedics for hip problems. Has not talked to her children for many years. Does not drink.  Dr. Millan - cardiologist, but says she has no cardiac probelms. alert to name and place

## 2021-05-15 RX ADMIN — Medication 1 TABLET(S): at 12:28

## 2021-05-15 RX ADMIN — QUETIAPINE FUMARATE 12.5 MILLIGRAM(S): 200 TABLET, FILM COATED ORAL at 20:33

## 2021-05-15 RX ADMIN — Medication 81 MILLIGRAM(S): at 12:28

## 2021-05-15 RX ADMIN — ATORVASTATIN CALCIUM 20 MILLIGRAM(S): 80 TABLET, FILM COATED ORAL at 20:33

## 2021-05-15 RX ADMIN — Medication 500 MILLIGRAM(S): at 12:28

## 2021-05-15 RX ADMIN — Medication 12.5 MILLIGRAM(S): at 06:07

## 2021-05-15 RX ADMIN — CEFTRIAXONE 100 MILLIGRAM(S): 500 INJECTION, POWDER, FOR SOLUTION INTRAMUSCULAR; INTRAVENOUS at 00:21

## 2021-05-15 RX ADMIN — Medication 12.5 MILLIGRAM(S): at 18:05

## 2021-05-15 RX ADMIN — PANTOPRAZOLE SODIUM 40 MILLIGRAM(S): 20 TABLET, DELAYED RELEASE ORAL at 12:29

## 2021-05-15 NOTE — PROGRESS NOTE ADULT - ASSESSMENT
77 yo F with unk pmhx presents with EMS - found on floor sitting in urine. Pt states she was getting out of the bed and fell on the ground. Does not recall how long she was on the ground. Complains of back pain.   History obtained from friend, Gary Baeza - [phone]; [phone]: Saw her on Saturday evening for family dinner. Usually calls every day. Did not return calls since Monday (4days ago). Went to check in on today. Has been forgetful over past some time. MRI and some other tests. Was at [facility] 3 weeks ago for stiff neck - sent home. Goes to a lot of doctors - not sure which ones. Has been following with orthopedics for hip problems. Has not talked to her children for many years. Does not drink.   Dr. Millan - cardiologist, but says she has no cardiac probelms. alert to name and place     ER vitals:  Tmax 99.3, P 100, /88.  WBC 12.8.  PCT 0.13.  Lact 3.1.  UA (-).  RVP (-).  Covid pcr (-).  Ucx (-).  CT chest no pna, partial atelectasis of b/l lower lobes, RML mucoid impaction.  CTA chest limited evaluation for segmental and subsegmental pulmonary embolism. No main, left right, or lobar pulmonary embolism.  LE doppler (-) DVT.  CT pelvis with acute fractures of the right superior and inferior pubic rami and of the right sacral ala.    SIRS:  -resolved  -s/p Low grade temp, borderline tachycardia (P 100), elevated WBC and lactate have resolved, infectious etiology has been ruled out.   - all Cx NTD, pan CT neg for infectious focus.  - Pt with stage 2 sacral wound, seen by Wound care.  Cont offloading and topical therapy, no acute infection/cellulitis.  - Cont to monitor WBC and temp curve.   - CTA chest and LE doppler (-) for PE/DVT.  - CT pelvis with sacral fracture.  Ortho eval, cont pain control. DC to [facility]  Urinary Retention  - Mon DCed, PVR greater than 300 cc, was straight cathed, developed hematuria, with leukocytosis, Cx sent, on CTX for UTI through 5/18    Hypernatremia  - resolved  - now  euvolemic    Pelvic and Sacral Fractures  - ortho input appreciated, PT ordered. ptn states she doesnt want to go to [facility]. will d/w . HCP is her nephew and consents to [facility]    Hypokalemia  - due to total body depletion, now resolved, on daily supplements    Hematuria  resolved.  s/p gross hematuria 2/2 UTI    DC planning tp [facility]. ptn is calm, awake, alert, nutrition consult and calorie count ordered, getting IVF 2/2 poor po intake, confused at baseline.   dc sc Lovenox 2/2 gross hematuria

## 2021-05-15 NOTE — PROGRESS NOTE ADULT - SUBJECTIVE AND OBJECTIVE BOX
Patient is a 80y old  Female who presents with a chief complaint of metabolic encephalopathy, SIRS, NSTEMI, hypernatremia, (15 May 2021 21:53)      SUBJECTIVE / OVERNIGHT EVENTS: pleasant , confused    MEDICATIONS  (STANDING):  ascorbic acid 500 milliGRAM(s) Oral daily  aspirin enteric coated 81 milliGRAM(s) Oral daily  atorvastatin 20 milliGRAM(s) Oral at bedtime  cefTRIAXone   IVPB      cefTRIAXone   IVPB 1000 milliGRAM(s) IV Intermittent every 24 hours  metoprolol tartrate 12.5 milliGRAM(s) Oral two times a day  multivitamin/minerals 1 Tablet(s) Oral daily  pantoprazole  Injectable 40 milliGRAM(s) IV Push daily  QUEtiapine 12.5 milliGRAM(s) Oral at bedtime    MEDICATIONS  (PRN):  haloperidol    Injectable 0.5 milliGRAM(s) IntraMuscular every 8 hours PRN Agitation  nystatin Powder 1 Application(s) Topical two times a day PRN after each BM      Vital Signs Last 24 Hrs  T(F): 98 (05-15-21 @ 20:10), Max: 98 (05-15-21 @ 05:35)  HR: 87 (05-15-21 @ 20:10) (87 - 101)  BP: 131/68 (05-15-21 @ 20:10) (114/67 - 131/68)  RR: 18 (05-15-21 @ 20:10) (18 - 18)  SpO2: 96% (05-15-21 @ 20:10) (96% - 97%)  Telemetry:   CAPILLARY BLOOD GLUCOSE        I&O's Summary    14 May 2021 07:01  -  15 May 2021 07:00  --------------------------------------------------------  IN: 1610 mL / OUT: 1300 mL / NET: 310 mL    15 May 2021 07:01  -  15 May 2021 22:47  --------------------------------------------------------  IN: 1040 mL / OUT: 500 mL / NET: 540 mL        PHYSICAL EXAM:  GENERAL: NAD, well-developed  HEAD:  Atraumatic, Normocephalic  EYES: EOMI, PERRLA, conjunctiva and sclera clear  NECK: Supple, No JVD  CHEST/LUNG: Clear to auscultation bilaterally; No wheeze  HEART: Regular rate and rhythm; No murmurs, rubs, or gallops  ABDOMEN: Soft, Nontender, Nondistended; Bowel sounds present  EXTREMITIES:  2+ Peripheral Pulses, No clubbing, cyanosis, or edema  PSYCH: AAOx3  NEUROLOGY: non-focal  SKIN: No rashes or lesions    LABS:                        11.1   8.53  )-----------( 417      ( 14 May 2021 07:15 )             33.8                     RADIOLOGY & ADDITIONAL TESTS:    Imaging Personally Reviewed:    Consultant(s) Notes Reviewed:      Care Discussed with Consultants/Other Providers:

## 2021-05-15 NOTE — PROGRESS NOTE ADULT - SUBJECTIVE AND OBJECTIVE BOX
Subjective: Patient seen and examined. No new events except as noted.     REVIEW OF SYSTEMS:    CONSTITUTIONAL: No weakness, fevers or chills  EYES/ENT: No visual changes;  No vertigo or throat pain   NECK: No pain or stiffness  RESPIRATORY: No cough, wheezing, hemoptysis; No shortness of breath  CARDIOVASCULAR: No chest pain or palpitations  GASTROINTESTINAL: No abdominal or epigastric pain. No nausea, vomiting, or hematemesis; No diarrhea or constipation. No melena or hematochezia.  GENITOURINARY: No dysuria, frequency or hematuria  NEUROLOGICAL: No numbness or weakness  SKIN: No itching, burning, rashes, or lesions   All other review of systems is negative unless indicated above.    MEDICATIONS:  MEDICATIONS  (STANDING):  ascorbic acid 500 milliGRAM(s) Oral daily  aspirin enteric coated 81 milliGRAM(s) Oral daily  atorvastatin 20 milliGRAM(s) Oral at bedtime  cefTRIAXone   IVPB      cefTRIAXone   IVPB 1000 milliGRAM(s) IV Intermittent every 24 hours  metoprolol tartrate 12.5 milliGRAM(s) Oral two times a day  multivitamin/minerals 1 Tablet(s) Oral daily  pantoprazole  Injectable 40 milliGRAM(s) IV Push daily  QUEtiapine 12.5 milliGRAM(s) Oral at bedtime      PHYSICAL EXAM:  T(C): 36.7 (05-15-21 @ 20:10), Max: 36.7 (05-15-21 @ 05:35)  HR: 87 (05-15-21 @ 20:10) (87 - 101)  BP: 131/68 (05-15-21 @ 20:10) (114/67 - 131/68)  RR: 18 (05-15-21 @ 20:10) (18 - 18)  SpO2: 96% (05-15-21 @ 20:10) (96% - 97%)  Wt(kg): --  I&O's Summary    14 May 2021 07:01  -  15 May 2021 07:00  --------------------------------------------------------  IN: 1610 mL / OUT: 1300 mL / NET: 310 mL    15 May 2021 07:01  -  15 May 2021 21:53  --------------------------------------------------------  IN: 1040 mL / OUT: 250 mL / NET: 790 mL          Appearance: Normal	  HEENT:   Normal oral mucosa, PERRL, EOMI	  Lymphatic: No lymphadenopathy , no edema  Cardiovascular: Normal S1 S2, No JVD, No murmurs , Peripheral pulses palpable 2+ bilaterally  Respiratory: Lungs clear to auscultation, normal effort 	  Gastrointestinal:  Soft, Non-tender, + BS	  Skin: No rashes, No ecchymoses, No cyanosis, warm to touch  Musculoskeletal: Normal range of motion, normal strength  Psychiatry:  Mood & affect appropriate  Ext: No edema      LABS:    CARDIAC MARKERS:                                11.1   8.53  )-----------( 417      ( 14 May 2021 07:15 )             33.8           proBNP:   Lipid Profile:   HgA1c:   TSH:             TELEMETRY: 	    ECG:  	  RADIOLOGY:   DIAGNOSTIC TESTING:  [ ] Echocardiogram:  [ ]  Catheterization:  [ ] Stress Test:    OTHER:

## 2021-05-16 LAB
ANION GAP SERPL CALC-SCNC: 12 MMOL/L — SIGNIFICANT CHANGE UP (ref 5–17)
BUN SERPL-MCNC: 15 MG/DL — SIGNIFICANT CHANGE UP (ref 7–23)
CALCIUM SERPL-MCNC: 9.6 MG/DL — SIGNIFICANT CHANGE UP (ref 8.4–10.5)
CHLORIDE SERPL-SCNC: 104 MMOL/L — SIGNIFICANT CHANGE UP (ref 96–108)
CO2 SERPL-SCNC: 26 MMOL/L — SIGNIFICANT CHANGE UP (ref 22–31)
CREAT SERPL-MCNC: 0.67 MG/DL — SIGNIFICANT CHANGE UP (ref 0.5–1.3)
GLUCOSE SERPL-MCNC: 103 MG/DL — HIGH (ref 70–99)
HCT VFR BLD CALC: 36.8 % — SIGNIFICANT CHANGE UP (ref 34.5–45)
HGB BLD-MCNC: 12 G/DL — SIGNIFICANT CHANGE UP (ref 11.5–15.5)
MCHC RBC-ENTMCNC: 31.4 PG — SIGNIFICANT CHANGE UP (ref 27–34)
MCHC RBC-ENTMCNC: 32.6 GM/DL — SIGNIFICANT CHANGE UP (ref 32–36)
MCV RBC AUTO: 96.3 FL — SIGNIFICANT CHANGE UP (ref 80–100)
NRBC # BLD: 0 /100 WBCS — SIGNIFICANT CHANGE UP (ref 0–0)
PLATELET # BLD AUTO: 481 K/UL — HIGH (ref 150–400)
POTASSIUM SERPL-MCNC: 4 MMOL/L — SIGNIFICANT CHANGE UP (ref 3.5–5.3)
POTASSIUM SERPL-SCNC: 4 MMOL/L — SIGNIFICANT CHANGE UP (ref 3.5–5.3)
RBC # BLD: 3.82 M/UL — SIGNIFICANT CHANGE UP (ref 3.8–5.2)
RBC # FLD: 14.2 % — SIGNIFICANT CHANGE UP (ref 10.3–14.5)
SODIUM SERPL-SCNC: 142 MMOL/L — SIGNIFICANT CHANGE UP (ref 135–145)
WBC # BLD: 9.5 K/UL — SIGNIFICANT CHANGE UP (ref 3.8–10.5)
WBC # FLD AUTO: 9.5 K/UL — SIGNIFICANT CHANGE UP (ref 3.8–10.5)

## 2021-05-16 RX ADMIN — Medication 12.5 MILLIGRAM(S): at 17:25

## 2021-05-16 RX ADMIN — QUETIAPINE FUMARATE 12.5 MILLIGRAM(S): 200 TABLET, FILM COATED ORAL at 19:36

## 2021-05-16 RX ADMIN — Medication 81 MILLIGRAM(S): at 13:25

## 2021-05-16 RX ADMIN — CEFTRIAXONE 100 MILLIGRAM(S): 500 INJECTION, POWDER, FOR SOLUTION INTRAMUSCULAR; INTRAVENOUS at 00:30

## 2021-05-16 RX ADMIN — Medication 1 TABLET(S): at 13:25

## 2021-05-16 RX ADMIN — Medication 12.5 MILLIGRAM(S): at 05:39

## 2021-05-16 RX ADMIN — ATORVASTATIN CALCIUM 20 MILLIGRAM(S): 80 TABLET, FILM COATED ORAL at 19:36

## 2021-05-16 RX ADMIN — CEFTRIAXONE 100 MILLIGRAM(S): 500 INJECTION, POWDER, FOR SOLUTION INTRAMUSCULAR; INTRAVENOUS at 23:26

## 2021-05-16 RX ADMIN — Medication 500 MILLIGRAM(S): at 14:27

## 2021-05-16 RX ADMIN — PANTOPRAZOLE SODIUM 40 MILLIGRAM(S): 20 TABLET, DELAYED RELEASE ORAL at 13:26

## 2021-05-16 NOTE — PROGRESS NOTE ADULT - PROBLEM SELECTOR PLAN 2
Pain control  Ortho consulted   DVT ppx   No DVT
Pain control  Ortho consult   DVT ppx   No DVT
Pain control  Ortho consult   DVT ppx   Check LE venous US.
Pain control  Ortho consult   DVT ppx   No DVT
Pain control  Ortho consulted   DVT ppx   No DVT
Pain control  Ortho consult   DVT ppx   No DVT

## 2021-05-16 NOTE — PROGRESS NOTE ADULT - PROBLEM SELECTOR PLAN 1
? syncope   Monitor on Tele   Check TTE   Check orthostatics   Infectious workup   Obtain any previous records.
? syncope   Monitor on Tele   Check orthostatics   Obtain any previous records.
? syncope   Monitor on Tele   Check TTE   Check orthostatics   Obtain any previous records.
? syncope   Monitor on Tele   Check TTE   Check orthostatics   Infectious workup   Obtain any previous records.
? syncope   Monitor on Tele   Check orthostatics   Obtain any previous records.
? syncope   Monitor on Tele   Check TTE   Check orthostatics   Infectious workup   Obtain any previous records.
? syncope   Monitor on Tele   Check orthostatics   Obtain any previous records.

## 2021-05-16 NOTE — PROGRESS NOTE ADULT - TIME BILLING
Advanced care planning was discussed with patient and family.  Advanced care planning forms were reviewed and discussed as appropriate.  Differential diagnosis and plan of care discussed with patient after the evaluation.   Pain assessed and judicious use of narcotics when appropriate was discussed.  Importance of Fall prevention discussed.  Counseling on Smoking and Alcohol cessation was offered when appropriate.  Counseling on Diet, exercise, and medication compliance was done.

## 2021-05-16 NOTE — PROGRESS NOTE ADULT - ASSESSMENT
77 yo F with unk pmhx presents with EMS - found on floor sitting in urine. Pt states she was getting out of the bed and fell on the ground. Does not recall how long she was on the ground. Complains of back pain.   History obtained from friend, Gary Baeza - [phone]; [phone]: Saw her on Saturday evening for family dinner. Usually calls every day. Did not return calls since Monday (4days ago). Went to check in on today. Has been forgetful over past some time. MRI and some other tests. Was at [facility] 3 weeks ago for stiff neck - sent home. Goes to a lot of doctors - not sure which ones. Has been following with orthopedics for hip problems. Has not talked to her children for many years. Does not drink.   Dr. Millan - cardiologist, but says she has no cardiac probelms. alert to name and place     ER vitals:  Tmax 99.3, P 100, /88.  WBC 12.8.  PCT 0.13.  Lact 3.1.  UA (-).  RVP (-).  Covid pcr (-).  Ucx (-).  CT chest no pna, partial atelectasis of b/l lower lobes, RML mucoid impaction.  CTA chest limited evaluation for segmental and subsegmental pulmonary embolism. No main, left right, or lobar pulmonary embolism.  LE doppler (-) DVT.  CT pelvis with acute fractures of the right superior and inferior pubic rami and of the right sacral ala.    SIRS:  -resolved  -s/p Low grade temp, borderline tachycardia (P 100), elevated WBC and lactate have resolved, infectious etiology has been ruled out.   - all Cx NTD, pan CT neg for infectious focus.  - Pt with stage 2 sacral wound, seen by Wound care.  Cont offloading and topical therapy, no acute infection/cellulitis.  - Cont to monitor WBC and temp curve.   - CTA chest and LE doppler (-) for PE/DVT.  - CT pelvis with sacral fracture.  Ortho eval, cont pain control. DC to [facility]  Urinary Retention  - Mon DCed, PVR greater than 300 cc, was straight cathed, developed hematuria, with leukocytosis, Cx sent, on CTX for UTI through 5/18    Hypernatremia  - resolved  - now  euvolemic    Pelvic and Sacral Fractures  - ortho input appreciated, PT ordered. ptn states she doesnt want to go to [facility]. will d/w . HCP is her nephew and consents to [facility]    Hypokalemia  - due to total body depletion, now resolved, on daily supplements    Hematuria  resolved.  s/p gross hematuria 2/2 UTI    DC planning tp [facility]. ptn is calm, awake, alert, nutrition consult and calorie count ordered, getting IVF 2/2 poor po intake, confused at baseline.   dc sc Lovenox 2/2 gross hematuria

## 2021-05-16 NOTE — PROGRESS NOTE ADULT - SUBJECTIVE AND OBJECTIVE BOX
Patient is a 80y old  Female who presents with a chief complaint of metabolic encephalopathy, SIRS, NSTEMI, hypernatremia, (16 May 2021 08:24)      SUBJECTIVE / OVERNIGHT EVENTS: no new c/o    MEDICATIONS  (STANDING):  ascorbic acid 500 milliGRAM(s) Oral daily  aspirin enteric coated 81 milliGRAM(s) Oral daily  atorvastatin 20 milliGRAM(s) Oral at bedtime  cefTRIAXone   IVPB      cefTRIAXone   IVPB 1000 milliGRAM(s) IV Intermittent every 24 hours  metoprolol tartrate 12.5 milliGRAM(s) Oral two times a day  multivitamin/minerals 1 Tablet(s) Oral daily  pantoprazole  Injectable 40 milliGRAM(s) IV Push daily  QUEtiapine 12.5 milliGRAM(s) Oral at bedtime    MEDICATIONS  (PRN):  haloperidol    Injectable 0.5 milliGRAM(s) IntraMuscular every 8 hours PRN Agitation  nystatin Powder 1 Application(s) Topical two times a day PRN after each BM      Vital Signs Last 24 Hrs  T(F): 97.9 (05-16-21 @ 14:37), Max: 98.4 (05-16-21 @ 04:40)  HR: 95 (05-16-21 @ 14:37) (87 - 95)  BP: 112/63 (05-16-21 @ 14:37) (112/63 - 137/82)  RR: 18 (05-16-21 @ 14:37) (18 - 18)  SpO2: 97% (05-16-21 @ 14:37) (94% - 97%)  Telemetry:   CAPILLARY BLOOD GLUCOSE        I&O's Summary    15 May 2021 07:01  -  16 May 2021 07:00  --------------------------------------------------------  IN: 1140 mL / OUT: 1150 mL / NET: -10 mL    16 May 2021 07:01  -  16 May 2021 18:29  --------------------------------------------------------  IN: 0 mL / OUT: 320 mL / NET: -320 mL        PHYSICAL EXAM:  GENERAL: NAD, well-developed  HEAD:  Atraumatic, Normocephalic  EYES: EOMI, PERRLA, conjunctiva and sclera clear  NECK: Supple, No JVD  CHEST/LUNG: Clear to auscultation bilaterally; No wheeze  HEART: Regular rate and rhythm; No murmurs, rubs, or gallops  ABDOMEN: Soft, Nontender, Nondistended; Bowel sounds present  EXTREMITIES:  2+ Peripheral Pulses, No clubbing, cyanosis, or edema  PSYCH: AAOx3  NEUROLOGY: non-focal  SKIN: No rashes or lesions    LABS:                        12.0   9.50  )-----------( 481      ( 16 May 2021 06:48 )             36.8     05-16    142  |  104  |  15  ----------------------------<  103<H>  4.0   |  26  |  0.67    Ca    9.6      16 May 2021 06:44                RADIOLOGY & ADDITIONAL TESTS:    Imaging Personally Reviewed:    Consultant(s) Notes Reviewed:      Care Discussed with Consultants/Other Providers:

## 2021-05-16 NOTE — PROGRESS NOTE ADULT - SUBJECTIVE AND OBJECTIVE BOX
Subjective: Patient seen and examined. No new events except as noted.     REVIEW OF SYSTEMS:    CONSTITUTIONAL: + weakness, fevers or chills  EYES/ENT: No visual changes;  No vertigo or throat pain   NECK: No pain or stiffness  RESPIRATORY: No cough, wheezing, hemoptysis; No shortness of breath  CARDIOVASCULAR: No chest pain or palpitations  GASTROINTESTINAL: No abdominal or epigastric pain. No nausea, vomiting, or hematemesis; No diarrhea or constipation. No melena or hematochezia.  GENITOURINARY: No dysuria, frequency or hematuria  NEUROLOGICAL: No numbness or weakness  SKIN: No itching, burning, rashes, or lesions   All other review of systems is negative unless indicated above.    MEDICATIONS:  MEDICATIONS  (STANDING):  ascorbic acid 500 milliGRAM(s) Oral daily  aspirin enteric coated 81 milliGRAM(s) Oral daily  atorvastatin 20 milliGRAM(s) Oral at bedtime  cefTRIAXone   IVPB      cefTRIAXone   IVPB 1000 milliGRAM(s) IV Intermittent every 24 hours  metoprolol tartrate 12.5 milliGRAM(s) Oral two times a day  multivitamin/minerals 1 Tablet(s) Oral daily  pantoprazole  Injectable 40 milliGRAM(s) IV Push daily  QUEtiapine 12.5 milliGRAM(s) Oral at bedtime      PHYSICAL EXAM:  T(C): 36.9 (05-16-21 @ 04:40), Max: 36.9 (05-16-21 @ 04:40)  HR: 94 (05-16-21 @ 04:40) (87 - 94)  BP: 137/82 (05-16-21 @ 04:40) (114/67 - 137/82)  RR: 18 (05-16-21 @ 04:40) (18 - 18)  SpO2: 94% (05-16-21 @ 04:40) (94% - 96%)  Wt(kg): --  I&O's Summary    15 May 2021 07:01  -  16 May 2021 07:00  --------------------------------------------------------  IN: 1140 mL / OUT: 1150 mL / NET: -10 mL          Appearance: NAD  HEENT:   Dry oral mucosa, PERRL, EOMI	  Lymphatic: No lymphadenopathy , no edema  Cardiovascular: Normal S1 S2, No JVD, No murmurs , Peripheral pulses palpable 2+ bilaterally  Respiratory: Lungs clear to auscultation, normal effort 	  Gastrointestinal:  Soft, Non-tender, + BS	  Skin: No rashes, No ecchymoses, No cyanosis, warm to touch  Musculoskeletal: Normal range of motion, normal strength  Psychiatry:  sleepy   Ext: No edema      LABS:    CARDIAC MARKERS:                                12.0   9.50  )-----------( 481      ( 16 May 2021 06:48 )             36.8     05-16    142  |  104  |  15  ----------------------------<  103<H>  4.0   |  26  |  0.67    Ca    9.6      16 May 2021 06:44      proBNP:   Lipid Profile:   HgA1c:   TSH:             TELEMETRY: 	    ECG:  	  RADIOLOGY:   DIAGNOSTIC TESTING:  [ ] Echocardiogram:  [ ]  Catheterization:  [ ] Stress Test:    OTHER:

## 2021-05-17 ENCOUNTER — APPOINTMENT (OUTPATIENT)
Dept: CARDIOLOGY | Facility: CLINIC | Age: 81
End: 2021-05-17

## 2021-05-17 LAB — SARS-COV-2 RNA SPEC QL NAA+PROBE: SIGNIFICANT CHANGE UP

## 2021-05-17 PROCEDURE — 99232 SBSQ HOSP IP/OBS MODERATE 35: CPT

## 2021-05-17 RX ADMIN — QUETIAPINE FUMARATE 12.5 MILLIGRAM(S): 200 TABLET, FILM COATED ORAL at 21:09

## 2021-05-17 RX ADMIN — ATORVASTATIN CALCIUM 20 MILLIGRAM(S): 80 TABLET, FILM COATED ORAL at 21:09

## 2021-05-17 RX ADMIN — PANTOPRAZOLE SODIUM 40 MILLIGRAM(S): 20 TABLET, DELAYED RELEASE ORAL at 12:26

## 2021-05-17 RX ADMIN — Medication 81 MILLIGRAM(S): at 12:26

## 2021-05-17 RX ADMIN — Medication 12.5 MILLIGRAM(S): at 17:31

## 2021-05-17 RX ADMIN — CEFTRIAXONE 100 MILLIGRAM(S): 500 INJECTION, POWDER, FOR SOLUTION INTRAMUSCULAR; INTRAVENOUS at 23:14

## 2021-05-17 RX ADMIN — Medication 500 MILLIGRAM(S): at 12:26

## 2021-05-17 RX ADMIN — Medication 12.5 MILLIGRAM(S): at 05:36

## 2021-05-17 RX ADMIN — Medication 1 TABLET(S): at 12:26

## 2021-05-17 NOTE — PROGRESS NOTE ADULT - SUBJECTIVE AND OBJECTIVE BOX
[facility]      Seen earlier today, and examined.  - Today, patient is without complaints.           *****MEDICATIONS: Current medication reviewed and documented.    MEDICATIONS  (STANDING):  ascorbic acid 500 milliGRAM(s) Oral daily  aspirin enteric coated 81 milliGRAM(s) Oral daily  atorvastatin 20 milliGRAM(s) Oral at bedtime  cefTRIAXone   IVPB 1000 milliGRAM(s) IV Intermittent every 24 hours  cefTRIAXone   IVPB      metoprolol tartrate 12.5 milliGRAM(s) Oral two times a day  multivitamin/minerals 1 Tablet(s) Oral daily  pantoprazole  Injectable 40 milliGRAM(s) IV Push daily  QUEtiapine 12.5 milliGRAM(s) Oral at bedtime    MEDICATIONS  (PRN):  haloperidol    Injectable 0.5 milliGRAM(s) IntraMuscular every 8 hours PRN Agitation  nystatin Powder 1 Application(s) Topical two times a day PRN after each BM          ***** VITAL SIGNS:  T(F): 98 (21 @ 11:45), Max: 98.2 (21 @ 20:57)  HR: 93 (21 @ 11:45) (93 - 100)  BP: 119/79 (21 @ 11:45) (115/65 - 126/74)  RR: 18 (21 @ 11:45) (18 - 18)  SpO2: 97% (21 @ 11:45) (97% - 98%)  Wt(kg): --  ,   I&O's Summary    17 May 2021 07:  -  18 May 2021 07:00  --------------------------------------------------------  IN: 830 mL / OUT: 1150 mL / NET: -320 mL    18 May 2021 07:  -  18 May 2021 12:43  --------------------------------------------------------  IN: 210 mL / OUT: 0 mL / NET: 210 mL             *****PHYSICAL EXAM:  :pleasant    alert oriented x 2 attention ok  comprehension ok   somewhat confused conversation      EOmi fundi not visualized    blinks to threat   Tongue is midline   Moving all 4 ext spontaneously no drift appreciated    Gait not assessed.        *****LAB AND IMAGIN.1   9.86  )-----------( 515      ( 18 May 2021 07:13 )             37.5               05-18    139  |  102  |  18  ----------------------------<  94  4.1   |  24  |  0.70    Ca    9.5      18 May 2021 07:11                           [All pertinent recent Imaging/Reports reviewed]           *****A S S E S S M E N T   A N D   P L A N :      77 yo F with unk pmhx presents with EMS - found on floor sitting in urine. Pt states she was getting out of the bed and fell on the ground. Does not recall how long she was on the ground. Complains of back pain.   History obtained from friend, Gary Baeza - [phone]; [phone]: Saw her on Saturday evening for family dinner. Usually calls every day. Did not return calls since Monday (4days ago). Went to check in on today. Has been forgetful over past some time. MRI and some other tests. Was at [facility] 3 weeks ago for stiff neck - sent home. Goes to a lot of doctors - not sure which ones. Has been following with orthopedics for hip problems. Has not talked to her children for many years. Does not drink.   Dr. Millan - cardiologist, but says she has no cardiac probelms. alert to name and place (06 May 2021 17:34)  limited history obtained   Problem/Recommendations 1:     ams waxing and waning  now more likely related to dementia related delirium   would require increased supervision if discharged home   mri neg for acute infarct    eeg  no sz, diffuse slowing   cpk ok      b12 supplemented    depakote 125 bid for agitation if needed    waxing and waning mental status   likely hospitalization related sundowning due to underlying dementia   regulate sleep wake cycle   thiamine 500 iv   encourage po intake    mental status improved pleasantly confused   eating better     Problem/Recommendations 2: pelvic fracture   ortho input appreciated no interventions        Problem/Recommendations 3 :prox LE weakness   likely deconditioning   pt reports that she is supposed to use walker but hasn't been   fall likely related to not using assistive device   She states that she slid off the bed   pt beatris appreciated recommending mata       Thank you for allowing me to participate in the care of this patient. Will continue to follow patient periodically. Please do not hesitate to call me if you have any  questions or if there has been a change in patients neurological status     ________________  Rhina Almaguer MD  [facility] ([facility]  [phone]      33 minutes spent on total encounter; more than 50 % of the visit was  spent counseling about plan of care, compliance to diet/exercise and medication regimen and or  coordinating care by the attending physician.      It is advised that stroke patients follow up with JULIO CESAR Johnston @ [phone] in 1- 2 weeks.   Others please follow up with Dr. Michael Nissenbaum [phone]

## 2021-05-17 NOTE — PROGRESS NOTE ADULT - ASSESSMENT
79 yo F with unk pmhx presents with EMS - found on floor sitting in urine. Pt states she was getting out of the bed and fell on the ground. Does not recall how long she was on the ground. Complains of back pain.   History obtained from friend, Gary Baeza - [phone]; [phone]: Saw her on Saturday evening for family dinner. Usually calls every day. Did not return calls since Monday (4days ago). Went to check in on today. Has been forgetful over past some time. MRI and some other tests. Was at [facility] 3 weeks ago for stiff neck - sent home. Goes to a lot of doctors - not sure which ones. Has been following with orthopedics for hip problems. Has not talked to her children for many years. Does not drink.   Dr. Millan - cardiologist, but says she has no cardiac probelms. alert to name and place     ER vitals:  Tmax 99.3, P 100, /88.  WBC 12.8.  PCT 0.13.  Lact 3.1.  UA (-).  RVP (-).  Covid pcr (-).  Ucx (-).  CT chest no pna, partial atelectasis of b/l lower lobes, RML mucoid impaction.  CTA chest limited evaluation for segmental and subsegmental pulmonary embolism. No main, left right, or lobar pulmonary embolism.  LE doppler (-) DVT.  CT pelvis with acute fractures of the right superior and inferior pubic rami and of the right sacral ala.    SIRS:  -resolved  -s/p Low grade temp, borderline tachycardia (P 100), elevated WBC and lactate have resolved, infectious etiology has been ruled out.   - all Cx NTD, pan CT neg for infectious focus.  - Pt with stage 2 sacral wound, seen by Wound care.  Cont offloading and topical therapy, no acute infection/cellulitis.  - Cont to monitor WBC and temp curve.   - CTA chest and LE doppler (-) for PE/DVT.  - CT pelvis with sacral fracture.  Ortho eval, cont pain control. DC to [facility]  Urinary Retention  - Mon DCed, PVR greater than 300 cc, was straight cathed, developed hematuria, with leukocytosis, Cx sent, on CTX for UTI through 5/18    Hypernatremia  - resolved  - now  euvolemic    Pelvic and Sacral Fractures  - ortho input appreciated, PT ordered. ptn states she doesnt want to go to [facility]. will d/w . HCP is her nephew and consents to [facility]    Hypokalemia  - due to total body depletion, now resolved, on daily supplements    Hematuria  resolved.  s/p gross hematuria 2/2 UTI    DC planning tp [facility]. ptn is calm, awake, alert, nutrition consult and calorie count ordered, getting IVF 2/2 poor po intake, confused at baseline.   dc sc Lovenox 2/2 gross hematuria
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
## 2021-05-17 NOTE — PROGRESS NOTE ADULT - SUBJECTIVE AND OBJECTIVE BOX
Patient is a 80y old  Female who presents with a chief complaint of metabolic encephalopathy, SIRS, NSTEMI, hypernatremia, (17 May 2021 11:36)      SUBJECTIVE / OVERNIGHT EVENTS: awaiting transfer to [facility], choices given by HCP    MEDICATIONS  (STANDING):  ascorbic acid 500 milliGRAM(s) Oral daily  aspirin enteric coated 81 milliGRAM(s) Oral daily  atorvastatin 20 milliGRAM(s) Oral at bedtime  cefTRIAXone   IVPB 1000 milliGRAM(s) IV Intermittent every 24 hours  cefTRIAXone   IVPB      metoprolol tartrate 12.5 milliGRAM(s) Oral two times a day  multivitamin/minerals 1 Tablet(s) Oral daily  pantoprazole  Injectable 40 milliGRAM(s) IV Push daily  QUEtiapine 12.5 milliGRAM(s) Oral at bedtime    MEDICATIONS  (PRN):  haloperidol    Injectable 0.5 milliGRAM(s) IntraMuscular every 8 hours PRN Agitation  nystatin Powder 1 Application(s) Topical two times a day PRN after each BM      Vital Signs Last 24 Hrs  T(F): 98.1 (05-17-21 @ 12:04), Max: 98.3 (05-17-21 @ 05:11)  HR: 96 (05-17-21 @ 17:25) (91 - 96)  BP: 115/65 (05-17-21 @ 17:25) (109/63 - 120/70)  RR: 18 (05-17-21 @ 12:04) (18 - 18)  SpO2: 97% (05-17-21 @ 12:04) (95% - 97%)  Telemetry:   CAPILLARY BLOOD GLUCOSE        I&O's Summary    16 May 2021 07:01  -  17 May 2021 07:00  --------------------------------------------------------  IN: 220 mL / OUT: 1420 mL / NET: -1200 mL    17 May 2021 07:01  -  17 May 2021 19:55  --------------------------------------------------------  IN: 660 mL / OUT: 0 mL / NET: 660 mL        PHYSICAL EXAM:  GENERAL: NAD, well-developed  HEAD:  Atraumatic, Normocephalic  EYES: EOMI, PERRLA, conjunctiva and sclera clear  NECK: Supple, No JVD  CHEST/LUNG: Clear to auscultation bilaterally; No wheeze  HEART: Regular rate and rhythm; No murmurs, rubs, or gallops  ABDOMEN: Soft, Nontender, Nondistended; Bowel sounds present  EXTREMITIES:  2+ Peripheral Pulses, No clubbing, cyanosis, or edema  PSYCH: AAOx3  NEUROLOGY: non-focal  SKIN: No rashes or lesions    LABS:                        12.0   9.50  )-----------( 481      ( 16 May 2021 06:48 )             36.8     05-16    142  |  104  |  15  ----------------------------<  103<H>  4.0   |  26  |  0.67    Ca    9.6      16 May 2021 06:44                RADIOLOGY & ADDITIONAL TESTS:    Imaging Personally Reviewed:    Consultant(s) Notes Reviewed:      Care Discussed with Consultants/Other Providers:

## 2021-05-17 NOTE — CHART NOTE - NSCHARTNOTEFT_GEN_A_CORE
Nutrition Follow Up Note  Patient seen for: calorie count/follow up on 3 DSU     Reason for Admission	metabolic encephalopathy, SIRS, NSTEMI, hypernatremia,      Chart reviewed, events noted.    IMM, fall/NSTEMI?, sacral fx, no intervention; hematuria, UTI iv abx, fam deciding about [facility].     Source: [] Patient       [x] EMR        [] RN        [] Family at bedside       [] Other:    -If unable to interview patient: [] Trach/Vent/BiPAP  [x] Disoriented/confused/inappropriate to interview    Diet Order:   Diet, Consistent Carbohydrate/No Snacks:   Supplement Feeding Modality:  Oral  Probiotic Yogurt/Smoothie Cans or Servings Per Day:  2       Frequency:  Daily (21)    - Is current order appropriate/adequate? [x] Yes  []  No:     - PO intake :   [] >75%  Adequate    [] 50-75%  Fair       [] <50%  Poor  Calorie Count:  Day 1: [id number] protein  Day2: 1008kcal/75grams protein  Day 3: 1674kcal/91grams protein    - Nutrition-related concerns:    GI:  Last BM _-feacl incontinence__.   Bowel Regimen? [] Yes   [x] No      Weights:   Daily Weight in k.8 (-17), Weight in k.4 (-13), Weight in k.7 (-12), Weight in k.8 (-11)    Nutritionally Pertinent MEDICATIONS  (STANDING):  ascorbic acid  atorvastatin  cefTRIAXone   IVPB  cefTRIAXone   IVPB  metoprolol tartrate  multivitamin/minerals  pantoprazole  Injectable    Pertinent Labs: : Glucose 103  A1C with Estimated Average Glucose Result: 6.1 % (21 @ 04:35)          Skin per nursing documentation: sacrum unstageable  Edema: none    Estimated Needs:   [x] no change since previous assessment  [] recalculated:     Previous Nutrition Diagnosis: Increased Nutrient Needs. (calories and protein)  Nutrition Diagnosis is: [x] ongoing  [] resolved [] not applicable     New Nutrition Diagnosis: [x] Not applicable    Nutrition Care Plan:  [] In Progress  [x] Achieved  [] Not applicable    Nutrition Interventions:     Education Provided:       [] Yes:  [x] No:        Recommendations:         [x] Continue current diet order            [] Add oral nutrition supplement:     [] Discontinue current diet order. Recommend:      [] Add micronutrient supplementation:      [x] Continue current micronutrient supplementation: vitamin C, multivitamin with minerals     [x] Other: continue diet mighty shakes x 2 daily    Monitoring and Evaluation:   Continue to monitor nutritional intake, tolerance to diet prescription, weights, labs, skin integrity      RD remains available upon request and will follow up per protocol  Jerri Esteban MA, RD, CDN #[phone]
Nutrition Follow Up Note  Patient seen for: consult for calorie count initiation, assessment x 2 in enhanced room on 3DSU    · Reason for Admission	metabolic encephalopathy, SIRS, NSTEMI, hypernatremia,      Chart reviewed, events noted.    IMM, fall/NSTEMI?, sacral fx, hematuria, UTI iv abx, [facility] family dynamics    Source: [] Patient       [x] EMR        [] RN        [] Family at bedside       [] Other:    -If unable to interview patient: [] Trach/Vent/BiPAP  [x] Disoriented/confused/inappropriate to interview    Diet Order:   Diet, Regular:   Supplement Feeding Modality:  Oral  Probiotic Yogurt/Smoothie Cans or Servings Per Day:  2       Frequency:  Daily (21)  mighty shake x 2 daily    - Is current order appropriate/adequate? [] Yes  [x]  No:     - PO intake :   [] >75%  Adequate    [] 50-75%  Fair       [x] <50%  Poor    - Nutrition-related concerns: pt A1c is in pre diabetes range     GI:  Last BM ___.   Bowel Regimen? [] Yes   [x] No      Weights:   Daily Weight in k.4 (-13), Weight in k.7 (-12), Weight in k.8 (-), Weight in k.8 (-10), Weight in k.8 ()    Nutritionally Pertinent MEDICATIONS  (STANDING):  atorvastatin  cefTRIAXone   IVPB  cefTRIAXone   IVPB  dextrose 5% + lactated ringers.  metoprolol tartrate  pantoprazole  Injectable    Pertinent Labs:   A1C with Estimated Average Glucose Result: 6.1 % (21 @ 04:35)          Skin per nursing documentation: unstageable sacrum  Edema: none    Estimated Needs:   [x] no change since previous assessment  [] recalculated:      Estimated Energy Needs:  · Weight (lbs)	149.9 lb  · Weight (kg)	68 kg  · Enter From (fran/kg)	22  · Enter To (fran/kg)	27  · Calculated From (fran/kg)	1496  · Calculated To (fran/kg)	1836     Estimated Protein Needs:  · Weight (lbs)	149.9 lb  · Weight (kg)	68 kg  · Enter From (g/kg)	1  · Enter To (g/kg)	1.2  · Calculated From (g/kg)	68  · Calculated To (g/kg)	81.6     Estimated Fluid Needs:  · Weight (lbs)	149.9 lb  · Weight (kg)	68 kg  · Enter From (ml/kg)	22  · Enter To (ml/kg)	27  · Calculated From (ml/kg)	1496  · Calculated To (ml/kg)	1836      · Other Calculations	estimated energy, protein & fluid needs based on dosing weight of 68 kg      Previous Nutrition Diagnosis:   Nutrition Diagnosis is: [x] ongoing  [] resolved [] not applicable     New Nutrition Diagnosis: [x] Not applicable    Nutrition Care Plan:  [] In Progress  [x] Achieved  [] Not applicable    Nutrition Interventions:     Education Provided:       [] Yes:  [x] No:        Recommendations:         [] Continue current diet order            [] Add oral nutrition supplement:     [x] Discontinue current diet order. Recommend: Consistent Carbohydrate with snack, Danactive x 2 daily, diet mighty shakes for lunch and dinner.     [x] Add micronutrient supplementation: multivitamin with minerals and vitamin C daily to prevent further skin breakdown.     [] Continue current micronutrient supplementation:      [] Other:     Monitoring and Evaluation:   Continue to monitor nutritional intake, tolerance to diet prescription, weights, labs, skin integrity      RD remains available upon request and will follow up per protocol  Jerri Esteban MA, YOCASTA, CDN #[phone]
Spoke to Pt's nephew Bill regarding COVID vaccination. Per nephew, pt was vaccinated for covid, but unsure which vaccine pt received.    Mar[place]  [phone]
-Results of U/A:  Urinalysis (05.11.21 @ 22:19)    pH Urine: 9.0    Glucose Qualitative, Urine: 100 mg/dL    Blood, Urine: Large    Color: Marti    Urine Appearance: Turbid    Bilirubin: Large    Ketone - Urine: Moderate    Specific Gravity: 1.034    Protein, Urine: >600 mg/dL    Urobilinogen: >8mg/dL    Nitrite: Positive    Leukocyte Esterase Concentration: Large    -Results discussed with Dr. Ortez, and concern for increasing WBC now up to 15.34. Pt remains afebrile  -Will send urine culture  -Start Ceftriaxone 1 Gram IVPB q 24 hrs.  -Continue monitoring urine output and bladder scan q 8 hrs.  -Urology recs. appreciated; will recall if hematuria worsens or clots noted
[facility]    Notified by RN patient with hematuria    Noted with bloody urine on pad   patient poor historian   Patient had Mon d/c 5/10 at 6am then patient was straight cath due to bladder scan >200 cc   patient void overnight   started with new hematuria today   Seen and examined   Urology called DR Osmin Ortez Evaluated patient   Will sign out to night ACP   Vital Signs Last 24 Hrs  T(C): 36.8 (11 May 2021 13:25), Max: 36.9 (10 May 2021 20:20)  T(F): 98.2 (11 May 2021 13:25), Max: 98.4 (10 May 2021 20:20)  HR: 100 (11 May 2021 17:41) (84 - 103)  BP: 129/66 (11 May 2021 17:41) (127/78 - 150/66)  BP(mean): --  RR: 16 (11 May 2021 13:25) (16 - 18)  SpO2: 96% (11 May 2021 13:25) (96% - 98%)  HPI:  79 yo F with unk pmhx presents with EMS - found on floor sitting in urine. Pt states she was getting out of the bed and fell on the ground. Does not recall how long she was on the ground. Complains of back pain.   History obtained from friend, Gary Baeza - [phone]; [phone]: Saw her on Saturday evening for family dinner. Usually calls every day. Did not return calls since Monday (4days ago). Went to check in on today. Has been forgetful over past some time. MRI and some other tests. Was at [facility] 3 weeks ago for stiff neck - sent home. Goes to a lot of doctors - not sure which ones. Has been following with orthopedics for hip problems. Has not talked to her children for many years. Does not drink.   Dr. Millan - cardiologist, but says she has no cardiac probelms. alert to name and place (06 May 2021 17:34)        Interventions taken                           Davida Webber NP-C

## 2021-05-18 ENCOUNTER — TRANSCRIPTION ENCOUNTER (OUTPATIENT)
Age: 81
End: 2021-05-18

## 2021-05-18 VITALS
HEART RATE: 93 BPM | OXYGEN SATURATION: 97 % | DIASTOLIC BLOOD PRESSURE: 79 MMHG | SYSTOLIC BLOOD PRESSURE: 119 MMHG | RESPIRATION RATE: 18 BRPM | TEMPERATURE: 98 F

## 2021-05-18 LAB
ANION GAP SERPL CALC-SCNC: 13 MMOL/L — SIGNIFICANT CHANGE UP (ref 5–17)
BUN SERPL-MCNC: 18 MG/DL — SIGNIFICANT CHANGE UP (ref 7–23)
CALCIUM SERPL-MCNC: 9.5 MG/DL — SIGNIFICANT CHANGE UP (ref 8.4–10.5)
CHLORIDE SERPL-SCNC: 102 MMOL/L — SIGNIFICANT CHANGE UP (ref 96–108)
CO2 SERPL-SCNC: 24 MMOL/L — SIGNIFICANT CHANGE UP (ref 22–31)
CREAT SERPL-MCNC: 0.7 MG/DL — SIGNIFICANT CHANGE UP (ref 0.5–1.3)
GLUCOSE SERPL-MCNC: 94 MG/DL — SIGNIFICANT CHANGE UP (ref 70–99)
HCT VFR BLD CALC: 37.5 % — SIGNIFICANT CHANGE UP (ref 34.5–45)
HGB BLD-MCNC: 12.1 G/DL — SIGNIFICANT CHANGE UP (ref 11.5–15.5)
MCHC RBC-ENTMCNC: 30.9 PG — SIGNIFICANT CHANGE UP (ref 27–34)
MCHC RBC-ENTMCNC: 32.3 GM/DL — SIGNIFICANT CHANGE UP (ref 32–36)
MCV RBC AUTO: 95.9 FL — SIGNIFICANT CHANGE UP (ref 80–100)
NRBC # BLD: 0 /100 WBCS — SIGNIFICANT CHANGE UP (ref 0–0)
PLATELET # BLD AUTO: 515 K/UL — HIGH (ref 150–400)
POTASSIUM SERPL-MCNC: 4.1 MMOL/L — SIGNIFICANT CHANGE UP (ref 3.5–5.3)
POTASSIUM SERPL-SCNC: 4.1 MMOL/L — SIGNIFICANT CHANGE UP (ref 3.5–5.3)
RBC # BLD: 3.91 M/UL — SIGNIFICANT CHANGE UP (ref 3.8–5.2)
RBC # FLD: 14.6 % — HIGH (ref 10.3–14.5)
SODIUM SERPL-SCNC: 139 MMOL/L — SIGNIFICANT CHANGE UP (ref 135–145)
WBC # BLD: 9.86 K/UL — SIGNIFICANT CHANGE UP (ref 3.8–10.5)
WBC # FLD AUTO: 9.86 K/UL — SIGNIFICANT CHANGE UP (ref 3.8–10.5)

## 2021-05-18 PROCEDURE — 84145 PROCALCITONIN (PCT): CPT

## 2021-05-18 PROCEDURE — 95816 EEG AWAKE AND DROWSY: CPT

## 2021-05-18 PROCEDURE — 85025 COMPLETE CBC W/AUTO DIFF WBC: CPT

## 2021-05-18 PROCEDURE — 93306 TTE W/DOPPLER COMPLETE: CPT

## 2021-05-18 PROCEDURE — 83735 ASSAY OF MAGNESIUM: CPT

## 2021-05-18 PROCEDURE — 70544 MR ANGIOGRAPHY HEAD W/O DYE: CPT

## 2021-05-18 PROCEDURE — 87086 URINE CULTURE/COLONY COUNT: CPT

## 2021-05-18 PROCEDURE — 76377 3D RENDER W/INTRP POSTPROCES: CPT

## 2021-05-18 PROCEDURE — 85027 COMPLETE CBC AUTOMATED: CPT

## 2021-05-18 PROCEDURE — 80048 BASIC METABOLIC PNL TOTAL CA: CPT

## 2021-05-18 PROCEDURE — 73502 X-RAY EXAM HIP UNI 2-3 VIEWS: CPT

## 2021-05-18 PROCEDURE — 93970 EXTREMITY STUDY: CPT

## 2021-05-18 PROCEDURE — 70450 CT HEAD/BRAIN W/O DYE: CPT

## 2021-05-18 PROCEDURE — 80053 COMPREHEN METABOLIC PANEL: CPT

## 2021-05-18 PROCEDURE — U0005: CPT

## 2021-05-18 PROCEDURE — 71045 X-RAY EXAM CHEST 1 VIEW: CPT

## 2021-05-18 PROCEDURE — 97110 THERAPEUTIC EXERCISES: CPT

## 2021-05-18 PROCEDURE — 92610 EVALUATE SWALLOWING FUNCTION: CPT

## 2021-05-18 PROCEDURE — 92523 SPEECH SOUND LANG COMPREHEN: CPT

## 2021-05-18 PROCEDURE — 96374 THER/PROPH/DIAG INJ IV PUSH: CPT

## 2021-05-18 PROCEDURE — 97161 PT EVAL LOW COMPLEX 20 MIN: CPT

## 2021-05-18 PROCEDURE — 72192 CT PELVIS W/O DYE: CPT

## 2021-05-18 PROCEDURE — 85018 HEMOGLOBIN: CPT

## 2021-05-18 PROCEDURE — 82550 ASSAY OF CK (CPK): CPT

## 2021-05-18 PROCEDURE — 83036 HEMOGLOBIN GLYCOSYLATED A1C: CPT

## 2021-05-18 PROCEDURE — 82607 VITAMIN B-12: CPT

## 2021-05-18 PROCEDURE — 0225U NFCT DS DNA&RNA 21 SARSCOV2: CPT

## 2021-05-18 PROCEDURE — 86901 BLOOD TYPING SEROLOGIC RH(D): CPT

## 2021-05-18 PROCEDURE — 72125 CT NECK SPINE W/O DYE: CPT

## 2021-05-18 PROCEDURE — 83605 ASSAY OF LACTIC ACID: CPT

## 2021-05-18 PROCEDURE — 97530 THERAPEUTIC ACTIVITIES: CPT

## 2021-05-18 PROCEDURE — 84484 ASSAY OF TROPONIN QUANT: CPT

## 2021-05-18 PROCEDURE — 85379 FIBRIN DEGRADATION QUANT: CPT

## 2021-05-18 PROCEDURE — 70551 MRI BRAIN STEM W/O DYE: CPT

## 2021-05-18 PROCEDURE — 96361 HYDRATE IV INFUSION ADD-ON: CPT

## 2021-05-18 PROCEDURE — U0003: CPT

## 2021-05-18 PROCEDURE — 86769 SARS-COV-2 COVID-19 ANTIBODY: CPT

## 2021-05-18 PROCEDURE — 82803 BLOOD GASES ANY COMBINATION: CPT

## 2021-05-18 PROCEDURE — 92611 MOTION FLUOROSCOPY/SWALLOW: CPT

## 2021-05-18 PROCEDURE — 82947 ASSAY GLUCOSE BLOOD QUANT: CPT

## 2021-05-18 PROCEDURE — 74230 X-RAY XM SWLNG FUNCJ C+: CPT

## 2021-05-18 PROCEDURE — 85730 THROMBOPLASTIN TIME PARTIAL: CPT

## 2021-05-18 PROCEDURE — 70547 MR ANGIOGRAPHY NECK W/O DYE: CPT

## 2021-05-18 PROCEDURE — 72190 X-RAY EXAM OF PELVIS: CPT

## 2021-05-18 PROCEDURE — 92526 ORAL FUNCTION THERAPY: CPT

## 2021-05-18 PROCEDURE — 84295 ASSAY OF SERUM SODIUM: CPT

## 2021-05-18 PROCEDURE — 80061 LIPID PANEL: CPT

## 2021-05-18 PROCEDURE — 86900 BLOOD TYPING SEROLOGIC ABO: CPT

## 2021-05-18 PROCEDURE — 86850 RBC ANTIBODY SCREEN: CPT

## 2021-05-18 PROCEDURE — 85610 PROTHROMBIN TIME: CPT

## 2021-05-18 PROCEDURE — 71275 CT ANGIOGRAPHY CHEST: CPT

## 2021-05-18 PROCEDURE — 72131 CT LUMBAR SPINE W/O DYE: CPT

## 2021-05-18 PROCEDURE — 82330 ASSAY OF CALCIUM: CPT

## 2021-05-18 PROCEDURE — 84132 ASSAY OF SERUM POTASSIUM: CPT

## 2021-05-18 PROCEDURE — 82962 GLUCOSE BLOOD TEST: CPT

## 2021-05-18 PROCEDURE — 71250 CT THORAX DX C-: CPT

## 2021-05-18 PROCEDURE — 84443 ASSAY THYROID STIM HORMONE: CPT

## 2021-05-18 PROCEDURE — 85014 HEMATOCRIT: CPT

## 2021-05-18 PROCEDURE — 82435 ASSAY OF BLOOD CHLORIDE: CPT

## 2021-05-18 PROCEDURE — 81001 URINALYSIS AUTO W/SCOPE: CPT

## 2021-05-18 PROCEDURE — 87040 BLOOD CULTURE FOR BACTERIA: CPT

## 2021-05-18 PROCEDURE — 99285 EMERGENCY DEPT VISIT HI MDM: CPT

## 2021-05-18 RX ORDER — ASCORBIC ACID 60 MG
1 TABLET,CHEWABLE ORAL
Qty: 0 | Refills: 0 | DISCHARGE
Start: 2021-05-18

## 2021-05-18 RX ORDER — MULTIVIT-MIN/FERROUS GLUCONATE 9 MG/15 ML
1 LIQUID (ML) ORAL
Qty: 0 | Refills: 0 | DISCHARGE
Start: 2021-05-18

## 2021-05-18 RX ORDER — METOPROLOL TARTRATE 50 MG
12.5 TABLET ORAL
Qty: 0 | Refills: 0 | DISCHARGE
Start: 2021-05-18

## 2021-05-18 RX ORDER — ASPIRIN/CALCIUM CARB/MAGNESIUM 324 MG
1 TABLET ORAL
Qty: 0 | Refills: 0 | DISCHARGE
Start: 2021-05-18

## 2021-05-18 RX ORDER — METOPROLOL TARTRATE 50 MG
1 TABLET ORAL
Qty: 0 | Refills: 0 | DISCHARGE

## 2021-05-18 RX ORDER — QUETIAPINE FUMARATE 200 MG/1
12.5 TABLET, FILM COATED ORAL
Qty: 0 | Refills: 0 | DISCHARGE
Start: 2021-05-18

## 2021-05-18 RX ORDER — NYSTATIN CREAM 100000 [USP'U]/G
1 CREAM TOPICAL
Qty: 0 | Refills: 0 | DISCHARGE
Start: 2021-05-18

## 2021-05-18 RX ADMIN — Medication 81 MILLIGRAM(S): at 11:45

## 2021-05-18 RX ADMIN — Medication 1 TABLET(S): at 11:45

## 2021-05-18 RX ADMIN — Medication 500 MILLIGRAM(S): at 11:45

## 2021-05-18 RX ADMIN — PANTOPRAZOLE SODIUM 40 MILLIGRAM(S): 20 TABLET, DELAYED RELEASE ORAL at 11:46

## 2021-05-18 RX ADMIN — Medication 12.5 MILLIGRAM(S): at 05:13

## 2021-05-18 NOTE — PROGRESS NOTE ADULT - PROBLEM SELECTOR PROBLEM 2
Sacral fracture, closed

## 2021-05-18 NOTE — PROGRESS NOTE ADULT - SUBJECTIVE AND OBJECTIVE BOX
Patient is a 80y old  Female who presents with a chief complaint of metabolic encephalopathy, SIRS, NSTEMI, hypernatremia, (18 May 2021 12:39)      SUBJECTIVE / OVERNIGHT EVENTS: no new developments awaiting DC to [facility]    MEDICATIONS  (STANDING):  ascorbic acid 500 milliGRAM(s) Oral daily  aspirin enteric coated 81 milliGRAM(s) Oral daily  atorvastatin 20 milliGRAM(s) Oral at bedtime  cefTRIAXone   IVPB 1000 milliGRAM(s) IV Intermittent every 24 hours  cefTRIAXone   IVPB      metoprolol tartrate 12.5 milliGRAM(s) Oral two times a day  multivitamin/minerals 1 Tablet(s) Oral daily  pantoprazole  Injectable 40 milliGRAM(s) IV Push daily  QUEtiapine 12.5 milliGRAM(s) Oral at bedtime    MEDICATIONS  (PRN):  haloperidol    Injectable 0.5 milliGRAM(s) IntraMuscular every 8 hours PRN Agitation  nystatin Powder 1 Application(s) Topical two times a day PRN after each BM      Vital Signs Last 24 Hrs  T(F): 98 (05-18-21 @ 11:45), Max: 98.2 (05-17-21 @ 20:57)  HR: 93 (05-18-21 @ 11:45) (93 - 100)  BP: 119/79 (05-18-21 @ 11:45) (115/65 - 126/74)  RR: 18 (05-18-21 @ 11:45) (18 - 18)  SpO2: 97% (05-18-21 @ 11:45) (97% - 98%)  Telemetry:   CAPILLARY BLOOD GLUCOSE        I&O's Summary    17 May 2021 07:01  -  18 May 2021 07:00  --------------------------------------------------------  IN: 830 mL / OUT: 1150 mL / NET: -320 mL    18 May 2021 07:01  -  18 May 2021 17:17  --------------------------------------------------------  IN: 410 mL / OUT: 150 mL / NET: 260 mL        PHYSICAL EXAM:  GENERAL: NAD, well-developed  HEAD:  Atraumatic, Normocephalic  EYES: EOMI, PERRLA, conjunctiva and sclera clear  NECK: Supple, No JVD  CHEST/LUNG: Clear to auscultation bilaterally; No wheeze  HEART: Regular rate and rhythm; No murmurs, rubs, or gallops  ABDOMEN: Soft, Nontender, Nondistended; Bowel sounds present  EXTREMITIES:  2+ Peripheral Pulses, No clubbing, cyanosis, or edema  PSYCH: AAOx3  NEUROLOGY: non-focal  SKIN: No rashes or lesions    LABS:                        12.1   9.86  )-----------( 515      ( 18 May 2021 07:13 )             37.5     05-18    139  |  102  |  18  ----------------------------<  94  4.1   |  24  |  0.70    Ca    9.5      18 May 2021 07:11                RADIOLOGY & ADDITIONAL TESTS:    Imaging Personally Reviewed:    Consultant(s) Notes Reviewed:      Care Discussed with Consultants/Other Providers:

## 2021-05-18 NOTE — PROGRESS NOTE ADULT - PROBLEM SELECTOR PROBLEM 1
AMS (altered mental status)

## 2021-05-18 NOTE — DISCHARGE NOTE NURSING/CASE MANAGEMENT/SOCIAL WORK - PATIENT PORTAL LINK FT
You can access the FollowMyHealth Patient Portal offered by [facility] by registering at the following website: [email or web address]. By joining [facility]’s FollowMyHealth portal, you will also be able to view your health information using other applications (apps) compatible with our system.

## 2021-05-18 NOTE — PROGRESS NOTE ADULT - SUBJECTIVE AND OBJECTIVE BOX
[facility]      Seen earlier today, and examined.  - Today, patient is without complaints.           *****MEDICATIONS: Current medication reviewed and documented.    MEDICATIONS  (STANDING):  ascorbic acid 500 milliGRAM(s) Oral daily  aspirin enteric coated 81 milliGRAM(s) Oral daily  atorvastatin 20 milliGRAM(s) Oral at bedtime  cefTRIAXone   IVPB 1000 milliGRAM(s) IV Intermittent every 24 hours  cefTRIAXone   IVPB      metoprolol tartrate 12.5 milliGRAM(s) Oral two times a day  multivitamin/minerals 1 Tablet(s) Oral daily  pantoprazole  Injectable 40 milliGRAM(s) IV Push daily  QUEtiapine 12.5 milliGRAM(s) Oral at bedtime    MEDICATIONS  (PRN):  haloperidol    Injectable 0.5 milliGRAM(s) IntraMuscular every 8 hours PRN Agitation  nystatin Powder 1 Application(s) Topical two times a day PRN after each BM          ***** VITAL SIGNS:  T(F): 98 (21 @ 11:45), Max: 98.2 (21 @ 20:57)  HR: 93 (21 @ 11:45) (93 - 100)  BP: 119/79 (21 @ 11:45) (115/65 - 126/74)  RR: 18 (21 @ 11:45) (18 - 18)  SpO2: 97% (21 @ 11:45) (97% - 98%)  Wt(kg): --  ,   I&O's Summary    17 May 2021 07:  -  18 May 2021 07:00  --------------------------------------------------------  IN: 830 mL / OUT: 1150 mL / NET: -320 mL    18 May 2021 07:  -  18 May 2021 12:39  --------------------------------------------------------  IN: 210 mL / OUT: 0 mL / NET: 210 mL             *****PHYSICAL EXAM:   :pleasant    alert oriented x 2 attention ok  comprehension ok   somewhat confused conversation      EOmi fundi not visualized    blinks to threat   Tongue is midline   Moving all 4 ext spontaneously no drift appreciated    Gait not assessed.              *****LAB AND IMAGIN.1   9.86  )-----------( 515      ( 18 May 2021 07:13 )             37.5               05-18    139  |  102  |  18  ----------------------------<  94  4.1   |  24  |  0.70    Ca    9.5      18 May 2021 07:11                           [All pertinent recent Imaging/Reports reviewed]           *****A S S E S S M E N T   A N D   P L A N :79 yo F with unk pmhx presents with EMS - found on floor sitting in urine. Pt states she was getting out of the bed and fell on the ground. Does not recall how long she was on the ground. Complains of back pain.   History obtained from friend, Gary Baeza - [phone]; [phone]: Saw her on Saturday evening for family dinner. Usually calls every day. Did not return calls since Monday (4days ago). Went to check in on today. Has been forgetful over past some time. MRI and some other tests. Was at [facility] 3 weeks ago for stiff neck - sent home. Goes to a lot of doctors - not sure which ones. Has been following with orthopedics for hip problems. Has not talked to her children for many years. Does not drink.   Dr. Millan - cardiologist, but says she has no cardiac probelms. alert to name and place (06 May 2021 17:34)  limited history obtained   Problem/Recommendations 1:     ams waxing and waning  now more likely related to dementia related delirium   would require increased supervision if discharged home   mri neg for acute infarct    eeg  no sz, diffuse slowing   cpk ok      b12 supplemented    depakote 125 bid for agitation if needed    waxing and waning mental status   likely hospitalization related sundowning due to underlying dementia   regulate sleep wake cycle   thiamine 500 iv   encourage po intake    mental status improved     Problem/Recommendations 2: pelvic fracture   ortho input appreciated no interventions        Problem/Recommendations 3 :prox LE weakness   likely deconditioning   pt reports that she is supposed to use walker but hasn't been   fall likely related to not using assistive device   She states that she slid off the bed   pt beatris appreciated recommending mata               Thank you for allowing me to participate in the care of this patient. Will continue to follow patient periodically. Please do not hesitate to call me if you have any  questions or if there has been a change in patients neurological status     ________________  Rhina Almaguer MD  [facility] ([facility]  [phone]      33 minutes spent on total encounter; more than 50 % of the visit was  spent counseling about plan of care, compliance to diet/exercise and medication regimen and or  coordinating care by the attending physician.      It is advised that stroke patients follow up with JULIO CESAR Johnston @ [phone] in 1- 2 weeks.   Others please follow up with Dr. Michael Nissenbaum [phone]

## 2021-05-18 NOTE — PROGRESS NOTE ADULT - NSICDXPILOT_GEN_ALL_CORE
[place]

## 2021-05-18 NOTE — PROGRESS NOTE ADULT - ASSESSMENT
77 yo F with unk pmhx presents with EMS - found on floor sitting in urine. Pt states she was getting out of the bed and fell on the ground. Does not recall how long she was on the ground. Complains of back pain.   History obtained from friend, Gary Baeza - [phone]; [phone]: Saw her on Saturday evening for family dinner. Usually calls every day. Did not return calls since Monday (4days ago). Went to check in on today. Has been forgetful over past some time. MRI and some other tests. Was at [facility] 3 weeks ago for stiff neck - sent home. Goes to a lot of doctors - not sure which ones. Has been following with orthopedics for hip problems. Has not talked to her children for many years. Does not drink.   Dr. Millan - cardiologist, but says she has no cardiac probelms. alert to name and place     ER vitals:  Tmax 99.3, P 100, /88.  WBC 12.8.  PCT 0.13.  Lact 3.1.  UA (-).  RVP (-).  Covid pcr (-).  Ucx (-).  CT chest no pna, partial atelectasis of b/l lower lobes, RML mucoid impaction.  CTA chest limited evaluation for segmental and subsegmental pulmonary embolism. No main, left right, or lobar pulmonary embolism.  LE doppler (-) DVT.  CT pelvis with acute fractures of the right superior and inferior pubic rami and of the right sacral ala.    SIRS:  -resolved  -s/p Low grade temp, borderline tachycardia (P 100), elevated WBC and lactate have resolved, infectious etiology has been ruled out.   - all Cx NTD, pan CT neg for infectious focus.  - Pt with stage 2 sacral wound, seen by Wound care.  Cont offloading and topical therapy, no acute infection/cellulitis.  - Cont to monitor WBC and temp curve.   - CTA chest and LE doppler (-) for PE/DVT.  - CT pelvis with sacral fracture.  Ortho eval, cont pain control. DC to [facility]  Urinary Retention  - Mon DCed, PVR greater than 300 cc, was straight cathed, developed hematuria, with leukocytosis, Cx sent, on CTX for UTI through 5/18    Hypernatremia  - resolved  - now  euvolemic    Pelvic and Sacral Fractures  - ortho input appreciated, PT ordered. ptn states she doesnt want to go to [facility]. will d/w . HCP is her nephew and consents to [facility]    Hypokalemia  - due to total body depletion, now resolved, on daily supplements    Hematuria  resolved.  s/p gross hematuria 2/2 UTI    DC planning tp [facility]. ptn is calm, awake, alert, nutrition consult and calorie count ordered, getting IVF 2/2 poor po intake, confused at baseline.   dc sc Lovenox 2/2 gross hematuria

## 2021-05-18 NOTE — PROGRESS NOTE ADULT - PROVIDER SPECIALTY LIST ADULT
Internal Medicine
Wound Care
Internal Medicine
Neurology
Cardiology
Infectious Disease
Internal Medicine
Electrophysiology
Internal Medicine
Neurology
Wound Care
Internal Medicine
Internal Medicine
Cardiology
Infectious Disease
Internal Medicine
Cardiology

## 2021-06-17 ENCOUNTER — NON-APPOINTMENT (OUTPATIENT)
Age: 81
End: 2021-06-17

## 2021-06-29 DIAGNOSIS — S32.591D OTHER SPECIFIED FRACTURE OF RIGHT PUBIS, SUBSEQUENT ENCOUNTER FOR FRACTURE WITH ROUTINE HEALING: ICD-10-CM

## 2021-06-30 ENCOUNTER — APPOINTMENT (OUTPATIENT)
Dept: ORTHOPEDIC SURGERY | Facility: CLINIC | Age: 81
End: 2021-06-30

## 2021-07-30 ENCOUNTER — NON-APPOINTMENT (OUTPATIENT)
Age: 81
End: 2021-07-30

## 2021-08-19 ENCOUNTER — APPOINTMENT (OUTPATIENT)
Age: 81
End: 2021-08-19

## 2021-08-19 NOTE — HISTORY OF PRESENT ILLNESS
[Former] : former [Never] : never [TextBox_4] : 79 year old female poor historian Hx mitral valve regurgitation, palpitations, hyperlipidemia, s/p resection adenocarcinoma lung 2017, reactive airway disease, presents to [facility] for follow up. Patient started on Symbicort 160/4.5 2 puffs BID.  She is experiencing memory issues and is currently being evaluated by Neurology.  She forgets to take maedications at times.  She is otherwise well without increased respiratory complaints.  She is following at [facility] for adenocarcinoma lung s/p resection 2017.  She is scheduled for follow up CT August 2021 per patient.

## 2021-08-19 NOTE — ASSESSMENT
[FreeTextEntry1] : 79 year old female Hx adenocarcinoma lung s/p resection 2017 presents for evaluation cough, mild reactive airway disease s/p resection adenocarcinoma lung\par \par \par Continue Symbicort 160/4.5 2 puffs BID\par Ventolin 2 puff every 4 hours if needed \par Continue Thoracic Surgery follow up/CT chest per Thoracic Surgery \par \par Follow up 4 months.

## 2021-10-06 ENCOUNTER — NON-APPOINTMENT (OUTPATIENT)
Age: 81
End: 2021-10-06

## 2022-02-16 ENCOUNTER — TRANSCRIPTION ENCOUNTER (OUTPATIENT)
Age: 82
End: 2022-02-16

## 2022-03-01 ENCOUNTER — APPOINTMENT (OUTPATIENT)
Dept: CARDIOLOGY | Facility: CLINIC | Age: 82
End: 2022-03-01

## 2022-03-07 ENCOUNTER — APPOINTMENT (OUTPATIENT)
Dept: CARDIOLOGY | Facility: CLINIC | Age: 82
End: 2022-03-07

## 2022-03-08 ENCOUNTER — APPOINTMENT (OUTPATIENT)
Dept: CARDIOLOGY | Facility: CLINIC | Age: 82
End: 2022-03-08
Payer: MEDICARE

## 2022-03-08 ENCOUNTER — NON-APPOINTMENT (OUTPATIENT)
Age: 82
End: 2022-03-08

## 2022-03-08 VITALS
HEART RATE: 63 BPM | HEIGHT: 66 IN | WEIGHT: 129 LBS | RESPIRATION RATE: 16 BRPM | SYSTOLIC BLOOD PRESSURE: 155 MMHG | TEMPERATURE: 97.5 F | BODY MASS INDEX: 20.73 KG/M2 | DIASTOLIC BLOOD PRESSURE: 88 MMHG | OXYGEN SATURATION: 96 %

## 2022-03-08 DIAGNOSIS — I20.9 ANGINA PECTORIS, UNSPECIFIED: ICD-10-CM

## 2022-03-08 PROCEDURE — 93000 ELECTROCARDIOGRAM COMPLETE: CPT

## 2022-03-08 PROCEDURE — 99214 OFFICE O/P EST MOD 30 MIN: CPT | Mod: 25

## 2022-03-08 RX ORDER — METOPROLOL SUCCINATE 25 MG/1
25 TABLET, EXTENDED RELEASE ORAL
Qty: 90 | Refills: 1 | Status: DISCONTINUED | COMMUNITY
Start: 2018-10-22 | End: 2022-03-08

## 2022-03-08 NOTE — DISCUSSION/SUMMARY
[FreeTextEntry1] : Dr. Millan-(PRIOR VISIT and PMH WITH Dr. Millan): \par This is a 80-year-old female with past medical history significant for mitral valve regurgitation, occasional palpitations, hyperlipidemia, status post recent resection of the right lobe for adenocarcinoma, comes in followup cardiac evaluation. She denies chest pain, shortness of breath, dizziness or syncope. \par \par The patient will continue on her current medications.  She will follow-up with her primary care physician.\par She will schedule an echo Doppler examination to evaluate her murmur, left ventricular function, chamber size, and rule out hypertrophy.\par \par Electrocardiogram done March 15, 2021 demonstrated normal sinus rhythm rate 72 bpm is otherwise unremarkable.\par \par The patient had blood work done with her primary care physician December 29, 2020 which demonstrated cholesterol 179, triglycerides of 99, HDL of 62, LDL calculated 97 mg/dL.\par \par She is currently hemodynamically stable and asymptomatic from a cardiac standpoint.\par \par Electrocardiogram done July 8, 2020 demonstrated normal sinus rhythm rate of 72 bpm is otherwise remarkable for left atrial abnormality and a right interventricular conduction delay.\par \par The patient had a normal nuclear stress test on October 29, 2018.\par \par The patient understands that aerobic exercises must be increased to 40 minutes 4 times per week. A detailed discussion of lifestyle modification was done today. The patient has a good understanding of the diagnosis, and treatment plan. Lifestyle modification was also outlined.\par \par

## 2022-03-08 NOTE — REASON FOR VISIT
[CV Risk Factors and Non-Cardiac Disease] : CV risk factors and non-cardiac disease [Structural Heart and Valve Disease] : structural heart and valve disease [Follow-Up - Clinic] : a clinic follow-up of [Dyspnea] : dyspnea [Hyperlipidemia] : hyperlipidemia [Hypertension] : hypertension [Palpitations] : palpitations [FreeTextEntry1] : murmur

## 2022-03-08 NOTE — PHYSICAL EXAM
[Well Developed] : well developed [Well Nourished] : well nourished [No Acute Distress] : no acute distress [Normal Venous Pressure] : normal venous pressure [No Carotid Bruit] : no carotid bruit [Normal S1, S2] : normal S1, S2 [No Murmur] : no murmur [No Rub] : no rub [Clear Lung Fields] : clear lung fields [Good Air Entry] : good air entry [No Respiratory Distress] : no respiratory distress  [Soft] : abdomen soft [Non Tender] : non-tender [No Masses/organomegaly] : no masses/organomegaly [Normal Bowel Sounds] : normal bowel sounds [Normal Gait] : normal gait [No Edema] : no edema [No Cyanosis] : no cyanosis [No Clubbing] : no clubbing [No Varicosities] : no varicosities [No Rash] : no rash [No Skin Lesions] : no skin lesions [Moves all extremities] : moves all extremities [No Focal Deficits] : no focal deficits [Normal Speech] : normal speech [Alert and Oriented] : alert and oriented [Normal memory] : normal memory [General Appearance - Well Developed] : well developed [Normal Appearance] : normal appearance [Well Groomed] : well groomed [General Appearance - Well Nourished] : well nourished [No Deformities] : no deformities [General Appearance - In No Acute Distress] : no acute distress [Normal Conjunctiva] : the conjunctiva exhibited no abnormalities [Normal Oral Mucosa] : normal oral mucosa [Normal Jugular Venous A Waves Present] : normal jugular venous A waves present [Normal Jugular Venous V Waves Present] : normal jugular venous V waves present [No Jugular Venous Ko A Waves] : no jugular venous ko A waves [Respiration, Rhythm And Depth] : normal respiratory rhythm and effort [Exaggerated Use Of Accessory Muscles For Inspiration] : no accessory muscle use [Auscultation Breath Sounds / Voice Sounds] : lungs were clear to auscultation bilaterally [Bowel Sounds] : normal bowel sounds [Abdomen Soft] : soft [Abnormal Walk] : normal gait [Gait - Sufficient For Exercise Testing] : the gait was sufficient for exercise testing [Nail Clubbing] : no clubbing of the fingernails [Cyanosis, Localized] : no localized cyanosis [Skin Color & Pigmentation] : normal skin color and pigmentation [Skin Turgor] : normal skin turgor [] : no rash [Oriented To Time, Place, And Person] : oriented to person, place, and time [Impaired Insight] : insight and judgment were intact [Affect] : the affect was normal [Mood] : the mood was normal [5th Left ICS - MCL] : palpated at the 5th LICS in the midclavicular line [Normal] : normal [No Precordial Heave] : no precordial heave was noted [Normal Rate] : normal [Rhythm Regular] : regular [Normal S1] : normal S1 [Normal S2] : normal S2 [No Gallop] : no gallop heard [II] : a grade 2 [I] : a grade 1 [2+] : left 2+ [No Abnormalities] : the abdominal aorta was not enlarged and no bruit was heard [No Pitting Edema] : no pitting edema present [S3] : no S3 [S4] : no S4 [Click] : no click [Right Carotid Bruit] : no bruit heard over the right carotid [Left Carotid Bruit] : no bruit heard over the left carotid [Right Femoral Bruit] : no bruit heard over the right femoral artery [Left Femoral Bruit] : no bruit heard over the left femoral artery

## 2022-03-08 NOTE — ASSESSMENT
[FreeTextEntry1] : This is a 81 year year old female here today for follow up cardiac evaluation. 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 She states that she is more forgetful but does follow up with her PCP Dr. Curran. She also states that she was in the hospital last year and has not been on any medication since her hospitalization. She lives alone and is a poor historian. \par \par Since her last visit she had a hospitalization on 5/13/2021:\par Hospital Course: \par Discharge Date: 18-May-2021 \par Admission Date	06-May-2021 16:06 \par Reason for Admission: metabolic encephalopathy, SIRS, NSTEMI, hypernatremia, \par 79 yo F with unk pmhx presents with EMS - found on floor sitting in urine. Pt \par states she was getting out of the bed and fell on the ground. Does not recall \par how long she was on the ground. Complains of back pain. \par Per family has been forgetful over past some time. Had MRI and some other \par tests. Was at [facility] 3 weeks ago for stiff neck - sent home. Dr. Millan - \par cardiologist, but says she has no cardiac problems. alert to name and place \par \par She denies fever, chills, weight loss, malaise, rash, alteration bowel habits, weakness, abdominal  pain, bloating, changes in urination, visual disturbances, chest pain, headaches, dizziness, heart palpitations, recent episodes of syncope or falls at this time.\par \par BLOOD PRESSURE:\par -BP is elevated in today's visit. We will start her on Telmisartan 80mg PO DAILY and she will follow up in 1 month for BP check. She was recently on Metoprolol ER 25mg PO DAILY but due to her episode of syncope we will hold this medication for now. HR also in the 60's today. \par \par -I have discussed the importance of maintaining good BP control and reviewed the newest guidelines with the patient while re-enforcing dietary sodium restrictions to no more than 2-3 g daily, DASH diet, life style modifications as well as the goal of maintaining ideal body weight with the patient today. I have advised the patient to avoid the use of over-the-counter medications/ supplements especially NSAIDS.\par \par I have reviewed with Ms. LAGUNAS that serious health consequences can occur when blood pressure is not well controlled and the need for strict compliance with medication and that optimal control can significantly reduce the risk of cardiovascular disease stroke, heart attack and other organ damage. They verbally expressed understanding of the fore mentioned serious health consequences to me today.\par \par BLOOD WORK:\par -New blood work was done by PCP on 2/24/22 and cholesterol elevated at 255. We will add back her Zetia-Simvastatin 10/40mg PO DAILY tablet and will repeat blood work during her next follow up appointment. \par \par CHOLESTEROL CONTROL:\par -Patient will continue the advised  TLC diet and to continue follow-up for treatment of hyperlipidemia and repeat blood testing with diet and exercise. I have discussed different exercises and the importance of maintenance of optimal body weight. The importance of staying within guidelines and recommendations was stressed to the patient today and they acknowledged that they understand this to me verbally.\par \par  -Ms. LAGUNAS was educated and advised that failure to follow-up with my medical recommendations to lower cholesterol can result in severe health consequences therefore, they will continuing a low saturated and low fat diet and to avoid excessive carbohydrates to help reduce triglycerides and that lowering LDL levels is associated with a significant decrease in serious cardiac events including but not limited to heart attack stroke and overall death. We will continue lipid lowering agents as advised based on blood test results and the patient understands to call if they develop severe muscle discomfort or if they have a reddish tinted discoloration in their urine.\par \par TESTING/REPORTS:\par -EKG done Mar 08, 2022 which demonstrated regular sinus rhythm with nonspecific ST-T wave changes, BPM of 63.\par \par -Electrocardiogram done July 8, 2020 demonstrated normal sinus rhythm rate of 72 bpm is otherwise remarkable for left atrial abnormality and a right interventricular conduction delay.\par \par -The patient had a normal nuclear stress test on October 29, 2018.\par \par PLAN:\par -Start Telmisartan 80mg PO DAILY. \par -Start Zetia-Simvastatin 10/40mg PO DAILY.\par -Carotid Doppler to evaluate carotid bruit vs murmur.\par -Repeat blood work to be done next visit to evaluate cholesterol.\par -The patient will schedule an Echo Doppler examination to evaluate murmur, left ventricular function, chamber size, and rule out hypertrophy.\par -Follow up with PCP.\par \par I have discussed the plan of care with Ms. DIAMOND LAGUNAS  and she  will follow up in 1 month. She is compliant with all of her medications.\par \par The patient understands that aerobic exercises must be increased to minutes 4 times/week and a detailed discussion of lifestyle modification was done today. \par The patient has a good understanding of the diagnosis, treatment plan and lifestyle modification. \par She will contact me at the office for any questions with their care or any changes in their health status.\par \par The plan of care was discussed with the patient with supervision physician Dr. Mario Millan and will be carried out as noted above.\par \par Elida HARRELL \par

## 2022-04-20 ENCOUNTER — LABORATORY RESULT (OUTPATIENT)
Age: 82
End: 2022-04-20

## 2022-04-20 ENCOUNTER — APPOINTMENT (OUTPATIENT)
Dept: CARDIOLOGY | Facility: CLINIC | Age: 82
End: 2022-04-20
Payer: MEDICARE

## 2022-04-20 VITALS
BODY MASS INDEX: 20.89 KG/M2 | HEIGHT: 66 IN | HEART RATE: 57 BPM | SYSTOLIC BLOOD PRESSURE: 150 MMHG | RESPIRATION RATE: 16 BRPM | DIASTOLIC BLOOD PRESSURE: 70 MMHG | OXYGEN SATURATION: 100 % | WEIGHT: 130 LBS | TEMPERATURE: 97.3 F

## 2022-04-20 PROCEDURE — 93306 TTE W/DOPPLER COMPLETE: CPT

## 2022-04-20 PROCEDURE — 93880 EXTRACRANIAL BILAT STUDY: CPT

## 2022-04-20 PROCEDURE — 93922 UPR/L XTREMITY ART 2 LEVELS: CPT

## 2022-04-20 PROCEDURE — 99215 OFFICE O/P EST HI 40 MIN: CPT | Mod: 25

## 2022-04-20 NOTE — ASSESSMENT
[FreeTextEntry1] : This is a 81 year year old female here today for follow up cardiac evaluation. 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 She is a poor historian and states that she lives alone at the [facility]. She is unaware of what medication she is on. She is ordered Telmisartan 80mg PO DAILY and Zetia-Simvastatin 10/40mg PO DAILY. She has Metoprolol on hand but it was discussed last visit that she should not be on this due to dizziness. She states she is not on this medication but is confused as to why this was ordered (this was ordered by "Dr. Ge Zuniga" who she does not know who he is. She denies chest pain or SELBY at this time and states she is feeling well otherwise. \par \par She is more forgetful but states she does follow up with her PCP Dr. Curran but has not seen him "in a long time" and "I don’t need to see him".\par \par PMH:\par Since her last visit she had a hospitalization on 5/13/2021:\par Hospital Course: \par Discharge Date: 18-May-2021 \par Admission Date	06-May-2021 16:06 \par Reason for Admission: metabolic encephalopathy, SIRS, NSTEMI, hypernatremia, \par 79 yo F with unk pmhx presents with EMS - found on floor sitting in urine. Pt \par states she was getting out of the bed and fell on the ground. Does not recall \par how long she was on the ground. Complains of back pain. \par Per family has been forgetful over past some time. Had MRI and some other \par tests. Was at [facility] 3 weeks ago for stiff neck - sent home. Dr. Millan - \par cardiologist, but says she has no cardiac problems. alert to name and place \par \par BLOOD PRESSURE:\par -BP remains elevated in today's visit. I remind her to stay on Telmisartan 80mg PO DAILY and she will follow up in 1 month for BP check. She can not tell me if she was taking this medication as prescribed. She says she is taking it daily but when asked again she states "I am not on that medication"\par \par -She was recently on Metoprolol ER 25mg PO DAILY but due to her episode of syncope the medication was d/c'ed and she should not be on this right now. \par \par -I have discussed the importance of maintaining good BP control and reviewed the newest guidelines with the patient while re-enforcing dietary sodium restrictions to no more than 2-3 g daily, DASH diet, life style modifications as well as the goal of maintaining ideal body weight with the patient today. I have advised the patient to avoid the use of over-the-counter medications/ supplements especially NSAIDS.\par \par I have reviewed with Ms. LAGUNAS that serious health consequences can occur when blood pressure is not well controlled and the need for strict compliance with medication and that optimal control can significantly reduce the risk of cardiovascular disease stroke, heart attack and other organ damage. They verbally expressed understanding of the fore mentioned serious health consequences to me today.\par \par BLOOD WORK:\par -Blood work was done by PCP on 2/24/22 and cholesterol elevated at 255. \par -She remains on Zetia-Simvastatin 10/40mg PO DAILY but again she can not recall if she is taking this medication.\par -New blood work was done today, 04/20/2022  to evaluate lipid profile, CBC, BMP, hepatic function, A1C and TSH. \par \par CHOLESTEROL CONTROL:\par -Patient will continue the advised  TLC diet and to continue follow-up for treatment of hyperlipidemia and repeat blood testing with diet and exercise. I have discussed different exercises and the importance of maintenance of optimal body weight. The importance of staying within guidelines and recommendations was stressed to the patient today and they acknowledged that they understand this to me verbally.\par \par  -Ms. LAGUNAS was educated and advised that failure to follow-up with my medical recommendations to lower cholesterol can result in severe health consequences therefore, they will continuing a low saturated and low fat diet and to avoid excessive carbohydrates to help reduce triglycerides and that lowering LDL levels is associated with a significant decrease in serious cardiac events including but not limited to heart attack stroke and overall death. We will continue lipid lowering agents as advised based on blood test results and the patient understands to call if they develop severe muscle discomfort or if they have a reddish tinted discoloration in their urine.\par \par TESTING/REPORTS: \par -EKG done Mar 08, 2022 which demonstrated regular sinus rhythm with nonspecific ST-T wave changes, BPM of 63.\par \par -Electrocardiogram done July 8, 2020 demonstrated normal sinus rhythm rate of 72 bpm is otherwise remarkable for left atrial abnormality and a right interventricular conduction delay.\par \par -The patient had a normal nuclear stress test on October 29, 2018.\par \par -Echocardiogram and Carotid doppler done today 4/20/22 and results pending. \par \par -Normal BUNNY done in the office 4/20/22\par \par PLAN:\par -Start Telmisartan 80mg PO DAILY. \par -Start Zetia-Simvastatin 10/40mg PO DAILY.\par -Follow up with PCP.\par -I tried to call her PCP number which she gave me at the [facility] where she is staying and did not reach anyone. I also left a message for her emergency contact "Kristal" and asked for a phone call back to see if there is anyone who can assist with her care while at home.\par -I wrote down for her the medication in which she should be taking for our office.\par \par I have discussed the plan of care with Ms. DIAMOND LAGUNAS  and she  will follow up in 1 month. \par \par The patient understands that aerobic exercises must be increased to minutes 4 times/week and a detailed discussion of lifestyle modification was done today. \par The patient has a good understanding of the diagnosis, treatment plan and lifestyle modification. \par She will contact me at the office for any questions with their care or any changes in their health status.\par \par The plan of care was discussed with the patient with supervision physician Dr. Mario Millan and will be carried out as noted above.\par \par Elida HARRELL \par

## 2022-05-06 ENCOUNTER — NON-APPOINTMENT (OUTPATIENT)
Age: 82
End: 2022-05-06

## 2022-06-08 ENCOUNTER — APPOINTMENT (OUTPATIENT)
Dept: CARDIOLOGY | Facility: CLINIC | Age: 82
End: 2022-06-08
Payer: MEDICARE

## 2022-06-08 VITALS
DIASTOLIC BLOOD PRESSURE: 68 MMHG | BODY MASS INDEX: 21.05 KG/M2 | WEIGHT: 131 LBS | TEMPERATURE: 97.6 F | HEART RATE: 90 BPM | OXYGEN SATURATION: 99 % | RESPIRATION RATE: 16 BRPM | SYSTOLIC BLOOD PRESSURE: 122 MMHG | HEIGHT: 66 IN

## 2022-06-08 PROCEDURE — 99214 OFFICE O/P EST MOD 30 MIN: CPT

## 2022-06-08 RX ORDER — IPRATROPIUM BROMIDE AND ALBUTEROL SULFATE 2.5; .5 MG/3ML; MG/3ML
0.5-2.5 (3) SOLUTION RESPIRATORY (INHALATION)
Qty: 1 | Refills: 0 | Status: DISCONTINUED | COMMUNITY
Start: 2019-12-16 | End: 2022-06-08

## 2022-06-08 NOTE — DISCUSSION/SUMMARY
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 She denies chest pain, shortness of breath, dizziness or syncope. \par The patient remained stable from a cardiac standpoint.  She reports that she recently had injections in her knees which have made her feel better.  She has been stable on her current dose of Vytorin 10/40 mg/day.  She still does not wish to change to a higher intensity statin therapy.\par Her blood pressure is controlled on Micardis 80 mg daily.  She will be seeing her primary care physician in the next few weeks and will get blood work through their office.\par \par The patient will continue on her current medications.  She will follow-up with her primary care physician.\par She will schedule an echo Doppler examination to evaluate her murmur, left ventricular function, chamber size, and rule out hypertrophy.\par \par Electrocardiogram done March 15, 2021 demonstrated normal sinus rhythm rate 72 bpm is otherwise unremarkable.\par \par The patient had blood work done with her primary care physician December 29, 2020 which demonstrated cholesterol 179, triglycerides of 99, HDL of 62, LDL calculated 97 mg/dL.\par \par She is currently hemodynamically stable and asymptomatic from a cardiac standpoint.\par \par Electrocardiogram done July 8, 2020 demonstrated normal sinus rhythm rate of 72 bpm is otherwise remarkable for left atrial abnormality and a right interventricular conduction delay.\par \par The patient had a normal nuclear stress test on October 29, 2018.\par \par The patient understands that aerobic exercises must be increased to 40 minutes 4 times per week. A detailed discussion of lifestyle modification was done today. The patient has a good understanding of the diagnosis, and treatment plan. Lifestyle modification was also outlined.\par \par

## 2022-06-08 NOTE — ASSESSMENT
[FreeTextEntry1] : \par ===Prior visit JULIO CESAR Patino Apr 2022==\par This is a 81 year year old female here today for follow up cardiac evaluation. 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 She is a poor historian and states that she lives alone at the [facility]. She is unaware of what medication she is on. She is ordered Telmisartan 80mg PO DAILY and Zetia-Simvastatin 10/40mg PO DAILY. She has Metoprolol on hand but it was discussed last visit that she should not be on this due to dizziness. She states she is not on this medication but is confused as to why this was ordered (this was ordered by "Dr. Ge Zuniga" who she does not know who he is. She denies chest pain or SELBY at this time and states she is feeling well otherwise. \par \par She is more forgetful but states she does follow up with her PCP Dr. Curran but has not seen him "in a long time" and "I don’t need to see him".\par \par PMH:\par Since her last visit she had a hospitalization on 5/13/2021:\par Hospital Course: \par Discharge Date: 18-May-2021 \par Admission Date	06-May-2021 16:06 \par Reason for Admission: metabolic encephalopathy, SIRS, NSTEMI, hypernatremia, \par 79 yo F with unk pmhx presents with EMS - found on floor sitting in urine. Pt \par states she was getting out of the bed and fell on the ground. Does not recall \par how long she was on the ground. Complains of back pain. \par Per family has been forgetful over past some time. Had MRI and some other \par tests. Was at [facility] 3 weeks ago for stiff neck - sent home. Dr. Millan - \par cardiologist, but says she has no cardiac problems. alert to name and place \par \par BLOOD PRESSURE:\par -BP remains elevated in today's visit. I remind her to stay on Telmisartan 80mg PO DAILY and she will follow up in 1 month for BP check. She can not tell me if she was taking this medication as prescribed. She says she is taking it daily but when asked again she states "I am not on that medication"\par \par -She was recently on Metoprolol ER 25mg PO DAILY but due to her episode of syncope the medication was d/c'ed and she should not be on this right now. \par \par -I have discussed the importance of maintaining good BP control and reviewed the newest guidelines with the patient while re-enforcing dietary sodium restrictions to no more than 2-3 g daily, DASH diet, life style modifications as well as the goal of maintaining ideal body weight with the patient today. I have advised the patient to avoid the use of over-the-counter medications/ supplements especially NSAIDS.\par \par I have reviewed with Ms. LAGUNAS that serious health consequences can occur when blood pressure is not well controlled and the need for strict compliance with medication and that optimal control can significantly reduce the risk of cardiovascular disease stroke, heart attack and other organ damage. They verbally expressed understanding of the fore mentioned serious health consequences to me today.\par \par BLOOD WORK:\par -Blood work was done by PCP on 2/24/22 and cholesterol elevated at 255. \par -She remains on Zetia-Simvastatin 10/40mg PO DAILY but again she can not recall if she is taking this medication.\par -New blood work was done today, 04/20/2022  to evaluate lipid profile, CBC, BMP, hepatic function, A1C and TSH. \par \par CHOLESTEROL CONTROL:\par -Patient will continue the advised  TLC diet and to continue follow-up for treatment of hyperlipidemia and repeat blood testing with diet and exercise. I have discussed different exercises and the importance of maintenance of optimal body weight. The importance of staying within guidelines and recommendations was stressed to the patient today and they acknowledged that they understand this to me verbally.\par \par  -Ms. LAGUNAS was educated and advised that failure to follow-up with my medical recommendations to lower cholesterol can result in severe health consequences therefore, they will continuing a low saturated and low fat diet and to avoid excessive carbohydrates to help reduce triglycerides and that lowering LDL levels is associated with a significant decrease in serious cardiac events including but not limited to heart attack stroke and overall death. We will continue lipid lowering agents as advised based on blood test results and the patient understands to call if they develop severe muscle discomfort or if they have a reddish tinted discoloration in their urine.\par \par TESTING/REPORTS: \par -EKG done Mar 08, 2022 which demonstrated regular sinus rhythm with nonspecific ST-T wave changes, BPM of 63.\par \par -Electrocardiogram done July 8, 2020 demonstrated normal sinus rhythm rate of 72 bpm is otherwise remarkable for left atrial abnormality and a right interventricular conduction delay.\par \par -The patient had a normal nuclear stress test on October 29, 2018.\par \par -Echocardiogram and Carotid doppler done today 4/20/22 and results pending. \par \par -Normal BUNNY done in the office 4/20/22\par \par PLAN:\par -Start Telmisartan 80mg PO DAILY. \par -Start Zetia-Simvastatin 10/40mg PO DAILY.\par -Follow up with PCP.\par -I tried to call her PCP number which she gave me at the [place] where she is staying and did not reach anyone. I also left a message for her emergency contact "Kristal" and asked for a phone call back to see if there is anyone who can assist with her care while at home.\par -I wrote down for her the medication in which she should be taking for our office.\par \par I have discussed the plan of care with Ms. DIAMOND LAGUNAS  and she  will follow up in 1 month. \par \par The patient understands that aerobic exercises must be increased to minutes 4 times/week and a detailed discussion of lifestyle modification was done today. \par The patient has a good understanding of the diagnosis, treatment plan and lifestyle modification. \par She will contact me at the office for any questions with their care or any changes in their health status.\par \par The plan of care was discussed with the patient with supervision physician Dr. Mario Millan and will be carried out as noted above.\par \par Elida HARRELL \par

## 2022-06-08 NOTE — PHYSICAL EXAM
No further episodes.   Well-controlled on verapamil follow-up with cardiologist [Well Developed] : well developed [Well Nourished] : well nourished [No Acute Distress] : no acute distress [Normal Venous Pressure] : normal venous pressure [No Carotid Bruit] : no carotid bruit [Normal S1, S2] : normal S1, S2 [No Murmur] : no murmur [No Rub] : no rub [Clear Lung Fields] : clear lung fields [Good Air Entry] : good air entry [No Respiratory Distress] : no respiratory distress  [Soft] : abdomen soft [Non Tender] : non-tender [No Masses/organomegaly] : no masses/organomegaly [Normal Bowel Sounds] : normal bowel sounds [Normal Gait] : normal gait [No Edema] : no edema [No Cyanosis] : no cyanosis [No Clubbing] : no clubbing [No Varicosities] : no varicosities [No Rash] : no rash [No Skin Lesions] : no skin lesions [Moves all extremities] : moves all extremities [No Focal Deficits] : no focal deficits [Normal Speech] : normal speech [Alert and Oriented] : alert and oriented [Normal memory] : normal memory [General Appearance - Well Developed] : well developed [Normal Appearance] : normal appearance [Well Groomed] : well groomed [General Appearance - Well Nourished] : well nourished [No Deformities] : no deformities [General Appearance - In No Acute Distress] : no acute distress [Normal Conjunctiva] : the conjunctiva exhibited no abnormalities [Normal Oral Mucosa] : normal oral mucosa [Normal Jugular Venous A Waves Present] : normal jugular venous A waves present [Normal Jugular Venous V Waves Present] : normal jugular venous V waves present [No Jugular Venous Ko A Waves] : no jugular venous ko A waves [Respiration, Rhythm And Depth] : normal respiratory rhythm and effort [Exaggerated Use Of Accessory Muscles For Inspiration] : no accessory muscle use [Auscultation Breath Sounds / Voice Sounds] : lungs were clear to auscultation bilaterally [Bowel Sounds] : normal bowel sounds [Abdomen Soft] : soft [Abnormal Walk] : normal gait [Gait - Sufficient For Exercise Testing] : the gait was sufficient for exercise testing [Nail Clubbing] : no clubbing of the fingernails [Cyanosis, Localized] : no localized cyanosis [Skin Color & Pigmentation] : normal skin color and pigmentation [Skin Turgor] : normal skin turgor [] : no rash [Oriented To Time, Place, And Person] : oriented to person, place, and time [Mood] : the mood was normal [Oriented to Person] : oriented to person [Oriented to Place] : oriented to place [Oriented to Time] : oriented to time [5th Left ICS - MCL] : palpated at the 5th LICS in the midclavicular line [Normal] : normal [No Precordial Heave] : no precordial heave was noted [Normal Rate] : normal [Rhythm Regular] : regular [Normal S1] : normal S1 [Normal S2] : normal S2 [No Gallop] : no gallop heard [II] : a grade 2 [I] : a grade 1 [2+] : left 2+ [No Abnormalities] : the abdominal aorta was not enlarged and no bruit was heard [No Pitting Edema] : no pitting edema present [FreeTextEntry1] : Pt is forgetful and poor historian.  [S3] : no S3 [S4] : no S4 [Click] : no click [Right Carotid Bruit] : no bruit heard over the right carotid [Left Carotid Bruit] : no bruit heard over the left carotid [Right Femoral Bruit] : no bruit heard over the right femoral artery [Left Femoral Bruit] : no bruit heard over the left femoral artery

## 2022-06-13 NOTE — PROGRESS NOTE ADULT - SUBJECTIVE AND OBJECTIVE BOX
Patient is a 80y old  Female who presents with a chief complaint of metabolic encephalopathy, SIRS, NSTEMI, hypernatremia, (13 May 2021 19:44)      SUBJECTIVE / OVERNIGHT EVENTS: ptn is calm, awake, alert, nutrition consult and calorie count ordered, getting IVF 2/2 poor po intake, confused at baseline. social work trying to find her children for consent for transfer to [facility]. by default may have to be the decision of her niece/nephew who are working on establishing HCP documents     MEDICATIONS  (STANDING):  aspirin enteric coated 81 milliGRAM(s) Oral daily  atorvastatin 20 milliGRAM(s) Oral at bedtime  cefTRIAXone   IVPB      cefTRIAXone   IVPB 1000 milliGRAM(s) IV Intermittent every 24 hours  dextrose 5% + lactated ringers. 1000 milliLiter(s) (75 mL/Hr) IV Continuous <Continuous>  metoprolol tartrate 12.5 milliGRAM(s) Oral two times a day  pantoprazole  Injectable 40 milliGRAM(s) IV Push daily    MEDICATIONS  (PRN):  acetaminophen    Suspension .. 650 milliGRAM(s) Oral every 6 hours PRN Temp greater or equal to 38C (100.4F), Mild Pain (1 - 3)  nystatin Powder 1 Application(s) Topical two times a day PRN after each BM      Vital Signs Last 24 Hrs  T(F): 98 (21 @ 12:52), Max: 98.2 (21 @ 20:40)  HR: 102 (21 @ 12:52) (91 - 102)  BP: 116/72 (21 @ 12:52) (111/72 - 131/72)  RR: 18 (21 @ 12:52) (17 - 18)  SpO2: 96% (21 @ 12:52) (96% - 98%)  Telemetry:   CAPILLARY BLOOD GLUCOSE        I&O's Summary    12 May 2021 07:01  -  13 May 2021 07:00  --------------------------------------------------------  IN: 1475 mL / OUT: 600 mL / NET: 875 mL    13 May 2021 07:01  -  13 May 2021 19:55  --------------------------------------------------------  IN: 1198 mL / OUT: 1750 mL / NET: -552 mL        PHYSICAL EXAM:  GENERAL: NAD, well-developed  HEAD:  Atraumatic, Normocephalic  EYES: EOMI, PERRLA, conjunctiva and sclera clear  NECK: Supple, No JVD  CHEST/LUNG: Clear to auscultation bilaterally; No wheeze  HEART: Regular rate and rhythm; No murmurs, rubs, or gallops  ABDOMEN: Soft, Nontender, Nondistended; Bowel sounds present  EXTREMITIES:  2+ Peripheral Pulses, No clubbing, cyanosis, or edema  PSYCH: AAOx3  NEUROLOGY: non-focal  SKIN: No rashes or lesions    LABS:                        11.1   10.70 )-----------( 394      ( 13 May 2021 06:30 )             34.1                 Urinalysis Basic - ( 11 May 2021 22:19 )    Color: Marti / Appearance: Turbid / S.034 / pH: x  Gluc: x / Ketone: Moderate  / Bili: Large / Urobili: >8mg/dL   Blood: x / Protein: >600 mg/dL / Nitrite: Positive   Leuk Esterase: Large / RBC: >50 /hpf / WBC 10 /HPF   Sq Epi: x / Non Sq Epi: 2 / Bacteria: Many        RADIOLOGY & ADDITIONAL TESTS:    Imaging Personally Reviewed:    Consultant(s) Notes Reviewed:      Care Discussed with Consultants/Other Providers:   257.7

## 2022-06-28 ENCOUNTER — APPOINTMENT (OUTPATIENT)
Dept: PULMONOLOGY | Facility: CLINIC | Age: 82
End: 2022-06-28

## 2022-06-28 VITALS
DIASTOLIC BLOOD PRESSURE: 70 MMHG | OXYGEN SATURATION: 97 % | SYSTOLIC BLOOD PRESSURE: 124 MMHG | HEART RATE: 90 BPM | TEMPERATURE: 98.7 F | BODY MASS INDEX: 21.53 KG/M2 | HEIGHT: 66 IN | WEIGHT: 134 LBS

## 2022-06-28 PROCEDURE — 99214 OFFICE O/P EST MOD 30 MIN: CPT | Mod: 25

## 2022-06-28 NOTE — HISTORY OF PRESENT ILLNESS
[Former] : former [Never] : never [TextBox_4] : 82 year old female poor historian Hx dementia, mitral valve regurgitation, palpitations, hyperlipidemia, s/p resection adenocarcinoma lung 2017, reactive airway disease, presents to [facility] for follow up. Patient reports she suffered a fall and was on the floor for two days.  She was hospitalized and subsequent stay at [facility].  She reports she is now living in [facility].  She is extremely poor historian and does not know medications she is on.  She continues to complain cough, "tickle in throat."  She is here for follow up.

## 2022-06-28 NOTE — ASSESSMENT
[FreeTextEntry1] : 82 year old female Hx adenocarcinoma lung s/p resection 2017 presents for evaluation cough, mild reactive airway disease s/p resection adenocarcinoma lung\par \par Trial of Lenny Vanegas\par \par Follow up 3 months.

## 2022-07-08 ENCOUNTER — APPOINTMENT (OUTPATIENT)
Dept: PULMONOLOGY | Facility: CLINIC | Age: 82
End: 2022-07-08

## 2022-07-15 ENCOUNTER — APPOINTMENT (OUTPATIENT)
Dept: OTOLARYNGOLOGY | Facility: CLINIC | Age: 82
End: 2022-07-15

## 2022-07-29 NOTE — BH CONSULTATION LIAISON ASSESSMENT NOTE - PRIOR MEDICATION SIDE EFFECTS OR ADVERSE REACTIONS
None known Tremfya Counseling: I discussed with the patient the risks of guselkumab including but not limited to immunosuppression, serious infections, and drug reactions.  The patient understands that monitoring is required including a PPD at baseline and must alert us or the primary physician if symptoms of infection or other concerning signs are noted.

## 2022-08-02 ENCOUNTER — APPOINTMENT (OUTPATIENT)
Dept: OTOLARYNGOLOGY | Facility: CLINIC | Age: 82
End: 2022-08-02

## 2022-08-02 VITALS
WEIGHT: 134 LBS | HEIGHT: 66 IN | HEART RATE: 94 BPM | BODY MASS INDEX: 21.53 KG/M2 | TEMPERATURE: 97.7 F | SYSTOLIC BLOOD PRESSURE: 113 MMHG | DIASTOLIC BLOOD PRESSURE: 71 MMHG

## 2022-08-02 DIAGNOSIS — J34.2 DEVIATED NASAL SEPTUM: ICD-10-CM

## 2022-08-02 DIAGNOSIS — H92.02 OTALGIA, LEFT EAR: ICD-10-CM

## 2022-08-02 PROCEDURE — 31231 NASAL ENDOSCOPY DX: CPT

## 2022-08-02 PROCEDURE — 99214 OFFICE O/P EST MOD 30 MIN: CPT | Mod: 25

## 2022-08-02 PROCEDURE — 92557 COMPREHENSIVE HEARING TEST: CPT

## 2022-08-02 PROCEDURE — 92567 TYMPANOMETRY: CPT

## 2022-08-02 NOTE — CONSULT LETTER
[Dear  ___] : Dear  [unfilled], [Consult Letter:] : I had the pleasure of evaluating your patient, [unfilled]. [Please see my note below.] : Please see my note below. [Consult Closing:] : Thank you very much for allowing me to participate in the care of this patient.  If you have any questions, please do not hesitate to contact me. [Sincerely,] : Sincerely, [FreeTextEntry3] : Mingo Cason MD\par [facility]\par Otolaryngology and Facial Plastics\par Associated Professor, [employer]\par

## 2022-08-02 NOTE — ASSESSMENT
[FreeTextEntry1] : Complaining of irritation of her nose on the left side also some discomfort in the left mastoid area and here for evaluation and examination she was reassured no pathology is noted ears are clean no infection endoscopically she has a deviated septum to the right no tumors or masses and she was reassured and alleviated her concerns we did a hearing test on her which showed that she has a normal presbycusis hearing loss pattern and she will follow-up with us annually.

## 2022-08-02 NOTE — END OF VISIT
[FreeTextEntry3] : I, Dr. Cason personally performed the evaluation and management (E/M) services including all necessary procedures, for this new patient. That E/M includes conducting the clinically appropriate initial history &/or exam, assessing all conditions, and establishing the plan of care. Today, my SANDRA, Marcia Espinosa, was here to observe &/or participate in the visit & follow plan of care established by me.\par \par \par

## 2022-08-02 NOTE — HISTORY OF PRESENT ILLNESS
[de-identified] : Patient has a history of pain behind the left ear randomly, which started years ago. This was improved, but then the other night she had the same pain behind the left ear. She describes it as a dull aching pain. She denies changes in hearing suddenly or ringing in the ears. He has a history of vertigo but nothing recent. \par She feels that the left nasal cavity is always clogged and she has a constant clear runny nose.

## 2022-08-09 ENCOUNTER — APPOINTMENT (OUTPATIENT)
Dept: PULMONOLOGY | Facility: CLINIC | Age: 82
End: 2022-08-09

## 2022-08-19 ENCOUNTER — NON-APPOINTMENT (OUTPATIENT)
Age: 82
End: 2022-08-19

## 2022-08-19 ENCOUNTER — APPOINTMENT (OUTPATIENT)
Dept: OPHTHALMOLOGY | Facility: CLINIC | Age: 82
End: 2022-08-19

## 2022-08-19 PROCEDURE — 92250 FUNDUS PHOTOGRAPHY W/I&R: CPT

## 2022-08-19 PROCEDURE — 92004 COMPRE OPH EXAM NEW PT 1/>: CPT

## 2022-08-31 ENCOUNTER — APPOINTMENT (OUTPATIENT)
Dept: PULMONOLOGY | Facility: CLINIC | Age: 82
End: 2022-08-31

## 2022-08-31 VITALS
HEART RATE: 92 BPM | SYSTOLIC BLOOD PRESSURE: 118 MMHG | DIASTOLIC BLOOD PRESSURE: 62 MMHG | TEMPERATURE: 97.3 F | OXYGEN SATURATION: 97 %

## 2022-08-31 DIAGNOSIS — J45.909 UNSPECIFIED ASTHMA, UNCOMPLICATED: ICD-10-CM

## 2022-08-31 DIAGNOSIS — J31.0 CHRONIC RHINITIS: ICD-10-CM

## 2022-08-31 PROCEDURE — 99214 OFFICE O/P EST MOD 30 MIN: CPT | Mod: 25

## 2022-08-31 NOTE — HISTORY OF PRESENT ILLNESS
[Former] : former [Never] : never [TextBox_4] : 82 year old female poor historian Hx dementia, mitral valve regurgitation, palpitations, hyperlipidemia, s/p resection adenocarcinoma lung 2017, reactive airway disease, presents to [facility] for follow up.

## 2022-08-31 NOTE — HISTORY OF PRESENT ILLNESS
[Former] : former [Never] : never [TextBox_4] : 82 year old female poor historian Hx dementia, mitral valve regurgitation, palpitations, hyperlipidemia, s/p resection adenocarcinoma lung 2017, reactive airway disease, presents to [facility] for follow up.

## 2022-09-08 ENCOUNTER — OUTPATIENT (OUTPATIENT)
Dept: OUTPATIENT SERVICES | Facility: HOSPITAL | Age: 82
LOS: 1 days | End: 2022-09-08
Payer: MEDICARE

## 2022-09-08 ENCOUNTER — APPOINTMENT (OUTPATIENT)
Dept: CT IMAGING | Facility: IMAGING CENTER | Age: 82
End: 2022-09-08

## 2022-09-08 DIAGNOSIS — R05.9 COUGH, UNSPECIFIED: ICD-10-CM

## 2022-09-08 DIAGNOSIS — C34.90 MALIGNANT NEOPLASM OF UNSPECIFIED PART OF UNSPECIFIED BRONCHUS OR LUNG: ICD-10-CM

## 2022-09-08 PROCEDURE — 71250 CT THORAX DX C-: CPT

## 2022-09-08 PROCEDURE — 71250 CT THORAX DX C-: CPT | Mod: 26,MH

## 2022-09-20 ENCOUNTER — APPOINTMENT (OUTPATIENT)
Dept: PULMONOLOGY | Facility: CLINIC | Age: 82
End: 2022-09-20

## 2022-09-20 PROCEDURE — 99442: CPT | Mod: 95

## 2022-09-20 NOTE — ASSESSMENT
[FreeTextEntry1] : 82 year old female HX Dementia, mitral valve regurgitation, s/p wedge resection adenocarcinoma 2017 presents for follow up now with new opacity on CT chest 9/12/22\par \par Repeat CT chest December 2022\par \par FOllow up pending CT results

## 2022-09-20 NOTE — REASON FOR VISIT
[Follow-Up] : a follow-up visit [Lung Cancer] : lung cancer [Asthma] : asthma [Shortness of Breath] : shortness of breath [Abnormal CXR/ Chest CT] : an abnormal CXR/ chest CT

## 2022-09-20 NOTE — HISTORY OF PRESENT ILLNESS
[Home] : at home, [unfilled] , at the time of the visit. [Medical Office: ([facility])___] : at the medical office located in  [Verbal consent obtained from patient] : the patient, [unfilled] [Former] : former [Never] : never [TextBox_4] : 82 year old female poor historian Hx dementia, mitral valve regurgitation, palpitations, hyperlipidemia, s/p resection adenocarcinoma lung 2017, reactive airway disease, presents to [facility] for follow up. Patient underwent CT chest 9/12/22 revealing 0.9 cm nodular opacity RML.  Patient is here for CT results and plan of care.

## 2022-09-20 NOTE — PHYSICAL EXAM
[General Appearance - In No Acute Distress] : no acute distress [Normal Conjunctiva] : the conjunctiva exhibited no abnormalities [Erythema] : erythema of the pharynx [Murmurs] : no murmurs present [Auscultation Breath Sounds / Voice Sounds] : lungs were clear to auscultation bilaterally

## 2022-09-23 ENCOUNTER — APPOINTMENT (OUTPATIENT)
Dept: CARDIOLOGY | Facility: CLINIC | Age: 82
End: 2022-09-23

## 2022-10-11 ENCOUNTER — RX RENEWAL (OUTPATIENT)
Age: 82
End: 2022-10-11

## 2022-10-11 RX ORDER — TELMISARTAN 80 MG/1
80 TABLET ORAL
Qty: 90 | Refills: 1 | Status: ACTIVE | COMMUNITY
Start: 2022-03-08 | End: 1900-01-01

## 2022-10-20 ENCOUNTER — APPOINTMENT (OUTPATIENT)
Dept: NUCLEAR MEDICINE | Facility: IMAGING CENTER | Age: 82
End: 2022-10-20

## 2022-10-25 ENCOUNTER — APPOINTMENT (OUTPATIENT)
Dept: PULMONOLOGY | Facility: CLINIC | Age: 82
End: 2022-10-25

## 2022-10-25 ENCOUNTER — APPOINTMENT (OUTPATIENT)
Dept: INTERNAL MEDICINE | Facility: CLINIC | Age: 82
End: 2022-10-25

## 2022-10-25 DIAGNOSIS — R05.9 COUGH, UNSPECIFIED: ICD-10-CM

## 2022-10-25 PROCEDURE — 99442: CPT | Mod: 95

## 2022-10-25 NOTE — ASSESSMENT
[FreeTextEntry1] : 82 year old female Hx adenocarcinoma lung s/p resection 2017 presents for evaluation cough, mild reactive airway disease s/p resection adenocarcinoma lung now with new 0.9 cm nodule on CT dated 9/82022\par \par Trial of Tessalon Perles\par Repeat CT chest 12/2022 \par Claritin as directed for allergy related symptoms\par \par Follow up pending CT chest

## 2022-10-25 NOTE — HISTORY OF PRESENT ILLNESS
[Former] : former [Never] : never [Home] : at home, [unfilled] , at the time of the visit. [Medical Office: ([facility])___] : at the medical office located in  [Verbal consent obtained from patient] : the patient, [unfilled] [TextBox_4] : 82 year old female poor historian Hx dementia, mitral valve regurgitation, palpitations, hyperlipidemia, s/p resection adenocarcinoma lung 2017, reactive airway disease, presents to [facility] for follow up. Patient underwent CT chest 9/12/22 revealing 0.9 cm nodular opacity RML.  Patient having difficulty remembering what was discussed last visit.  She is experiencing memory issues.   She is here to rediscuss plan of care regarding CT results.

## 2022-10-25 NOTE — REASON FOR VISIT
[Follow-Up] : a follow-up visit [Abnormal CXR/ Chest CT] : an abnormal CXR/ chest CT [Lung Cancer] : lung cancer [Asthma] : asthma [Shortness of Breath] : shortness of breath

## 2022-10-25 NOTE — PROCEDURE
[FreeTextEntry1] : PFT 12/16/2019 personally reviewed Normal Spirometry\par \par CT chest 9/8/22 personally reviewed new RML 0.9 cm nodular opacity recommend CT follow up 3 months (December 2022)

## 2022-10-26 ENCOUNTER — LABORATORY RESULT (OUTPATIENT)
Age: 82
End: 2022-10-26

## 2022-10-26 ENCOUNTER — NON-APPOINTMENT (OUTPATIENT)
Age: 82
End: 2022-10-26

## 2022-10-26 ENCOUNTER — APPOINTMENT (OUTPATIENT)
Dept: CARDIOLOGY | Facility: CLINIC | Age: 82
End: 2022-10-26

## 2022-10-26 VITALS
OXYGEN SATURATION: 97 % | TEMPERATURE: 97.6 F | WEIGHT: 134 LBS | SYSTOLIC BLOOD PRESSURE: 122 MMHG | DIASTOLIC BLOOD PRESSURE: 82 MMHG | RESPIRATION RATE: 16 BRPM | HEART RATE: 85 BPM | HEIGHT: 66 IN | BODY MASS INDEX: 21.53 KG/M2

## 2022-10-26 DIAGNOSIS — R41.3 OTHER AMNESIA: ICD-10-CM

## 2022-10-26 PROCEDURE — 99214 OFFICE O/P EST MOD 30 MIN: CPT

## 2022-10-26 PROCEDURE — 93000 ELECTROCARDIOGRAM COMPLETE: CPT

## 2022-10-26 RX ORDER — EZETIMIBE AND SIMVASTATIN 10; 40 MG/1; MG/1
10-40 TABLET ORAL DAILY
Qty: 90 | Refills: 1 | Status: ACTIVE | COMMUNITY
Start: 2019-04-23 | End: 1900-01-01

## 2022-10-26 NOTE — PHYSICAL EXAM
[Well Developed] : well developed [Well Nourished] : well nourished [No Acute Distress] : no acute distress [Normal Venous Pressure] : normal venous pressure [No Carotid Bruit] : no carotid bruit [Normal S1, S2] : normal S1, S2 [No Murmur] : no murmur [No Rub] : no rub [Clear Lung Fields] : clear lung fields [Good Air Entry] : good air entry [No Respiratory Distress] : no respiratory distress  [Soft] : abdomen soft [Non Tender] : non-tender [No Masses/organomegaly] : no masses/organomegaly [Normal Bowel Sounds] : normal bowel sounds [Normal Gait] : normal gait [No Edema] : no edema [No Cyanosis] : no cyanosis [No Clubbing] : no clubbing [No Varicosities] : no varicosities [No Rash] : no rash [No Skin Lesions] : no skin lesions [Moves all extremities] : moves all extremities [No Focal Deficits] : no focal deficits [Normal Speech] : normal speech [Alert and Oriented] : alert and oriented [Normal memory] : normal memory [General Appearance - Well Developed] : well developed [Normal Appearance] : normal appearance [Well Groomed] : well groomed [General Appearance - Well Nourished] : well nourished [No Deformities] : no deformities [General Appearance - In No Acute Distress] : no acute distress [Normal Conjunctiva] : the conjunctiva exhibited no abnormalities [Normal Oral Mucosa] : normal oral mucosa [Normal Jugular Venous A Waves Present] : normal jugular venous A waves present [Normal Jugular Venous V Waves Present] : normal jugular venous V waves present [No Jugular Venous Ko A Waves] : no jugular venous ko A waves [Respiration, Rhythm And Depth] : normal respiratory rhythm and effort [Exaggerated Use Of Accessory Muscles For Inspiration] : no accessory muscle use [Auscultation Breath Sounds / Voice Sounds] : lungs were clear to auscultation bilaterally [Bowel Sounds] : normal bowel sounds [Abdomen Soft] : soft [Abnormal Walk] : normal gait [Gait - Sufficient For Exercise Testing] : the gait was sufficient for exercise testing [Nail Clubbing] : no clubbing of the fingernails [Cyanosis, Localized] : no localized cyanosis [Skin Turgor] : normal skin turgor [Skin Color & Pigmentation] : normal skin color and pigmentation [] : no rash [Oriented To Time, Place, And Person] : oriented to person, place, and time [Mood] : the mood was normal [Oriented to Person] : oriented to person [Oriented to Place] : oriented to place [Oriented to Time] : oriented to time [5th Left ICS - MCL] : palpated at the 5th LICS in the midclavicular line [Normal] : normal [No Precordial Heave] : no precordial heave was noted [Normal Rate] : normal [Rhythm Regular] : regular [Normal S1] : normal S1 [Normal S2] : normal S2 [No Gallop] : no gallop heard [II] : a grade 2 [I] : a grade 1 [2+] : left 2+ [No Abnormalities] : the abdominal aorta was not enlarged and no bruit was heard [No Pitting Edema] : no pitting edema present [FreeTextEntry1] : Pt is forgetful and poor historian.  [S3] : no S3 [S4] : no S4 [Click] : no click [Right Carotid Bruit] : no bruit heard over the right carotid [Left Carotid Bruit] : no bruit heard over the left carotid [Right Femoral Bruit] : no bruit heard over the right femoral artery [Left Femoral Bruit] : no bruit heard over the left femoral artery

## 2022-10-26 NOTE — DISCUSSION/SUMMARY
[FreeTextEntry1] : This is a 82-year-old female with past medical history significant for mitral valve regurgitation, occasional palpitations, hyperlipidemia, status post recent resection of the right lobe for adenocarcinoma, comes in followup cardiac evaluation. She denies chest pain, shortness of breath, dizziness or syncope. \par The patient remains stable from a cardiac standpoint.  \par Electrocardiogram done August 26, 2022 demonstrated normal sinus rhythm rate of 85 bpm is otherwise remarkable for left\par She admits that she is struggling with her memory.\par She will have blood work done today for lipid panel.  She will follow-up with her neurologist regarding her memory and her primary care physician.\par  She has been stable on her current dose of Vytorin 10/40 mg/day.  She still does not wish to change to a higher intensity statin therapy.\par Her blood pressure is controlled on Micardis 80 mg daily.  She will be seeing her primary care physician in the next few weeks and will get blood work through their office\par \par Electrocardiogram done March 15, 2021 demonstrated normal sinus rhythm rate 72 bpm is otherwise unremarkable.\par \par The patient had blood work done with her primary care physician December 29, 2020 which demonstrated cholesterol 179, triglycerides of 99, HDL of 62, LDL calculated 97 mg/dL.\par \par She is currently hemodynamically stable and asymptomatic from a cardiac standpoint.\par \par Electrocardiogram done July 8, 2020 demonstrated normal sinus rhythm rate of 72 bpm is otherwise remarkable for left atrial abnormality and a right interventricular conduction delay.\par \par The patient had a normal nuclear stress test on October 29, 2018.\par \par The patient understands that aerobic exercises must be increased to 40 minutes 4 times per week. A detailed discussion of lifestyle modification was done today. The patient has a good understanding of the diagnosis, and treatment plan. Lifestyle modification was also outlined.

## 2022-10-26 NOTE — ASSESSMENT
[FreeTextEntry1] : \par ===Prior visit JULIO CESAR Patino Apr 2022==\par This is a 81 year year old female here today for follow up cardiac evaluation. 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 She is a poor historian and states that she lives alone at the [facility]. She is unaware of what medication she is on. She is ordered Telmisartan 80mg PO DAILY and Zetia-Simvastatin 10/40mg PO DAILY. She has Metoprolol on hand but it was discussed last visit that she should not be on this due to dizziness. She states she is not on this medication but is confused as to why this was ordered (this was ordered by "Dr. Ge Zuniga" who she does not know who he is. She denies chest pain or SELBY at this time and states she is feeling well otherwise. \par \par She is more forgetful but states she does follow up with her PCP Dr. Curran but has not seen him "in a long time" and "I don’t need to see him".\par \par PMH:\par Since her last visit she had a hospitalization on 5/13/2021:\par Hospital Course: \par Discharge Date: 18-May-2021 \par Admission Date	06-May-2021 16:06 \par Reason for Admission: metabolic encephalopathy, SIRS, NSTEMI, hypernatremia, \par 79 yo F with unk pmhx presents with EMS - found on floor sitting in urine. Pt \par states she was getting out of the bed and fell on the ground. Does not recall \par how long she was on the ground. Complains of back pain. \par Per family has been forgetful over past some time. Had MRI and some other \par tests. Was at [facility] 3 weeks ago for stiff neck - sent home. Dr. Millan - \par cardiologist, but says she has no cardiac problems. alert to name and place \par \par BLOOD PRESSURE:\par -BP remains elevated in today's visit. I remind her to stay on Telmisartan 80mg PO DAILY and she will follow up in 1 month for BP check. She can not tell me if she was taking this medication as prescribed. She says she is taking it daily but when asked again she states "I am not on that medication"\par \par -She was recently on Metoprolol ER 25mg PO DAILY but due to her episode of syncope the medication was d/c'ed and she should not be on this right now. \par \par -I have discussed the importance of maintaining good BP control and reviewed the newest guidelines with the patient while re-enforcing dietary sodium restrictions to no more than 2-3 g daily, DASH diet, life style modifications as well as the goal of maintaining ideal body weight with the patient today. I have advised the patient to avoid the use of over-the-counter medications/ supplements especially NSAIDS.\par \par I have reviewed with Ms. LAGUNAS that serious health consequences can occur when blood pressure is not well controlled and the need for strict compliance with medication and that optimal control can significantly reduce the risk of cardiovascular disease stroke, heart attack and other organ damage. They verbally expressed understanding of the fore mentioned serious health consequences to me today.\par \par BLOOD WORK:\par -Blood work was done by PCP on 2/24/22 and cholesterol elevated at 255. \par -She remains on Zetia-Simvastatin 10/40mg PO DAILY but again she can not recall if she is taking this medication.\par -New blood work was done today, 04/20/2022  to evaluate lipid profile, CBC, BMP, hepatic function, A1C and TSH. \par \par CHOLESTEROL CONTROL:\par -Patient will continue the advised  TLC diet and to continue follow-up for treatment of hyperlipidemia and repeat blood testing with diet and exercise. I have discussed different exercises and the importance of maintenance of optimal body weight. The importance of staying within guidelines and recommendations was stressed to the patient today and they acknowledged that they understand this to me verbally.\par \par  -Ms. LAGUNAS was educated and advised that failure to follow-up with my medical recommendations to lower cholesterol can result in severe health consequences therefore, they will continuing a low saturated and low fat diet and to avoid excessive carbohydrates to help reduce triglycerides and that lowering LDL levels is associated with a significant decrease in serious cardiac events including but not limited to heart attack stroke and overall death. We will continue lipid lowering agents as advised based on blood test results and the patient understands to call if they develop severe muscle discomfort or if they have a reddish tinted discoloration in their urine.\par \par TESTING/REPORTS: \par -EKG done Mar 08, 2022 which demonstrated regular sinus rhythm with nonspecific ST-T wave changes, BPM of 63.\par \par -Electrocardiogram done July 8, 2020 demonstrated normal sinus rhythm rate of 72 bpm is otherwise remarkable for left atrial abnormality and a right interventricular conduction delay.\par \par -The patient had a normal nuclear stress test on October 29, 2018.\par \par -Echocardiogram and Carotid doppler done today 4/20/22 and results pending. \par \par -Normal BUNNY done in the office 4/20/22\par \par PLAN:\par -Start Telmisartan 80mg PO DAILY. \par -Start Zetia-Simvastatin 10/40mg PO DAILY.\par -Follow up with PCP.\par -I tried to call her PCP number which she gave me at the [facility] where she is staying and did not reach anyone. I also left a message for her emergency contact "Kristal" and asked for a phone call back to see if there is anyone who can assist with her care while at home.\par -I wrote down for her the medication in which she should be taking for our office.\par \par I have discussed the plan of care with Ms. DIAMOND LAGUNAS  and she  will follow up in 1 month. \par \par The patient understands that aerobic exercises must be increased to minutes 4 times/week and a detailed discussion of lifestyle modification was done today. \par The patient has a good understanding of the diagnosis, treatment plan and lifestyle modification. \par She will contact me at the office for any questions with their care or any changes in their health status.\par \par The plan of care was discussed with the patient with supervision physician Dr. Mario Millan and will be carried out as noted above.\par \par Elida HARRELL \par

## 2022-10-27 RX ORDER — ELECTROLYTES/DEXTROSE
SOLUTION, ORAL ORAL DAILY
Refills: 0 | Status: COMPLETED | COMMUNITY
Start: 2018-11-27 | End: 2022-10-27

## 2022-10-27 RX ORDER — DONEPEZIL HYDROCHLORIDE 5 MG/1
5 TABLET ORAL
Qty: 30 | Refills: 0 | Status: COMPLETED | COMMUNITY
Start: 2022-04-12 | End: 2022-10-27

## 2022-10-27 RX ORDER — GALANTAMINE 4 MG/1
4 TABLET, FILM COATED ORAL
Qty: 60 | Refills: 0 | Status: COMPLETED | COMMUNITY
Start: 2022-04-25 | End: 2022-10-27

## 2022-10-27 RX ORDER — BENZONATATE 200 MG/1
200 CAPSULE ORAL 3 TIMES DAILY
Qty: 60 | Refills: 1 | Status: COMPLETED | COMMUNITY
Start: 2022-06-28 | End: 2022-10-27

## 2022-10-27 RX ORDER — AMOXICILLIN AND CLAVULANATE POTASSIUM 875; 125 MG/1; MG/1
875-125 TABLET, COATED ORAL
Qty: 14 | Refills: 0 | Status: COMPLETED | COMMUNITY
Start: 2022-05-18 | End: 2022-10-27

## 2022-10-27 RX ORDER — CEFDINIR 300 MG/1
300 CAPSULE ORAL
Qty: 14 | Refills: 0 | Status: COMPLETED | COMMUNITY
Start: 2022-02-22 | End: 2022-10-27

## 2022-10-27 RX ORDER — ALBUTEROL SULFATE 90 UG/1
108 (90 BASE) AEROSOL, METERED RESPIRATORY (INHALATION)
Qty: 1 | Refills: 1 | Status: COMPLETED | COMMUNITY
Start: 2020-03-16 | End: 2022-10-27

## 2022-11-09 ENCOUNTER — NON-APPOINTMENT (OUTPATIENT)
Age: 82
End: 2022-11-09

## 2022-12-19 ENCOUNTER — OUTPATIENT (OUTPATIENT)
Dept: OUTPATIENT SERVICES | Facility: HOSPITAL | Age: 82
LOS: 1 days | End: 2022-12-19
Payer: MEDICARE

## 2022-12-19 ENCOUNTER — APPOINTMENT (OUTPATIENT)
Dept: CT IMAGING | Facility: IMAGING CENTER | Age: 82
End: 2022-12-19

## 2022-12-19 DIAGNOSIS — C34.90 MALIGNANT NEOPLASM OF UNSPECIFIED PART OF UNSPECIFIED BRONCHUS OR LUNG: ICD-10-CM

## 2022-12-19 DIAGNOSIS — R93.89 ABNORMAL FINDINGS ON DIAGNOSTIC IMAGING OF OTHER SPECIFIED BODY STRUCTURES: ICD-10-CM

## 2022-12-19 PROCEDURE — 71250 CT THORAX DX C-: CPT | Mod: 26,MH

## 2022-12-19 PROCEDURE — 71250 CT THORAX DX C-: CPT

## 2023-01-18 ENCOUNTER — APPOINTMENT (OUTPATIENT)
Dept: NUCLEAR MEDICINE | Facility: IMAGING CENTER | Age: 83
End: 2023-01-18
Payer: MEDICARE

## 2023-01-18 ENCOUNTER — OUTPATIENT (OUTPATIENT)
Dept: OUTPATIENT SERVICES | Facility: HOSPITAL | Age: 83
LOS: 1 days | End: 2023-01-18
Payer: MEDICARE

## 2023-01-18 DIAGNOSIS — Z00.8 ENCOUNTER FOR OTHER GENERAL EXAMINATION: ICD-10-CM

## 2023-01-18 DIAGNOSIS — R41.3 OTHER AMNESIA: ICD-10-CM

## 2023-01-18 PROCEDURE — A9552: CPT

## 2023-01-18 PROCEDURE — 78608 BRAIN IMAGING (PET): CPT | Mod: 26,MH

## 2023-01-18 PROCEDURE — 78999 UNLISTED MISC PX DX NUC MED: CPT | Mod: MH

## 2023-01-18 PROCEDURE — 78999 UNLISTED MISC PX DX NUC MED: CPT | Mod: 26,MH

## 2023-01-18 PROCEDURE — 78608 BRAIN IMAGING (PET): CPT | Mod: MH

## 2023-02-08 ENCOUNTER — APPOINTMENT (OUTPATIENT)
Dept: PULMONOLOGY | Facility: CLINIC | Age: 83
End: 2023-02-08
Payer: MEDICARE

## 2023-02-08 VITALS
HEART RATE: 88 BPM | DIASTOLIC BLOOD PRESSURE: 64 MMHG | TEMPERATURE: 97.6 F | SYSTOLIC BLOOD PRESSURE: 120 MMHG | OXYGEN SATURATION: 98 % | HEIGHT: 66 IN | WEIGHT: 140 LBS | BODY MASS INDEX: 22.5 KG/M2

## 2023-02-08 PROCEDURE — 99214 OFFICE O/P EST MOD 30 MIN: CPT | Mod: 25

## 2023-02-09 NOTE — HISTORY OF PRESENT ILLNESS
[Home] : at home, [unfilled] , at the time of the visit. [Medical Office: ([facility])___] : at the medical office located in  [Verbal consent obtained from patient] : the patient, [unfilled] [Former] : former [Never] : never [TextBox_4] : 82 year old female poor historian Hx severe dementia, mitral valve regurgitation, palpitations, hyperlipidemia, s/p resection adenocarcinoma lung 2017, reactive airway disease, presents to [facility] for follow up. Patient  had repeat CT chest January 2023 revealing 1.4 cm lobulated pulmonary nodule.  Patient is here for follow up and plan of care.

## 2023-02-09 NOTE — PROCEDURE
[FreeTextEntry1] : PFT 12/16/2019 personally reviewed Normal Spirometry\par \par CT chest 9/8/22 personally reviewed new RML 0.9 cm nodular opacity recommend CT follow up 3 months (December 2022)\par \par CT chest 12/19/22 personally reviewed s/p wedge resection RLL\par New 1.4 cm lobulated nodule lateral segment RML

## 2023-02-09 NOTE — ASSESSMENT
[FreeTextEntry1] : 82 year old female Hx adenocarcinoma lung s/p resection 2017 presents for evaluation cough, mild reactive airway disease s/p resection adenocarcinoma lung now with new 1.4 cm nodule on CT dated 12/22/22\par \par Consider PET CT\par Patient severely limited by dementia. and having difficulty understanding plan of care.  Patient does not speak to family and attempts to contact family have been unsuccessful.  Patient has HCP she but does not accompany patient to appointments.  Will attempt to contact HCP.\par \par

## 2023-02-09 NOTE — ADDENDUM
[FreeTextEntry1] : I have contacted Ms. Webb's Health Care Proxy, Nathaly Stokes, [phone],  to discuss my concerns regarding patient deteriorating memory, worsening dementia as patient does not have insight into her diagnosis nor is she capable on following up on plan of care. Patient was unaccompanied at today's visit and demonstrated significant cognitive decline.  She lives alone and was unable to telephone taxi for ride home or make necessary follow up and testing appointment.  Patient HCP is aware of her deteriorating status and will reach out for possible in home assistance as well as Geriatric Department  for further assistance.

## 2023-02-14 ENCOUNTER — TRANSCRIPTION ENCOUNTER (OUTPATIENT)
Age: 83
End: 2023-02-14

## 2023-02-16 ENCOUNTER — APPOINTMENT (OUTPATIENT)
Dept: NUCLEAR MEDICINE | Facility: IMAGING CENTER | Age: 83
End: 2023-02-16
Payer: MEDICARE

## 2023-02-16 ENCOUNTER — OUTPATIENT (OUTPATIENT)
Dept: OUTPATIENT SERVICES | Facility: HOSPITAL | Age: 83
LOS: 1 days | End: 2023-02-16
Payer: MEDICARE

## 2023-02-16 DIAGNOSIS — C34.90 MALIGNANT NEOPLASM OF UNSPECIFIED PART OF UNSPECIFIED BRONCHUS OR LUNG: ICD-10-CM

## 2023-02-16 DIAGNOSIS — R91.1 SOLITARY PULMONARY NODULE: ICD-10-CM

## 2023-02-16 DIAGNOSIS — R93.89 ABNORMAL FINDINGS ON DIAGNOSTIC IMAGING OF OTHER SPECIFIED BODY STRUCTURES: ICD-10-CM

## 2023-02-16 PROCEDURE — 78815 PET IMAGE W/CT SKULL-THIGH: CPT | Mod: 26,PI,MH

## 2023-02-16 PROCEDURE — 78815 PET IMAGE W/CT SKULL-THIGH: CPT

## 2023-02-16 PROCEDURE — A9552: CPT

## 2023-02-23 ENCOUNTER — APPOINTMENT (OUTPATIENT)
Dept: PULMONOLOGY | Facility: CLINIC | Age: 83
End: 2023-02-23
Payer: MEDICARE

## 2023-02-23 VITALS
SYSTOLIC BLOOD PRESSURE: 138 MMHG | WEIGHT: 140 LBS | HEIGHT: 66 IN | TEMPERATURE: 97 F | RESPIRATION RATE: 14 BRPM | OXYGEN SATURATION: 98 % | DIASTOLIC BLOOD PRESSURE: 76 MMHG | BODY MASS INDEX: 22.5 KG/M2 | HEART RATE: 90 BPM

## 2023-02-23 PROCEDURE — 99214 OFFICE O/P EST MOD 30 MIN: CPT | Mod: 25

## 2023-02-23 NOTE — ASSESSMENT
[FreeTextEntry1] : 82 year old female Hx adenocarcinoma lung s/p resection 2017 presents for evaluation cough, mild reactive airway disease s/p resection adenocarcinoma lung now with new 1.4 cm nodule on CT dated 12/22/22, PET CT results reviewed (see Narrative)\par \par Repeat CT chest 3 months May 2023\par \par After long discussion with patient and HCP agrees with plan of care.  All questions answered to patient and HCP satisfaction\par \par

## 2023-02-23 NOTE — PROCEDURE
[FreeTextEntry1] : PFT 12/16/2019 personally reviewed Normal Spirometry\par \par CT chest 9/8/22 personally reviewed new RML 0.9 cm nodular opacity recommend CT follow up 3 months (December 2022)\par \par CT chest 12/19/22 personally reviewed s/p wedge resection RLL\par New 1.4 cm lobulated nodule lateral segment RML \par \par PET CT 2/16/23 personally reviewed minimally FDG avid lobulated pulmonary nodule, lateral aspect RML not significantly changed as compared to CT dated 12/19/22 etiology indeterminate. Differential diagnosis includes low grade primary lung neoplasm\par Post surgical changes RLL with associated minimally avid FDG activity which is unchanged on CT

## 2023-02-23 NOTE — HISTORY OF PRESENT ILLNESS
[Former] : former [Never] : never [Home] : at home, [unfilled] , at the time of the visit. [Medical Office: ([facility])___] : at the medical office located in  [Verbal consent obtained from patient] : the patient, [unfilled] [TextBox_4] : 82 year old female poor historian Hx severe dementia, mitral valve regurgitation, palpitations, hyperlipidemia, s/p resection adenocarcinoma lung 2017, reactive airway disease, presents to [facility] for follow up. Patient underwent PET CT and is here accompanied by her health care proxy Patricia Stokes.  Patient is here for PET CT results and plan of care.

## 2023-03-22 ENCOUNTER — APPOINTMENT (OUTPATIENT)
Dept: PULMONOLOGY | Facility: CLINIC | Age: 83
End: 2023-03-22
Payer: MEDICARE

## 2023-03-22 VITALS
DIASTOLIC BLOOD PRESSURE: 76 MMHG | HEART RATE: 89 BPM | TEMPERATURE: 98 F | SYSTOLIC BLOOD PRESSURE: 130 MMHG | HEIGHT: 66 IN | OXYGEN SATURATION: 99 % | WEIGHT: 133 LBS | BODY MASS INDEX: 21.38 KG/M2

## 2023-03-22 DIAGNOSIS — R94.2 ABNORMAL RESULTS OF PULMONARY FUNCTION STUDIES: ICD-10-CM

## 2023-03-22 DIAGNOSIS — C34.90 MALIGNANT NEOPLASM OF UNSPECIFIED PART OF UNSPECIFIED BRONCHUS OR LUNG: ICD-10-CM

## 2023-03-22 DIAGNOSIS — F03.90 UNSPECIFIED DEMENTIA W/OUT BEHAVIORAL DISTURBANCE: ICD-10-CM

## 2023-03-22 DIAGNOSIS — R93.89 ABNORMAL FINDINGS ON DIAGNOSTIC IMAGING OF OTHER SPECIFIED BODY STRUCTURES: ICD-10-CM

## 2023-03-22 DIAGNOSIS — R91.1 SOLITARY PULMONARY NODULE: ICD-10-CM

## 2023-03-22 PROCEDURE — 99214 OFFICE O/P EST MOD 30 MIN: CPT | Mod: 25

## 2023-03-22 NOTE — HISTORY OF PRESENT ILLNESS
[Former] : former [Never] : never [Home] : at home, [unfilled] , at the time of the visit. [Medical Office: ([facility])___] : at the medical office located in  [Verbal consent obtained from patient] : the patient, [unfilled] [TextBox_4] : 82 year old female poor historian Hx severe dementia, mitral valve regurgitation, palpitations, hyperlipidemia, s/p resection adenocarcinoma lung 2017, reactive airway disease, presents to [facility] for follow up. Patient is unaccompanied today.  She reports she would like to change her health care proxy.  She is to have repeat CT chest May 2023.  She states she has a friend outside but does not want her in the during visit.

## 2023-03-22 NOTE — ASSESSMENT
[FreeTextEntry1] : 82 year old female Hx adenocarcinoma lung s/p resection 2017 presents for evaluation cough, mild reactive airway disease s/p resection adenocarcinoma lung now with new 1.4 cm nodule on CT dated 12/22/22, PET CT results reviewed (see Narrative)\par \par Repeat CT chest 3 months May 2023\par \par Patient unaccompanied today.  She has difficulty with memory and processing instructions.

## 2023-04-18 ENCOUNTER — LABORATORY RESULT (OUTPATIENT)
Age: 83
End: 2023-04-18

## 2023-04-18 ENCOUNTER — APPOINTMENT (OUTPATIENT)
Dept: CARDIOLOGY | Facility: CLINIC | Age: 83
End: 2023-04-18
Payer: MEDICARE

## 2023-04-18 ENCOUNTER — NON-APPOINTMENT (OUTPATIENT)
Age: 83
End: 2023-04-18

## 2023-04-18 VITALS
OXYGEN SATURATION: 99 % | TEMPERATURE: 97.6 F | SYSTOLIC BLOOD PRESSURE: 127 MMHG | RESPIRATION RATE: 15 BRPM | HEIGHT: 66 IN | WEIGHT: 140 LBS | DIASTOLIC BLOOD PRESSURE: 80 MMHG | BODY MASS INDEX: 22.5 KG/M2 | HEART RATE: 62 BPM

## 2023-04-18 PROCEDURE — 93000 ELECTROCARDIOGRAM COMPLETE: CPT

## 2023-04-18 PROCEDURE — 99214 OFFICE O/P EST MOD 30 MIN: CPT

## 2023-04-18 NOTE — ASSESSMENT
[FreeTextEntry1] : \par ===Prior visit JULIO CESAR Patino Apr 2022==\par This is a 81 year year old female here today for follow up cardiac evaluation. 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 She is a poor historian and states that she lives alone at the [facility]. She is unaware of what medication she is on. She is ordered Telmisartan 80mg PO DAILY and Zetia-Simvastatin 10/40mg PO DAILY. She has Metoprolol on hand but it was discussed last visit that she should not be on this due to dizziness. She states she is not on this medication but is confused as to why this was ordered (this was ordered by "Dr. Ge Zuniga" who she does not know who he is. She denies chest pain or SELBY at this time and states she is feeling well otherwise. \par \par She is more forgetful but states she does follow up with her PCP Dr. Curran but has not seen him "in a long time" and "I don’t need to see him".\par \par PMH:\par Since her last visit she had a hospitalization on 5/13/2021:\par Hospital Course: \par Discharge Date: 18-May-2021 \par Admission Date	06-May-2021 16:06 \par Reason for Admission: metabolic encephalopathy, SIRS, NSTEMI, hypernatremia, \par 77 yo F with unk pmhx presents with EMS - found on floor sitting in urine. Pt \par states she was getting out of the bed and fell on the ground. Does not recall \par how long she was on the ground. Complains of back pain. \par Per family has been forgetful over past some time. Had MRI and some other \par tests. Was at [facility] 3 weeks ago for stiff neck - sent home. Dr. Millan - \par cardiologist, but says she has no cardiac problems. alert to name and place \par \par BLOOD PRESSURE:\par -BP remains elevated in today's visit. I remind her to stay on Telmisartan 80mg PO DAILY and she will follow up in 1 month for BP check. She can not tell me if she was taking this medication as prescribed. She says she is taking it daily but when asked again she states "I am not on that medication"\par \par -She was recently on Metoprolol ER 25mg PO DAILY but due to her episode of syncope the medication was d/c'ed and she should not be on this right now. \par \par -I have discussed the importance of maintaining good BP control and reviewed the newest guidelines with the patient while re-enforcing dietary sodium restrictions to no more than 2-3 g daily, DASH diet, life style modifications as well as the goal of maintaining ideal body weight with the patient today. I have advised the patient to avoid the use of over-the-counter medications/ supplements especially NSAIDS.\par \par I have reviewed with Ms. LAGUNAS that serious health consequences can occur when blood pressure is not well controlled and the need for strict compliance with medication and that optimal control can significantly reduce the risk of cardiovascular disease stroke, heart attack and other organ damage. They verbally expressed understanding of the fore mentioned serious health consequences to me today.\par \par BLOOD WORK:\par -Blood work was done by PCP on 2/24/22 and cholesterol elevated at 255. \par -She remains on Zetia-Simvastatin 10/40mg PO DAILY but again she can not recall if she is taking this medication.\par -New blood work was done today, 04/20/2022  to evaluate lipid profile, CBC, BMP, hepatic function, A1C and TSH. \par \par CHOLESTEROL CONTROL:\par -Patient will continue the advised  TLC diet and to continue follow-up for treatment of hyperlipidemia and repeat blood testing with diet and exercise. I have discussed different exercises and the importance of maintenance of optimal body weight. The importance of staying within guidelines and recommendations was stressed to the patient today and they acknowledged that they understand this to me verbally.\par \par  -Ms. LAGUNAS was educated and advised that failure to follow-up with my medical recommendations to lower cholesterol can result in severe health consequences therefore, they will continuing a low saturated and low fat diet and to avoid excessive carbohydrates to help reduce triglycerides and that lowering LDL levels is associated with a significant decrease in serious cardiac events including but not limited to heart attack stroke and overall death. We will continue lipid lowering agents as advised based on blood test results and the patient understands to call if they develop severe muscle discomfort or if they have a reddish tinted discoloration in their urine.\par \par TESTING/REPORTS: \par -EKG done Mar 08, 2022 which demonstrated regular sinus rhythm with nonspecific ST-T wave changes, BPM of 63.\par \par -Electrocardiogram done July 8, 2020 demonstrated normal sinus rhythm rate of 72 bpm is otherwise remarkable for left atrial abnormality and a right interventricular conduction delay.\par \par -The patient had a normal nuclear stress test on October 29, 2018.\par \par -Echocardiogram and Carotid doppler done today 4/20/22 and results pending. \par \par -Normal BUNNY done in the office 4/20/22\par \par PLAN:\par -Start Telmisartan 80mg PO DAILY. \par -Start Zetia-Simvastatin 10/40mg PO DAILY.\par -Follow up with PCP.\par -I tried to call her PCP number which she gave me at the [facility] where she is staying and did not reach anyone. I also left a message for her emergency contact "Kristal" and asked for a phone call back to see if there is anyone who can assist with her care while at home.\par -I wrote down for her the medication in which she should be taking for our office.\par \par I have discussed the plan of care with Ms. DIAMOND LAGUNAS  and she  will follow up in 1 month. \par \par The patient understands that aerobic exercises must be increased to minutes 4 times/week and a detailed discussion of lifestyle modification was done today. \par The patient has a good understanding of the diagnosis, treatment plan and lifestyle modification. \par She will contact me at the office for any questions with their care or any changes in their health status.\par \par The plan of care was discussed with the patient with supervision physician Dr. Mario Millan and will be carried out as noted above.\par \par Elida HARRELL \par  Yes

## 2023-04-18 NOTE — PHYSICAL EXAM
[Well Developed] : well developed [Well Nourished] : well nourished [No Acute Distress] : no acute distress [Normal Venous Pressure] : normal venous pressure [No Carotid Bruit] : no carotid bruit [Normal S1, S2] : normal S1, S2 [No Murmur] : no murmur [No Rub] : no rub [Clear Lung Fields] : clear lung fields [Good Air Entry] : good air entry [No Respiratory Distress] : no respiratory distress  [Soft] : abdomen soft [Non Tender] : non-tender [No Masses/organomegaly] : no masses/organomegaly [Normal Bowel Sounds] : normal bowel sounds [Normal Gait] : normal gait [No Edema] : no edema [No Cyanosis] : no cyanosis [No Clubbing] : no clubbing [No Varicosities] : no varicosities [No Rash] : no rash [No Skin Lesions] : no skin lesions [Moves all extremities] : moves all extremities [No Focal Deficits] : no focal deficits [Normal Speech] : normal speech [Alert and Oriented] : alert and oriented [Normal memory] : normal memory [General Appearance - Well Developed] : well developed [Normal Appearance] : normal appearance [Well Groomed] : well groomed [General Appearance - Well Nourished] : well nourished [No Deformities] : no deformities [General Appearance - In No Acute Distress] : no acute distress [Normal Conjunctiva] : the conjunctiva exhibited no abnormalities [Normal Oral Mucosa] : normal oral mucosa [Normal Jugular Venous A Waves Present] : normal jugular venous A waves present [Normal Jugular Venous V Waves Present] : normal jugular venous V waves present [No Jugular Venous Ko A Waves] : no jugular venous ko A waves [Respiration, Rhythm And Depth] : normal respiratory rhythm and effort [Exaggerated Use Of Accessory Muscles For Inspiration] : no accessory muscle use [Auscultation Breath Sounds / Voice Sounds] : lungs were clear to auscultation bilaterally [Bowel Sounds] : normal bowel sounds [Abdomen Soft] : soft [Abnormal Walk] : normal gait [Gait - Sufficient For Exercise Testing] : the gait was sufficient for exercise testing [Nail Clubbing] : no clubbing of the fingernails [Cyanosis, Localized] : no localized cyanosis [Skin Color & Pigmentation] : normal skin color and pigmentation [Skin Turgor] : normal skin turgor [] : no rash [Oriented To Time, Place, And Person] : oriented to person, place, and time [Mood] : the mood was normal [Oriented to Person] : oriented to person [Oriented to Place] : oriented to place [Oriented to Time] : oriented to time [5th Left ICS - MCL] : palpated at the 5th LICS in the midclavicular line [Normal] : normal [No Precordial Heave] : no precordial heave was noted [Normal Rate] : normal [Rhythm Regular] : regular [Normal S1] : normal S1 [Normal S2] : normal S2 [No Gallop] : no gallop heard [II] : a grade 2 [I] : a grade 1 [2+] : left 2+ [No Abnormalities] : the abdominal aorta was not enlarged and no bruit was heard [No Pitting Edema] : no pitting edema present [FreeTextEntry1] : Pt is forgetful and poor historian.  [S3] : no S3 [S4] : no S4 [Click] : no click [Right Carotid Bruit] : no bruit heard over the right carotid [Left Carotid Bruit] : no bruit heard over the left carotid [Right Femoral Bruit] : no bruit heard over the right femoral artery [Left Femoral Bruit] : no bruit heard over the left femoral artery

## 2023-04-18 NOTE — DISCUSSION/SUMMARY
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 She denies chest pain, shortness of breath, dizziness or syncope. \par The patient remains stable from a cardiac standpoint.  \par Electrocardiogram done April 18, 2023 demonstrates normal sinus rhythm rate 61 bpm is otherwise unremarkable.\par The patient will follow-up with her primary care physician and neurologist.  She is currently hemodynamically stable and asymptomatic from a cardiac standpoint.\par Lipid profile done October 26, 2022 demonstrated total cholesterol 228, HDL 49, triglycerides 201, LDL direct 143 mg/dL and non-HDL cholesterol 179 mg/dL.  The patient remains on Vytorin 10/40 mg daily as well as Micardis 80 mg/day.\par \par Electrocardiogram done August 26, 2022 demonstrated normal sinus rhythm rate of 85 bpm is otherwise remarkable for left\par She admits that she is struggling with her memory.\par Electrocardiogram done March 15, 2021 demonstrated normal sinus rhythm rate 72 bpm is otherwise unremarkable.\par \par The patient had blood work done with her primary care physician December 29, 2020 which demonstrated cholesterol 179, triglycerides of 99, HDL of 62, LDL calculated 97 mg/dL.\par \par She is currently hemodynamically stable and asymptomatic from a cardiac standpoint.\par \par Electrocardiogram done July 8, 2020 demonstrated normal sinus rhythm rate of 72 bpm is otherwise remarkable for left atrial abnormality and a right interventricular conduction delay.\par \par The patient had a normal nuclear stress test on October 29, 2018.\par \par The patient understands that aerobic exercises must be increased to 40 minutes 4 times per week. A detailed discussion of lifestyle modification was done today. The patient has a good understanding of the diagnosis, and treatment plan. Lifestyle modification was also outlined.

## 2023-05-19 ENCOUNTER — OUTPATIENT (OUTPATIENT)
Dept: OUTPATIENT SERVICES | Facility: HOSPITAL | Age: 83
LOS: 1 days | End: 2023-05-19
Payer: MEDICARE

## 2023-05-19 ENCOUNTER — APPOINTMENT (OUTPATIENT)
Dept: CT IMAGING | Facility: IMAGING CENTER | Age: 83
End: 2023-05-19
Payer: MEDICARE

## 2023-05-19 DIAGNOSIS — R93.89 ABNORMAL FINDINGS ON DIAGNOSTIC IMAGING OF OTHER SPECIFIED BODY STRUCTURES: ICD-10-CM

## 2023-05-19 DIAGNOSIS — C34.90 MALIGNANT NEOPLASM OF UNSPECIFIED PART OF UNSPECIFIED BRONCHUS OR LUNG: ICD-10-CM

## 2023-05-19 DIAGNOSIS — R94.2 ABNORMAL RESULTS OF PULMONARY FUNCTION STUDIES: ICD-10-CM

## 2023-05-19 PROCEDURE — 71250 CT THORAX DX C-: CPT

## 2023-05-19 PROCEDURE — 71250 CT THORAX DX C-: CPT | Mod: 26,MH

## 2023-05-24 ENCOUNTER — APPOINTMENT (OUTPATIENT)
Dept: CT IMAGING | Facility: IMAGING CENTER | Age: 83
End: 2023-05-24

## 2023-05-25 ENCOUNTER — NON-APPOINTMENT (OUTPATIENT)
Age: 83
End: 2023-05-25

## 2023-05-26 ENCOUNTER — APPOINTMENT (OUTPATIENT)
Dept: OPHTHALMOLOGY | Facility: CLINIC | Age: 83
End: 2023-05-26
Payer: MEDICARE

## 2023-05-26 ENCOUNTER — NON-APPOINTMENT (OUTPATIENT)
Age: 83
End: 2023-05-26

## 2023-05-26 PROCEDURE — 92014 COMPRE OPH EXAM EST PT 1/>: CPT

## 2023-05-26 PROCEDURE — 92250 FUNDUS PHOTOGRAPHY W/I&R: CPT

## 2023-08-22 ENCOUNTER — APPOINTMENT (OUTPATIENT)
Dept: OTOLARYNGOLOGY | Facility: CLINIC | Age: 83
End: 2023-08-22
Payer: MEDICARE

## 2023-08-22 VITALS
BODY MASS INDEX: 22.5 KG/M2 | DIASTOLIC BLOOD PRESSURE: 69 MMHG | SYSTOLIC BLOOD PRESSURE: 127 MMHG | HEIGHT: 66 IN | WEIGHT: 140 LBS | HEART RATE: 69 BPM

## 2023-08-22 DIAGNOSIS — H90.3 SENSORINEURAL HEARING LOSS, BILATERAL: ICD-10-CM

## 2023-08-22 DIAGNOSIS — R09.81 NASAL CONGESTION: ICD-10-CM

## 2023-08-22 PROCEDURE — 31231 NASAL ENDOSCOPY DX: CPT

## 2023-08-22 PROCEDURE — 99214 OFFICE O/P EST MOD 30 MIN: CPT | Mod: 25

## 2023-08-22 NOTE — ASSESSMENT
[FreeTextEntry1] : Patient here for general ENT checkup ears look great nasal exam no tumors masses or polyps oral examinations normal neck exam is normal she was reassured no further interventions indicated follow-up with us as needed.

## 2023-08-22 NOTE — END OF VISIT
[FreeTextEntry3] : I, Dr. Cason personally performed the evaluation and management (E/M) services including all necessary procedures, for this new patient. That E/M includes conducting the clinically appropriate initial history &/or exam, assessing all conditions, and establishing the plan of care. Today, my SANDRA, Marcia Espinosa, was here to observe &/or participate in the visit & follow plan of care established by me.

## 2023-08-22 NOTE — HISTORY OF PRESENT ILLNESS
[de-identified] : Patient comes in with a deep noise in the ears that comes and goes. She denies pain in the ears or changes in hearing. She does have constant nasal congestion and runny nose. She does not use any nasal sprays  right now. SHe is doing well overall.

## 2023-09-12 ENCOUNTER — APPOINTMENT (OUTPATIENT)
Dept: CARDIOLOGY | Facility: CLINIC | Age: 83
End: 2023-09-12
Payer: MEDICARE

## 2023-09-12 DIAGNOSIS — I25.10 ATHEROSCLEROTIC HEART DISEASE OF NATIVE CORONARY ARTERY W/OUT ANGINA PECTORIS: ICD-10-CM

## 2023-09-12 DIAGNOSIS — R06.09 OTHER FORMS OF DYSPNEA: ICD-10-CM

## 2023-09-12 DIAGNOSIS — I73.9 PERIPHERAL VASCULAR DISEASE, UNSPECIFIED: ICD-10-CM

## 2023-09-12 DIAGNOSIS — R09.89 OTHER SPECIFIED SYMPTOMS AND SIGNS INVOLVING THE CIRCULATORY AND RESPIRATORY SYSTEMS: ICD-10-CM

## 2023-09-12 DIAGNOSIS — R42 DIZZINESS AND GIDDINESS: ICD-10-CM

## 2023-09-12 DIAGNOSIS — I65.29 OCCLUSION AND STENOSIS OF UNSPECIFIED CAROTID ARTERY: ICD-10-CM

## 2023-09-12 PROCEDURE — 93880 EXTRACRANIAL BILAT STUDY: CPT

## 2023-09-12 PROCEDURE — 93922 UPR/L XTREMITY ART 2 LEVELS: CPT

## 2023-09-12 PROCEDURE — 93306 TTE W/DOPPLER COMPLETE: CPT

## 2023-10-09 PROBLEM — I73.9 CLAUDICATION: Status: ACTIVE | Noted: 2022-04-20

## 2023-10-11 ENCOUNTER — RESULT CHARGE (OUTPATIENT)
Age: 83
End: 2023-10-11

## 2023-10-12 ENCOUNTER — APPOINTMENT (OUTPATIENT)
Dept: CARDIOLOGY | Facility: CLINIC | Age: 83
End: 2023-10-12

## 2023-10-12 DIAGNOSIS — Z87.898 PERSONAL HISTORY OF OTHER SPECIFIED CONDITIONS: ICD-10-CM

## 2023-10-12 DIAGNOSIS — I34.0 NONRHEUMATIC MITRAL (VALVE) INSUFFICIENCY: ICD-10-CM

## 2023-10-12 DIAGNOSIS — I10 ESSENTIAL (PRIMARY) HYPERTENSION: ICD-10-CM

## 2023-10-12 DIAGNOSIS — R07.9 CHEST PAIN, UNSPECIFIED: ICD-10-CM

## 2023-10-12 DIAGNOSIS — E78.5 HYPERLIPIDEMIA, UNSPECIFIED: ICD-10-CM

## 2023-10-22 PROBLEM — I25.10 CORONARY ARTERY DISEASE: Status: ACTIVE | Noted: 2019-11-20

## 2023-10-22 PROBLEM — R09.89 CAROTID BRUIT: Status: ACTIVE | Noted: 2019-09-22

## 2023-10-22 PROBLEM — R42 DIZZY: Status: ACTIVE | Noted: 2017-05-03

## 2023-10-22 PROBLEM — I65.29 CAROTID ARTERY PLAQUE: Status: ACTIVE | Noted: 2019-09-22

## 2023-12-06 ENCOUNTER — INPATIENT (INPATIENT)
Facility: HOSPITAL | Age: 83
LOS: 5 days | Discharge: NURSING, REHAB OR LONG-TERM CARE | DRG: 72 | End: 2023-12-12
Attending: INTERNAL MEDICINE | Admitting: INTERNAL MEDICINE
Payer: MEDICARE

## 2023-12-06 VITALS
HEIGHT: 64 IN | DIASTOLIC BLOOD PRESSURE: 55 MMHG | SYSTOLIC BLOOD PRESSURE: 136 MMHG | OXYGEN SATURATION: 94 % | TEMPERATURE: 98 F | HEART RATE: 74 BPM | WEIGHT: 160.06 LBS | RESPIRATION RATE: 17 BRPM

## 2023-12-06 DIAGNOSIS — G93.41 METABOLIC ENCEPHALOPATHY: ICD-10-CM

## 2023-12-06 LAB
ALBUMIN SERPL ELPH-MCNC: 3.8 G/DL — SIGNIFICANT CHANGE UP (ref 3.3–5)
ALBUMIN SERPL ELPH-MCNC: 3.8 G/DL — SIGNIFICANT CHANGE UP (ref 3.3–5)
ALP SERPL-CCNC: 81 U/L — SIGNIFICANT CHANGE UP (ref 40–120)
ALP SERPL-CCNC: 81 U/L — SIGNIFICANT CHANGE UP (ref 40–120)
ALT FLD-CCNC: 9 U/L — LOW (ref 10–45)
ALT FLD-CCNC: 9 U/L — LOW (ref 10–45)
AMMONIA BLD-MCNC: 12 UMOL/L — SIGNIFICANT CHANGE UP (ref 11–55)
AMMONIA BLD-MCNC: 12 UMOL/L — SIGNIFICANT CHANGE UP (ref 11–55)
ANION GAP SERPL CALC-SCNC: 10 MMOL/L — SIGNIFICANT CHANGE UP (ref 5–17)
ANION GAP SERPL CALC-SCNC: 10 MMOL/L — SIGNIFICANT CHANGE UP (ref 5–17)
APAP SERPL-MCNC: <15 UG/ML — SIGNIFICANT CHANGE UP (ref 10–30)
APAP SERPL-MCNC: <15 UG/ML — SIGNIFICANT CHANGE UP (ref 10–30)
APPEARANCE UR: CLEAR — SIGNIFICANT CHANGE UP
APPEARANCE UR: CLEAR — SIGNIFICANT CHANGE UP
AST SERPL-CCNC: 20 U/L — SIGNIFICANT CHANGE UP (ref 10–40)
AST SERPL-CCNC: 20 U/L — SIGNIFICANT CHANGE UP (ref 10–40)
BACTERIA # UR AUTO: NEGATIVE /HPF — SIGNIFICANT CHANGE UP
BACTERIA # UR AUTO: NEGATIVE /HPF — SIGNIFICANT CHANGE UP
BASE EXCESS BLDV CALC-SCNC: 2 MMOL/L — SIGNIFICANT CHANGE UP (ref -2–3)
BASE EXCESS BLDV CALC-SCNC: 2 MMOL/L — SIGNIFICANT CHANGE UP (ref -2–3)
BASOPHILS # BLD AUTO: 0.05 K/UL — SIGNIFICANT CHANGE UP (ref 0–0.2)
BASOPHILS # BLD AUTO: 0.05 K/UL — SIGNIFICANT CHANGE UP (ref 0–0.2)
BASOPHILS NFR BLD AUTO: 0.4 % — SIGNIFICANT CHANGE UP (ref 0–2)
BASOPHILS NFR BLD AUTO: 0.4 % — SIGNIFICANT CHANGE UP (ref 0–2)
BILIRUB SERPL-MCNC: 0.4 MG/DL — SIGNIFICANT CHANGE UP (ref 0.2–1.2)
BILIRUB SERPL-MCNC: 0.4 MG/DL — SIGNIFICANT CHANGE UP (ref 0.2–1.2)
BILIRUB UR-MCNC: NEGATIVE — SIGNIFICANT CHANGE UP
BILIRUB UR-MCNC: NEGATIVE — SIGNIFICANT CHANGE UP
BUN SERPL-MCNC: 24 MG/DL — HIGH (ref 7–23)
BUN SERPL-MCNC: 24 MG/DL — HIGH (ref 7–23)
CA-I SERPL-SCNC: 1.27 MMOL/L — SIGNIFICANT CHANGE UP (ref 1.15–1.33)
CA-I SERPL-SCNC: 1.27 MMOL/L — SIGNIFICANT CHANGE UP (ref 1.15–1.33)
CALCIUM SERPL-MCNC: 10.3 MG/DL — SIGNIFICANT CHANGE UP (ref 8.4–10.5)
CALCIUM SERPL-MCNC: 10.3 MG/DL — SIGNIFICANT CHANGE UP (ref 8.4–10.5)
CAST: 1 /LPF — SIGNIFICANT CHANGE UP (ref 0–4)
CAST: 1 /LPF — SIGNIFICANT CHANGE UP (ref 0–4)
CHLORIDE BLDV-SCNC: 104 MMOL/L — SIGNIFICANT CHANGE UP (ref 96–108)
CHLORIDE BLDV-SCNC: 104 MMOL/L — SIGNIFICANT CHANGE UP (ref 96–108)
CHLORIDE SERPL-SCNC: 104 MMOL/L — SIGNIFICANT CHANGE UP (ref 96–108)
CHLORIDE SERPL-SCNC: 104 MMOL/L — SIGNIFICANT CHANGE UP (ref 96–108)
CK SERPL-CCNC: 83 U/L — SIGNIFICANT CHANGE UP (ref 25–170)
CK SERPL-CCNC: 83 U/L — SIGNIFICANT CHANGE UP (ref 25–170)
CO2 BLDV-SCNC: 29 MMOL/L — HIGH (ref 22–26)
CO2 BLDV-SCNC: 29 MMOL/L — HIGH (ref 22–26)
CO2 SERPL-SCNC: 25 MMOL/L — SIGNIFICANT CHANGE UP (ref 22–31)
CO2 SERPL-SCNC: 25 MMOL/L — SIGNIFICANT CHANGE UP (ref 22–31)
COLOR SPEC: YELLOW — SIGNIFICANT CHANGE UP
COLOR SPEC: YELLOW — SIGNIFICANT CHANGE UP
CREAT SERPL-MCNC: 0.74 MG/DL — SIGNIFICANT CHANGE UP (ref 0.5–1.3)
CREAT SERPL-MCNC: 0.74 MG/DL — SIGNIFICANT CHANGE UP (ref 0.5–1.3)
DIFF PNL FLD: NEGATIVE — SIGNIFICANT CHANGE UP
DIFF PNL FLD: NEGATIVE — SIGNIFICANT CHANGE UP
EGFR: 81 ML/MIN/1.73M2 — SIGNIFICANT CHANGE UP
EGFR: 81 ML/MIN/1.73M2 — SIGNIFICANT CHANGE UP
EOSINOPHIL # BLD AUTO: 0.08 K/UL — SIGNIFICANT CHANGE UP (ref 0–0.5)
EOSINOPHIL # BLD AUTO: 0.08 K/UL — SIGNIFICANT CHANGE UP (ref 0–0.5)
EOSINOPHIL NFR BLD AUTO: 0.6 % — SIGNIFICANT CHANGE UP (ref 0–6)
EOSINOPHIL NFR BLD AUTO: 0.6 % — SIGNIFICANT CHANGE UP (ref 0–6)
EPI CELLS # UR: SIGNIFICANT CHANGE UP
EPI CELLS # UR: SIGNIFICANT CHANGE UP
ETHANOL SERPL-MCNC: <10 MG/DL — SIGNIFICANT CHANGE UP (ref 0–10)
ETHANOL SERPL-MCNC: <10 MG/DL — SIGNIFICANT CHANGE UP (ref 0–10)
FLUAV AG NPH QL: SIGNIFICANT CHANGE UP
FLUAV AG NPH QL: SIGNIFICANT CHANGE UP
FLUBV AG NPH QL: SIGNIFICANT CHANGE UP
FLUBV AG NPH QL: SIGNIFICANT CHANGE UP
GAS PNL BLDV: 135 MMOL/L — LOW (ref 136–145)
GAS PNL BLDV: 135 MMOL/L — LOW (ref 136–145)
GAS PNL BLDV: SIGNIFICANT CHANGE UP
GLUCOSE BLDV-MCNC: 108 MG/DL — HIGH (ref 70–99)
GLUCOSE BLDV-MCNC: 108 MG/DL — HIGH (ref 70–99)
GLUCOSE SERPL-MCNC: 109 MG/DL — HIGH (ref 70–99)
GLUCOSE SERPL-MCNC: 109 MG/DL — HIGH (ref 70–99)
GLUCOSE UR QL: NEGATIVE MG/DL — SIGNIFICANT CHANGE UP
GLUCOSE UR QL: NEGATIVE MG/DL — SIGNIFICANT CHANGE UP
HCO3 BLDV-SCNC: 27 MMOL/L — SIGNIFICANT CHANGE UP (ref 22–29)
HCO3 BLDV-SCNC: 27 MMOL/L — SIGNIFICANT CHANGE UP (ref 22–29)
HCT VFR BLD CALC: 39.6 % — SIGNIFICANT CHANGE UP (ref 34.5–45)
HCT VFR BLD CALC: 39.6 % — SIGNIFICANT CHANGE UP (ref 34.5–45)
HCT VFR BLDA CALC: 40 % — SIGNIFICANT CHANGE UP (ref 34.5–46.5)
HCT VFR BLDA CALC: 40 % — SIGNIFICANT CHANGE UP (ref 34.5–46.5)
HGB BLD CALC-MCNC: 13.3 G/DL — SIGNIFICANT CHANGE UP (ref 11.7–16.1)
HGB BLD CALC-MCNC: 13.3 G/DL — SIGNIFICANT CHANGE UP (ref 11.7–16.1)
HGB BLD-MCNC: 12.9 G/DL — SIGNIFICANT CHANGE UP (ref 11.5–15.5)
HGB BLD-MCNC: 12.9 G/DL — SIGNIFICANT CHANGE UP (ref 11.5–15.5)
HYALINE CASTS # UR AUTO: 0 — SIGNIFICANT CHANGE UP
HYALINE CASTS # UR AUTO: 0 — SIGNIFICANT CHANGE UP
IMM GRANULOCYTES NFR BLD AUTO: 0.4 % — SIGNIFICANT CHANGE UP (ref 0–0.9)
IMM GRANULOCYTES NFR BLD AUTO: 0.4 % — SIGNIFICANT CHANGE UP (ref 0–0.9)
KETONES UR-MCNC: 15 MG/DL
KETONES UR-MCNC: 15 MG/DL
LACTATE BLDV-MCNC: 1.5 MMOL/L — SIGNIFICANT CHANGE UP (ref 0.5–2)
LACTATE BLDV-MCNC: 1.5 MMOL/L — SIGNIFICANT CHANGE UP (ref 0.5–2)
LEUKOCYTE ESTERASE UR-ACNC: ABNORMAL
LEUKOCYTE ESTERASE UR-ACNC: ABNORMAL
LYMPHOCYTES # BLD AUTO: 1.58 K/UL — SIGNIFICANT CHANGE UP (ref 1–3.3)
LYMPHOCYTES # BLD AUTO: 1.58 K/UL — SIGNIFICANT CHANGE UP (ref 1–3.3)
LYMPHOCYTES # BLD AUTO: 12.7 % — LOW (ref 13–44)
LYMPHOCYTES # BLD AUTO: 12.7 % — LOW (ref 13–44)
MAGNESIUM SERPL-MCNC: 2.2 MG/DL — SIGNIFICANT CHANGE UP (ref 1.6–2.6)
MAGNESIUM SERPL-MCNC: 2.2 MG/DL — SIGNIFICANT CHANGE UP (ref 1.6–2.6)
MCHC RBC-ENTMCNC: 31.2 PG — SIGNIFICANT CHANGE UP (ref 27–34)
MCHC RBC-ENTMCNC: 31.2 PG — SIGNIFICANT CHANGE UP (ref 27–34)
MCHC RBC-ENTMCNC: 32.6 GM/DL — SIGNIFICANT CHANGE UP (ref 32–36)
MCHC RBC-ENTMCNC: 32.6 GM/DL — SIGNIFICANT CHANGE UP (ref 32–36)
MCV RBC AUTO: 95.9 FL — SIGNIFICANT CHANGE UP (ref 80–100)
MCV RBC AUTO: 95.9 FL — SIGNIFICANT CHANGE UP (ref 80–100)
MONOCYTES # BLD AUTO: 1.01 K/UL — HIGH (ref 0–0.9)
MONOCYTES # BLD AUTO: 1.01 K/UL — HIGH (ref 0–0.9)
MONOCYTES NFR BLD AUTO: 8.1 % — SIGNIFICANT CHANGE UP (ref 2–14)
MONOCYTES NFR BLD AUTO: 8.1 % — SIGNIFICANT CHANGE UP (ref 2–14)
NEUTROPHILS # BLD AUTO: 9.65 K/UL — HIGH (ref 1.8–7.4)
NEUTROPHILS # BLD AUTO: 9.65 K/UL — HIGH (ref 1.8–7.4)
NEUTROPHILS NFR BLD AUTO: 77.8 % — HIGH (ref 43–77)
NEUTROPHILS NFR BLD AUTO: 77.8 % — HIGH (ref 43–77)
NITRITE UR-MCNC: NEGATIVE — SIGNIFICANT CHANGE UP
NITRITE UR-MCNC: NEGATIVE — SIGNIFICANT CHANGE UP
NRBC # BLD: 0 /100 WBCS — SIGNIFICANT CHANGE UP (ref 0–0)
NRBC # BLD: 0 /100 WBCS — SIGNIFICANT CHANGE UP (ref 0–0)
PCO2 BLDV: 44 MMHG — HIGH (ref 39–42)
PCO2 BLDV: 44 MMHG — HIGH (ref 39–42)
PH BLDV: 7.4 — SIGNIFICANT CHANGE UP (ref 7.32–7.43)
PH BLDV: 7.4 — SIGNIFICANT CHANGE UP (ref 7.32–7.43)
PH UR: 5.5 — SIGNIFICANT CHANGE UP (ref 5–8)
PH UR: 5.5 — SIGNIFICANT CHANGE UP (ref 5–8)
PHOSPHATE SERPL-MCNC: 2.5 MG/DL — SIGNIFICANT CHANGE UP (ref 2.5–4.5)
PHOSPHATE SERPL-MCNC: 2.5 MG/DL — SIGNIFICANT CHANGE UP (ref 2.5–4.5)
PLATELET # BLD AUTO: 265 K/UL — SIGNIFICANT CHANGE UP (ref 150–400)
PLATELET # BLD AUTO: 265 K/UL — SIGNIFICANT CHANGE UP (ref 150–400)
PO2 BLDV: 50 MMHG — HIGH (ref 25–45)
PO2 BLDV: 50 MMHG — HIGH (ref 25–45)
POTASSIUM BLDV-SCNC: 4.3 MMOL/L — SIGNIFICANT CHANGE UP (ref 3.5–5.1)
POTASSIUM BLDV-SCNC: 4.3 MMOL/L — SIGNIFICANT CHANGE UP (ref 3.5–5.1)
POTASSIUM SERPL-MCNC: 4.2 MMOL/L — SIGNIFICANT CHANGE UP (ref 3.5–5.3)
POTASSIUM SERPL-MCNC: 4.2 MMOL/L — SIGNIFICANT CHANGE UP (ref 3.5–5.3)
POTASSIUM SERPL-SCNC: 4.2 MMOL/L — SIGNIFICANT CHANGE UP (ref 3.5–5.3)
POTASSIUM SERPL-SCNC: 4.2 MMOL/L — SIGNIFICANT CHANGE UP (ref 3.5–5.3)
PROT SERPL-MCNC: 6.7 G/DL — SIGNIFICANT CHANGE UP (ref 6–8.3)
PROT SERPL-MCNC: 6.7 G/DL — SIGNIFICANT CHANGE UP (ref 6–8.3)
PROT UR-MCNC: 30 MG/DL
PROT UR-MCNC: 30 MG/DL
RBC # BLD: 4.13 M/UL — SIGNIFICANT CHANGE UP (ref 3.8–5.2)
RBC # BLD: 4.13 M/UL — SIGNIFICANT CHANGE UP (ref 3.8–5.2)
RBC # FLD: 12.9 % — SIGNIFICANT CHANGE UP (ref 10.3–14.5)
RBC # FLD: 12.9 % — SIGNIFICANT CHANGE UP (ref 10.3–14.5)
RBC CASTS # UR COMP ASSIST: 1 /HPF — SIGNIFICANT CHANGE UP (ref 0–4)
RBC CASTS # UR COMP ASSIST: 1 /HPF — SIGNIFICANT CHANGE UP (ref 0–4)
REVIEW: SIGNIFICANT CHANGE UP
REVIEW: SIGNIFICANT CHANGE UP
RSV RNA NPH QL NAA+NON-PROBE: SIGNIFICANT CHANGE UP
RSV RNA NPH QL NAA+NON-PROBE: SIGNIFICANT CHANGE UP
SALICYLATES SERPL-MCNC: <2 MG/DL — LOW (ref 15–30)
SALICYLATES SERPL-MCNC: <2 MG/DL — LOW (ref 15–30)
SAO2 % BLDV: 78.2 % — SIGNIFICANT CHANGE UP (ref 67–88)
SAO2 % BLDV: 78.2 % — SIGNIFICANT CHANGE UP (ref 67–88)
SARS-COV-2 RNA SPEC QL NAA+PROBE: SIGNIFICANT CHANGE UP
SARS-COV-2 RNA SPEC QL NAA+PROBE: SIGNIFICANT CHANGE UP
SODIUM SERPL-SCNC: 139 MMOL/L — SIGNIFICANT CHANGE UP (ref 135–145)
SODIUM SERPL-SCNC: 139 MMOL/L — SIGNIFICANT CHANGE UP (ref 135–145)
SP GR SPEC: 1.03 — SIGNIFICANT CHANGE UP (ref 1–1.03)
SP GR SPEC: 1.03 — SIGNIFICANT CHANGE UP (ref 1–1.03)
SQUAMOUS # UR AUTO: 1 /HPF — SIGNIFICANT CHANGE UP (ref 0–5)
SQUAMOUS # UR AUTO: 1 /HPF — SIGNIFICANT CHANGE UP (ref 0–5)
TROPONIN T, HIGH SENSITIVITY RESULT: 15 NG/L — SIGNIFICANT CHANGE UP (ref 0–51)
TROPONIN T, HIGH SENSITIVITY RESULT: 15 NG/L — SIGNIFICANT CHANGE UP (ref 0–51)
UROBILINOGEN FLD QL: 1 MG/DL — SIGNIFICANT CHANGE UP (ref 0.2–1)
UROBILINOGEN FLD QL: 1 MG/DL — SIGNIFICANT CHANGE UP (ref 0.2–1)
WBC # BLD: 12.42 K/UL — HIGH (ref 3.8–10.5)
WBC # BLD: 12.42 K/UL — HIGH (ref 3.8–10.5)
WBC # FLD AUTO: 12.42 K/UL — HIGH (ref 3.8–10.5)
WBC # FLD AUTO: 12.42 K/UL — HIGH (ref 3.8–10.5)
WBC UR QL: 2 /HPF — SIGNIFICANT CHANGE UP (ref 0–5)
WBC UR QL: 2 /HPF — SIGNIFICANT CHANGE UP (ref 0–5)

## 2023-12-06 PROCEDURE — 99285 EMERGENCY DEPT VISIT HI MDM: CPT

## 2023-12-06 PROCEDURE — 93880 EXTRACRANIAL BILAT STUDY: CPT | Mod: 26

## 2023-12-06 PROCEDURE — 71250 CT THORAX DX C-: CPT | Mod: 26

## 2023-12-06 PROCEDURE — 72131 CT LUMBAR SPINE W/O DYE: CPT | Mod: 26

## 2023-12-06 PROCEDURE — 70450 CT HEAD/BRAIN W/O DYE: CPT | Mod: 26,MA

## 2023-12-06 PROCEDURE — 72128 CT CHEST SPINE W/O DYE: CPT | Mod: 26

## 2023-12-06 PROCEDURE — 71045 X-RAY EXAM CHEST 1 VIEW: CPT | Mod: 26

## 2023-12-06 RX ORDER — MORPHINE SULFATE 50 MG/1
2 CAPSULE, EXTENDED RELEASE ORAL EVERY 6 HOURS
Refills: 0 | Status: DISCONTINUED | OUTPATIENT
Start: 2023-12-06 | End: 2023-12-12

## 2023-12-06 RX ORDER — PIPERACILLIN AND TAZOBACTAM 4; .5 G/20ML; G/20ML
3.38 INJECTION, POWDER, LYOPHILIZED, FOR SOLUTION INTRAVENOUS ONCE
Refills: 0 | Status: COMPLETED | OUTPATIENT
Start: 2023-12-06 | End: 2023-12-07

## 2023-12-06 RX ORDER — VANCOMYCIN HCL 1 G
1000 VIAL (EA) INTRAVENOUS ONCE
Refills: 0 | Status: COMPLETED | OUTPATIENT
Start: 2023-12-06 | End: 2023-12-07

## 2023-12-06 RX ORDER — PIPERACILLIN AND TAZOBACTAM 4; .5 G/20ML; G/20ML
3.38 INJECTION, POWDER, LYOPHILIZED, FOR SOLUTION INTRAVENOUS ONCE
Refills: 0 | Status: COMPLETED | OUTPATIENT
Start: 2023-12-06 | End: 2023-12-06

## 2023-12-06 RX ORDER — CYCLOBENZAPRINE HYDROCHLORIDE 10 MG/1
10 TABLET, FILM COATED ORAL ONCE
Refills: 0 | Status: COMPLETED | OUTPATIENT
Start: 2023-12-06 | End: 2023-12-06

## 2023-12-06 RX ORDER — PIPERACILLIN AND TAZOBACTAM 4; .5 G/20ML; G/20ML
3.38 INJECTION, POWDER, LYOPHILIZED, FOR SOLUTION INTRAVENOUS EVERY 8 HOURS
Refills: 0 | Status: DISCONTINUED | OUTPATIENT
Start: 2023-12-07 | End: 2023-12-07

## 2023-12-06 RX ORDER — THIAMINE MONONITRATE (VIT B1) 100 MG
500 TABLET ORAL DAILY
Refills: 0 | Status: COMPLETED | OUTPATIENT
Start: 2023-12-06 | End: 2023-12-09

## 2023-12-06 RX ORDER — HALOPERIDOL DECANOATE 100 MG/ML
1 INJECTION INTRAMUSCULAR EVERY 6 HOURS
Refills: 0 | Status: DISCONTINUED | OUTPATIENT
Start: 2023-12-06 | End: 2023-12-12

## 2023-12-06 RX ADMIN — Medication 105 MILLIGRAM(S): at 23:11

## 2023-12-06 RX ADMIN — PIPERACILLIN AND TAZOBACTAM 200 GRAM(S): 4; .5 INJECTION, POWDER, LYOPHILIZED, FOR SOLUTION INTRAVENOUS at 20:45

## 2023-12-06 NOTE — H&P ADULT - NSHPPHYSICALEXAM_GEN_ALL_CORE
T(C): 36.9 (12-06-23 @ 16:00), Max: 36.9 (12-06-23 @ 16:00)  HR: 74 (12-06-23 @ 16:00) (74 - 74)  BP: 136/55 (12-06-23 @ 16:00) (136/55 - 136/55)  RR: 17 (12-06-23 @ 16:00) (17 - 17)  SpO2: 94% (12-06-23 @ 16:00) (94% - 94%)    PHYSICAL EXAM:  GENERAL: NAD, well-developed  HEAD:  Atraumatic, Normocephalic  EYES: EOMI, PERRLA, conjunctiva and sclera clear  NECK: Supple, No JVD  CHEST/LUNG: Clear to auscultation bilaterally; No wheeze  HEART: Regular rate and rhythm; No murmurs, rubs, or gallops  ABDOMEN: Soft, Nontender, Nondistended; Bowel sounds present  EXTREMITIES:  2+ Peripheral Pulses, No clubbing, cyanosis, or edema  PSYCH: AAOx3  NEUROLOGY: non-focal  SKIN: No rashes or lesions

## 2023-12-06 NOTE — H&P ADULT - ASSESSMENT
83-year-old female past medical history of dementia, HTN, HLD, DM presenting to the ED for evaluation of altered mental status.  As per report, the patient was found by EMS inside the bushes outside of her house.  Patient states that she is unsure how she got there but "I did not fall."  Patient is a very poor historian, states she has severe RIght back pain. AxO to name only.  ptn was admitted with similar complaints in May 2021. wound up having Sepsis 2/2 UTI, urinary retention. at present UA still not available, no cough, afebrile    A/P  Metabolic encephalopathy  r/o syncope  HEAD Ct neg  check MR brain, TTE, carotids  R/O UTI  hydrate gently  Neuro and card consults  cont outptn meds after contacting outptn pharmacy  social service consult re safety at home  check CT T and LS spine 2/2 pain  DVT ppx w sc lovenox

## 2023-12-06 NOTE — ED PROVIDER NOTE - OBJECTIVE STATEMENT
83-year-old female past medical history of HTN, HLD, DM presenting to the ED for evaluation of altered mental status.  As per report, the patient was found by EMS inside the bushes outside of her house.  Patient states that she is unsure how she got there but "I did not fall."  Patient is a very poor historian.  Is complaining of no pain at this time including neck pain, back pain, chest pain, abdominal pain, extremity pain, hip pain, and any additional complaints at this time.  Patient denies any anticoagulant use.

## 2023-12-06 NOTE — H&P ADULT - HISTORY OF PRESENT ILLNESS
83-year-old female past medical history of dementia, HTN, HLD, DM presenting to the ED for evaluation of altered mental status.  As per report, the patient was found by EMS inside the bushes outside of her house.  Patient states that she is unsure how she got there but "I did not fall."  Patient is a very poor historian, states she has severe RIght back pain. AxO to name only.  ptn was admitted with similar complaints in May 2021. wound up having Sepsis 2/2 UTI, urinary retention. at present UA still not available, no cough, afebrile

## 2023-12-06 NOTE — ED PROVIDER NOTE - ATTENDING CONTRIBUTION TO CARE
History and physical as documented above.  Benign trauma evaluation. However, confused and is a poor historian. No indication for trauma imaging. Will perform an AMS w/u, likely TBA.

## 2023-12-06 NOTE — ED PROVIDER NOTE - PHYSICAL EXAMINATION
Constitutional: Well-appearing, well-nourished, comfortable appearing.   Head: Normocephalic, atraumatic.   Eyes: PERRL. EOMI. No conjunctival pallor.   ENT: Moist mucous membranes. Uvula midline. No pharyngeal erythema or exudates.  Neck: No LAD. Supple. No midline cervical tenderness.  No paracervical tenderness.  CVS: Regular rate, regular rhythm. Normal S1, S2. No murmurs, rubs, or gallops. No peripheral edema noted.   Respiratory: No respiratory distress. Clear to auscultation bilaterally. No wheezing, rales, or rhonchi.   Abdomen: Abd is soft and non-distended. No tenderness. No rebound, guarding, or rigidity.   MSK: No CVA tenderness bilaterally. No midline thoracic or lumbar tenderness.  No para spinal tenderness. Full range of motion to all 4 extremities in all joints in all 4 extremities.    Neuro: Alert and oriented to person, place, and time. Follows commands. Moves all extremities. Strength is 5 out of 5 and symmetric bilaterally.  Sensation is grossly intact.  Cranial nerves II through XII intact.  Skin: Warm and dry. No rashes.   Psych: Normal affect, cooperative.

## 2023-12-06 NOTE — ED ADULT NURSE NOTE - OBJECTIVE STATEMENT
patient BIBA, alert & oriented to self, time & place. As per EMS she was on the side of a hilly road on the bushes. PAtient stated when asked "I was trying my self not to fall so I tried to put myself down. Denies pain, no brusing noted, dizziness, headache, numbness or weakness. Noticed to be asking repetitive questions & gets inpatient. Cooperative & follows calm instructions. Safety precautions maintained with side rails up & patient placed in front of the station. No climbling out of bed. GCS-14. Needs verbal redirection. No family available at this time. patient BIBA, alert & oriented to self, time & place but with periods of confusion. As per EMS she was on the side of a hilly road on the bushes. PAtient stated when asked "I was trying my self not to fall so I tried to put myself down. Denies pain, no brusing noted, dizziness, headache, numbness or weakness. Noticed to be asking repetitive questions & gets inpatient. Cooperative & follows calm instructions. Safety precautions maintained with side rails up & patient placed in front of the station. No climbling out of bed. GCS-14. Needs verbal redirection. No family available at this time.

## 2023-12-06 NOTE — ED ADULT NURSE NOTE - NSFALLHARMRISKINTERV_ED_ALL_ED
Assistance OOB with selected safe patient handling equipment if applicable/Assistance with ambulation/Communicate risk of Fall with Harm to all staff, patient, and family/Monitor gait and stability/Monitor for mental status changes and reorient to person, place, and time, as needed/Provide visual cue: red socks, yellow wristband, yellow gown, etc/Reinforce activity limits and safety measures with patient and family/Toileting schedule using arm’s reach rule for commode and bathroom/Use of alarms - bed, stretcher, chair and/or video monitoring/Bed in lowest position, wheels locked, appropriate side rails in place/Call bell, personal items and telephone in reach/Instruct patient to call for assistance before getting out of bed/chair/stretcher/Non-slip footwear applied when patient is off stretcher/[place] to call system/Physically safe environment - no spills, clutter or unnecessary equipment/Purposeful Proactive Rounding/Room/bathroom lighting operational, light cord in reach Assistance OOB with selected safe patient handling equipment if applicable/Assistance with ambulation/Communicate risk of Fall with Harm to all staff, patient, and family/Monitor gait and stability/Monitor for mental status changes and reorient to person, place, and time, as needed/Provide visual cue: red socks, yellow wristband, yellow gown, etc/Reinforce activity limits and safety measures with patient and family/Toileting schedule using arm’s reach rule for commode and bathroom/Use of alarms - bed, stretcher, chair and/or video monitoring/Bed in lowest position, wheels locked, appropriate side rails in place/Call bell, personal items and telephone in reach/Instruct patient to call for assistance before getting out of bed/chair/stretcher/Non-slip footwear applied when patient is off stretcher/[place] to call system/Physically safe environment - no spills, clutter or unnecessary equipment/Purposeful Proactive Rounding/Room/bathroom lighting operational, light cord in reach

## 2023-12-06 NOTE — ED ADULT NURSE REASSESSMENT NOTE - NS ED NURSE REASSESS COMMENT FT1
straight cath, performed 2 RNs present, sterile technique maintained. ~75cc urine removed. UA/UC sent.

## 2023-12-06 NOTE — ED PROVIDER NOTE - PROGRESS NOTE DETAILS
Juan Carlos MANDUJANO: Patient admitted to Dr. Ortez who saw patient in the ED and requested straight cath, CT T-spine, Ct L-spine be ordered. Orders placed at her request.

## 2023-12-07 LAB
ANION GAP SERPL CALC-SCNC: 11 MMOL/L — SIGNIFICANT CHANGE UP (ref 5–17)
ANION GAP SERPL CALC-SCNC: 11 MMOL/L — SIGNIFICANT CHANGE UP (ref 5–17)
BUN SERPL-MCNC: 19 MG/DL — SIGNIFICANT CHANGE UP (ref 7–23)
BUN SERPL-MCNC: 19 MG/DL — SIGNIFICANT CHANGE UP (ref 7–23)
CALCIUM SERPL-MCNC: 10.1 MG/DL — SIGNIFICANT CHANGE UP (ref 8.4–10.5)
CALCIUM SERPL-MCNC: 10.1 MG/DL — SIGNIFICANT CHANGE UP (ref 8.4–10.5)
CHLORIDE SERPL-SCNC: 103 MMOL/L — SIGNIFICANT CHANGE UP (ref 96–108)
CHLORIDE SERPL-SCNC: 103 MMOL/L — SIGNIFICANT CHANGE UP (ref 96–108)
CO2 SERPL-SCNC: 24 MMOL/L — SIGNIFICANT CHANGE UP (ref 22–31)
CO2 SERPL-SCNC: 24 MMOL/L — SIGNIFICANT CHANGE UP (ref 22–31)
CREAT SERPL-MCNC: 0.82 MG/DL — SIGNIFICANT CHANGE UP (ref 0.5–1.3)
CREAT SERPL-MCNC: 0.82 MG/DL — SIGNIFICANT CHANGE UP (ref 0.5–1.3)
EGFR: 71 ML/MIN/1.73M2 — SIGNIFICANT CHANGE UP
EGFR: 71 ML/MIN/1.73M2 — SIGNIFICANT CHANGE UP
FOLATE SERPL-MCNC: 17.3 NG/ML — SIGNIFICANT CHANGE UP
FOLATE SERPL-MCNC: 17.3 NG/ML — SIGNIFICANT CHANGE UP
GLUCOSE SERPL-MCNC: 144 MG/DL — HIGH (ref 70–99)
GLUCOSE SERPL-MCNC: 144 MG/DL — HIGH (ref 70–99)
HCT VFR BLD CALC: 39.7 % — SIGNIFICANT CHANGE UP (ref 34.5–45)
HCT VFR BLD CALC: 39.7 % — SIGNIFICANT CHANGE UP (ref 34.5–45)
HGB BLD-MCNC: 13.2 G/DL — SIGNIFICANT CHANGE UP (ref 11.5–15.5)
HGB BLD-MCNC: 13.2 G/DL — SIGNIFICANT CHANGE UP (ref 11.5–15.5)
MCHC RBC-ENTMCNC: 31.5 PG — SIGNIFICANT CHANGE UP (ref 27–34)
MCHC RBC-ENTMCNC: 31.5 PG — SIGNIFICANT CHANGE UP (ref 27–34)
MCHC RBC-ENTMCNC: 33.2 GM/DL — SIGNIFICANT CHANGE UP (ref 32–36)
MCHC RBC-ENTMCNC: 33.2 GM/DL — SIGNIFICANT CHANGE UP (ref 32–36)
MCV RBC AUTO: 94.7 FL — SIGNIFICANT CHANGE UP (ref 80–100)
MCV RBC AUTO: 94.7 FL — SIGNIFICANT CHANGE UP (ref 80–100)
NRBC # BLD: 0 /100 WBCS — SIGNIFICANT CHANGE UP (ref 0–0)
NRBC # BLD: 0 /100 WBCS — SIGNIFICANT CHANGE UP (ref 0–0)
PLATELET # BLD AUTO: 308 K/UL — SIGNIFICANT CHANGE UP (ref 150–400)
PLATELET # BLD AUTO: 308 K/UL — SIGNIFICANT CHANGE UP (ref 150–400)
POTASSIUM SERPL-MCNC: 4 MMOL/L — SIGNIFICANT CHANGE UP (ref 3.5–5.3)
POTASSIUM SERPL-MCNC: 4 MMOL/L — SIGNIFICANT CHANGE UP (ref 3.5–5.3)
POTASSIUM SERPL-SCNC: 4 MMOL/L — SIGNIFICANT CHANGE UP (ref 3.5–5.3)
POTASSIUM SERPL-SCNC: 4 MMOL/L — SIGNIFICANT CHANGE UP (ref 3.5–5.3)
PROCALCITONIN SERPL-MCNC: 0.04 NG/ML — SIGNIFICANT CHANGE UP (ref 0.02–0.1)
PROCALCITONIN SERPL-MCNC: 0.04 NG/ML — SIGNIFICANT CHANGE UP (ref 0.02–0.1)
RBC # BLD: 4.19 M/UL — SIGNIFICANT CHANGE UP (ref 3.8–5.2)
RBC # BLD: 4.19 M/UL — SIGNIFICANT CHANGE UP (ref 3.8–5.2)
RBC # FLD: 12.8 % — SIGNIFICANT CHANGE UP (ref 10.3–14.5)
RBC # FLD: 12.8 % — SIGNIFICANT CHANGE UP (ref 10.3–14.5)
SODIUM SERPL-SCNC: 138 MMOL/L — SIGNIFICANT CHANGE UP (ref 135–145)
SODIUM SERPL-SCNC: 138 MMOL/L — SIGNIFICANT CHANGE UP (ref 135–145)
T3 SERPL-MCNC: 84 NG/DL — SIGNIFICANT CHANGE UP (ref 80–200)
T3 SERPL-MCNC: 84 NG/DL — SIGNIFICANT CHANGE UP (ref 80–200)
T4 AB SER-ACNC: 7.3 UG/DL — SIGNIFICANT CHANGE UP (ref 4.6–12)
T4 AB SER-ACNC: 7.3 UG/DL — SIGNIFICANT CHANGE UP (ref 4.6–12)
TSH SERPL-MCNC: 1.66 UIU/ML — SIGNIFICANT CHANGE UP (ref 0.27–4.2)
TSH SERPL-MCNC: 1.66 UIU/ML — SIGNIFICANT CHANGE UP (ref 0.27–4.2)
TSH SERPL-MCNC: 1.72 UIU/ML — SIGNIFICANT CHANGE UP (ref 0.27–4.2)
TSH SERPL-MCNC: 1.72 UIU/ML — SIGNIFICANT CHANGE UP (ref 0.27–4.2)
VIT B12 SERPL-MCNC: 443 PG/ML — SIGNIFICANT CHANGE UP (ref 232–1245)
VIT B12 SERPL-MCNC: 443 PG/ML — SIGNIFICANT CHANGE UP (ref 232–1245)
WBC # BLD: 12.28 K/UL — HIGH (ref 3.8–10.5)
WBC # BLD: 12.28 K/UL — HIGH (ref 3.8–10.5)
WBC # FLD AUTO: 12.28 K/UL — HIGH (ref 3.8–10.5)
WBC # FLD AUTO: 12.28 K/UL — HIGH (ref 3.8–10.5)

## 2023-12-07 RX ADMIN — HALOPERIDOL DECANOATE 1 MILLIGRAM(S): 100 INJECTION INTRAMUSCULAR at 21:13

## 2023-12-07 RX ADMIN — PIPERACILLIN AND TAZOBACTAM 25 GRAM(S): 4; .5 INJECTION, POWDER, LYOPHILIZED, FOR SOLUTION INTRAVENOUS at 02:03

## 2023-12-07 RX ADMIN — PIPERACILLIN AND TAZOBACTAM 25 GRAM(S): 4; .5 INJECTION, POWDER, LYOPHILIZED, FOR SOLUTION INTRAVENOUS at 06:51

## 2023-12-07 RX ADMIN — Medication 250 MILLIGRAM(S): at 05:28

## 2023-12-07 RX ADMIN — PIPERACILLIN AND TAZOBACTAM 25 GRAM(S): 4; .5 INJECTION, POWDER, LYOPHILIZED, FOR SOLUTION INTRAVENOUS at 14:17

## 2023-12-07 RX ADMIN — Medication 105 MILLIGRAM(S): at 22:51

## 2023-12-07 NOTE — CONSULT NOTE ADULT - SUBJECTIVE AND OBJECTIVE BOX
Neurology Consult    Reason for Consult: Patient is a 83y old  Female who presents with a chief complaint of metabolic encephalopathy (06 Dec 2023 19:48)      HPI:  83-year-old female past medical history of dementia, HTN, HLD, DM presenting to the ED for evaluation of altered mental status.  As per report, the patient was found by EMS inside the bushes outside of her house.  Patient states that she is unsure how she got there but "I did not fall."  Patient is a very poor historian, states she has severe RIght back pain. AxO to name only.  ptn was admitted with similar complaints in May 2021. wound up having Sepsis 2/2 UTI, urinary retention. at present UA still not available, no cough, afebrile (06 Dec 2023 19:48)       PAST MEDICAL & SURGICAL HISTORY:  dementia  HTN  HLD  DM       No significant past surgical history          Allergies: Allergies    No Known Allergies    Intolerances        Social History: Denies toxic habits including tobacco, ETOH or illicit drugs.    Family History: FAMILY HISTORY:  No pertinent family history in first degree relatives    . No family history of strokes    Medications: MEDICATIONS  (STANDING):  piperacillin/tazobactam IVPB.. 3.375 Gram(s) IV Intermittent every 8 hours  thiamine IVPB 500 milliGRAM(s) IV Intermittent daily    MEDICATIONS  (PRN):  haloperidol    Injectable 1 milliGRAM(s) IV Push every 6 hours PRN Agitation  morphine  - Injectable 2 milliGRAM(s) IV Push every 6 hours PRN Severe Pain (7 - 10)      Review of Systems:  CONSTITUTIONAL:  No weight loss, fever, chills, weakness or fatigue.  HEENT:  Eyes:  No visual loss, blurred vision, double vision or yellow sclera. Ears, Nose, Throat:  No hearing loss, sneezing, congestion, runny nose or sore throat.  SKIN:  No rash or itching.  CARDIOVASCULAR:  No chest pain, chest pressure or chest discomfort. No palpitations or edema.  RESPIRATORY:  No shortness of breath, cough or sputum.  GASTROINTESTINAL:  No anorexia, nausea, vomiting or diarrhea. No abdominal pain or blood.  GENITOURINARY:  No burning on urination or incontinence   NEUROLOGICAL:  No headache, dizziness, syncope, paralysis, ataxia, numbness or tingling in the extremities. No change in bowel or bladder control. no limb weakness. no vision changes.   MUSCULOSKELETAL:  back pain   HEMATOLOGIC:  No anemia, bleeding or bruising.  LYMPHATICS:  No enlarged nodes. No history of splenectomy.  PSYCHIATRIC:  No history of depression or anxiety.  ENDOCRINOLOGIC:  No reports of sweating, cold or heat intolerance. No polyuria or polydipsia.      Vitals:  Vital Signs Last 24 Hrs  T(C): 36.3 (07 Dec 2023 05:35), Max: 36.9 (06 Dec 2023 16:00)  T(F): 97.3 (07 Dec 2023 05:35), Max: 98.4 (06 Dec 2023 16:00)  HR: 79 (07 Dec 2023 05:35) (69 - 79)  BP: 140/76 (07 Dec 2023 05:35) (127/62 - 142/78)  BP(mean): --  RR: 19 (07 Dec 2023 05:35) (17 - 20)  SpO2: 97% (07 Dec 2023 05:35) (94% - 97%)    Parameters below as of 07 Dec 2023 05:35  Patient On (Oxygen Delivery Method): room air    Orthostatic VS        General Exam:   General Appearance: Appropriately dressed and in no acute distress       Head: Normocephalic, atraumatic and no dysmorphic features  Ear, Nose, and Throat: Moist mucous membranes  CVS: S1S2+  Resp: No SOB, no wheeze or rhonchi  GI: soft NT/ND  Extremities: No edema or cyanosis  Skin: No bruises or rashes     Neurological Exam:  Mental Status: Awake, alert and oriented x 1  Able to follow simple and complex verbal commands. Able to name and repeat. fluent speech. No obvious aphasia or dysarthria noted.   Cranial Nerves: PERRL, EOMI, VFFC, sensation V1-V3 intact,  ? L NLF facial asymmetry, equal elevation of palate, scm/trap 5/5, tongue is midline on protrusion. no obvious papilledema on fundoscopic exam. hearing is grossly intact.   Motor: Normal bulk, tone and strength throughout. Fine finger movements were intact and symmetric. no tremors or drift noted.    Sensation: Intact to light touch and pinprick throughout. no right/left confusion. no extinction to tactile on DSS.    Reflexes: 1+ throughout at biceps, brachioradialis, triceps, patellars and ankles bilaterally and equal. No clonus. R toe and L toe were both downgoing.  Coordination: No dysmetria on FNF    Gait: deferred in ED     Data/Labs/Imaging which I personally reviewed.     Labs:     CBC Full  -  ( 07 Dec 2023 07:21 )  WBC Count : 12.28 K/uL  RBC Count : 4.19 M/uL  Hemoglobin : 13.2 g/dL  Hematocrit : 39.7 %  Platelet Count - Automated : 308 K/uL  Mean Cell Volume : 94.7 fl  Mean Cell Hemoglobin : 31.5 pg  Mean Cell Hemoglobin Concentration : 33.2 gm/dL  Auto Neutrophil # : x  Auto Lymphocyte # : x  Auto Monocyte # : x  Auto Eosinophil # : x  Auto Basophil # : x  Auto Neutrophil % : x  Auto Lymphocyte % : x  Auto Monocyte % : x  Auto Eosinophil % : x  Auto Basophil % : x    12-07    138  |  103  |  19  ----------------------------<  144<H>  4.0   |  24  |  0.82    Ca    10.1      07 Dec 2023 07:22  Phos  2.5     12-06  Mg     2.2     12-06    TPro  6.7  /  Alb  3.8  /  TBili  0.4  /  DBili  x   /  AST  20  /  ALT  9<L>  /  AlkPhos  81  12-06    LIVER FUNCTIONS - ( 06 Dec 2023 18:26 )  Alb: 3.8 g/dL / Pro: 6.7 g/dL / ALK PHOS: 81 U/L / ALT: 9 U/L / AST: 20 U/L / GGT: x             Urinalysis Basic - ( 07 Dec 2023 07:22 )    Color: x / Appearance: x / SG: x / pH: x  Gluc: 144 mg/dL / Ketone: x  / Bili: x / Urobili: x   Blood: x / Protein: x / Nitrite: x   Leuk Esterase: x / RBC: x / WBC x   Sq Epi: x / Non Sq Epi: x / Bacteria: x      < from: CT Head No Cont (12.06.23 @ 18:42) >    ACC: [id number] EXAM:  CT BRAIN   ORDERED BY: LINCOLN SALAZAR     PROCEDURE DATE:  12/06/2023          INTERPRETATION:  CT HEAD  HEAD CT    INDICATIONS: ams, 83-year-old female past medical history of HTN, HLD, DM   presenting to the ED for evaluation of altered mental status.  As per   report, the patient was found by EMS inside the bushes outside of her   house. Patient states that she is unsure how she got there but "I did not   fall." Patient is a very poor historian.    TECHNIQUE:    HEAD CT:  Serial axial images were obtained from the skull base to the vertex   without the use of intravenous contrast.    COMPARISON EXAMINATION: Head CT 5/6/2021    FINDINGS:    HEAD CT:    VENTRICLES AND SULCI: Ventricles and sulci are unremarkable for patient   age.  INTRA-AXIAL: No intracranial mass, acute hemorrhage, or midline shift is   present. There is non-specific decreased attenuation in the white matter   likely related to sequelae of microvascular disease.  EXTRA-AXIAL: No extra-axial fluid collection is present.  INTRACRANIAL HEMORRHAGE: None.    VISUALIZED SINUSES: No air-fluid levels are identified. Mild mucosal   thickening left maxillary sinus.  VISUALIZED MASTOIDS:  Clear.  CALVARIUM:  Intact.  MISCELLANEOUS:  Bilateral cataract surgery.    SOFT TISSUES: Unremarkable.  BONES: Unremarkable.      IMPRESSION:    HEAD CT: Mild to moderate volume loss, microvascular disease, no acute   hemorrhage or midline shift.    --- End of Report ---            LOIDA COLÓN MD; Attending Radiologist  This document has been electronically signed. Dec  6 2023  6:49PM    < end of copied text >       Neurology Consult    Reason for Consult: Patient is a 83y old  Female who presents with a chief complaint of metabolic encephalopathy (06 Dec 2023 19:48)      HPI:  83-year-old female past medical history of dementia, HTN, HLD, DM presenting to the ED for evaluation of altered mental status.  As per report, the patient was found by EMS inside the bushes outside of her house.  Patient states that she is unsure how she got there but "I did not fall."  Patient is a very poor historian, states she has severe RIght back pain. AxO to name only.  ptn was admitted with similar complaints in May 2021. wound up having Sepsis 2/2 UTI, urinary retention. at present UA still not available, no cough, afebrile (06 Dec 2023 19:48)       PAST MEDICAL & SURGICAL HISTORY:  dementia  HTN  HLD  DM       No significant past surgical history          Allergies: Allergies    No Known Allergies    Intolerances        Social History: Denies toxic habits including tobacco, ETOH or illicit drugs.    Family History: FAMILY HISTORY:  No pertinent family history in first degree relatives    . No family history of strokes    Medications: MEDICATIONS  (STANDING):  piperacillin/tazobactam IVPB.. 3.375 Gram(s) IV Intermittent every 8 hours  thiamine IVPB 500 milliGRAM(s) IV Intermittent daily    MEDICATIONS  (PRN):  haloperidol    Injectable 1 milliGRAM(s) IV Push every 6 hours PRN Agitation  morphine  - Injectable 2 milliGRAM(s) IV Push every 6 hours PRN Severe Pain (7 - 10)      Review of Systems:  CONSTITUTIONAL:  No weight loss, fever, chills, weakness or fatigue.  HEENT:  Eyes:  No visual loss, blurred vision, double vision or yellow sclera. Ears, Nose, Throat:  No hearing loss, sneezing, congestion, runny nose or sore throat.  SKIN:  No rash or itching.  CARDIOVASCULAR:  No chest pain, chest pressure or chest discomfort. No palpitations or edema.  RESPIRATORY:  No shortness of breath, cough or sputum.  GASTROINTESTINAL:  No anorexia, nausea, vomiting or diarrhea. No abdominal pain or blood.  GENITOURINARY:  No burning on urination or incontinence   NEUROLOGICAL:  No headache, dizziness, syncope, paralysis, ataxia, numbness or tingling in the extremities. No change in bowel or bladder control. no limb weakness. no vision changes.   MUSCULOSKELETAL:  back pain   HEMATOLOGIC:  No anemia, bleeding or bruising.  LYMPHATICS:  No enlarged nodes. No history of splenectomy.  PSYCHIATRIC:  No history of depression or anxiety.  ENDOCRINOLOGIC:  No reports of sweating, cold or heat intolerance. No polyuria or polydipsia.      Vitals:  Vital Signs Last 24 Hrs  T(C): 36.3 (07 Dec 2023 05:35), Max: 36.9 (06 Dec 2023 16:00)  T(F): 97.3 (07 Dec 2023 05:35), Max: 98.4 (06 Dec 2023 16:00)  HR: 79 (07 Dec 2023 05:35) (69 - 79)  BP: 140/76 (07 Dec 2023 05:35) (127/62 - 142/78)  BP(mean): --  RR: 19 (07 Dec 2023 05:35) (17 - 20)  SpO2: 97% (07 Dec 2023 05:35) (94% - 97%)    Parameters below as of 07 Dec 2023 05:35  Patient On (Oxygen Delivery Method): room air    Orthostatic VS        General Exam:   General Appearance: Appropriately dressed and in no acute distress       Head: Normocephalic, atraumatic and no dysmorphic features  Ear, Nose, and Throat: Moist mucous membranes  CVS: S1S2+  Resp: No SOB, no wheeze or rhonchi  GI: soft NT/ND  Extremities: No edema or cyanosis  Skin: No bruises or rashes     Neurological Exam:  Mental Status: Awake, alert and oriented x 1  Able to follow simple and complex verbal commands. Able to name and repeat. fluent speech. No obvious aphasia or dysarthria noted.   Cranial Nerves: PERRL, EOMI, VFFC, sensation V1-V3 intact,  ? L NLF facial asymmetry, equal elevation of palate, scm/trap 5/5, tongue is midline on protrusion. no obvious papilledema on fundoscopic exam. hearing is grossly intact.   Motor: Normal bulk, tone and strength throughout. Fine finger movements were intact and symmetric. no tremors or drift noted.    Sensation: Intact to light touch and pinprick throughout. no right/left confusion. no extinction to tactile on DSS.    Reflexes: 1+ throughout at biceps, brachioradialis, triceps, patellars and ankles bilaterally and equal. No clonus. R toe and L toe were both downgoing.  Coordination: No dysmetria on FNF    Gait: deferred in ED     Data/Labs/Imaging which I personally reviewed.     Labs:     CBC Full  -  ( 07 Dec 2023 07:21 )  WBC Count : 12.28 K/uL  RBC Count : 4.19 M/uL  Hemoglobin : 13.2 g/dL  Hematocrit : 39.7 %  Platelet Count - Automated : 308 K/uL  Mean Cell Volume : 94.7 fl  Mean Cell Hemoglobin : 31.5 pg  Mean Cell Hemoglobin Concentration : 33.2 gm/dL  Auto Neutrophil # : x  Auto Lymphocyte # : x  Auto Monocyte # : x  Auto Eosinophil # : x  Auto Basophil # : x  Auto Neutrophil % : x  Auto Lymphocyte % : x  Auto Monocyte % : x  Auto Eosinophil % : x  Auto Basophil % : x    12-07    138  |  103  |  19  ----------------------------<  144<H>  4.0   |  24  |  0.82    Ca    10.1      07 Dec 2023 07:22  Phos  2.5     12-06  Mg     2.2     12-06    TPro  6.7  /  Alb  3.8  /  TBili  0.4  /  DBili  x   /  AST  20  /  ALT  9<L>  /  AlkPhos  81  12-06    LIVER FUNCTIONS - ( 06 Dec 2023 18:26 )  Alb: 3.8 g/dL / Pro: 6.7 g/dL / ALK PHOS: 81 U/L / ALT: 9 U/L / AST: 20 U/L / GGT: x             Urinalysis Basic - ( 07 Dec 2023 07:22 )    Color: x / Appearance: x / SG: x / pH: x  Gluc: 144 mg/dL / Ketone: x  / Bili: x / Urobili: x   Blood: x / Protein: x / Nitrite: x   Leuk Esterase: x / RBC: x / WBC x   Sq Epi: x / Non Sq Epi: x / Bacteria: x      < from: CT Head No Cont (12.06.23 @ 18:42) >    ACC: [id number] EXAM:  CT BRAIN   ORDERED BY: LINCOLN SALAZAR     PROCEDURE DATE:  12/06/2023          INTERPRETATION:  CT HEAD  HEAD CT    INDICATIONS: ams, 83-year-old female past medical history of HTN, HLD, DM   presenting to the ED for evaluation of altered mental status.  As per   report, the patient was found by EMS inside the bushes outside of her   house. Patient states that she is unsure how she got there but "I did not   fall." Patient is a very poor historian.    TECHNIQUE:    HEAD CT:  Serial axial images were obtained from the skull base to the vertex   without the use of intravenous contrast.    COMPARISON EXAMINATION: Head CT 5/6/2021    FINDINGS:    HEAD CT:    VENTRICLES AND SULCI: Ventricles and sulci are unremarkable for patient   age.  INTRA-AXIAL: No intracranial mass, acute hemorrhage, or midline shift is   present. There is non-specific decreased attenuation in the white matter   likely related to sequelae of microvascular disease.  EXTRA-AXIAL: No extra-axial fluid collection is present.  INTRACRANIAL HEMORRHAGE: None.    VISUALIZED SINUSES: No air-fluid levels are identified. Mild mucosal   thickening left maxillary sinus.  VISUALIZED MASTOIDS:  Clear.  CALVARIUM:  Intact.  MISCELLANEOUS:  Bilateral cataract surgery.    SOFT TISSUES: Unremarkable.  BONES: Unremarkable.      IMPRESSION:    HEAD CT: Mild to moderate volume loss, microvascular disease, no acute   hemorrhage or midline shift.    --- End of Report ---            LOIDA COLÓN MD; Attending Radiologist  This document has been electronically signed. Dec  6 2023  6:49PM    < end of copied text >

## 2023-12-07 NOTE — PHYSICAL THERAPY INITIAL EVALUATION ADULT - NSPTDISCHREC_GEN_A_CORE
DC subacute rehab; if home, home PT, 24/7 assist/supervision for ALL mobility/ADLs, recommend rolling walker and transport/polyfly WC, CM aware./Sub-acute Rehab

## 2023-12-07 NOTE — CONSULT NOTE ADULT - ASSESSMENT
83-year-old female with dementia, HTN, HLD, DM presenting to the ED for evaluation of altered mental status.  As per report, the patient was found by EMS inside the bushes outside of her house.  Patient states that she is unsure how she got there but "I did not fall."  Patient is a very poor historian, states she has severe RIght back pain. AxO to name only.  ptn was admitted with similar complaints in May 2021. wound up having Sepsis 2/2 UTI, urinary retention. at present UA still not available, no cough, afebrile   o/e AAOx1 , ? 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 Dwight Rosenstein   will r/o sttroke     - getting zosyn for possible infection   - thiamine   - check b12, RPR, TSH  - MRI brain pending will f/u   - VPA for agitaiton  - no change in home dementia meds  - f/u MRI brain   - TTE pending   - can check orthosetatics   - delerium precuations   - PT/OT   - check FS, glucose control <180  - GI/DVT ppx  - Counseling on diet, exercise, and medication adherence was done  - Counseling on smoking cessation and alcohol consumption offered when appropriate.  - Pain assessed and judicious use of narcotics when appropriate was discussed.    - Stroke education given when appropriate.  - Importance of fall prevention discussed.   - Differential diagnosis and plan of care discussed with patient and/or family and primary team  - Thank you for allowing me to participate in the care of this patient. Call with questions.     Mickey Yu MD  Vascular Neurology  Office: [phone]  83-year-old female with dementia, HTN, HLD, DM presenting to the ED for evaluation of altered mental status.  As per report, the patient was found by EMS inside the bushes outside of her house.  Patient states that she is unsure how she got there but "I did not fall."  Patient is a very poor historian, states she has severe RIght back pain. AxO to name only.  ptn was admitted with similar complaints in May 2021. wound up having Sepsis 2/2 UTI, urinary retention. at present UA still not available, no cough, afebrile   o/e AAOx1 , ? 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 Dwight Rosenstein   will r/o sttroke     - getting zosyn for possible infection   - thiamine   - check b12, RPR, TSH  - MRI brain pending will f/u   - VPA for agitaiton  - no change in home dementia meds  - f/u MRI brain   - TTE pending   - can check orthosetatics   - delerium precuations   - PT/OT   - check FS, glucose control <180  - GI/DVT ppx  - Counseling on diet, exercise, and medication adherence was done  - Counseling on smoking cessation and alcohol consumption offered when appropriate.  - Pain assessed and judicious use of narcotics when appropriate was discussed.    - Stroke education given when appropriate.  - Importance of fall prevention discussed.   - Differential diagnosis and plan of care discussed with patient and/or family and primary team  - Thank you for allowing me to participate in the care of this patient. Call with questions.     Mickey Yu MD  Vascular Neurology  Office: [phone]

## 2023-12-07 NOTE — PROGRESS NOTE ADULT - SUBJECTIVE AND OBJECTIVE BOX
Patient is a 83y old  Female who presents with a chief complaint of metabolic encephalopathy (07 Dec 2023 08:40)      SUBJECTIVE / OVERNIGHT EVENTS: ptn feels better, awaiting MR brain, will check w neuro how necessary it is on this admission. ptn is nonfocal. PT eval done, recs are [facility].   MEDICATIONS  (STANDING):  piperacillin/tazobactam IVPB.. 3.375 Gram(s) IV Intermittent every 8 hours  thiamine IVPB 500 milliGRAM(s) IV Intermittent daily    MEDICATIONS  (PRN):  haloperidol    Injectable 1 milliGRAM(s) IV Push every 6 hours PRN Agitation  morphine  - Injectable 2 milliGRAM(s) IV Push every 6 hours PRN Severe Pain (7 - 10)      Vital Signs Last 24 Hrs  T(F): 98.8 (12-07-23 @ 13:12), Max: 98.8 (12-07-23 @ 13:12)  HR: 87 (12-07-23 @ 14:47) (75 - 87)  BP: 120/65 (12-07-23 @ 14:47) (100/43 - 140/76)  RR: 18 (12-07-23 @ 13:12) (18 - 19)  SpO2: 95% (12-07-23 @ 14:47) (95% - 98%)  Telemetry:   CAPILLARY BLOOD GLUCOSE        I&O's Summary      PHYSICAL EXAM:  GENERAL: NAD, well-developed  HEAD:  Atraumatic, Normocephalic  EYES: EOMI, PERRLA, conjunctiva and sclera clear  NECK: Supple, No JVD  CHEST/LUNG: Clear to auscultation bilaterally; No wheeze  HEART: Regular rate and rhythm; No murmurs, rubs, or gallops  ABDOMEN: Soft, Nontender, Nondistended; Bowel sounds present  EXTREMITIES:  2+ Peripheral Pulses, No clubbing, cyanosis, or edema  PSYCH: AAOx3  NEUROLOGY: non-focal  SKIN: No rashes or lesions    LABS:                        13.2   12.28 )-----------( 308      ( 07 Dec 2023 07:21 )             39.7     12-07    138  |  103  |  19  ----------------------------<  144<H>  4.0   |  24  |  0.82    Ca    10.1      07 Dec 2023 07:22  Phos  2.5     12-06  Mg     2.2     12-06    TPro  6.7  /  Alb  3.8  /  TBili  0.4  /  DBili  x   /  AST  20  /  ALT  9<L>  /  AlkPhos  81  12-06      CARDIAC MARKERS ( 06 Dec 2023 18:26 )  x     / x     / 83 U/L / x     / x          Urinalysis Basic - ( 07 Dec 2023 07:22 )    Color: x / Appearance: x / SG: x / pH: x  Gluc: 144 mg/dL / Ketone: x  / Bili: x / Urobili: x   Blood: x / Protein: x / Nitrite: x   Leuk Esterase: x / RBC: x / WBC x   Sq Epi: x / Non Sq Epi: x / Bacteria: x        RADIOLOGY & ADDITIONAL TESTS:    Imaging Personally Reviewed:    Consultant(s) Notes Reviewed:      Care Discussed with Consultants/Other Providers:

## 2023-12-07 NOTE — PHYSICAL THERAPY INITIAL EVALUATION ADULT - GENERAL OBSERVATIONS, REHAB EVAL
Pt found down at home, +AMS. Pt received supine on stretcher (orange 61R), +IVL, A&OX1 (self), confused, follows simple commands with redirection. CT spine: reveals chronic comp fx L2 since 2/23, cleared by RADHA Bettencourt

## 2023-12-07 NOTE — PROGRESS NOTE ADULT - ASSESSMENT
83-year-old female past medical history of dementia, HTN, HLD, DM presenting to the ED for evaluation of altered mental status.  As per report, the patient was found by EMS inside the bushes outside of her house.  Patient states that she is unsure how she got there but "I did not fall."  Patient is a very poor historian, states she has severe RIght back pain. AxO to name only.  ptn was admitted with similar complaints in May 2021. wound up having Sepsis 2/2 UTI, urinary retention. at present UA still not available, no cough, afebrile    A/P  Metabolic encephalopathy  r/o syncope  HEAD Ct neg  unable to do MR brain, since cannot get history  check TTE, carotids  check orthostatics  UA is negative  euvolemic  Neuro and card consults  cont outptn meds after contacting outptn pharmacy  social service consult re safety at home  PT eval done, recs to [facility]  CT T and LS spine 2/2 pain: cheonic L2 compression Fx  DVT ppx w sc lovenox

## 2023-12-07 NOTE — PHYSICAL THERAPY INITIAL EVALUATION ADULT - PERTINENT HX OF CURRENT PROBLEM, REHAB EVAL
Pt is a 83-year-old female admitted to [facility] on 12/6/23 past medical history of dementia, HTN, HLD, DM presenting to the ED for evaluation of altered mental status.  As per report, the pt was found by EMS inside the bushes outside of her house.  Pt states that she is unsure how she got there but "I did not fall."  Pt is a very poor historian, states she has severe RIght back pain. AxO to name only.  pt was admitted with similar complaints in May 2021. wound up having Sepsis 2/2 UTI, urinary retention. at present UA still not available, no cough, afebrile. Hospital course: Metabolic encephalopathy, r/o syncope, HEAD Ct neg, check MR brain, TTE, carotids, R/O UTI, hydrate gently, Neuro and card consults, cont outptn meds after contacting outptn pharmacy, social service consult re safety at home, check CT T and LS spine 2/2 pain, DVT ppx w sc lovenox. VA duplex carotids: No significant hemodynamic stenosis of either carotid artery. Measurement of carotid stenosis is based on velocity parameters that correlate the residual internal carotid diameter with that of the more distal vessel in accordance with a method such as the [employer] American Symptomatic Carotid Endarterectomy Trial (NASCET). CT Chest: Scattered pulmonary nodules measuring less than 3 mm. A 4 mm right middle lobe nodule (3-61) appears new from 5/19/2023. A 9 mm right middle lobe nodule (3-96) is unchanged from prior. Right lower lobe wedge resection with postoperative changes. L1 vertebral body compression deformity is unchanged from 5/19/2023. CTH:  Mild to moderate volume loss, microvascular disease, no acute hemorrhage or midline shift. Pt is a 83-year-old female admitted to [facility] on 12/6/23 past medical history of dementia, HTN, HLD, DM presenting to the ED for evaluation of altered mental status.  As per report, the pt was found by EMS inside the bushes outside of her house.  Pt states that she is unsure how she got there but "I did not fall."  Pt is a very poor historian, states she has severe RIght back pain. AxO to name only.  pt was admitted with similar complaints in May 2021. wound up having Sepsis 2/2 UTI, urinary retention. at present UA still not available, no cough, afebrile. Hospital course: Metabolic encephalopathy, r/o syncope, HEAD Ct neg, check MR brain, TTE, carotids, R/O UTI, hydrate gently, Neuro and card consults, cont outptn meds after contacting outptn pharmacy, social service consult re safety at home, check CT T and LS spine 2/2 pain, DVT ppx w sc lovenox. VA duplex carotids: No significant hemodynamic stenosis of either carotid artery. Measurement of carotid stenosis is based on velocity parameters that correlate the residual internal carotid diameter with that of the more distal vessel in accordance with a method such as the [employer] Symptomatic Carotid Endarterectomy Trial (NASCET). CT Chest: Scattered pulmonary nodules measuring less than 3 mm. A 4 mm right middle lobe nodule (3-61) appears new from 5/19/2023. A 9 mm right middle lobe nodule (3-96) is unchanged from prior. Right lower lobe wedge resection with postoperative changes. L1 vertebral body compression deformity is unchanged from 5/19/2023. CTH:  Mild to moderate volume loss, microvascular disease, no acute hemorrhage or midline shift. Pt is a 83-year-old female admitted to [facility] on 12/6/23 past medical history of dementia, HTN, HLD, DM presenting to the ED for evaluation of altered mental status.  As per report, the pt was found by EMS inside the bushes outside of her house.  Pt states that she is unsure how she got there but "I did not fall."  Pt is a very poor historian, states she has severe RIght back pain. AxO to name only.  pt was admitted with similar complaints in May 2021. wound up having Sepsis 2/2 UTI, urinary retention. at present UA still not available, no cough, afebrile. Hospital course: Metabolic encephalopathy, r/o syncope, HEAD Ct neg, check MR brain, TTE, carotids, R/O UTI, hydrate gently, Neuro and card consults, cont outptn meds after contacting outptn pharmacy, social service consult re safety at home, check CT T and LS spine 2/2 pain, DVT ppx w sc lovenox. VA duplex carotids: No significant hemodynamic stenosis of either carotid artery. Measurement of carotid stenosis is based on velocity parameters that correlate the residual internal carotid diameter with that of the more distal vessel in accordance with a method such as the [employer] American Symptomatic Carotid Endarterectomy Trial (NASCET). CT Chest: Scattered pulmonary nodules measuring less than 3 mm. A 4 mm right middle lobe nodule (3-61) appears new from 5/19/2023. A 9 mm right middle lobe nodule (3-96) is unchanged from prior. Right lower lobe wedge resection with postoperative changes. L1 vertebral body compression deformity is unchanged from 5/19/2023. CTH:  Mild to moderate volume loss, microvascular disease, no acute hemorrhage or midline shift. CT T/Lspine: No acute displaced fracture or traumatic subluxation. Chronic compression fracture of L2 with mild bony retropulsion. Mild degenerative changes as above. Pt is a 83-year-old female admitted to [facility] on 12/6/23 past medical history of dementia, HTN, HLD, DM presenting to the ED for evaluation of altered mental status.  As per report, the pt was found by EMS inside the bushes outside of her house.  Pt states that she is unsure how she got there but "I did not fall."  Pt is a very poor historian, states she has severe RIght back pain. AxO to name only.  pt was admitted with similar complaints in May 2021. wound up having Sepsis 2/2 UTI, urinary retention. at present UA still not available, no cough, afebrile. Hospital course: Metabolic encephalopathy, r/o syncope, HEAD Ct neg, check MR brain, TTE, carotids, R/O UTI, hydrate gently, Neuro and card consults, cont outptn meds after contacting outptn pharmacy, social service consult re safety at home, check CT T and LS spine 2/2 pain, DVT ppx w sc lovenox. VA duplex carotids: No significant hemodynamic stenosis of either carotid artery. Measurement of carotid stenosis is based on velocity parameters that correlate the residual internal carotid diameter with that of the more distal vessel in accordance with a method such as the [employer] American Symptomatic Carotid Endarterectomy Trial (NASCET). CT Chest: Scattered pulmonary nodules measuring less than 3 mm. A 4 mm right middle lobe nodule (3-61) appears new from 5/19/2023. A 9 mm right middle lobe nodule (3-96) is unchanged from prior. Right lower lobe wedge resection with postoperative changes. L1 vertebral body compression deformity is unchanged from 5/19/2023. CTH:  Mild to moderate volume loss, microvascular disease, no acute hemorrhage or midline shift. CT T/Lspine: No acute displaced fracture or traumatic subluxation. Chronic compression fracture of L2 with mild bony retropulsion. Mild degenerative changes as above.

## 2023-12-08 ENCOUNTER — APPOINTMENT (OUTPATIENT)
Dept: PULMONOLOGY | Facility: CLINIC | Age: 83
End: 2023-12-08

## 2023-12-08 LAB
CULTURE RESULTS: NO GROWTH — SIGNIFICANT CHANGE UP
CULTURE RESULTS: NO GROWTH — SIGNIFICANT CHANGE UP
SPECIMEN SOURCE: SIGNIFICANT CHANGE UP
SPECIMEN SOURCE: SIGNIFICANT CHANGE UP

## 2023-12-08 RX ORDER — PREGABALIN 225 MG/1
1000 CAPSULE ORAL DAILY
Refills: 0 | Status: DISCONTINUED | OUTPATIENT
Start: 2023-12-08 | End: 2023-12-12

## 2023-12-08 RX ADMIN — HALOPERIDOL DECANOATE 1 MILLIGRAM(S): 100 INJECTION INTRAMUSCULAR at 16:22

## 2023-12-08 RX ADMIN — Medication 105 MILLIGRAM(S): at 11:18

## 2023-12-08 RX ADMIN — HALOPERIDOL DECANOATE 1 MILLIGRAM(S): 100 INJECTION INTRAMUSCULAR at 22:54

## 2023-12-08 NOTE — PATIENT PROFILE ADULT - FALL HARM RISK - HARM RISK INTERVENTIONS
Assistance with ambulation/Assistance OOB with selected safe patient handling equipment/Communicate Risk of Fall with Harm to all staff/Discuss with provider need for PT consult/Monitor for mental status changes/Monitor gait and stability/Move patient closer to nurses' station/Reinforce activity limits and safety measures with patient and family/Reorient to person, place and time as needed/Tailored Fall Risk Interventions/Toileting schedule using arm’s reach rule for commode and bathroom/Use of alarms - bed, chair and/or voice tab/Visual Cue: Yellow wristband and red socks/Bed in lowest position, wheels locked, appropriate side rails in place/Call bell, personal items and telephone in reach/Instruct patient to call for assistance before getting out of bed or chair/Non-slip footwear when patient is out of bed/[place] to call system/Physically safe environment - no spills, clutter or unnecessary equipment/Purposeful Proactive Rounding/Room/bathroom lighting operational, light cord in reach Assistance with ambulation/Assistance OOB with selected safe patient handling equipment/Communicate Risk of Fall with Harm to all staff/Discuss with provider need for PT consult/Monitor for mental status changes/Monitor gait and stability/Move patient closer to nurses' station/Reinforce activity limits and safety measures with patient and family/Reorient to person, place and time as needed/Tailored Fall Risk Interventions/Toileting schedule using arm’s reach rule for commode and bathroom/Use of alarms - bed, chair and/or voice tab/Visual Cue: Yellow wristband and red socks/Bed in lowest position, wheels locked, appropriate side rails in place/Call bell, personal items and telephone in reach/Instruct patient to call for assistance before getting out of bed or chair/Non-slip footwear when patient is out of bed/[place] to call system/Physically safe environment - no spills, clutter or unnecessary equipment/Purposeful Proactive Rounding/Room/bathroom lighting operational, light cord in reach

## 2023-12-08 NOTE — PROGRESS NOTE ADULT - SUBJECTIVE AND OBJECTIVE BOX
Neurology     S: patient seen. doing okay.  confused a bit       Medications: MEDICATIONS  (STANDING):  thiamine IVPB 500 milliGRAM(s) IV Intermittent daily    MEDICATIONS  (PRN):  haloperidol    Injectable 1 milliGRAM(s) IV Push every 6 hours PRN Agitation  morphine  - Injectable 2 milliGRAM(s) IV Push every 6 hours PRN Severe Pain (7 - 10)       Vitals:  Vital Signs Last 24 Hrs  T(C): 36.8 (08 Dec 2023 06:21), Max: 37.1 (07 Dec 2023 13:12)  T(F): 98.2 (08 Dec 2023 06:21), Max: 98.8 (07 Dec 2023 13:12)  HR: 76 (08 Dec 2023 06:21) (61 - 89)  BP: 118/64 (08 Dec 2023 06:21) (100/43 - 150/72)  BP(mean): --  RR: 18 (08 Dec 2023 06:21) (18 - 18)  SpO2: 97% (08 Dec 2023 06:21) (95% - 98%)    Parameters below as of 08 Dec 2023 06:21  Patient On (Oxygen Delivery Method): room air              General Exam:   General Appearance: Appropriately dressed and in no acute distress       Head: Normocephalic, atraumatic and no dysmorphic features  Ear, Nose, and Throat: Moist mucous membranes  CVS: S1S2+  Resp: No SOB, no wheeze or rhonchi  GI: soft NT/ND  Extremities: No edema or cyanosis  Skin: No bruises or rashes     Neurological Exam:  Mental Status: Awake, alert and oriented x 1  Able to follow simple and complex verbal commands. Able to name and repeat. fluent speech. No obvious aphasia or dysarthria noted.   Cranial Nerves: PERRL, EOMI, VFFC, sensation V1-V3 intact,  ? L NLF facial asymmetry, equal elevation of palate, scm/trap 5/5, tongue is midline on protrusion. no obvious papilledema on fundoscopic exam. hearing is grossly intact.   Motor: Normal bulk, tone and strength throughout. Fine finger movements were intact and symmetric. no tremors or drift noted.    Sensation: Intact to light touch and pinprick throughout. no right/left confusion. no extinction to tactile on DSS.    Reflexes: 1+ throughout at biceps, brachioradialis, triceps, patellars and ankles bilaterally and equal. No clonus. R toe and L toe were both downgoing.  Coordination: No dysmetria on FNF    Gait: deferred in ED     Data/Labs/Imaging which I personally reviewed.     Labs:       LABS:                          13.2   12.28 )-----------( 308      ( 07 Dec 2023 07:21 )             39.7     12-07    138  |  103  |  19  ----------------------------<  144<H>  4.0   |  24  |  0.82    Ca    10.1      07 Dec 2023 07:22  Phos  2.5     12-06  Mg     2.2     12-06    TPro  6.7  /  Alb  3.8  /  TBili  0.4  /  DBili  x   /  AST  20  /  ALT  9<L>  /  AlkPhos  81  12-06    LIVER FUNCTIONS - ( 06 Dec 2023 18:26 )  Alb: 3.8 g/dL / Pro: 6.7 g/dL / ALK PHOS: 81 U/L / ALT: 9 U/L / AST: 20 U/L / GGT: x             Urinalysis Basic - ( 07 Dec 2023 07:22 )    Color: x / Appearance: x / SG: x / pH: x  Gluc: 144 mg/dL / Ketone: x  / Bili: x / Urobili: x   Blood: x / Protein: x / Nitrite: x   Leuk Esterase: x / RBC: x / WBC x   Sq Epi: x / Non Sq Epi: x / Bacteria: x        < from: CT Head No Cont (12.06.23 @ 18:42) >    ACC: [id number] EXAM:  CT BRAIN   ORDERED BY: LINCOLN SALAZAR     PROCEDURE DATE:  12/06/2023          INTERPRETATION:  CT HEAD  HEAD CT    INDICATIONS: ams, 83-year-old female past medical history of HTN, HLD, DM   presenting to the ED for evaluation of altered mental status.  As per   report, the patient was found by EMS inside the bushes outside of her   house. Patient states that she is unsure how she got there but "I did not   fall." Patient is a very poor historian.    TECHNIQUE:    HEAD CT:  Serial axial images were obtained from the skull base to the vertex   without the use of intravenous contrast.    COMPARISON EXAMINATION: Head CT 5/6/2021    FINDINGS:    HEAD CT:    VENTRICLES AND SULCI: Ventricles and sulci are unremarkable for patient   age.  INTRA-AXIAL: No intracranial mass, acute hemorrhage, or midline shift is   present. There is non-specific decreased attenuation in the white matter   likely related to sequelae of microvascular disease.  EXTRA-AXIAL: No extra-axial fluid collection is present.  INTRACRANIAL HEMORRHAGE: None.    VISUALIZED SINUSES: No air-fluid levels are identified. Mild mucosal   thickening left maxillary sinus.  VISUALIZED MASTOIDS:  Clear.  CALVARIUM:  Intact.  MISCELLANEOUS:  Bilateral cataract surgery.    SOFT TISSUES: Unremarkable.  BONES: Unremarkable.      IMPRESSION:    HEAD CT: Mild to moderate volume loss, microvascular disease, no acute   hemorrhage or midline shift.    --- End of Report ---            LOIDA COLÓN MD; Attending Radiologist  This document has been electronically signed. Dec  6 2023  6:49PM    < end of copied text >       Neurology     S: patient seen. doing okay.  confused a bit       Medications: MEDICATIONS  (STANDING):  thiamine IVPB 500 milliGRAM(s) IV Intermittent daily    MEDICATIONS  (PRN):  haloperidol    Injectable 1 milliGRAM(s) IV Push every 6 hours PRN Agitation  morphine  - Injectable 2 milliGRAM(s) IV Push every 6 hours PRN Severe Pain (7 - 10)       Vitals:  Vital Signs Last 24 Hrs  T(C): 36.8 (08 Dec 2023 06:21), Max: 37.1 (07 Dec 2023 13:12)  T(F): 98.2 (08 Dec 2023 06:21), Max: 98.8 (07 Dec 2023 13:12)  HR: 76 (08 Dec 2023 06:21) (61 - 89)  BP: 118/64 (08 Dec 2023 06:21) (100/43 - 150/72)  BP(mean): --  RR: 18 (08 Dec 2023 06:21) (18 - 18)  SpO2: 97% (08 Dec 2023 06:21) (95% - 98%)    Parameters below as of 08 Dec 2023 06:21  Patient On (Oxygen Delivery Method): room air              General Exam:   General Appearance: Appropriately dressed and in no acute distress       Head: Normocephalic, atraumatic and no dysmorphic features  Ear, Nose, and Throat: Moist mucous membranes  CVS: S1S2+  Resp: No SOB, no wheeze or rhonchi  GI: soft NT/ND  Extremities: No edema or cyanosis  Skin: No bruises or rashes     Neurological Exam:  Mental Status: Awake, alert and oriented x 1  Able to follow simple and complex verbal commands. Able to name and repeat. fluent speech. No obvious aphasia or dysarthria noted.   Cranial Nerves: PERRL, EOMI, VFFC, sensation V1-V3 intact,  ? L NLF facial asymmetry, equal elevation of palate, scm/trap 5/5, tongue is midline on protrusion. no obvious papilledema on fundoscopic exam. hearing is grossly intact.   Motor: Normal bulk, tone and strength throughout. Fine finger movements were intact and symmetric. no tremors or drift noted.    Sensation: Intact to light touch and pinprick throughout. no right/left confusion. no extinction to tactile on DSS.    Reflexes: 1+ throughout at biceps, brachioradialis, triceps, patellars and ankles bilaterally and equal. No clonus. R toe and L toe were both downgoing.  Coordination: No dysmetria on FNF    Gait: deferred in ED     Data/Labs/Imaging which I personally reviewed.     Labs:       LABS:                          13.2   12.28 )-----------( 308      ( 07 Dec 2023 07:21 )             39.7     12-07    138  |  103  |  19  ----------------------------<  144<H>  4.0   |  24  |  0.82    Ca    10.1      07 Dec 2023 07:22  Phos  2.5     12-06  Mg     2.2     12-06    TPro  6.7  /  Alb  3.8  /  TBili  0.4  /  DBili  x   /  AST  20  /  ALT  9<L>  /  AlkPhos  81  12-06    LIVER FUNCTIONS - ( 06 Dec 2023 18:26 )  Alb: 3.8 g/dL / Pro: 6.7 g/dL / ALK PHOS: 81 U/L / ALT: 9 U/L / AST: 20 U/L / GGT: x             Urinalysis Basic - ( 07 Dec 2023 07:22 )    Color: x / Appearance: x / SG: x / pH: x  Gluc: 144 mg/dL / Ketone: x  / Bili: x / Urobili: x   Blood: x / Protein: x / Nitrite: x   Leuk Esterase: x / RBC: x / WBC x   Sq Epi: x / Non Sq Epi: x / Bacteria: x        < from: CT Head No Cont (12.06.23 @ 18:42) >    ACC: [id number] EXAM:  CT BRAIN   ORDERED BY: LINCOLN SALAZAR     PROCEDURE DATE:  12/06/2023          INTERPRETATION:  CT HEAD  HEAD CT    INDICATIONS: ams, 83-year-old female past medical history of HTN, HLD, DM   presenting to the ED for evaluation of altered mental status.  As per   report, the patient was found by EMS inside the bushes outside of her   house. Patient states that she is unsure how she got there but "I did not   fall." Patient is a very poor historian.    TECHNIQUE:    HEAD CT:  Serial axial images were obtained from the skull base to the vertex   without the use of intravenous contrast.    COMPARISON EXAMINATION: Head CT 5/6/2021    FINDINGS:    HEAD CT:    VENTRICLES AND SULCI: Ventricles and sulci are unremarkable for patient   age.  INTRA-AXIAL: No intracranial mass, acute hemorrhage, or midline shift is   present. There is non-specific decreased attenuation in the white matter   likely related to sequelae of microvascular disease.  EXTRA-AXIAL: No extra-axial fluid collection is present.  INTRACRANIAL HEMORRHAGE: None.    VISUALIZED SINUSES: No air-fluid levels are identified. Mild mucosal   thickening left maxillary sinus.  VISUALIZED MASTOIDS:  Clear.  CALVARIUM:  Intact.  MISCELLANEOUS:  Bilateral cataract surgery.    SOFT TISSUES: Unremarkable.  BONES: Unremarkable.      IMPRESSION:    HEAD CT: Mild to moderate volume loss, microvascular disease, no acute   hemorrhage or midline shift.    --- End of Report ---            LOIDA COLÓN MD; Attending Radiologist  This document has been electronically signed. Dec  6 2023  6:49PM    < end of copied text >

## 2023-12-08 NOTE — PATIENT PROFILE ADULT - FUNCTIONAL ASSESSMENT - BASIC MOBILITY 6.
3-calculated by average/Not able to assess (calculate score using [facility] averaging method)  3-calculated by average/Not able to assess (calculate score using [facility] averaging method)

## 2023-12-08 NOTE — PROGRESS NOTE ADULT - ASSESSMENT
83-year-old female with dementia, HTN, HLD, DM presenting to the ED for evaluation of altered mental status.  As per report, the patient was found by EMS inside the bushes outside of her house.  Patient states that she is unsure how she got there but "I did not fall."  Patient is a very poor historian, states she has severe RIght back pain. AxO to name only.  ptn was admitted with similar complaints in May 2021. wound up having Sepsis 2/2 UTI, urinary retention. at present UA still not available, no cough, afebrile   o/e AAOx1 , ? 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 Dwight Rosenstein   will r/o sttroke     - no focal findings. can defer MRI brain at this time   - was  getting zosyn for possible infection ; now off   - thiamine   - VPA for agitaiton  - no change in home dementia meds  - TTE pending   - can check orthosetatics   - delerium precuations   - PT/OT   - check FS, glucose control <180  - GI/DVT ppx   - Thank you for allowing me to participate in the care of this patient. Call with questions.   outpatient f/u on discharge   Mickey Yu MD  Vascular Neurology  Office: [phone]  83-year-old female with dementia, HTN, HLD, DM presenting to the ED for evaluation of altered mental status.  As per report, the patient was found by EMS inside the bushes outside of her house.  Patient states that she is unsure how she got there but "I did not fall."  Patient is a very poor historian, states she has severe RIght back pain. AxO to name only.  ptn was admitted with similar complaints in May 2021. wound up having Sepsis 2/2 UTI, urinary retention. at present UA still not available, no cough, afebrile   o/e AAOx1 , ? 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 Dwight Rosenstein   will r/o sttroke     - no focal findings. can defer MRI brain at this time   - was  getting zosyn for possible infection ; now off   - thiamine   - VPA for agitaiton  - no change in home dementia meds  - TTE pending   - can check orthosetatics   - delerium precuations   - PT/OT   - check FS, glucose control <180  - GI/DVT ppx   - Thank you for allowing me to participate in the care of this patient. Call with questions.   outpatient f/u on discharge   Mickey Yu MD  Vascular Neurology  Office: [phone]

## 2023-12-08 NOTE — PHARMACOTHERAPY INTERVENTION NOTE - COMMENTS
Medication Reconciliation completed for patient.  These were confirmed with patient and patient's pharmacy, [facility].  Per patient, she takes 7 medications from [facility] however, [facility] only reported 3 maintenance medications, all of which were filled in June 2023.  Updated medications are reflected in Outpatient Medication Review.     Home Medications:  memantine 10 mg oral tablet: 1 tab(s) orally once a day LAST FILL JUNE 2023   telmisartan 80 mg oral tablet: 1 tab(s) orally once a day LAST FILL JUNE 2023   Vytorin 10 mg-40 mg oral tablet: 1 tab(s) orally once a day LAST FILL JUNE 2023    Ida Schmidt, PharmD, BCPS  Clinical Pharmacy Specialist  Available on Teams     Medication Reconciliation completed for patient.  These were confirmed with patient and patient's pharmacy, [facility].  Per patient, she takes 7 medications from [facility] however, [facility] only reported 3 maintenance medications, all of which were filled in June 2023.  Updated medications are reflected in Outpatient Medication Review.     Home Medications:  memantine 10 mg oral tablet: 1 tab(s) orally once a day LAST FILL JUNE 2023   telmisartan 80 mg oral tablet: 1 tab(s) orally once a day LAST FILL JUNE 2023   Vytorin 10 mg-40 mg oral tablet: 1 tab(s) orally once a day LAST FILL JUNE 2023    Ida Schmidt, PharmD, BCPS  Clinical Pharmacy Specialist  Available on Teams

## 2023-12-08 NOTE — PROGRESS NOTE ADULT - SUBJECTIVE AND OBJECTIVE BOX
Patient is a 83y old  Female who presents with a chief complaint of metabolic encephalopathy (08 Dec 2023 09:50)      SUBJECTIVE / OVERNIGHT EVENTS: dc planning to [facility], awaiting TTE    MEDICATIONS  (STANDING):  cyanocobalamin 1000 MICROGram(s) Oral daily  multivitamin 1 Tablet(s) Oral daily  thiamine IVPB 500 milliGRAM(s) IV Intermittent daily    MEDICATIONS  (PRN):  haloperidol    Injectable 1 milliGRAM(s) IV Push every 6 hours PRN Agitation  morphine  - Injectable 2 milliGRAM(s) IV Push every 6 hours PRN Severe Pain (7 - 10)      Vital Signs Last 24 Hrs  T(F): 97.8 (12-08-23 @ 19:47), Max: 98.2 (12-08-23 @ 06:21)  HR: 82 (12-08-23 @ 19:47) (73 - 82)  BP: 118/88 (12-08-23 @ 19:47) (118/64 - 135/78)  RR: 18 (12-08-23 @ 19:47) (18 - 18)  SpO2: 96% (12-08-23 @ 19:47) (92% - 97%)  Telemetry:   CAPILLARY BLOOD GLUCOSE        I&O's Summary    08 Dec 2023 07:01  -  08 Dec 2023 22:27  --------------------------------------------------------  IN: 600 mL / OUT: 300 mL / NET: 300 mL        PHYSICAL EXAM:  GENERAL: NAD, well-developed  HEAD:  Atraumatic, Normocephalic  EYES: EOMI, PERRLA, conjunctiva and sclera clear  NECK: Supple, No JVD  CHEST/LUNG: Clear to auscultation bilaterally; No wheeze  HEART: Regular rate and rhythm; No murmurs, rubs, or gallops  ABDOMEN: Soft, Nontender, Nondistended; Bowel sounds present  EXTREMITIES:  2+ Peripheral Pulses, No clubbing, cyanosis, or edema  PSYCH: AAOx3  NEUROLOGY: non-focal  SKIN: No rashes or lesions    LABS:                        13.2   12.28 )-----------( 308      ( 07 Dec 2023 07:21 )             39.7     12-07    138  |  103  |  19  ----------------------------<  144<H>  4.0   |  24  |  0.82    Ca    10.1      07 Dec 2023 07:22            Urinalysis Basic - ( 07 Dec 2023 07:22 )    Color: x / Appearance: x / SG: x / pH: x  Gluc: 144 mg/dL / Ketone: x  / Bili: x / Urobili: x   Blood: x / Protein: x / Nitrite: x   Leuk Esterase: x / RBC: x / WBC x   Sq Epi: x / Non Sq Epi: x / Bacteria: x        RADIOLOGY & ADDITIONAL TESTS:    Imaging Personally Reviewed:    Consultant(s) Notes Reviewed:      Care Discussed with Consultants/Other Providers:

## 2023-12-09 RX ORDER — ACETAMINOPHEN 500 MG
650 TABLET ORAL ONCE
Refills: 0 | Status: COMPLETED | OUTPATIENT
Start: 2023-12-09 | End: 2023-12-09

## 2023-12-09 RX ORDER — SODIUM CHLORIDE 9 MG/ML
1000 INJECTION, SOLUTION INTRAVENOUS
Refills: 0 | Status: DISCONTINUED | OUTPATIENT
Start: 2023-12-09 | End: 2023-12-12

## 2023-12-09 RX ORDER — LANOLIN ALCOHOL/MO/W.PET/CERES
3 CREAM (GRAM) TOPICAL ONCE
Refills: 0 | Status: COMPLETED | OUTPATIENT
Start: 2023-12-09 | End: 2023-12-09

## 2023-12-09 RX ADMIN — SODIUM CHLORIDE 60 MILLILITER(S): 9 INJECTION, SOLUTION INTRAVENOUS at 17:27

## 2023-12-09 RX ADMIN — Medication 650 MILLIGRAM(S): at 14:10

## 2023-12-09 RX ADMIN — Medication 1 TABLET(S): at 11:44

## 2023-12-09 RX ADMIN — PREGABALIN 1000 MICROGRAM(S): 225 CAPSULE ORAL at 11:44

## 2023-12-09 RX ADMIN — SODIUM CHLORIDE 60 MILLILITER(S): 9 INJECTION, SOLUTION INTRAVENOUS at 21:53

## 2023-12-09 RX ADMIN — Medication 3 MILLIGRAM(S): at 21:53

## 2023-12-09 RX ADMIN — Medication 105 MILLIGRAM(S): at 11:44

## 2023-12-09 RX ADMIN — Medication 650 MILLIGRAM(S): at 13:11

## 2023-12-09 NOTE — DIETITIAN INITIAL EVALUATION ADULT - NSFNSGIIOFT_GEN_A_CORE
- Pt denies nausea, vomiting, diarrhea, or constipation at this time   - Last BM:3 days ago per pt; not currently ordered for bowel regimen

## 2023-12-09 NOTE — DIETITIAN INITIAL EVALUATION ADULT - OTHER INFO
Home Medications:  memantine 10 mg oral tablet: 1 tab(s) orally once a day LAST FILL JUNE 2023 (08 Dec 2023 14:05)  telmisartan 80 mg oral tablet: 1 tab(s) orally once a day LAST FILL JUNE 2023 (08 Dec 2023 14:06)  Vytorin 10 mg-40 mg oral tablet: 1 tab(s) orally once a day LAST FILL JUNE 2023 (08 Dec 2023 14:07)

## 2023-12-09 NOTE — PROGRESS NOTE ADULT - SUBJECTIVE AND OBJECTIVE BOX
Neurology     S: patient seen. doing okay.  confused a bit       Medications: MEDICATIONS  (STANDING):  cyanocobalamin 1000 MICROGram(s) Oral daily  multivitamin 1 Tablet(s) Oral daily  thiamine IVPB 500 milliGRAM(s) IV Intermittent daily    MEDICATIONS  (PRN):  haloperidol    Injectable 1 milliGRAM(s) IV Push every 6 hours PRN Agitation  morphine  - Injectable 2 milliGRAM(s) IV Push every 6 hours PRN Severe Pain (7 - 10)       Vitals:  Vital Signs Last 24 Hrs  T(C): 36.8 (09 Dec 2023 05:57), Max: 36.8 (09 Dec 2023 05:57)  T(F): 98.3 (09 Dec 2023 05:57), Max: 98.3 (09 Dec 2023 05:57)  HR: 77 (09 Dec 2023 05:57) (73 - 82)  BP: 144/70 (09 Dec 2023 05:57) (118/88 - 144/70)  BP(mean): --  RR: 18 (09 Dec 2023 05:57) (18 - 18)  SpO2: 97% (09 Dec 2023 05:57) (92% - 97%)    Parameters below as of 09 Dec 2023 05:57  Patient On (Oxygen Delivery Method): room air                    General Exam:   General Appearance: Appropriately dressed and in no acute distress       Head: Normocephalic, atraumatic and no dysmorphic features  Ear, Nose, and Throat: Moist mucous membranes  CVS: S1S2+  Resp: No SOB, no wheeze or rhonchi  GI: soft NT/ND  Extremities: No edema or cyanosis  Skin: No bruises or rashes     Neurological Exam:  Mental Status: Awake, alert and oriented x 1  Able to follow simple and complex verbal commands. Able to name and repeat. fluent speech. No obvious aphasia or dysarthria noted.   Cranial Nerves: PERRL, EOMI, VFFC, sensation V1-V3 intact,  ? L NLF facial asymmetry, equal elevation of palate, scm/trap 5/5, tongue is midline on protrusion. no obvious papilledema on fundoscopic exam. hearing is grossly intact.   Motor: Normal bulk, tone and strength throughout. Fine finger movements were intact and symmetric. no tremors or drift noted.    Sensation: Intact to light touch and pinprick throughout. no right/left confusion. no extinction to tactile on DSS.    Reflexes: 1+ throughout at biceps, brachioradialis, triceps, patellars and ankles bilaterally and equal. No clonus. R toe and L toe were both downgoing.  Coordination: No dysmetria on FNF    Gait: deferred     Data/Labs/Imaging which I personally reviewed.     Labs:     LABS:  no new labs             < from: CT Head No Cont (12.06.23 @ 18:42) >    ACC: [id number] EXAM:  CT BRAIN   ORDERED BY: LINCOLN SALAZAR     PROCEDURE DATE:  12/06/2023          INTERPRETATION:  CT HEAD  HEAD CT    INDICATIONS: ams, 83-year-old female past medical history of HTN, HLD, DM   presenting to the ED for evaluation of altered mental status.  As per   report, the patient was found by EMS inside the bushes outside of her   house. Patient states that she is unsure how she got there but "I did not   fall." Patient is a very poor historian.    TECHNIQUE:    HEAD CT:  Serial axial images were obtained from the skull base to the vertex   without the use of intravenous contrast.    COMPARISON EXAMINATION: Head CT 5/6/2021    FINDINGS:    HEAD CT:    VENTRICLES AND SULCI: Ventricles and sulci are unremarkable for patient   age.  INTRA-AXIAL: No intracranial mass, acute hemorrhage, or midline shift is   present. There is non-specific decreased attenuation in the white matter   likely related to sequelae of microvascular disease.  EXTRA-AXIAL: No extra-axial fluid collection is present.  INTRACRANIAL HEMORRHAGE: None.    VISUALIZED SINUSES: No air-fluid levels are identified. Mild mucosal   thickening left maxillary sinus.  VISUALIZED MASTOIDS:  Clear.  CALVARIUM:  Intact.  MISCELLANEOUS:  Bilateral cataract surgery.    SOFT TISSUES: Unremarkable.  BONES: Unremarkable.      IMPRESSION:    HEAD CT: Mild to moderate volume loss, microvascular disease, no acute   hemorrhage or midline shift.    --- End of Report ---            LOIDA COLÓN MD; Attending Radiologist  This document has been electronically signed. Dec  6 2023  6:49PM    < end of copied text >       Neurology     S: patient seen. doing okay.  confused a bit       Medications: MEDICATIONS  (STANDING):  cyanocobalamin 1000 MICROGram(s) Oral daily  multivitamin 1 Tablet(s) Oral daily  thiamine IVPB 500 milliGRAM(s) IV Intermittent daily    MEDICATIONS  (PRN):  haloperidol    Injectable 1 milliGRAM(s) IV Push every 6 hours PRN Agitation  morphine  - Injectable 2 milliGRAM(s) IV Push every 6 hours PRN Severe Pain (7 - 10)       Vitals:  Vital Signs Last 24 Hrs  T(C): 36.8 (09 Dec 2023 05:57), Max: 36.8 (09 Dec 2023 05:57)  T(F): 98.3 (09 Dec 2023 05:57), Max: 98.3 (09 Dec 2023 05:57)  HR: 77 (09 Dec 2023 05:57) (73 - 82)  BP: 144/70 (09 Dec 2023 05:57) (118/88 - 144/70)  BP(mean): --  RR: 18 (09 Dec 2023 05:57) (18 - 18)  SpO2: 97% (09 Dec 2023 05:57) (92% - 97%)    Parameters below as of 09 Dec 2023 05:57  Patient On (Oxygen Delivery Method): room air                    General Exam:   General Appearance: Appropriately dressed and in no acute distress       Head: Normocephalic, atraumatic and no dysmorphic features  Ear, Nose, and Throat: Moist mucous membranes  CVS: S1S2+  Resp: No SOB, no wheeze or rhonchi  GI: soft NT/ND  Extremities: No edema or cyanosis  Skin: No bruises or rashes     Neurological Exam:  Mental Status: Awake, alert and oriented x 1  Able to follow simple and complex verbal commands. Able to name and repeat. fluent speech. No obvious aphasia or dysarthria noted.   Cranial Nerves: PERRL, EOMI, VFFC, sensation V1-V3 intact,  ? L NLF facial asymmetry, equal elevation of palate, scm/trap 5/5, tongue is midline on protrusion. no obvious papilledema on fundoscopic exam. hearing is grossly intact.   Motor: Normal bulk, tone and strength throughout. Fine finger movements were intact and symmetric. no tremors or drift noted.    Sensation: Intact to light touch and pinprick throughout. no right/left confusion. no extinction to tactile on DSS.    Reflexes: 1+ throughout at biceps, brachioradialis, triceps, patellars and ankles bilaterally and equal. No clonus. R toe and L toe were both downgoing.  Coordination: No dysmetria on FNF    Gait: deferred     Data/Labs/Imaging which I personally reviewed.     Labs:     LABS:  no new labs             < from: CT Head No Cont (12.06.23 @ 18:42) >    ACC: [id number] EXAM:  CT BRAIN   ORDERED BY: LINCOLN SALAZAR     PROCEDURE DATE:  12/06/2023          INTERPRETATION:  CT HEAD  HEAD CT    INDICATIONS: ams, 83-year-old female past medical history of HTN, HLD, DM   presenting to the ED for evaluation of altered mental status.  As per   report, the patient was found by EMS inside the bushes outside of her   house. Patient states that she is unsure how she got there but "I did not   fall." Patient is a very poor historian.    TECHNIQUE:    HEAD CT:  Serial axial images were obtained from the skull base to the vertex   without the use of intravenous contrast.    COMPARISON EXAMINATION: Head CT 5/6/2021    FINDINGS:    HEAD CT:    VENTRICLES AND SULCI: Ventricles and sulci are unremarkable for patient   age.  INTRA-AXIAL: No intracranial mass, acute hemorrhage, or midline shift is   present. There is non-specific decreased attenuation in the white matter   likely related to sequelae of microvascular disease.  EXTRA-AXIAL: No extra-axial fluid collection is present.  INTRACRANIAL HEMORRHAGE: None.    VISUALIZED SINUSES: No air-fluid levels are identified. Mild mucosal   thickening left maxillary sinus.  VISUALIZED MASTOIDS:  Clear.  CALVARIUM:  Intact.  MISCELLANEOUS:  Bilateral cataract surgery.    SOFT TISSUES: Unremarkable.  BONES: Unremarkable.      IMPRESSION:    HEAD CT: Mild to moderate volume loss, microvascular disease, no acute   hemorrhage or midline shift.    --- End of Report ---            LOIDA COLÓN MD; Attending Radiologist  This document has been electronically signed. Dec  6 2023  6:49PM    < end of copied text >

## 2023-12-09 NOTE — DIETITIAN INITIAL EVALUATION ADULT - PERTINENT MEDS FT
MEDICATIONS  (STANDING):  cyanocobalamin 1000 MICROGram(s) Oral daily  multivitamin 1 Tablet(s) Oral daily  sodium chloride 0.45%. 1000 milliLiter(s) (60 mL/Hr) IV Continuous <Continuous>    MEDICATIONS  (PRN):  haloperidol    Injectable 1 milliGRAM(s) IV Push every 6 hours PRN Agitation  morphine  - Injectable 2 milliGRAM(s) IV Push every 6 hours PRN Severe Pain (7 - 10)

## 2023-12-09 NOTE — PROGRESS NOTE ADULT - SUBJECTIVE AND OBJECTIVE BOX
Patient is a 83y old  Female who presents with a chief complaint of Metabolic encephalopathy     (09 Dec 2023 15:15)      SUBJECTIVE / OVERNIGHT EVENTS: ptn is alert, awake, urine is dark, poor water intake. lives alone, awaiting dc to [facility]    MEDICATIONS  (STANDING):  cyanocobalamin 1000 MICROGram(s) Oral daily  multivitamin 1 Tablet(s) Oral daily  sodium chloride 0.45%. 1000 milliLiter(s) (60 mL/Hr) IV Continuous <Continuous>    MEDICATIONS  (PRN):  haloperidol    Injectable 1 milliGRAM(s) IV Push every 6 hours PRN Agitation  morphine  - Injectable 2 milliGRAM(s) IV Push every 6 hours PRN Severe Pain (7 - 10)      Vital Signs Last 24 Hrs  T(F): 98 (12-09-23 @ 13:06), Max: 98.3 (12-09-23 @ 05:57)  HR: 89 (12-09-23 @ 13:06) (77 - 89)  BP: 116/68 (12-09-23 @ 13:06) (116/68 - 144/70)  RR: 18 (12-09-23 @ 13:06) (18 - 18)  SpO2: 97% (12-09-23 @ 13:06) (96% - 97%)  Telemetry:   CAPILLARY BLOOD GLUCOSE        I&O's Summary    08 Dec 2023 07:01  -  09 Dec 2023 07:00  --------------------------------------------------------  IN: 600 mL / OUT: 700 mL / NET: -100 mL        PHYSICAL EXAM:  GENERAL: NAD, well-developed  HEAD:  Atraumatic, Normocephalic  EYES: EOMI, PERRLA, conjunctiva and sclera clear  NECK: Supple, No JVD  CHEST/LUNG: Clear to auscultation bilaterally; No wheeze  HEART: Regular rate and rhythm; No murmurs, rubs, or gallops  ABDOMEN: Soft, Nontender, Nondistended; Bowel sounds present  EXTREMITIES:  2+ Peripheral Pulses, No clubbing, cyanosis, or edema  PSYCH: AAOx3  NEUROLOGY: non-focal  SKIN: No rashes or lesions    LABS:                    RADIOLOGY & ADDITIONAL TESTS:    Imaging Personally Reviewed:    Consultant(s) Notes Reviewed:      Care Discussed with Consultants/Other Providers:   Patient is a 83y old  Female who presents with a chief complaint of Metabolic encephalopathy     (09 Dec 2023 15:15)      SUBJECTIVE / OVERNIGHT EVENTS: ptn is alert, awake, urine is dark, poor water intake. lives alone, awaiting dc to [facility]    MEDICATIONS  (STANDING):  cyanocobalamin 1000 MICROGram(s) Oral daily  multivitamin 1 Tablet(s) Oral daily  sodium chloride 0.45%. 1000 milliLiter(s) (60 mL/Hr) IV Continuous <Continuous>    MEDICATIONS  (PRN):  haloperidol    Injectable 1 milliGRAM(s) IV Push every 6 hours PRN Agitation  morphine  - Injectable 2 milliGRAM(s) IV Push every 6 hours PRN Severe Pain (7 - 10)      Vital Signs Last 24 Hrs  T(F): 98 (12-09-23 @ 13:06), Max: 98.3 (12-09-23 @ 05:57)  HR: 89 (12-09-23 @ 13:06) (77 - 89)  BP: 116/68 (12-09-23 @ 13:06) (116/68 - 144/70)  RR: 18 (12-09-23 @ 13:06) (18 - 18)  SpO2: 97% (12-09-23 @ 13:06) (96% - 97%)  Telemetry:   CAPILLARY BLOOD GLUCOSE        I&O's Summary    08 Dec 2023 07:01  -  09 Dec 2023 07:00  --------------------------------------------------------  IN: 600 mL / OUT: 700 mL / NET: -100 mL        PHYSICAL EXAM:  GENERAL: NAD, well-developed  HEAD:  Atraumatic, Normocephalic  EYES: EOMI, PERRLA, conjunctiva and sclera clear  NECK: Supple, No JVD  CHEST/LUNG: Clear to auscultation bilaterally; No wheeze  HEART: Regular rate and rhythm; No murmurs, rubs, or gallops  ABDOMEN: Soft, Nontender, Nondistended; Bowel sounds present  EXTREMITIES:  2+ Peripheral Pulses, No clubbing, cyanosis, or edema  PSYCH: AAOx3  NEUROLOGY: non-focal  SKIN: No rashes or lesions    LABS:                    RADIOLOGY & ADDITIONAL TESTS:    Imaging Personally Reviewed:    Consultant(s) Notes Reviewed:      Care Discussed with Consultants/Other Providers:

## 2023-12-09 NOTE — PROGRESS NOTE ADULT - ASSESSMENT
83-year-old female with dementia, HTN, HLD, DM presenting to the ED for evaluation of altered mental status.  As per report, the patient was found by EMS inside the bushes outside of her house.  Patient states that she is unsure how she got there but "I did not fall."  Patient is a very poor historian, states she has severe RIght back pain. AxO to name only.  ptn was admitted with similar complaints in May 2021. wound up having Sepsis 2/2 UTI, urinary retention. at present UA still not available, no cough, afebrile   o/e AAOx1 , ? 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 Dwight Rosenstein   will r/o sttroke     - no focal findings. can defer MRI brain at this time   - was  getting zosyn for possible infection ; now off   - thiamine   - VPA for agitaiton  - no change in home dementia meds  - TTE pending   - can check orthosetatics   - delerium precuations   - PT/OT   - check FS, glucose control <180  - GI/DVT ppx   - Thank you for allowing me to participate in the care of this patient. Call with questions.   outpatient f/u on discharge   dc plan [facility] Yu MD  Vascular Neurology  Office: [phone]  83-year-old female with dementia, HTN, HLD, DM presenting to the ED for evaluation of altered mental status.  As per report, the patient was found by EMS inside the bushes outside of her house.  Patient states that she is unsure how she got there but "I did not fall."  Patient is a very poor historian, states she has severe RIght back pain. AxO to name only.  ptn was admitted with similar complaints in May 2021. wound up having Sepsis 2/2 UTI, urinary retention. at present UA still not available, no cough, afebrile   o/e AAOx1 , ? 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 Dwight Rosenstein   will r/o sttroke     - no focal findings. can defer MRI brain at this time   - was  getting zosyn for possible infection ; now off   - thiamine   - VPA for agitaiton  - no change in home dementia meds  - TTE pending   - can check orthosetatics   - delerium precuations   - PT/OT   - check FS, glucose control <180  - GI/DVT ppx   - Thank you for allowing me to participate in the care of this patient. Call with questions.   outpatient f/u on discharge   dc plan GENARO Yu MD  Vascular Neurology  Office: [phone]

## 2023-12-09 NOTE — DIETITIAN INITIAL EVALUATION ADULT - ADD RECOMMEND
- obtain A1C as able; monitor glycemic levels and adjust diet PRN  - Will continue to monitor PO intake, weight, labs, skin, GI status, diet.   - Nutrition care plan to remain consistent with pt goals of care  - RD remains available to review diet education and adjust diet recommendations as needed.

## 2023-12-09 NOTE — DIETITIAN INITIAL EVALUATION ADULT - ORAL INTAKE PTA/DIET HISTORY
Pt endorses good appetite and PO intake PTA. denies history DM (?DM per chart) no A1C at this time;   Pt reports not following specific diet/ diet restrictions PTA and confirmed no known food allergies/ food intolerances

## 2023-12-10 LAB
HCT VFR BLD CALC: 41.4 % — SIGNIFICANT CHANGE UP (ref 34.5–45)
HCT VFR BLD CALC: 41.4 % — SIGNIFICANT CHANGE UP (ref 34.5–45)
HGB BLD-MCNC: 13.5 G/DL — SIGNIFICANT CHANGE UP (ref 11.5–15.5)
HGB BLD-MCNC: 13.5 G/DL — SIGNIFICANT CHANGE UP (ref 11.5–15.5)
MCHC RBC-ENTMCNC: 31.1 PG — SIGNIFICANT CHANGE UP (ref 27–34)
MCHC RBC-ENTMCNC: 31.1 PG — SIGNIFICANT CHANGE UP (ref 27–34)
MCHC RBC-ENTMCNC: 32.6 GM/DL — SIGNIFICANT CHANGE UP (ref 32–36)
MCHC RBC-ENTMCNC: 32.6 GM/DL — SIGNIFICANT CHANGE UP (ref 32–36)
MCV RBC AUTO: 95.4 FL — SIGNIFICANT CHANGE UP (ref 80–100)
MCV RBC AUTO: 95.4 FL — SIGNIFICANT CHANGE UP (ref 80–100)
NRBC # BLD: 0 /100 WBCS — SIGNIFICANT CHANGE UP (ref 0–0)
NRBC # BLD: 0 /100 WBCS — SIGNIFICANT CHANGE UP (ref 0–0)
PLATELET # BLD AUTO: 354 K/UL — SIGNIFICANT CHANGE UP (ref 150–400)
PLATELET # BLD AUTO: 354 K/UL — SIGNIFICANT CHANGE UP (ref 150–400)
RBC # BLD: 4.34 M/UL — SIGNIFICANT CHANGE UP (ref 3.8–5.2)
RBC # BLD: 4.34 M/UL — SIGNIFICANT CHANGE UP (ref 3.8–5.2)
RBC # FLD: 12.5 % — SIGNIFICANT CHANGE UP (ref 10.3–14.5)
RBC # FLD: 12.5 % — SIGNIFICANT CHANGE UP (ref 10.3–14.5)
WBC # BLD: 9.07 K/UL — SIGNIFICANT CHANGE UP (ref 3.8–10.5)
WBC # BLD: 9.07 K/UL — SIGNIFICANT CHANGE UP (ref 3.8–10.5)
WBC # FLD AUTO: 9.07 K/UL — SIGNIFICANT CHANGE UP (ref 3.8–10.5)
WBC # FLD AUTO: 9.07 K/UL — SIGNIFICANT CHANGE UP (ref 3.8–10.5)

## 2023-12-10 RX ADMIN — Medication 1 TABLET(S): at 11:14

## 2023-12-10 RX ADMIN — PREGABALIN 1000 MICROGRAM(S): 225 CAPSULE ORAL at 11:14

## 2023-12-10 RX ADMIN — HALOPERIDOL DECANOATE 1 MILLIGRAM(S): 100 INJECTION INTRAMUSCULAR at 15:39

## 2023-12-10 NOTE — PROGRESS NOTE ADULT - SUBJECTIVE AND OBJECTIVE BOX
Neurology     S: patient seen. doing okay.  confused a bit       Medications: MEDICATIONS  (STANDING):  cyanocobalamin 1000 MICROGram(s) Oral daily  multivitamin 1 Tablet(s) Oral daily  sodium chloride 0.45%. 1000 milliLiter(s) (60 mL/Hr) IV Continuous <Continuous>    MEDICATIONS  (PRN):  haloperidol    Injectable 1 milliGRAM(s) IV Push every 6 hours PRN Agitation  morphine  - Injectable 2 milliGRAM(s) IV Push every 6 hours PRN Severe Pain (7 - 10)       Vitals:  Vital Signs Last 24 Hrs  T(C): 36.7 (10 Dec 2023 12:59), Max: 36.8 (09 Dec 2023 21:27)  T(F): 98 (10 Dec 2023 12:59), Max: 98.3 (09 Dec 2023 21:27)  HR: 74 (10 Dec 2023 12:59) (74 - 87)  BP: 104/66 (10 Dec 2023 12:59) (104/66 - 146/69)  BP(mean): --  RR: 18 (10 Dec 2023 12:59) (18 - 18)  SpO2: 97% (10 Dec 2023 12:59) (95% - 99%)    Parameters below as of 10 Dec 2023 12:59  Patient On (Oxygen Delivery Method): room air                   General Exam:   General Appearance: Appropriately dressed and in no acute distress       Head: Normocephalic, atraumatic and no dysmorphic features  Ear, Nose, and Throat: Moist mucous membranes  CVS: S1S2+  Resp: No SOB, no wheeze or rhonchi  GI: soft NT/ND  Extremities: No edema or cyanosis  Skin: No bruises or rashes     Neurological Exam:  Mental Status: Awake, alert and oriented x 1  Able to follow simple and complex verbal commands. Able to name and repeat. fluent speech. No obvious aphasia or dysarthria noted.   Cranial Nerves: PERRL, EOMI, VFFC, sensation V1-V3 intact,  ? L NLF facial asymmetry, equal elevation of palate, scm/trap 5/5, tongue is midline on protrusion. no obvious papilledema on fundoscopic exam. hearing is grossly intact.   Motor: Normal bulk, tone and strength throughout. Fine finger movements were intact and symmetric. no tremors or drift noted.    Sensation: Intact to light touch and pinprick throughout. no right/left confusion. no extinction to tactile on DSS.    Reflexes: 1+ throughout at biceps, brachioradialis, triceps, patellars and ankles bilaterally and equal. No clonus. R toe and L toe were both downgoing.  Coordination: No dysmetria on FNF    Gait: deferred     Data/Labs/Imaging which I personally reviewed.     Labs:     LABS:                          13.5   9.07  )-----------( 354      ( 10 Dec 2023 07:20 )             41.4                  < from: CT Head No Cont (12.06.23 @ 18:42) >    ACC: [id number] EXAM:  CT BRAIN   ORDERED BY: LINCOLN SALAZAR     PROCEDURE DATE:  12/06/2023          INTERPRETATION:  CT HEAD  HEAD CT    INDICATIONS: ams, 83-year-old female past medical history of HTN, HLD, DM   presenting to the ED for evaluation of altered mental status.  As per   report, the patient was found by EMS inside the bushes outside of her   house. Patient states that she is unsure how she got there but "I did not   fall." Patient is a very poor historian.    TECHNIQUE:    HEAD CT:  Serial axial images were obtained from the skull base to the vertex   without the use of intravenous contrast.    COMPARISON EXAMINATION: Head CT 5/6/2021    FINDINGS:    HEAD CT:    VENTRICLES AND SULCI: Ventricles and sulci are unremarkable for patient   age.  INTRA-AXIAL: No intracranial mass, acute hemorrhage, or midline shift is   present. There is non-specific decreased attenuation in the white matter   likely related to sequelae of microvascular disease.  EXTRA-AXIAL: No extra-axial fluid collection is present.  INTRACRANIAL HEMORRHAGE: None.    VISUALIZED SINUSES: No air-fluid levels are identified. Mild mucosal   thickening left maxillary sinus.  VISUALIZED MASTOIDS:  Clear.  CALVARIUM:  Intact.  MISCELLANEOUS:  Bilateral cataract surgery.    SOFT TISSUES: Unremarkable.  BONES: Unremarkable.      IMPRESSION:    HEAD CT: Mild to moderate volume loss, microvascular disease, no acute   hemorrhage or midline shift.    --- End of Report ---            LOIDA COLÓN MD; Attending Radiologist  This document has been electronically signed. Dec  6 2023  6:49PM    < end of copied text >       Neurology     S: patient seen. doing okay.  confused a bit       Medications: MEDICATIONS  (STANDING):  cyanocobalamin 1000 MICROGram(s) Oral daily  multivitamin 1 Tablet(s) Oral daily  sodium chloride 0.45%. 1000 milliLiter(s) (60 mL/Hr) IV Continuous <Continuous>    MEDICATIONS  (PRN):  haloperidol    Injectable 1 milliGRAM(s) IV Push every 6 hours PRN Agitation  morphine  - Injectable 2 milliGRAM(s) IV Push every 6 hours PRN Severe Pain (7 - 10)       Vitals:  Vital Signs Last 24 Hrs  T(C): 36.7 (10 Dec 2023 12:59), Max: 36.8 (09 Dec 2023 21:27)  T(F): 98 (10 Dec 2023 12:59), Max: 98.3 (09 Dec 2023 21:27)  HR: 74 (10 Dec 2023 12:59) (74 - 87)  BP: 104/66 (10 Dec 2023 12:59) (104/66 - 146/69)  BP(mean): --  RR: 18 (10 Dec 2023 12:59) (18 - 18)  SpO2: 97% (10 Dec 2023 12:59) (95% - 99%)    Parameters below as of 10 Dec 2023 12:59  Patient On (Oxygen Delivery Method): room air                   General Exam:   General Appearance: Appropriately dressed and in no acute distress       Head: Normocephalic, atraumatic and no dysmorphic features  Ear, Nose, and Throat: Moist mucous membranes  CVS: S1S2+  Resp: No SOB, no wheeze or rhonchi  GI: soft NT/ND  Extremities: No edema or cyanosis  Skin: No bruises or rashes     Neurological Exam:  Mental Status: Awake, alert and oriented x 1  Able to follow simple and complex verbal commands. Able to name and repeat. fluent speech. No obvious aphasia or dysarthria noted.   Cranial Nerves: PERRL, EOMI, VFFC, sensation V1-V3 intact,  ? L NLF facial asymmetry, equal elevation of palate, scm/trap 5/5, tongue is midline on protrusion. no obvious papilledema on fundoscopic exam. hearing is grossly intact.   Motor: Normal bulk, tone and strength throughout. Fine finger movements were intact and symmetric. no tremors or drift noted.    Sensation: Intact to light touch and pinprick throughout. no right/left confusion. no extinction to tactile on DSS.    Reflexes: 1+ throughout at biceps, brachioradialis, triceps, patellars and ankles bilaterally and equal. No clonus. R toe and L toe were both downgoing.  Coordination: No dysmetria on FNF    Gait: deferred     Data/Labs/Imaging which I personally reviewed.     Labs:     LABS:                          13.5   9.07  )-----------( 354      ( 10 Dec 2023 07:20 )             41.4                  < from: CT Head No Cont (12.06.23 @ 18:42) >    ACC: [id number] EXAM:  CT BRAIN   ORDERED BY: LINCOLN SALAZAR     PROCEDURE DATE:  12/06/2023          INTERPRETATION:  CT HEAD  HEAD CT    INDICATIONS: ams, 83-year-old female past medical history of HTN, HLD, DM   presenting to the ED for evaluation of altered mental status.  As per   report, the patient was found by EMS inside the bushes outside of her   house. Patient states that she is unsure how she got there but "I did not   fall." Patient is a very poor historian.    TECHNIQUE:    HEAD CT:  Serial axial images were obtained from the skull base to the vertex   without the use of intravenous contrast.    COMPARISON EXAMINATION: Head CT 5/6/2021    FINDINGS:    HEAD CT:    VENTRICLES AND SULCI: Ventricles and sulci are unremarkable for patient   age.  INTRA-AXIAL: No intracranial mass, acute hemorrhage, or midline shift is   present. There is non-specific decreased attenuation in the white matter   likely related to sequelae of microvascular disease.  EXTRA-AXIAL: No extra-axial fluid collection is present.  INTRACRANIAL HEMORRHAGE: None.    VISUALIZED SINUSES: No air-fluid levels are identified. Mild mucosal   thickening left maxillary sinus.  VISUALIZED MASTOIDS:  Clear.  CALVARIUM:  Intact.  MISCELLANEOUS:  Bilateral cataract surgery.    SOFT TISSUES: Unremarkable.  BONES: Unremarkable.      IMPRESSION:    HEAD CT: Mild to moderate volume loss, microvascular disease, no acute   hemorrhage or midline shift.    --- End of Report ---            LOIDA COLÓN MD; Attending Radiologist  This document has been electronically signed. Dec  6 2023  6:49PM    < end of copied text >

## 2023-12-10 NOTE — PROGRESS NOTE ADULT - SUBJECTIVE AND OBJECTIVE BOX
Patient is a 83y old  Female who presents with a chief complaint of metabolic encephalopathy (10 Dec 2023 13:18)      SUBJECTIVE / OVERNIGHT EVENTS: no new events    MEDICATIONS  (STANDING):  cyanocobalamin 1000 MICROGram(s) Oral daily  multivitamin 1 Tablet(s) Oral daily  sodium chloride 0.45%. 1000 milliLiter(s) (60 mL/Hr) IV Continuous <Continuous>    MEDICATIONS  (PRN):  haloperidol    Injectable 1 milliGRAM(s) IV Push every 6 hours PRN Agitation  morphine  - Injectable 2 milliGRAM(s) IV Push every 6 hours PRN Severe Pain (7 - 10)      Vital Signs Last 24 Hrs  T(F): 98 (12-10-23 @ 12:59), Max: 98.3 (12-09-23 @ 21:27)  HR: 74 (12-10-23 @ 12:59) (74 - 87)  BP: 104/66 (12-10-23 @ 12:59) (104/66 - 146/69)  RR: 18 (12-10-23 @ 12:59) (18 - 18)  SpO2: 97% (12-10-23 @ 12:59) (95% - 99%)  Telemetry:   CAPILLARY BLOOD GLUCOSE        I&O's Summary      PHYSICAL EXAM:  GENERAL: NAD, well-developed  HEAD:  Atraumatic, Normocephalic  EYES: EOMI, PERRLA, conjunctiva and sclera clear  NECK: Supple, No JVD  CHEST/LUNG: Clear to auscultation bilaterally; No wheeze  HEART: Regular rate and rhythm; No murmurs, rubs, or gallops  ABDOMEN: Soft, Nontender, Nondistended; Bowel sounds present  EXTREMITIES:  2+ Peripheral Pulses, No clubbing, cyanosis, or edema  PSYCH: AAOx3  NEUROLOGY: non-focal  SKIN: No rashes or lesions    LABS:                        13.5   9.07  )-----------( 354      ( 10 Dec 2023 07:20 )             41.4                     RADIOLOGY & ADDITIONAL TESTS:    Imaging Personally Reviewed:    Consultant(s) Notes Reviewed:      Care Discussed with Consultants/Other Providers:

## 2023-12-10 NOTE — PROGRESS NOTE ADULT - ASSESSMENT
83-year-old female with dementia, HTN, HLD, DM presenting to the ED for evaluation of altered mental status.  As per report, the patient was found by EMS inside the bushes outside of her house.  Patient states that she is unsure how she got there but "I did not fall."  Patient is a very poor historian, states she has severe RIght back pain. AxO to name only.  ptn was admitted with similar complaints in May 2021. wound up having Sepsis 2/2 UTI, urinary retention. at present UA still not available, no cough, afebrile   o/e AAOx1 , ? 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 Dwight Rosenstein   will r/o sttroke     - no focal findings. can defer MRI brain at this time   - was  getting zosyn for possible infection ; now off   - thiamine   - VPA for agitaiton  - no change in home dementia meds  - TTE pending   - can check orthosetatics   - delerium precuations   - PT/OT   - check FS, glucose control <180  - GI/DVT ppx   - Thank you for allowing me to participate in the care of this patient. Call with questions.   outpatient f/u on discharge   dc plan GENARO Yu MD  Vascular Neurology  Office: [phone]  83-year-old female with dementia, HTN, HLD, DM presenting to the ED for evaluation of altered mental status.  As per report, the patient was found by EMS inside the bushes outside of her house.  Patient states that she is unsure how she got there but "I did not fall."  Patient is a very poor historian, states she has severe RIght back pain. AxO to name only.  ptn was admitted with similar complaints in May 2021. wound up having Sepsis 2/2 UTI, urinary retention. at present UA still not available, no cough, afebrile   o/e AAOx1 , ? 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 Dwight Rosenstein   will r/o sttroke     - no focal findings. can defer MRI brain at this time   - was  getting zosyn for possible infection ; now off   - thiamine   - VPA for agitaiton  - no change in home dementia meds  - TTE pending   - can check orthosetatics   - delerium precuations   - PT/OT   - check FS, glucose control <180  - GI/DVT ppx   - Thank you for allowing me to participate in the care of this patient. Call with questions.   outpatient f/u on discharge   dc plan GENARO Yu MD  Vascular Neurology  Office: [phone]

## 2023-12-11 PROCEDURE — 93306 TTE W/DOPPLER COMPLETE: CPT | Mod: 26

## 2023-12-11 RX ADMIN — PREGABALIN 1000 MICROGRAM(S): 225 CAPSULE ORAL at 11:22

## 2023-12-11 RX ADMIN — Medication 1 TABLET(S): at 11:22

## 2023-12-11 NOTE — PROGRESS NOTE ADULT - ASSESSMENT
83-year-old female with dementia, HTN, HLD, DM presenting to the ED for evaluation of altered mental status.  As per report, the patient was found by EMS inside the bushes outside of her house.  Patient states that she is unsure how she got there but "I did not fall."  Patient is a very poor historian, states she has severe RIght back pain. AxO to name only.  ptn was admitted with similar complaints in May 2021. wound up having Sepsis 2/2 UTI, urinary retention. at present UA still not available, no cough, afebrile   o/e AAOx1 , ? 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 Dwight Rosenstein   will r/o sttroke     - no focal findings. can defer MRI brain at this time   - was  getting zosyn for possible infection ; now off   - thiamine   - VPA for agitaiton  - no change in home dementia meds  - TTE pending   - can check orthosetatics   - delerium precuations   - PT/OT   - check FS, glucose control <180  - GI/DVT ppx   - Thank you for allowing me to participate in the care of this patient. Call with questions.   outpatient f/u on discharge   dc plan GENARO uY MD  Vascular Neurology  Office: [phone]  83-year-old female with dementia, HTN, HLD, DM presenting to the ED for evaluation of altered mental status.  As per report, the patient was found by EMS inside the bushes outside of her house.  Patient states that she is unsure how she got there but "I did not fall."  Patient is a very poor historian, states she has severe RIght back pain. AxO to name only.  ptn was admitted with similar complaints in May 2021. wound up having Sepsis 2/2 UTI, urinary retention. at present UA still not available, no cough, afebrile   o/e AAOx1 , ? 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 Dwight Rosenstein   will r/o sttroke     - no focal findings. can defer MRI brain at this time   - was  getting zosyn for possible infection ; now off   - thiamine   - VPA for agitaiton  - no change in home dementia meds  - TTE pending   - can check orthosetatics   - delerium precuations   - PT/OT   - check FS, glucose control <180  - GI/DVT ppx   - Thank you for allowing me to participate in the care of this patient. Call with questions.   outpatient f/u on discharge   dc plan GENARO Yu MD  Vascular Neurology  Office: [phone]

## 2023-12-11 NOTE — PROGRESS NOTE ADULT - SUBJECTIVE AND OBJECTIVE BOX
Neurology     S: patient seen. doing okay.  confused a bit       Medications: MEDICATIONS  (STANDING):  cyanocobalamin 1000 MICROGram(s) Oral daily  multivitamin 1 Tablet(s) Oral daily  sodium chloride 0.45%. 1000 milliLiter(s) (60 mL/Hr) IV Continuous <Continuous>    MEDICATIONS  (PRN):  haloperidol    Injectable 1 milliGRAM(s) IV Push every 6 hours PRN Agitation  morphine  - Injectable 2 milliGRAM(s) IV Push every 6 hours PRN Severe Pain (7 - 10)       Vitals:  Vital Signs Last 24 Hrs  T(C): 36.6 (11 Dec 2023 05:55), Max: 36.8 (10 Dec 2023 21:41)  T(F): 97.9 (11 Dec 2023 05:55), Max: 98.3 (10 Dec 2023 21:41)  HR: 76 (11 Dec 2023 05:55) (68 - 76)  BP: 158/70 (11 Dec 2023 05:55) (104/66 - 158/70)  BP(mean): --  RR: 18 (11 Dec 2023 05:55) (18 - 18)  SpO2: 98% (11 Dec 2023 05:55) (96% - 98%)    Parameters below as of 11 Dec 2023 05:55  Patient On (Oxygen Delivery Method): room air                       General Exam:   General Appearance: Appropriately dressed and in no acute distress       Head: Normocephalic, atraumatic and no dysmorphic features  Ear, Nose, and Throat: Moist mucous membranes  CVS: S1S2+  Resp: No SOB, no wheeze or rhonchi  GI: soft NT/ND  Extremities: No edema or cyanosis  Skin: No bruises or rashes     Neurological Exam:  Mental Status: Awake, alert and oriented x 1  Able to follow simple and complex verbal commands. Able to name and repeat. fluent speech. No obvious aphasia or dysarthria noted.   Cranial Nerves: PERRL, EOMI, VFFC, sensation V1-V3 intact,  ? L NLF facial asymmetry, equal elevation of palate, scm/trap 5/5, tongue is midline on protrusion. no obvious papilledema on fundoscopic exam. hearing is grossly intact.   Motor: Normal bulk, tone and strength throughout. Fine finger movements were intact and symmetric. no tremors or drift noted.    Sensation: Intact to light touch and pinprick throughout. no right/left confusion. no extinction to tactile on DSS.    Reflexes: 1+ throughout at biceps, brachioradialis, triceps, patellars and ankles bilaterally and equal. No clonus. R toe and L toe were both downgoing.  Coordination: No dysmetria on FNF    Gait: deferred     Data/Labs/Imaging which I personally reviewed.        no new labs   LABS:                          13.5   9.07  )-----------( 354      ( 10 Dec 2023 07:20 )             41.4                  < from: CT Head No Cont (12.06.23 @ 18:42) >    ACC: [id number] EXAM:  CT BRAIN   ORDERED BY: LINCOLN SALAZAR     PROCEDURE DATE:  12/06/2023          INTERPRETATION:  CT HEAD  HEAD CT    INDICATIONS: ams, 83-year-old female past medical history of HTN, HLD, DM   presenting to the ED for evaluation of altered mental status.  As per   report, the patient was found by EMS inside the bushes outside of her   house. Patient states that she is unsure how she got there but "I did not   fall." Patient is a very poor historian.    TECHNIQUE:    HEAD CT:  Serial axial images were obtained from the skull base to the vertex   without the use of intravenous contrast.    COMPARISON EXAMINATION: Head CT 5/6/2021    FINDINGS:    HEAD CT:    VENTRICLES AND SULCI: Ventricles and sulci are unremarkable for patient   age.  INTRA-AXIAL: No intracranial mass, acute hemorrhage, or midline shift is   present. There is non-specific decreased attenuation in the white matter   likely related to sequelae of microvascular disease.  EXTRA-AXIAL: No extra-axial fluid collection is present.  INTRACRANIAL HEMORRHAGE: None.    VISUALIZED SINUSES: No air-fluid levels are identified. Mild mucosal   thickening left maxillary sinus.  VISUALIZED MASTOIDS:  Clear.  CALVARIUM:  Intact.  MISCELLANEOUS:  Bilateral cataract surgery.    SOFT TISSUES: Unremarkable.  BONES: Unremarkable.      IMPRESSION:    HEAD CT: Mild to moderate volume loss, microvascular disease, no acute   hemorrhage or midline shift.    --- End of Report ---            Neurology     S: patient seen. doing okay.  confused a bit       Medications: MEDICATIONS  (STANDING):  cyanocobalamin 1000 MICROGram(s) Oral daily  multivitamin 1 Tablet(s) Oral daily  sodium chloride 0.45%. 1000 milliLiter(s) (60 mL/Hr) IV Continuous <Continuous>    MEDICATIONS  (PRN):  haloperidol    Injectable 1 milliGRAM(s) IV Push every 6 hours PRN Agitation  morphine  - Injectable 2 milliGRAM(s) IV Push every 6 hours PRN Severe Pain (7 - 10)       Vitals:  Vital Signs Last 24 Hrs  T(C): 36.6 (11 Dec 2023 05:55), Max: 36.8 (10 Dec 2023 21:41)  T(F): 97.9 (11 Dec 2023 05:55), Max: 98.3 (10 Dec 2023 21:41)  HR: 76 (11 Dec 2023 05:55) (68 - 76)  BP: 158/70 (11 Dec 2023 05:55) (104/66 - 158/70)  BP(mean): --  RR: 18 (11 Dec 2023 05:55) (18 - 18)  SpO2: 98% (11 Dec 2023 05:55) (96% - 98%)    Parameters below as of 11 Dec 2023 05:55  Patient On (Oxygen Delivery Method): room air                       General Exam:   General Appearance: Appropriately dressed and in no acute distress       Head: Normocephalic, atraumatic and no dysmorphic features  Ear, Nose, and Throat: Moist mucous membranes  CVS: S1S2+  Resp: No SOB, no wheeze or rhonchi  GI: soft NT/ND  Extremities: No edema or cyanosis  Skin: No bruises or rashes     Neurological Exam:  Mental Status: Awake, alert and oriented x 1  Able to follow simple and complex verbal commands. Able to name and repeat. fluent speech. No obvious aphasia or dysarthria noted.   Cranial Nerves: PERRL, EOMI, VFFC, sensation V1-V3 intact,  ? L NLF facial asymmetry, equal elevation of palate, scm/trap 5/5, tongue is midline on protrusion. no obvious papilledema on fundoscopic exam. hearing is grossly intact.   Motor: Normal bulk, tone and strength throughout. Fine finger movements were intact and symmetric. no tremors or drift noted.    Sensation: Intact to light touch and pinprick throughout. no right/left confusion. no extinction to tactile on DSS.    Reflexes: 1+ throughout at biceps, brachioradialis, triceps, patellars and ankles bilaterally and equal. No clonus. R toe and L toe were both downgoing.  Coordination: No dysmetria on FNF    Gait: deferred     Data/Labs/Imaging which I personally reviewed.        no new labs   LABS:                          13.5   9.07  )-----------( 354      ( 10 Dec 2023 07:20 )             41.4                  < from: CT Head No Cont (12.06.23 @ 18:42) >    ACC: [id number] EXAM:  CT BRAIN   ORDERED BY: LINCOLN SALAZAR     PROCEDURE DATE:  12/06/2023          INTERPRETATION:  CT HEAD  HEAD CT    INDICATIONS: ams, 83-year-old female past medical history of HTN, HLD, DM   presenting to the ED for evaluation of altered mental status.  As per   report, the patient was found by EMS inside the bushes outside of her   house. Patient states that she is unsure how she got there but "I did not   fall." Patient is a very poor historian.    TECHNIQUE:    HEAD CT:  Serial axial images were obtained from the skull base to the vertex   without the use of intravenous contrast.    COMPARISON EXAMINATION: Head CT 5/6/2021    FINDINGS:    HEAD CT:    VENTRICLES AND SULCI: Ventricles and sulci are unremarkable for patient   age.  INTRA-AXIAL: No intracranial mass, acute hemorrhage, or midline shift is   present. There is non-specific decreased attenuation in the white matter   likely related to sequelae of microvascular disease.  EXTRA-AXIAL: No extra-axial fluid collection is present.  INTRACRANIAL HEMORRHAGE: None.    VISUALIZED SINUSES: No air-fluid levels are identified. Mild mucosal   thickening left maxillary sinus.  VISUALIZED MASTOIDS:  Clear.  CALVARIUM:  Intact.  MISCELLANEOUS:  Bilateral cataract surgery.    SOFT TISSUES: Unremarkable.  BONES: Unremarkable.      IMPRESSION:    HEAD CT: Mild to moderate volume loss, microvascular disease, no acute   hemorrhage or midline shift.    --- End of Report ---

## 2023-12-11 NOTE — PROGRESS NOTE ADULT - SUBJECTIVE AND OBJECTIVE BOX
Patient is a 83y old  Female who presents with a chief complaint of metabolic encephalopathy (11 Dec 2023 08:57)      SUBJECTIVE / OVERNIGHT EVENTS: s/p TTE, awaiting GENARO    MEDICATIONS  (STANDING):  cyanocobalamin 1000 MICROGram(s) Oral daily  multivitamin 1 Tablet(s) Oral daily  sodium chloride 0.45%. 1000 milliLiter(s) (60 mL/Hr) IV Continuous <Continuous>    MEDICATIONS  (PRN):  haloperidol    Injectable 1 milliGRAM(s) IV Push every 6 hours PRN Agitation  morphine  - Injectable 2 milliGRAM(s) IV Push every 6 hours PRN Severe Pain (7 - 10)      Vital Signs Last 24 Hrs  T(F): 97.8 (12-11-23 @ 20:06), Max: 98.1 (12-11-23 @ 13:23)  HR: 85 (12-11-23 @ 20:06) (75 - 85)  BP: 128/71 (12-11-23 @ 20:06) (128/71 - 158/70)  RR: 18 (12-11-23 @ 20:06) (18 - 18)  SpO2: 95% (12-11-23 @ 20:06) (95% - 98%)  Telemetry:   CAPILLARY BLOOD GLUCOSE        I&O's Summary      PHYSICAL EXAM:  GENERAL: NAD, well-developed  HEAD:  Atraumatic, Normocephalic  EYES: EOMI, PERRLA, conjunctiva and sclera clear  NECK: Supple, No JVD  CHEST/LUNG: Clear to auscultation bilaterally; No wheeze  HEART: Regular rate and rhythm; No murmurs, rubs, or gallops  ABDOMEN: Soft, Nontender, Nondistended; Bowel sounds present  EXTREMITIES:  2+ Peripheral Pulses, No clubbing, cyanosis, or edema  PSYCH: AAOx3  NEUROLOGY: non-focal  SKIN: No rashes or lesions    LABS:                        13.5   9.07  )-----------( 354      ( 10 Dec 2023 07:20 )             41.4                     RADIOLOGY & ADDITIONAL TESTS:    Imaging Personally Reviewed:    Consultant(s) Notes Reviewed:      Care Discussed with Consultants/Other Providers:

## 2023-12-12 ENCOUNTER — TRANSCRIPTION ENCOUNTER (OUTPATIENT)
Age: 83
End: 2023-12-12

## 2023-12-12 VITALS
DIASTOLIC BLOOD PRESSURE: 61 MMHG | HEART RATE: 71 BPM | RESPIRATION RATE: 18 BRPM | OXYGEN SATURATION: 98 % | TEMPERATURE: 98 F | SYSTOLIC BLOOD PRESSURE: 140 MMHG

## 2023-12-12 PROCEDURE — 82140 ASSAY OF AMMONIA: CPT

## 2023-12-12 PROCEDURE — 82607 VITAMIN B-12: CPT

## 2023-12-12 PROCEDURE — 84484 ASSAY OF TROPONIN QUANT: CPT

## 2023-12-12 PROCEDURE — 84100 ASSAY OF PHOSPHORUS: CPT

## 2023-12-12 PROCEDURE — 70450 CT HEAD/BRAIN W/O DYE: CPT | Mod: MA

## 2023-12-12 PROCEDURE — 97110 THERAPEUTIC EXERCISES: CPT

## 2023-12-12 PROCEDURE — 85018 HEMOGLOBIN: CPT

## 2023-12-12 PROCEDURE — 80048 BASIC METABOLIC PNL TOTAL CA: CPT

## 2023-12-12 PROCEDURE — 82330 ASSAY OF CALCIUM: CPT

## 2023-12-12 PROCEDURE — 82947 ASSAY GLUCOSE BLOOD QUANT: CPT

## 2023-12-12 PROCEDURE — 84443 ASSAY THYROID STIM HORMONE: CPT

## 2023-12-12 PROCEDURE — 80053 COMPREHEN METABOLIC PANEL: CPT

## 2023-12-12 PROCEDURE — 84480 ASSAY TRIIODOTHYRONINE (T3): CPT

## 2023-12-12 PROCEDURE — 72128 CT CHEST SPINE W/O DYE: CPT

## 2023-12-12 PROCEDURE — 85025 COMPLETE CBC W/AUTO DIFF WBC: CPT

## 2023-12-12 PROCEDURE — 83735 ASSAY OF MAGNESIUM: CPT

## 2023-12-12 PROCEDURE — 82435 ASSAY OF BLOOD CHLORIDE: CPT

## 2023-12-12 PROCEDURE — 84295 ASSAY OF SERUM SODIUM: CPT

## 2023-12-12 PROCEDURE — 71250 CT THORAX DX C-: CPT

## 2023-12-12 PROCEDURE — 84145 PROCALCITONIN (PCT): CPT

## 2023-12-12 PROCEDURE — 80307 DRUG TEST PRSMV CHEM ANLYZR: CPT

## 2023-12-12 PROCEDURE — 93306 TTE W/DOPPLER COMPLETE: CPT

## 2023-12-12 PROCEDURE — 36415 COLL VENOUS BLD VENIPUNCTURE: CPT

## 2023-12-12 PROCEDURE — 97162 PT EVAL MOD COMPLEX 30 MIN: CPT

## 2023-12-12 PROCEDURE — 82746 ASSAY OF FOLIC ACID SERUM: CPT

## 2023-12-12 PROCEDURE — 72131 CT LUMBAR SPINE W/O DYE: CPT

## 2023-12-12 PROCEDURE — 81001 URINALYSIS AUTO W/SCOPE: CPT

## 2023-12-12 PROCEDURE — 85014 HEMATOCRIT: CPT

## 2023-12-12 PROCEDURE — 87637 SARSCOV2&INF A&B&RSV AMP PRB: CPT

## 2023-12-12 PROCEDURE — 83605 ASSAY OF LACTIC ACID: CPT

## 2023-12-12 PROCEDURE — 85027 COMPLETE CBC AUTOMATED: CPT

## 2023-12-12 PROCEDURE — 84132 ASSAY OF SERUM POTASSIUM: CPT

## 2023-12-12 PROCEDURE — 93880 EXTRACRANIAL BILAT STUDY: CPT

## 2023-12-12 PROCEDURE — 84436 ASSAY OF TOTAL THYROXINE: CPT

## 2023-12-12 PROCEDURE — 71045 X-RAY EXAM CHEST 1 VIEW: CPT

## 2023-12-12 PROCEDURE — 97116 GAIT TRAINING THERAPY: CPT

## 2023-12-12 PROCEDURE — 87086 URINE CULTURE/COLONY COUNT: CPT

## 2023-12-12 PROCEDURE — 82550 ASSAY OF CK (CPK): CPT

## 2023-12-12 PROCEDURE — 99285 EMERGENCY DEPT VISIT HI MDM: CPT

## 2023-12-12 PROCEDURE — 82803 BLOOD GASES ANY COMBINATION: CPT

## 2023-12-12 RX ORDER — PREGABALIN 225 MG/1
1 CAPSULE ORAL
Qty: 0 | Refills: 0 | DISCHARGE
Start: 2023-12-12

## 2023-12-12 RX ORDER — TELMISARTAN 20 MG/1
1 TABLET ORAL
Refills: 0 | DISCHARGE

## 2023-12-12 RX ORDER — MEMANTINE HYDROCHLORIDE 10 MG/1
1 TABLET ORAL
Refills: 0 | DISCHARGE

## 2023-12-12 RX ORDER — EZETIMIBE AND SIMVASTATIN 10; 80 MG/1; MG/1
1 TABLET, FILM COATED ORAL
Refills: 0 | DISCHARGE

## 2023-12-12 RX ADMIN — PREGABALIN 1000 MICROGRAM(S): 225 CAPSULE ORAL at 12:03

## 2023-12-12 RX ADMIN — Medication 1 TABLET(S): at 12:03

## 2023-12-12 NOTE — DISCHARGE NOTE PROVIDER - CARE PROVIDER_API CALL
Rosenstein, Dwight Jay  Neurology  [address], [address]  [place] [address]  Phone: [phone]  Fax: [phone]  Established Patient  Follow Up Time:    Rosenstein, Dwight Jay  Neurology  [address], [address]  [place] [address]  Phone: [phone]  Fax: [phone]  Established Patient  Follow Up Time:

## 2023-12-12 NOTE — PROGRESS NOTE ADULT - REASON FOR ADMISSION
metabolic encephalopathy

## 2023-12-12 NOTE — DISCHARGE NOTE NURSING/CASE MANAGEMENT/SOCIAL WORK - NSDCPEFALRISK_GEN_ALL_CORE
For information on Fall & Injury Prevention, visit: [email or web address] OR  [email or web address] OR  https://www.cdc.gov/steadi/patient.html For information on Fall & Injury Prevention, visit: [email or web address]://[email or web address]/[email or web address]-[email or web address] OR  https://[email or web address]/[email or web address]-injury OR  https://www.cdc.gov/steadi/patient.html

## 2023-12-12 NOTE — PROGRESS NOTE ADULT - SUBJECTIVE AND OBJECTIVE BOX
Neurology     S: patient seen. doing okay.  confused a bit dc plan GENARO         Medications: MEDICATIONS  (STANDING):  cyanocobalamin 1000 MICROGram(s) Oral daily  multivitamin 1 Tablet(s) Oral daily  sodium chloride 0.45%. 1000 milliLiter(s) (60 mL/Hr) IV Continuous <Continuous>    MEDICATIONS  (PRN):       Vitals:  Vital Signs Last 24 Hrs  T(C): 36.6 (12 Dec 2023 04:08), Max: 36.7 (11 Dec 2023 13:23)  T(F): 97.8 (12 Dec 2023 04:08), Max: 98.1 (11 Dec 2023 13:23)  HR: 90 (12 Dec 2023 04:08) (75 - 90)  BP: 129/71 (12 Dec 2023 04:08) (128/71 - 137/62)  BP(mean): --  RR: 18 (12 Dec 2023 04:08) (18 - 18)  SpO2: 96% (12 Dec 2023 04:08) (95% - 97%)    Parameters below as of 12 Dec 2023 04:08  Patient On (Oxygen Delivery Method): room air                           General Exam:   General Appearance: Appropriately dressed and in no acute distress       Head: Normocephalic, atraumatic and no dysmorphic features  Ear, Nose, and Throat: Moist mucous membranes  CVS: S1S2+  Resp: No SOB, no wheeze or rhonchi  GI: soft NT/ND  Extremities: No edema or cyanosis  Skin: No bruises or rashes     Neurological Exam:  Mental Status: Awake, alert and oriented x 1  Able to follow simple and complex verbal commands. Able to name and repeat. fluent speech. No obvious aphasia or dysarthria noted.   Cranial Nerves: PERRL, EOMI, VFFC, sensation V1-V3 intact,  ? L NLF facial asymmetry, equal elevation of palate, scm/trap 5/5, tongue is midline on protrusion. no obvious papilledema on fundoscopic exam. hearing is grossly intact.   Motor: Normal bulk, tone and strength throughout. Fine finger movements were intact and symmetric. no tremors or drift noted.    Sensation: Intact to light touch and pinprick throughout. no right/left confusion. no extinction to tactile on DSS.    Reflexes: 1+ throughout at biceps, brachioradialis, triceps, patellars and ankles bilaterally and equal. No clonus. R toe and L toe were both downgoing.  Coordination: No dysmetria on FNF    Gait: deferred     Data/Labs/Imaging which I personally reviewed.        no new labs   LABS:                          13.5   9.07  )-----------( 354      ( 10 Dec 2023 07:20 )             41.4                  < from: CT Head No Cont (12.06.23 @ 18:42) >    ACC: [id number] EXAM:  CT BRAIN   ORDERED BY: LINCOLN SALAZAR     PROCEDURE DATE:  12/06/2023          INTERPRETATION:  CT HEAD  HEAD CT    INDICATIONS: ams, 83-year-old female past medical history of HTN, HLD, DM   presenting to the ED for evaluation of altered mental status.  As per   report, the patient was found by EMS inside the bushes outside of her   house. Patient states that she is unsure how she got there but "I did not   fall." Patient is a very poor historian.    TECHNIQUE:    HEAD CT:  Serial axial images were obtained from the skull base to the vertex   without the use of intravenous contrast.    COMPARISON EXAMINATION: Head CT 5/6/2021    FINDINGS:    HEAD CT:    VENTRICLES AND SULCI: Ventricles and sulci are unremarkable for patient   age.  INTRA-AXIAL: No intracranial mass, acute hemorrhage, or midline shift is   present. There is non-specific decreased attenuation in the white matter   likely related to sequelae of microvascular disease.  EXTRA-AXIAL: No extra-axial fluid collection is present.  INTRACRANIAL HEMORRHAGE: None.    VISUALIZED SINUSES: No air-fluid levels are identified. Mild mucosal   thickening left maxillary sinus.  VISUALIZED MASTOIDS:  Clear.  CALVARIUM:  Intact.  MISCELLANEOUS:  Bilateral cataract surgery.    SOFT TISSUES: Unremarkable.  BONES: Unremarkable.      IMPRESSION:    HEAD CT: Mild to moderate volume loss, microvascular disease, no acute   hemorrhage or midline shift.    --- End of Report ---        < from: TTE W or WO Ultrasound Enhancing Agent (12.11.23 @ 08:49) >    TRANSTHORACIC ECHOCARDIOGRAM REPORT  ________________________________________________________________________________                                      _______       Pt. Name:       DIAMOND LAGUNAS Study Date:    12/11/2023  MRN:            [id number]         YOB: 1940  Accession #:    [id number]         Age:           83 years  Account#:       [id number]      Gender:        F  Heart Rate:     53 bpm            Height:        64.00 in (162.56 cm)  Rhythm:         sinus rhythm      Weight:        160.00 lb (72.58 kg)  Blood Pressure: 157/70 mmHg       BSA/BMI:       1.78 m² / 27.46 kg/m²  ________________________________________________________________________________________  Referring Physician:    [id number] Rachelle De Leon  Interpreting Physician: Meng Lincoln MD  Primary Sonographer:    Kristin Johnson RD    CPT:               ECHO TTE WO CON COMP W DOPP - 37962.m  Indication(s):     Abnormal electrocardiogram ECG/EKG - R94.31  Procedure:         Transthoracic echocardiogram with 2-D, M-mode and complete                     spectral and color flow Doppler.  Ordering Location: [facility]  Admission Status:  Inpatient  Study Information: Image quality for this study is adequate.    _______________________________________________________________________________________     CONCLUSIONS:      1. Left ventricular cavity is normal. Left ventricular wall thickness is normal. Left ventricular systolic function is normal with an ejection fraction of 63 % by Carbajal's method of disks.   2. No prior echocardiogram is available for comparison.    ________________________________________________________________________________________  FINDINGS:     Left Ventricle:  The left ventricular cavity is normal. Left ventricular wall thickness is normal. Left ventricular systolic function is normal with a calculated ejection fraction of 63 % by the Carbajal's biplane method of disks. Impaired relaxation with normal filling pressure. There is normal LV mass and concentric remodeling.     Right Ventricle:  The right ventricular cavity is normal in size and normal systolic function. Tricuspid annular plane systolic excursion (TAPSE) is 2.0 cm (normal >=1.7 cm).     Left Atrium:  The left atrium is normal with an indexed volume of 20.80 ml/m².     Right Atrium:  The right atrium is normal in size with an indexed volume of 14.61 ml/m².     Interatrial Septum:  The interatrial septum appears intact.     Aortic Valve:  There is mild calcification of the aortic valve leaflets. There is trace aortic regurgitation.     Mitral Valve:  Structurally normal mitral valve with normal leaflet excursion. There is no mitral valve stenosis. There is trace mitral regurgitation.     Tricuspid Valve:  Structurally normal tricuspidvalve with normal leaflet excursion. There is trace tricuspid regurgitation.     Pulmonic Valve:  Structurally normal pulmonic valve with normal leaflet excursion. There is trace pulmonic regurgitation.     Aorta:  The aortic annulus and aortic root appear normal in size.     Pericardium:  No pericardial effusion seen.     Systemic Veins:  The inferior vena cava is normal in size measuring 1.10 cm in diameter, (normal <2.1cm) with abnormal inspiratory collapse (abnormal <50%) consistent with mildly elevated right atrial pressure (~8, range 5-10mmHg).  ____________________________________________________________________  QUANTITATIVE DATA:  Left Ventricle Measurements: (Indexed to BSA)     IVSd (2D):   0.9 cm  LVPWd (2D):  1.0 cm  LVIDd (2D):3.8 cm  LVIDs (2D):  2.4 cm  LV Mass:     110 g  62.0 g/m²  LV Vol d, MOD A2C: 48.7 ml 27.37 ml/m²  LV Vol d, MOD A4C: 49.6 ml 27.88 ml/m²  LV Vol d, MOD BP:  50.7 ml 28.50 ml/m²  LV Vol s, MOD A2C: 17.7 ml 9.95 ml/m²  LV Vol s, MOD A4C: 19.6 ml 11.02 ml/m²  LV Vol s, MOD BP:  18.8 ml 10.57 ml/m²  LVOT SV MOD BP:    31.9 ml  LV EF% MOD BP:     63 %     MV E Vmax:    0.53 m/s  MV A Vmax:    0.86 m/s  MV E/A:       0.62  e' lateral:   7.72 cm/s  e' medial:    7.40 cm/s  E/e' lateral: 6.85  E/e' medial:  7.15  E/e' Average: 7.00    Aorta Measurements: (normal range) (Indexed to BSA)     Sinuses of Valsalva: 3.30 cm (2.7 - 3.3 cm)       Left Atrium Measurements: (Indexed to BSA)  LA Diam 2D:        2.50 cm  LA Vol s, MOD A4C: 39.70 ml.  LA Vol s,MOD A2C: 32.70 ml.  LA Vol s, MOD BP:  37.00 ml  20.80 ml/m²    Right Ventricle Measurements: Right Atrial Measurements:     TAPSE:           2.0 cm       RA Vol:       26.00 ml  TV Magalis. S':      11.30 cm/s   RA Vol Index: 14.61 ml/m²  RV Base (RVID1): 4.0 cm  RV Mid (RVID2):  3.2 cm       LVOT / RVOT/ Qp/Qs Data: (Indexed to BSA)  LVOT Diameter: 2.00 cm  LVOT Vmax:     0.68 m/s  LVOT VTI:      14.90 cm  LVOT SV:       46.8 ml  26.31 ml/m²    Mitral Valve Measurements:     MV E Vmax: 0.5 m/s  MV A Vmax: 0.9 m/s  MV E/A:    0.6       Tricuspid Valve Measurements:     RA Pressure: 8 mmHg    ________________________________________________________________________________________  Electronically signed on 12/11/2023 at 11:27:31 AM by Meng Lincoln MD         *** Final ***    < end of copied text >  Orthostatic VS      Neurology     S: patient seen. doing okay.  confused a bit dc plan GENARO         Medications: MEDICATIONS  (STANDING):  cyanocobalamin 1000 MICROGram(s) Oral daily  multivitamin 1 Tablet(s) Oral daily  sodium chloride 0.45%. 1000 milliLiter(s) (60 mL/Hr) IV Continuous <Continuous>    MEDICATIONS  (PRN):       Vitals:  Vital Signs Last 24 Hrs  T(C): 36.6 (12 Dec 2023 04:08), Max: 36.7 (11 Dec 2023 13:23)  T(F): 97.8 (12 Dec 2023 04:08), Max: 98.1 (11 Dec 2023 13:23)  HR: 90 (12 Dec 2023 04:08) (75 - 90)  BP: 129/71 (12 Dec 2023 04:08) (128/71 - 137/62)  BP(mean): --  RR: 18 (12 Dec 2023 04:08) (18 - 18)  SpO2: 96% (12 Dec 2023 04:08) (95% - 97%)    Parameters below as of 12 Dec 2023 04:08  Patient On (Oxygen Delivery Method): room air                           General Exam:   General Appearance: Appropriately dressed and in no acute distress       Head: Normocephalic, atraumatic and no dysmorphic features  Ear, Nose, and Throat: Moist mucous membranes  CVS: S1S2+  Resp: No SOB, no wheeze or rhonchi  GI: soft NT/ND  Extremities: No edema or cyanosis  Skin: No bruises or rashes     Neurological Exam:  Mental Status: Awake, alert and oriented x 1  Able to follow simple and complex verbal commands. Able to name and repeat. fluent speech. No obvious aphasia or dysarthria noted.   Cranial Nerves: PERRL, EOMI, VFFC, sensation V1-V3 intact,  ? L NLF facial asymmetry, equal elevation of palate, scm/trap 5/5, tongue is midline on protrusion. no obvious papilledema on fundoscopic exam. hearing is grossly intact.   Motor: Normal bulk, tone and strength throughout. Fine finger movements were intact and symmetric. no tremors or drift noted.    Sensation: Intact to light touch and pinprick throughout. no right/left confusion. no extinction to tactile on DSS.    Reflexes: 1+ throughout at biceps, brachioradialis, triceps, patellars and ankles bilaterally and equal. No clonus. R toe and L toe were both downgoing.  Coordination: No dysmetria on FNF    Gait: deferred     Data/Labs/Imaging which I personally reviewed.        no new labs   LABS:                          13.5   9.07  )-----------( 354      ( 10 Dec 2023 07:20 )             41.4                  < from: CT Head No Cont (12.06.23 @ 18:42) >    ACC: [id number] EXAM:  CT BRAIN   ORDERED BY: LINCOLN SALAZAR     PROCEDURE DATE:  12/06/2023          INTERPRETATION:  CT HEAD  HEAD CT    INDICATIONS: ams, 83-year-old female past medical history of HTN, HLD, DM   presenting to the ED for evaluation of altered mental status.  As per   report, the patient was found by EMS inside the bushes outside of her   house. Patient states that she is unsure how she got there but "I did not   fall." Patient is a very poor historian.    TECHNIQUE:    HEAD CT:  Serial axial images were obtained from the skull base to the vertex   without the use of intravenous contrast.    COMPARISON EXAMINATION: Head CT 5/6/2021    FINDINGS:    HEAD CT:    VENTRICLES AND SULCI: Ventricles and sulci are unremarkable for patient   age.  INTRA-AXIAL: No intracranial mass, acute hemorrhage, or midline shift is   present. There is non-specific decreased attenuation in the white matter   likely related to sequelae of microvascular disease.  EXTRA-AXIAL: No extra-axial fluid collection is present.  INTRACRANIAL HEMORRHAGE: None.    VISUALIZED SINUSES: No air-fluid levels are identified. Mild mucosal   thickening left maxillary sinus.  VISUALIZED MASTOIDS:  Clear.  CALVARIUM:  Intact.  MISCELLANEOUS:  Bilateral cataract surgery.    SOFT TISSUES: Unremarkable.  BONES: Unremarkable.      IMPRESSION:    HEAD CT: Mild to moderate volume loss, microvascular disease, no acute   hemorrhage or midline shift.    --- End of Report ---        < from: TTE W or WO Ultrasound Enhancing Agent (12.11.23 @ 08:49) >    TRANSTHORACIC ECHOCARDIOGRAM REPORT  ________________________________________________________________________________                                      _______       Pt. Name:       DIAMOND LAGUNAS Study Date:    12/11/2023  MRN:            [id number]         YOB: 1940  Accession #:    [id number]         Age:           83 years  Account#:       [id number]      Gender:        F  Heart Rate:     53 bpm            Height:        64.00 in (162.56 cm)  Rhythm:         sinus rhythm      Weight:        160.00 lb (72.58 kg)  Blood Pressure: 157/70 mmHg       BSA/BMI:       1.78 m² / 27.46 kg/m²  ________________________________________________________________________________________  Referring Physician:    [id number] Rachelle De Leon  Interpreting Physician: Meng Lincoln MD  Primary Sonographer:    Kristin Johnson RD    CPT:               ECHO TTE WO CON COMP W DOPP - 48322.m  Indication(s):     Abnormal electrocardiogram ECG/EKG - R94.31  Procedure:         Transthoracic echocardiogram with 2-D, M-mode and complete                     spectral and color flow Doppler.  Ordering Location: [facility]  Admission Status:  Inpatient  Study Information: Image quality for this study is adequate.    _______________________________________________________________________________________     CONCLUSIONS:      1. Left ventricular cavity is normal. Left ventricular wall thickness is normal. Left ventricular systolic function is normal with an ejection fraction of 63 % by Carbajal's method of disks.   2. No prior echocardiogram is available for comparison.    ________________________________________________________________________________________  FINDINGS:     Left Ventricle:  The left ventricular cavity is normal. Left ventricular wall thickness is normal. Left ventricular systolic function is normal with a calculated ejection fraction of 63 % by the Carbajal's biplane method of disks. Impaired relaxation with normal filling pressure. There is normal LV mass and concentric remodeling.     Right Ventricle:  The right ventricular cavity is normal in size and normal systolic function. Tricuspid annular plane systolic excursion (TAPSE) is 2.0 cm (normal >=1.7 cm).     Left Atrium:  The left atrium is normal with an indexed volume of 20.80 ml/m².     Right Atrium:  The right atrium is normal in size with an indexed volume of 14.61 ml/m².     Interatrial Septum:  The interatrial septum appears intact.     Aortic Valve:  There is mild calcification of the aortic valve leaflets. There is trace aortic regurgitation.     Mitral Valve:  Structurally normal mitral valve with normal leaflet excursion. There is no mitral valve stenosis. There is trace mitral regurgitation.     Tricuspid Valve:  Structurally normal tricuspidvalve with normal leaflet excursion. There is trace tricuspid regurgitation.     Pulmonic Valve:  Structurally normal pulmonic valve with normal leaflet excursion. There is trace pulmonic regurgitation.     Aorta:  The aortic annulus and aortic root appear normal in size.     Pericardium:  No pericardial effusion seen.     Systemic Veins:  The inferior vena cava is normal in size measuring 1.10 cm in diameter, (normal <2.1cm) with abnormal inspiratory collapse (abnormal <50%) consistent with mildly elevated right atrial pressure (~8, range 5-10mmHg).  ____________________________________________________________________  QUANTITATIVE DATA:  Left Ventricle Measurements: (Indexed to BSA)     IVSd (2D):   0.9 cm  LVPWd (2D):  1.0 cm  LVIDd (2D):3.8 cm  LVIDs (2D):  2.4 cm  LV Mass:     110 g  62.0 g/m²  LV Vol d, MOD A2C: 48.7 ml 27.37 ml/m²  LV Vol d, MOD A4C: 49.6 ml 27.88 ml/m²  LV Vol d, MOD BP:  50.7 ml 28.50 ml/m²  LV Vol s, MOD A2C: 17.7 ml 9.95 ml/m²  LV Vol s, MOD A4C: 19.6 ml 11.02 ml/m²  LV Vol s, MOD BP:  18.8 ml 10.57 ml/m²  LVOT SV MOD BP:    31.9 ml  LV EF% MOD BP:     63 %     MV E Vmax:    0.53 m/s  MV A Vmax:    0.86 m/s  MV E/A:       0.62  e' lateral:   7.72 cm/s  e' medial:    7.40 cm/s  E/e' lateral: 6.85  E/e' medial:  7.15  E/e' Average: 7.00    Aorta Measurements: (normal range) (Indexed to BSA)     Sinuses of Valsalva: 3.30 cm (2.7 - 3.3 cm)       Left Atrium Measurements: (Indexed to BSA)  LA Diam 2D:        2.50 cm  LA Vol s, MOD A4C: 39.70 ml.  LA Vol s,MOD A2C: 32.70 ml.  LA Vol s, MOD BP:  37.00 ml  20.80 ml/m²    Right Ventricle Measurements: Right Atrial Measurements:     TAPSE:           2.0 cm       RA Vol:       26.00 ml  TV Magalis. S':      11.30 cm/s   RA Vol Index: 14.61 ml/m²  RV Base (RVID1): 4.0 cm  RV Mid (RVID2):  3.2 cm       LVOT / RVOT/ Qp/Qs Data: (Indexed to BSA)  LVOT Diameter: 2.00 cm  LVOT Vmax:     0.68 m/s  LVOT VTI:      14.90 cm  LVOT SV:       46.8 ml  26.31 ml/m²    Mitral Valve Measurements:     MV E Vmax: 0.5 m/s  MV A Vmax: 0.9 m/s  MV E/A:    0.6       Tricuspid Valve Measurements:     RA Pressure: 8 mmHg    ________________________________________________________________________________________  Electronically signed on 12/11/2023 at 11:27:31 AM by Meng Lincoln MD         *** Final ***    < end of copied text >  Orthostatic VS

## 2023-12-12 NOTE — DISCHARGE NOTE PROVIDER - HOSPITAL COURSE
HPI:  83-year-old female past medical history of dementia, HTN, HLD, DM presenting to the ED for evaluation of altered mental status.  As per report, the patient was found by EMS inside the bushes outside of her house.  Patient states that she is unsure how she got there but "I did not fall."  Patient is a very poor historian, states she has severe RIght back pain. AxO to name only.  ptn was admitted with similar complaints in May 2021. wound up having Sepsis 2/2 UTI, urinary retention. at present UA still not available, no cough, afebrile (06 Dec 2023 19:48)    Hospital Course:Dx: AMS possible dementia, possible metabolic encephalopathy, possible UTI strated on IV Vancox1, then on empiric Zosy. U/a was neg-off abt CTH non acute   Neuro consulted. patient cleared for dc to [facility]O then f/u with her neurologist Dr Rosenstein    Important Medication Changes and Reason:none    Active or Pending Issues Requiring Follow-up: pcp after GEN[facility]    Advanced Directives:   [ ] Full code  [ ] DNR  [ ] Hospice    Discharge Diagnoses:  AMS possible dementia, possible metabolic encephalopathy

## 2023-12-12 NOTE — DISCHARGE NOTE PROVIDER - NSDCCPCAREPLAN_GEN_ALL_CORE_FT
PRINCIPAL DISCHARGE DIAGNOSIS  Diagnosis: Metabolic encephalopathy  Assessment and Plan of Treatment: Baseline, GEN[facility]O  follow up with neurologist/ PCP after rehab  return if worsens

## 2023-12-12 NOTE — DISCHARGE NOTE PROVIDER - NSDCMRMEDTOKEN_GEN_ALL_CORE_FT
cyanocobalamin 1000 mcg oral tablet: 1 tab(s) orally once a day  Multiple Vitamins oral tablet: 1 tab(s) orally once a day

## 2023-12-12 NOTE — DISCHARGE NOTE PROVIDER - NSDCFUSCHEDAPPT_GEN_ALL_CORE_FT
[facility] Physician [facility]  CARDIOLOGY [address] [place] [address]   Scheduled Appointment: 01/15/2024     [facility] Physician [facility]  CARDIOLOGY [address] [place] [address]   Scheduled Appointment: 01/15/2024

## 2023-12-12 NOTE — PROGRESS NOTE ADULT - ASSESSMENT
83-year-old female past medical history of dementia, HTN, HLD, DM presenting to the ED for evaluation of altered mental status.  As per report, the patient was found by EMS inside the bushes outside of her house.  Patient states that she is unsure how she got there but "I did not fall."  Patient is a very poor historian, states she has severe RIght back pain. AxO to name only.  ptn was admitted with similar complaints in May 2021. wound up having Sepsis 2/2 UTI, urinary retention. at present UA still not available, no cough, afebrile    A/P  Metabolic encephalopathy  r/o syncope  HEAD Ct neg  unable to do MR brain, since cannot get history  check TTE, carotids  check orthostatics  UA is negative  euvolemic  Neuro and card consults  cont outptn meds after contacting outptn pharmacy  social service consult re safety at home  PT eval done, recs to [facility]  CT T and LS spine 2/2 pain: cheonic L2 compression Fx  DVT ppx w sc lovenox  dc to [facility] today 83-year-old female past medical history of dementia, HTN, HLD, DM presenting to the ED for evaluation of altered mental status.  As per report, the patient was found by EMS inside the bushes outside of her house.  Patient states that she is unsure how she got there but "I did not fall."  Patient is a very poor historian, states she has severe RIght back pain. AxO to name only.  ptn was admitted with similar complaints in May 2021. wound up having Sepsis 2/2 UTI, urinary retention. at present UA still not available, no cough, afebrile    A/P  Metabolic encephalopathy  r/o syncope  HEAD Ct neg  unable to do MR brain, since cannot get history  check TTE, carotids  check orthostatics  UA is negative  euvolemic  Neuro and card consults  cont outptn meds after contacting outptn pharmacy  social service consult re safety at home  PT eval done, recs to [facility]  CT T and LS spine 2/2 pain: cheonic L2 compression Fx  DVT ppx w sc lovenox  dc to [facility] today

## 2023-12-12 NOTE — PROGRESS NOTE ADULT - PROVIDER SPECIALTY LIST ADULT
Neurology
Neurology
Internal Medicine
Internal Medicine
Neurology
Internal Medicine
Internal Medicine
Neurology
Internal Medicine
Internal Medicine
Neurology

## 2023-12-12 NOTE — PROGRESS NOTE ADULT - SUBJECTIVE AND OBJECTIVE BOX
Patient is a 83y old  Female who presents with a chief complaint of metabolic encephalopathy (12 Dec 2023 15:35)      SUBJECTIVE / OVERNIGHT EVENTS: no new events    MEDICATIONS  (STANDING):  cyanocobalamin 1000 MICROGram(s) Oral daily  multivitamin 1 Tablet(s) Oral daily  sodium chloride 0.45%. 1000 milliLiter(s) (60 mL/Hr) IV Continuous <Continuous>    MEDICATIONS  (PRN):      Vital Signs Last 24 Hrs  T(F): 97.5 (12-12-23 @ 12:31), Max: 97.8 (12-12-23 @ 04:08)  HR: 71 (12-12-23 @ 12:31) (71 - 90)  BP: 140/61 (12-12-23 @ 12:31) (129/71 - 140/61)  RR: 18 (12-12-23 @ 12:31) (18 - 18)  SpO2: 98% (12-12-23 @ 12:31) (96% - 98%)  Telemetry:   CAPILLARY BLOOD GLUCOSE        I&O's Summary    11 Dec 2023 07:01  -  12 Dec 2023 07:00  --------------------------------------------------------  IN: 0 mL / OUT: 300 mL / NET: -300 mL    12 Dec 2023 07:01  -  12 Dec 2023 21:52  --------------------------------------------------------  IN: 0 mL / OUT: 150 mL / NET: -150 mL        PHYSICAL EXAM:  GENERAL: NAD, well-developed  HEAD:  Atraumatic, Normocephalic  EYES: EOMI, PERRLA, conjunctiva and sclera clear  NECK: Supple, No JVD  CHEST/LUNG: Clear to auscultation bilaterally; No wheeze  HEART: Regular rate and rhythm; No murmurs, rubs, or gallops  ABDOMEN: Soft, Nontender, Nondistended; Bowel sounds present  EXTREMITIES:  2+ Peripheral Pulses, No clubbing, cyanosis, or edema  PSYCH: AAOx3  NEUROLOGY: non-focal  SKIN: No rashes or lesions    LABS:                    RADIOLOGY & ADDITIONAL TESTS:    Imaging Personally Reviewed:    Consultant(s) Notes Reviewed:      Care Discussed with Consultants/Other Providers:

## 2023-12-12 NOTE — PROGRESS NOTE ADULT - ASSESSMENT
83-year-old female with dementia, HTN, HLD, DM presenting to the ED for evaluation of altered mental status.  As per report, the patient was found by EMS inside the bushes outside of her house.  Patient states that she is unsure how she got there but "I did not fall."  Patient is a very poor historian, states she has severe RIght back pain. AxO to name only.  ptn was admitted with similar complaints in May 2021. wound up having Sepsis 2/2 UTI, urinary retention. at present UA still not available, no cough, afebrile   o/e AAOx1 , ? 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 Dwight Rosenstein   will r/o sttroke     - no focal findings. can defer MRI brain at this time   - was  getting zosyn for possible infection ; now off   - thiamine   - VPA for agitaiton  - no change in home dementia meds  - can check orthosetatics   - delerium precuations   - PT/OT   - check FS, glucose control <180  - GI/DVT ppx   - Thank you for allowing me to participate in the care of this patient. Call with questions.   outpatient f/u on discharge   dc plan GEN[facility]O ; awaiting response from    Mickey Yu MD  Vascular Neurology  Office: [phone]  83-year-old female with dementia, HTN, HLD, DM presenting to the ED for evaluation of altered mental status.  As per report, the patient was found by EMS inside the bushes outside of her house.  Patient states that she is unsure how she got there but "I did not fall."  Patient is a very poor historian, states she has severe RIght back pain. AxO to name only.  ptn was admitted with similar complaints in May 2021. wound up having Sepsis 2/2 UTI, urinary retention. at present UA still not available, no cough, afebrile   o/e AAOx1 , ? 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 Dwight Rosenstein   will r/o sttroke     - no focal findings. can defer MRI brain at this time   - was  getting zosyn for possible infection ; now off   - thiamine   - VPA for agitaiton  - no change in home dementia meds  - can check orthosetatics   - delerium precuations   - PT/OT   - check FS, glucose control <180  - GI/DVT ppx   - Thank you for allowing me to participate in the care of this patient. Call with questions.   outpatient f/u on discharge   dc plan [facility]ARO ; awaiting response from    Mickey Yu MD  Vascular Neurology  Office: [phone]

## 2023-12-12 NOTE — DISCHARGE NOTE NURSING/CASE MANAGEMENT/SOCIAL WORK - PATIENT PORTAL LINK FT
You can access the FollowMyHealth Patient Portal offered by [facility] by registering at the following website: [email or web address]. By joining [employer]s FollowMyHealth portal, you will also be able to view your health information using other applications (apps) compatible with our system. You can access the FollowMyHealth Patient Portal offered by [facility] by registering at the following website: [email or web address]. By joining [employer]’s FollowMyHealth portal, you will also be able to view your health information using other applications (apps) compatible with our system.

## 2023-12-15 ENCOUNTER — INPATIENT (INPATIENT)
Facility: HOSPITAL | Age: 83
LOS: 4 days | Discharge: NURSING, REHAB OR LONG-TERM CARE | End: 2023-12-20
Attending: ORTHOPAEDIC SURGERY | Admitting: ORTHOPAEDIC SURGERY
Payer: MEDICARE

## 2023-12-15 ENCOUNTER — TRANSCRIPTION ENCOUNTER (OUTPATIENT)
Age: 83
End: 2023-12-15

## 2023-12-15 VITALS
SYSTOLIC BLOOD PRESSURE: 132 MMHG | HEIGHT: 64 IN | HEART RATE: 68 BPM | OXYGEN SATURATION: 98 % | DIASTOLIC BLOOD PRESSURE: 57 MMHG | RESPIRATION RATE: 20 BRPM | TEMPERATURE: 98 F

## 2023-12-15 DIAGNOSIS — Z29.9 ENCOUNTER FOR PROPHYLACTIC MEASURES, UNSPECIFIED: ICD-10-CM

## 2023-12-15 DIAGNOSIS — S72.009A FRACTURE OF UNSPECIFIED PART OF NECK OF UNSPECIFIED FEMUR, INITIAL ENCOUNTER FOR CLOSED FRACTURE: ICD-10-CM

## 2023-12-15 DIAGNOSIS — D72.829 ELEVATED WHITE BLOOD CELL COUNT, UNSPECIFIED: ICD-10-CM

## 2023-12-15 DIAGNOSIS — Z01.818 ENCOUNTER FOR OTHER PREPROCEDURAL EXAMINATION: ICD-10-CM

## 2023-12-15 DIAGNOSIS — E11.9 TYPE 2 DIABETES MELLITUS WITHOUT COMPLICATIONS: ICD-10-CM

## 2023-12-15 DIAGNOSIS — R42 DIZZINESS AND GIDDINESS: ICD-10-CM

## 2023-12-15 DIAGNOSIS — I10 ESSENTIAL (PRIMARY) HYPERTENSION: ICD-10-CM

## 2023-12-15 DIAGNOSIS — F03.90 UNSPECIFIED DEMENTIA WITHOUT BEHAVIORAL DISTURBANCE: ICD-10-CM

## 2023-12-15 DIAGNOSIS — E83.52 HYPERCALCEMIA: ICD-10-CM

## 2023-12-15 LAB
ALBUMIN SERPL ELPH-MCNC: 4.1 G/DL — SIGNIFICANT CHANGE UP (ref 3.3–5)
ALP SERPL-CCNC: 93 U/L — SIGNIFICANT CHANGE UP (ref 40–120)
ALT FLD-CCNC: 17 U/L — SIGNIFICANT CHANGE UP (ref 4–33)
ANION GAP SERPL CALC-SCNC: 13 MMOL/L — SIGNIFICANT CHANGE UP (ref 7–14)
ANION GAP SERPL CALC-SCNC: 13 MMOL/L — SIGNIFICANT CHANGE UP (ref 7–14)
ANISOCYTOSIS BLD QL: SLIGHT — SIGNIFICANT CHANGE UP
ANISOCYTOSIS BLD QL: SLIGHT — SIGNIFICANT CHANGE UP
APPEARANCE UR: ABNORMAL
APPEARANCE UR: ABNORMAL
APTT BLD: 26.3 SEC — SIGNIFICANT CHANGE UP (ref 24.5–35.6)
APTT BLD: 26.3 SEC — SIGNIFICANT CHANGE UP (ref 24.5–35.6)
AST SERPL-CCNC: 28 U/L — SIGNIFICANT CHANGE UP (ref 4–32)
BASOPHILS # BLD AUTO: 0 K/UL — SIGNIFICANT CHANGE UP (ref 0–0.2)
BASOPHILS # BLD AUTO: 0 K/UL — SIGNIFICANT CHANGE UP (ref 0–0.2)
BASOPHILS NFR BLD AUTO: 0 % — SIGNIFICANT CHANGE UP (ref 0–2)
BASOPHILS NFR BLD AUTO: 0 % — SIGNIFICANT CHANGE UP (ref 0–2)
BILIRUB DIRECT SERPL-MCNC: <0.2 MG/DL — SIGNIFICANT CHANGE UP (ref 0–0.3)
BILIRUB DIRECT SERPL-MCNC: <0.2 MG/DL — SIGNIFICANT CHANGE UP (ref 0–0.3)
BILIRUB INDIRECT FLD-MCNC: >0.2 MG/DL — SIGNIFICANT CHANGE UP (ref 0–1)
BILIRUB INDIRECT FLD-MCNC: >0.2 MG/DL — SIGNIFICANT CHANGE UP (ref 0–1)
BILIRUB SERPL-MCNC: 0.4 MG/DL — SIGNIFICANT CHANGE UP (ref 0.2–1.2)
BILIRUB UR-MCNC: ABNORMAL
BILIRUB UR-MCNC: ABNORMAL
BLD GP AB SCN SERPL QL: NEGATIVE — SIGNIFICANT CHANGE UP
BLD GP AB SCN SERPL QL: NEGATIVE — SIGNIFICANT CHANGE UP
BUN SERPL-MCNC: 32 MG/DL — HIGH (ref 7–23)
BUN SERPL-MCNC: 32 MG/DL — HIGH (ref 7–23)
BURR CELLS BLD QL SMEAR: PRESENT — SIGNIFICANT CHANGE UP
BURR CELLS BLD QL SMEAR: PRESENT — SIGNIFICANT CHANGE UP
CALCIUM SERPL-MCNC: 10.6 MG/DL — HIGH (ref 8.4–10.5)
CALCIUM SERPL-MCNC: 10.6 MG/DL — HIGH (ref 8.4–10.5)
CHLORIDE SERPL-SCNC: 99 MMOL/L — SIGNIFICANT CHANGE UP (ref 98–107)
CHLORIDE SERPL-SCNC: 99 MMOL/L — SIGNIFICANT CHANGE UP (ref 98–107)
CO2 SERPL-SCNC: 24 MMOL/L — SIGNIFICANT CHANGE UP (ref 22–31)
CO2 SERPL-SCNC: 24 MMOL/L — SIGNIFICANT CHANGE UP (ref 22–31)
COLOR SPEC: SIGNIFICANT CHANGE UP
COLOR SPEC: SIGNIFICANT CHANGE UP
CREAT SERPL-MCNC: 0.73 MG/DL — SIGNIFICANT CHANGE UP (ref 0.5–1.3)
CREAT SERPL-MCNC: 0.73 MG/DL — SIGNIFICANT CHANGE UP (ref 0.5–1.3)
DIFF PNL FLD: NEGATIVE — SIGNIFICANT CHANGE UP
DIFF PNL FLD: NEGATIVE — SIGNIFICANT CHANGE UP
EGFR: 82 ML/MIN/1.73M2 — SIGNIFICANT CHANGE UP
EGFR: 82 ML/MIN/1.73M2 — SIGNIFICANT CHANGE UP
EOSINOPHIL # BLD AUTO: 0 K/UL — SIGNIFICANT CHANGE UP (ref 0–0.5)
EOSINOPHIL # BLD AUTO: 0 K/UL — SIGNIFICANT CHANGE UP (ref 0–0.5)
EOSINOPHIL NFR BLD AUTO: 0 % — SIGNIFICANT CHANGE UP (ref 0–6)
EOSINOPHIL NFR BLD AUTO: 0 % — SIGNIFICANT CHANGE UP (ref 0–6)
GLUCOSE BLDC GLUCOMTR-MCNC: 120 MG/DL — HIGH (ref 70–99)
GLUCOSE BLDC GLUCOMTR-MCNC: 120 MG/DL — HIGH (ref 70–99)
GLUCOSE BLDC GLUCOMTR-MCNC: 135 MG/DL — HIGH (ref 70–99)
GLUCOSE BLDC GLUCOMTR-MCNC: 135 MG/DL — HIGH (ref 70–99)
GLUCOSE SERPL-MCNC: 153 MG/DL — HIGH (ref 70–99)
GLUCOSE SERPL-MCNC: 153 MG/DL — HIGH (ref 70–99)
GLUCOSE UR QL: NEGATIVE MG/DL — SIGNIFICANT CHANGE UP
GLUCOSE UR QL: NEGATIVE MG/DL — SIGNIFICANT CHANGE UP
HCT VFR BLD CALC: 43.3 % — SIGNIFICANT CHANGE UP (ref 34.5–45)
HCT VFR BLD CALC: 43.3 % — SIGNIFICANT CHANGE UP (ref 34.5–45)
HGB BLD-MCNC: 14.4 G/DL — SIGNIFICANT CHANGE UP (ref 11.5–15.5)
HGB BLD-MCNC: 14.4 G/DL — SIGNIFICANT CHANGE UP (ref 11.5–15.5)
IANC: 21.07 K/UL — HIGH (ref 1.8–7.4)
IANC: 21.07 K/UL — HIGH (ref 1.8–7.4)
INR BLD: 1.07 RATIO — SIGNIFICANT CHANGE UP (ref 0.85–1.18)
INR BLD: 1.07 RATIO — SIGNIFICANT CHANGE UP (ref 0.85–1.18)
KETONES UR-MCNC: ABNORMAL MG/DL
KETONES UR-MCNC: ABNORMAL MG/DL
LEUKOCYTE ESTERASE UR-ACNC: ABNORMAL
LEUKOCYTE ESTERASE UR-ACNC: ABNORMAL
LYMPHOCYTES # BLD AUTO: 0 % — LOW (ref 13–44)
LYMPHOCYTES # BLD AUTO: 0 % — LOW (ref 13–44)
LYMPHOCYTES # BLD AUTO: 0 K/UL — LOW (ref 1–3.3)
LYMPHOCYTES # BLD AUTO: 0 K/UL — LOW (ref 1–3.3)
MACROCYTES BLD QL: SLIGHT — SIGNIFICANT CHANGE UP
MACROCYTES BLD QL: SLIGHT — SIGNIFICANT CHANGE UP
MANUAL SMEAR VERIFICATION: SIGNIFICANT CHANGE UP
MANUAL SMEAR VERIFICATION: SIGNIFICANT CHANGE UP
MCHC RBC-ENTMCNC: 31 PG — SIGNIFICANT CHANGE UP (ref 27–34)
MCHC RBC-ENTMCNC: 31 PG — SIGNIFICANT CHANGE UP (ref 27–34)
MCHC RBC-ENTMCNC: 33.3 GM/DL — SIGNIFICANT CHANGE UP (ref 32–36)
MCHC RBC-ENTMCNC: 33.3 GM/DL — SIGNIFICANT CHANGE UP (ref 32–36)
MCV RBC AUTO: 93.3 FL — SIGNIFICANT CHANGE UP (ref 80–100)
MCV RBC AUTO: 93.3 FL — SIGNIFICANT CHANGE UP (ref 80–100)
MONOCYTES # BLD AUTO: 1.66 K/UL — HIGH (ref 0–0.9)
MONOCYTES # BLD AUTO: 1.66 K/UL — HIGH (ref 0–0.9)
MONOCYTES NFR BLD AUTO: 7 % — SIGNIFICANT CHANGE UP (ref 2–14)
MONOCYTES NFR BLD AUTO: 7 % — SIGNIFICANT CHANGE UP (ref 2–14)
NEUTROPHILS # BLD AUTO: 21.5 K/UL — HIGH (ref 1.8–7.4)
NEUTROPHILS # BLD AUTO: 21.5 K/UL — HIGH (ref 1.8–7.4)
NEUTROPHILS NFR BLD AUTO: 88.7 % — HIGH (ref 43–77)
NEUTROPHILS NFR BLD AUTO: 88.7 % — HIGH (ref 43–77)
NEUTS BAND # BLD: 1.7 % — SIGNIFICANT CHANGE UP (ref 0–6)
NEUTS BAND # BLD: 1.7 % — SIGNIFICANT CHANGE UP (ref 0–6)
NITRITE UR-MCNC: POSITIVE
NITRITE UR-MCNC: POSITIVE
OVALOCYTES BLD QL SMEAR: SLIGHT — SIGNIFICANT CHANGE UP
OVALOCYTES BLD QL SMEAR: SLIGHT — SIGNIFICANT CHANGE UP
PH UR: 5.5 — SIGNIFICANT CHANGE UP (ref 5–8)
PH UR: 5.5 — SIGNIFICANT CHANGE UP (ref 5–8)
PLAT MORPH BLD: ABNORMAL
PLAT MORPH BLD: ABNORMAL
PLATELET # BLD AUTO: 358 K/UL — SIGNIFICANT CHANGE UP (ref 150–400)
PLATELET # BLD AUTO: 358 K/UL — SIGNIFICANT CHANGE UP (ref 150–400)
PLATELET COUNT - ESTIMATE: NORMAL — SIGNIFICANT CHANGE UP
PLATELET COUNT - ESTIMATE: NORMAL — SIGNIFICANT CHANGE UP
POIKILOCYTOSIS BLD QL AUTO: SLIGHT — SIGNIFICANT CHANGE UP
POIKILOCYTOSIS BLD QL AUTO: SLIGHT — SIGNIFICANT CHANGE UP
POTASSIUM SERPL-MCNC: 4.7 MMOL/L — SIGNIFICANT CHANGE UP (ref 3.5–5.3)
POTASSIUM SERPL-MCNC: 4.7 MMOL/L — SIGNIFICANT CHANGE UP (ref 3.5–5.3)
POTASSIUM SERPL-SCNC: 4.7 MMOL/L — SIGNIFICANT CHANGE UP (ref 3.5–5.3)
POTASSIUM SERPL-SCNC: 4.7 MMOL/L — SIGNIFICANT CHANGE UP (ref 3.5–5.3)
PROT SERPL-MCNC: 7.2 G/DL — SIGNIFICANT CHANGE UP (ref 6–8.3)
PROT SERPL-MCNC: 7.2 G/DL — SIGNIFICANT CHANGE UP (ref 6–8.3)
PROT SERPL-MCNC: 7.3 G/DL — SIGNIFICANT CHANGE UP (ref 6–8.3)
PROT SERPL-MCNC: 7.3 G/DL — SIGNIFICANT CHANGE UP (ref 6–8.3)
PROT UR-MCNC: 30 MG/DL
PROT UR-MCNC: 30 MG/DL
PROTHROM AB SERPL-ACNC: 12.1 SEC — SIGNIFICANT CHANGE UP (ref 9.5–13)
PROTHROM AB SERPL-ACNC: 12.1 SEC — SIGNIFICANT CHANGE UP (ref 9.5–13)
RBC # BLD: 4.64 M/UL — SIGNIFICANT CHANGE UP (ref 3.8–5.2)
RBC # BLD: 4.64 M/UL — SIGNIFICANT CHANGE UP (ref 3.8–5.2)
RBC # FLD: 12.5 % — SIGNIFICANT CHANGE UP (ref 10.3–14.5)
RBC # FLD: 12.5 % — SIGNIFICANT CHANGE UP (ref 10.3–14.5)
RBC BLD AUTO: ABNORMAL
RBC BLD AUTO: ABNORMAL
RH IG SCN BLD-IMP: POSITIVE — SIGNIFICANT CHANGE UP
SODIUM SERPL-SCNC: 136 MMOL/L — SIGNIFICANT CHANGE UP (ref 135–145)
SODIUM SERPL-SCNC: 136 MMOL/L — SIGNIFICANT CHANGE UP (ref 135–145)
SP GR SPEC: 1.04 — HIGH (ref 1–1.03)
SP GR SPEC: 1.04 — HIGH (ref 1–1.03)
UROBILINOGEN FLD QL: 1 MG/DL — SIGNIFICANT CHANGE UP (ref 0.2–1)
UROBILINOGEN FLD QL: 1 MG/DL — SIGNIFICANT CHANGE UP (ref 0.2–1)
VARIANT LYMPHS # BLD: 2.6 % — SIGNIFICANT CHANGE UP (ref 0–6)
VARIANT LYMPHS # BLD: 2.6 % — SIGNIFICANT CHANGE UP (ref 0–6)
WBC # BLD: 23.78 K/UL — HIGH (ref 3.8–10.5)
WBC # BLD: 23.78 K/UL — HIGH (ref 3.8–10.5)
WBC # FLD AUTO: 23.78 K/UL — HIGH (ref 3.8–10.5)
WBC # FLD AUTO: 23.78 K/UL — HIGH (ref 3.8–10.5)

## 2023-12-15 PROCEDURE — 73564 X-RAY EXAM KNEE 4 OR MORE: CPT | Mod: 26,RT

## 2023-12-15 PROCEDURE — 73552 X-RAY EXAM OF FEMUR 2/>: CPT | Mod: 26,RT

## 2023-12-15 PROCEDURE — 72125 CT NECK SPINE W/O DYE: CPT | Mod: 26,MA

## 2023-12-15 PROCEDURE — 71045 X-RAY EXAM CHEST 1 VIEW: CPT | Mod: 26

## 2023-12-15 PROCEDURE — 99223 1ST HOSP IP/OBS HIGH 75: CPT

## 2023-12-15 PROCEDURE — 99285 EMERGENCY DEPT VISIT HI MDM: CPT | Mod: GC

## 2023-12-15 PROCEDURE — 72170 X-RAY EXAM OF PELVIS: CPT | Mod: 26,59

## 2023-12-15 PROCEDURE — 70450 CT HEAD/BRAIN W/O DYE: CPT | Mod: 26,MA

## 2023-12-15 PROCEDURE — 72192 CT PELVIS W/O DYE: CPT | Mod: 26

## 2023-12-15 PROCEDURE — 73502 X-RAY EXAM HIP UNI 2-3 VIEWS: CPT | Mod: 26,RT

## 2023-12-15 RX ORDER — CHLORHEXIDINE GLUCONATE 213 G/1000ML
1 SOLUTION TOPICAL ONCE
Refills: 0 | Status: COMPLETED | OUTPATIENT
Start: 2023-12-15 | End: 2023-12-16

## 2023-12-15 RX ORDER — POVIDONE-IODINE 5 %
1 AEROSOL (ML) TOPICAL ONCE
Refills: 0 | Status: COMPLETED | OUTPATIENT
Start: 2023-12-15 | End: 2023-12-16

## 2023-12-15 RX ORDER — OXYCODONE HYDROCHLORIDE 5 MG/1
5 TABLET ORAL EVERY 4 HOURS
Refills: 0 | Status: DISCONTINUED | OUTPATIENT
Start: 2023-12-15 | End: 2023-12-20

## 2023-12-15 RX ORDER — ACETAMINOPHEN 500 MG
975 TABLET ORAL EVERY 8 HOURS
Refills: 0 | Status: DISCONTINUED | OUTPATIENT
Start: 2023-12-15 | End: 2023-12-16

## 2023-12-15 RX ORDER — SENNA PLUS 8.6 MG/1
2 TABLET ORAL AT BEDTIME
Refills: 0 | Status: DISCONTINUED | OUTPATIENT
Start: 2023-12-15 | End: 2023-12-20

## 2023-12-15 RX ORDER — OXYCODONE HYDROCHLORIDE 5 MG/1
2.5 TABLET ORAL EVERY 4 HOURS
Refills: 0 | Status: DISCONTINUED | OUTPATIENT
Start: 2023-12-15 | End: 2023-12-20

## 2023-12-15 RX ORDER — MAGNESIUM HYDROXIDE 400 MG/1
30 TABLET, CHEWABLE ORAL DAILY
Refills: 0 | Status: DISCONTINUED | OUTPATIENT
Start: 2023-12-15 | End: 2023-12-20

## 2023-12-15 RX ADMIN — SENNA PLUS 2 TABLET(S): 8.6 TABLET ORAL at 21:54

## 2023-12-15 RX ADMIN — Medication 975 MILLIGRAM(S): at 21:54

## 2023-12-15 RX ADMIN — Medication 975 MILLIGRAM(S): at 22:52

## 2023-12-15 RX ADMIN — Medication 975 MILLIGRAM(S): at 15:50

## 2023-12-15 NOTE — ED PROVIDER NOTE - PHYSICAL EXAMINATION
CONSTITUTIONAL: Well appearing, awake, alert, and in no apparent distress.  HEAD: normocephalic, atraumatic  ENT: oral mucosa moist  CARDIAC: regular rate and rhythm; no murmurs, rubs, or gallops  RESPIRATORY: normal breath sounds, clear to ascultation bilaterally, no rales, rhonchi, wheezing  GASTROINTESTINAL: abdomen nondistended, soft, nontender, no guarding, rebound tenderness  MSK: Spine appears normal, range of motion is not limited, no muscle or joint tenderness; no midline C/T/L/S-spine tenderness; no anterior/posterior chest wall tenderness  NEURO: Alert and oriented, no focal deficits, no motor or sensory deficits.  SKIN: Skin normal color for race, warm, dry and intact. No evidence of rash.  PSYCH: appropriate mood and affect

## 2023-12-15 NOTE — ED ADULT NURSE REASSESSMENT NOTE - STATUS

## 2023-12-15 NOTE — ED ADULT TRIAGE NOTE - CHIEF COMPLAINT QUOTE
arrives from [facility]- s/p witnessed mechanical fall per staff, c/o RLE pain- hx dementia arrives from [facility]- s/p witnessed mechanical fall per staff, c/o RLE pain- hx dementia

## 2023-12-15 NOTE — ED PROVIDER NOTE - ATTENDING CONTRIBUTION TO CARE
Afebrile. Awake and Alert. Head atraumatic. No mid-line CS TTP. Lungs CTA. Heart RRR. Abdomen soft NTND. CN II-XII grossly intact. +TTP right hip mild, NV intact distally. Pelvis stable.

## 2023-12-15 NOTE — PATIENT PROFILE ADULT - NSPROMEDSADMININFO_GEN_A_NUR
Assessment:  1  Greater trochanteric bursitis of both hips  Ambulatory Referral to Physical Therapy    Large joint arthrocentesis: bilateral greater trochanteric bursa      2  Bilateral hip pain  Ambulatory Referral to Orthopedic Surgery    XR spine lumbar 2 or 3 views injury    Ambulatory Referral to Physical Therapy    Large joint arthrocentesis: bilateral greater trochanteric bursa      3  Tendinitis involving left hip abductors  Ambulatory Referral to Physical Therapy    Large joint arthrocentesis: bilateral greater trochanteric bursa      4  Scoliosis of lumbar spine, unspecified scoliosis type  Ambulatory Referral to Physical Therapy    Large joint arthrocentesis: bilateral greater trochanteric bursa        Patient Active Problem List   Diagnosis   • Allergic rhinitis   • Benign essential hypertension   • Osteoporosis   • Esophageal reflux   • Headache, migraine   • Irritable bowel syndrome   • Multiple joint pain   • Spider veins of both lower extremities   • Vitamin D deficiency   • Well adult exam   • Annual physical exam   • Ankylosing spondylitis ([facility] [address] )   • Fibromyalgia   • Right wrist pain   • Post menopausal problems           Plan      51 y o  with bilateral hip pain  Upon review of the ap pelvis and lumbar x-ray, a thorough history and my examination, Shine Norman is presenting with signs and symptoms consistent with greater trochanteric bursitis, left hip abductor tendonitis and underlying scoliosis  Diagnosis and treatment options were discussed with the patient, she was given the opportunity ask questions  Bilateral greater trochanteric bursa corticosteroid injections were offered, accepted, administered in clinic today  Procedure tolerated well, postinjection protocol advised  Referral to physical therapy to address tendinitis and underlying scoliosis provided  Medrol dose pack sent to patient's pharmacy  Subjective:     Patient ID:    Chief Complaint:Shara Higueraamparo 51 y o  female      HPI    Patient presents to the office today for initial evaluation of bilateral hip pain, left worse then right  Patient notes intermittent hip pain for years, has increased in severity recently  Denies any injury or trauma to either hip  Pain is localized laterally, points to greater trochanter  She notes pain radiates to low back  Does not go down legs  Patient has history of previous greater trochanteric bursa injections bilaterally which do provide relief for at least 3 months   She has gone to physical therapy       The following portions of the patient's history were reviewed and updated as appropriate: allergies, current medications, past family history, past social history, past surgical history and problem list         Social History     Socioeconomic History   • Marital status: /Civil Union     Spouse name: Not on file   • Number of children: 2   • Years of education: Not on file   • Highest education level: Not on file   Occupational History   • Not on file   Tobacco Use   • Smoking status: Never   • Smokeless tobacco: Never   Vaping Use   • Vaping Use: Never used   Substance and Sexual Activity   • Alcohol use: Not Currently     Comment: seldomly    • Drug use: No   • Sexual activity: Yes     Partners: Male   Other Topics Concern   • Not on file   Social History Narrative    Drinks coffee     Social Determinants of Health     Financial Resource Strain: Not on file   Food Insecurity: Not on file   Transportation Needs: Not on file   Physical Activity: Not on file   Stress: Not on file   Social Connections: Not on file   Intimate Partner Violence: Not on file   Housing Stability: Not on file     Past Medical History:   Diagnosis Date   • Acute right-sided low back pain with right-sided sciatica 10/24/2019   • Fibromyalgia    •  2 para 2    • History of COVID-19 2023   • Hypertension    • IBS (irritable bowel syndrome)    • Melanoma ([facility] [address] )    • Migraine      Past Surgical History:   Procedure Laterality Date   • COLONOSCOPY  2018    Fiberoptic   • DILATION AND CURETTAGE OF UTERUS     • HYSTERECTOMY  2010   • HYSTEROSCOPY W/ ENDOMETRIAL ABLATION  2009   • MAMMO (HISTORICAL) Bilateral 02/01/2018    NEG   • OVARIAN CYST DRAINAGE     • SALPINGOOPHORECTOMY Right    • TONSILLECTOMY       Allergies   Allergen Reactions   • Erythromycin Vomiting and Other (See Comments)     nausea  Reaction Date: 19Apr2011;      Current Outpatient Medications on File Prior to Visit   Medication Sig Dispense Refill   • CALCIUM PO Take by mouth     • Cholecalciferol (VITAMIN D3) 1000 units CHEW Taking it Monday, Wednesday, Friday     • dicyclomine (BENTYL) 10 mg capsule Take 1 capsule (10 mg total) by mouth 3 (three) times a day before meals (Patient taking differently: Take 10 mg by mouth as needed) 90 capsule 2   • ELDERBERRY PO Take by mouth     • Lactobacillus (ACIDOPHILUS/BIFIDUS PO) Take by mouth     • Multiple Vitamins-Minerals (WOMENS MULTI PO) Take by mouth     • nadolol (CORGARD) 20 mg tablet TAKE 1 TABLET BY MOUTH EVERY DAY 90 tablet 3     No current facility-administered medications on file prior to visit  Objective:    Review of Systems   Constitutional: Negative for chills and fever  HENT: Negative for ear pain and sore throat  Eyes: Negative for pain and visual disturbance  Respiratory: Negative for cough and shortness of breath  Cardiovascular: Negative for chest pain and palpitations  Gastrointestinal: Negative for abdominal pain and vomiting  Genitourinary: Negative for dysuria and hematuria  Musculoskeletal: Negative for arthralgias and back pain  Skin: Negative for color change and rash  Neurological: Negative for seizures and syncope  All other systems reviewed and are negative  Right Hip Exam     Tenderness   The patient is experiencing tenderness in the greater trochanter      Muscle Strength   Abduction: 5/5   Adduction: 5/5   Flexion: 5/5 Other   Erythema: absent  Sensation: normal  Pulse: present      Left Hip Exam     Tenderness   The patient is experiencing tenderness in the greater trochanter  Muscle Strength   Abduction: 5/5   Adduction: 5/5   Flexion: 5/5     Other   Erythema: absent  Sensation: normal  Pulse: present    Comments:  (+) Trendelenburg             Physical Exam  Vitals and nursing note reviewed  HENT:      Head: Normocephalic  Eyes:      Extraocular Movements: Extraocular movements intact  Cardiovascular:      Rate and Rhythm: Normal rate  Pulses: Normal pulses  Pulmonary:      Effort: Pulmonary effort is normal    Musculoskeletal:         General: Normal range of motion  Cervical back: Normal range of motion  Skin:     General: Skin is warm and dry  Neurological:      General: No focal deficit present  Mental Status: She is alert  Psychiatric:         Behavior: Behavior normal          Large joint arthrocentesis: bilateral greater trochanteric bursa  Universal Protocol:  Consent: Verbal consent obtained  Risks and benefits: risks, benefits and alternatives were discussed  Consent given by: patient  Timeout called at: 6/16/2023 2:49 PM   Patient understanding: patient states understanding of the procedure being performed  Patient identity confirmed: verbally with patient    Supporting Documentation  Indications: diagnostic evaluation   Procedure Details  Location: hip - bilateral greater trochanteric bursa  Needle size: 22 G  Ultrasound guidance: no    Medications (Right): 2 mL bupivacaine (PF) 0 5 %; 40 mg triamcinolone acetonide 40 mg/mLMedications (Left): 80 mg triamcinolone acetonide 40 mg/mL; 2 mL bupivacaine (PF) 0 5 %   Patient tolerance: patient tolerated the procedure well with no immediate complications  Dressing:  Sterile dressing applied             I have personally reviewed pertinent films in PACS       Bilateral hip x-ray's obtained 6/7/2023 demonstrate no acute osseous "abnormalities    X-ray of lumbar spine obtained today demonstrate mild scoliosis, no significant degenerative changes      Scribe Attestation    I,:  Eda Galicia am acting as a scribe while in the presence of the attending physician :       I,:  Benitez Butler, DO personally performed the services described in this documentation    as scribed in my presence :           Portions of the record may have been created with voice recognition software   Occasional wrong word or \"sound a like\" substitutions may have occurred due to the inherent limitations of voice recognition software   Read the chart carefully and recognize, using context, where substitutions have occurred      " administer in food

## 2023-12-15 NOTE — CONSULT NOTE ADULT - PROBLEM SELECTOR RECOMMENDATION 3
- suspect likely from acute fracture  - check UA unable to elicit symptoms due to dementia   - CXR neg; no overt cough or URI symptoms

## 2023-12-15 NOTE — ED ADULT NURSE NOTE - NSFALLUNIVINTERV_ED_ALL_ED
Bed/Stretcher in lowest position, wheels locked, appropriate side rails in place/Call bell, personal items and telephone in reach/Instruct patient to call for assistance before getting out of bed/chair/stretcher/Non-slip footwear applied when patient is off stretcher/[place] to call system/Physically safe environment - no spills, clutter or unnecessary equipment/Purposeful proactive rounding/Room/bathroom lighting operational, light cord in reach Bed/Stretcher in lowest position, wheels locked, appropriate side rails in place/Call bell, personal items and telephone in reach/Instruct patient to call for assistance before getting out of bed/chair/stretcher/Non-slip footwear applied when patient is off stretcher/[place] to call system/Physically safe environment - no spills, clutter or unnecessary equipment/Purposeful proactive rounding/Room/bathroom lighting operational, light cord in reach

## 2023-12-15 NOTE — ED PROVIDER NOTE - CLINICAL SUMMARY MEDICAL DECISION MAKING FREE TEXT BOX
83-year-old female, history of hypertension, hyperlipidemia, diabetes, dementia, presents to ED from [facility] with right lower extremity pain status post fall.  Unknown if head trauma.  VSS.  Physical exam with tenderness to right hip, head atraumatic, no midline C/T/L/S-spine tenderness.  Concern for acute intracranial pathology, cardiac arrythmia, infectious process, fracture.  Will check labs, ect, CT head/neck, xray chest, pelvis, right hip, right femur, right knee. 83-year-old female, history of hypertension, hyperlipidemia, diabetes, dementia, presents to ED from [facility] with right lower extremity pain status post fall.  Unknown if head trauma.  VSS.  Physical exam with tenderness to right hip, head atraumatic, no midline C/T/L/S-spine tenderness.  Concern for acute intracranial pathology, cardiac arrythmia, infectious process, fracture.  Will check labs, ect, CT head/neck, xray chest, pelvis, right hip, right femur, right knee.

## 2023-12-15 NOTE — H&P ADULT - NSHPADDITIONALINFOADULT_GEN_ALL_CORE
Attending note:  Extremity: shortening / ER deformity R LE, soft tissue swelling and associated tenderness R hip  - Long discussion with patient and POA Meng Darryn [phone] [confirmatory paperwork produced by [facility] nurse manager Geeta Thibodeaux] regarding nature of condition, potential course / sequelae, options reviewed and both are requesting surgical intervention TFN R hip fracture.  Risks, benefits, indications, alternatives reviewed in detail, risks including but not specifically limited to bleeding, infection, neurovascular / tendon injury, residual pain weakness stiffness swelling, instability, hardware loosening / failure, fracture propagation, malunion, nonunion, wound drainage sequelae, thrombosis, cardiopulmonary sequelae, mortality, amongst others; also aware potential necessity ancillary surgery in future, as well as risks blood transfusion, amongst others.  All questions answered, POA understands and wishes to proceed.  Consent SOC for surgery. Attending note:  Extremity: shortening / ER deformity R LE, soft tissue swelling and associated tenderness R hip  - Long discussion with patient and POA Meng Darryn [phone] [confirmatory paperwork produced by [facility] nurse manager Geeta Thibodeaux] regarding nature of condition, potential course / sequelae, options reviewed and both are requesting surgical intervention TFN R hip fracture.  Risks, benefits, indications, alternatives reviewed in detail, risks including but not specifically limited to bleeding, infection, neurovascular / tendon injury, residual pain weakness stiffness swelling, instability, hardware loosening / failure, fracture propagation, malunion, nonunion, wound drainage sequelae, thrombosis, cardiopulmonary sequelae, mortality, amongst others; also aware potential necessity ancillary surgery in future, as well as risks blood transfusion, amongst others.  All questions answered, POA understands and wishes to proceed.  Consent SOC for surgery. Attending note:  Extremity: shortening / ER deformity R LE, soft tissue swelling and associated tenderness R hip  - Long discussion with patient and POA Meng Darryn by telephone [phone] [confirmatory paperwork produced by [facility] nurse manager Geeta Thibodeaux] regarding nature of condition, potential course / sequelae, options reviewed and both are requesting surgical intervention TFN R hip fracture.  Risks, benefits, indications, alternatives reviewed in detail, risks including but not specifically limited to bleeding, infection, neurovascular / tendon injury, residual pain weakness stiffness swelling, instability, hardware loosening / failure, fracture propagation, malunion, nonunion, wound drainage sequelae, thrombosis, cardiopulmonary sequelae, mortality, amongst others; also aware potential necessity ancillary surgery in future, as well as risks blood transfusion, amongst others.  All questions answered, POA understands and wishes to proceed.  Consent SOC for surgery. Attending note:  Extremity: shortening / ER deformity R LE, soft tissue swelling and associated tenderness R hip  - Long discussion with patient and POA Meng Darryn by telephone [phone] [confirmatory paperwork produced by [facility] nurse manager Geeta Thibodeaux] regarding nature of condition, potential course / sequelae, options reviewed and both are requesting surgical intervention TFN R hip fracture.  Risks, benefits, indications, alternatives reviewed in detail, risks including but not specifically limited to bleeding, infection, neurovascular / tendon injury, residual pain weakness stiffness swelling, instability, hardware loosening / failure, fracture propagation, malunion, nonunion, wound drainage sequelae, thrombosis, cardiopulmonary sequelae, mortality, amongst others; also aware potential necessity ancillary surgery in future, as well as risks blood transfusion, amongst others.  All questions answered, POA understands and wishes to proceed.  Consent SOC for surgery.

## 2023-12-15 NOTE — H&P ADULT - NSHPLABSRESULTS_GEN_ALL_CORE
ACC: [id number] EXAM: CT PELVIS ONLY ORDERED BY: YADIEL GATICA  PROCEDURE DATE: 12/15/2023  INTERPRETATION: EXAMINATION: CT of the pelvis without contrast    CLINICAL INFORMATION: Hip fracture    TECHNIQUE: Axial CT images were obtained through the pelvis. Coronal and sagittal reformatted images were made.    FINDINGS: There is an acute comminuted right intertrochanteric femur fracture with slight varus angulation. There are no other acute fractures. There are chronic malunited fractures of the right superior and inferior pubic rami. There are mild osteoarthritic changes at the hips bilaterally. There is lower lumbar spondylosis. There is a chronic fracture of the right sacral ala. There is myofascial edema and hematoma about the right proximal femur fracture.    There is cholelithiasis. There is a moderate amount stool throughout the colon.    IMPRESSION: Acute comminuted right intertrochanteric femur fracture with varus angulation.  --- End of Report ---    MAURA WALKER MD; Attending Radiologist  This document has been electronically signed. Dec 15 2023 1:43PM ACC: [id number] EXAM: CT PELVIS ONLY ORDERED BY: YADIEL GATICA  PROCEDURE DATE: 12/15/2023  INTERPRETATION: EXAMINATION: CT of the pelvis without contrast    CLINICAL INFORMATION: Hip fracture    TECHNIQUE: Axial CT images were obtained through the pelvis. Coronal and sagittal reformatted images were made.    FINDINGS: There is an acute comminuted right intertrochanteric femur fracture with slight varus angulation. There are no other acute fractures. There are chronic malunited fractures of the right superior and inferior pubic rami. There are mild osteoarthritic changes at the hips bilaterally. There is lower lumbar spondylosis. There is a chronic fracture of the right sacral ala. There is myofascial edema and hematoma about the right proximal femur fracture.    There is cholelithiasis. There is a moderate amount stool throughout the colon.    IMPRESSION: Acute comminuted right intertrochanteric femur fracture with varus angulation.  --- End of Report ---    MAURA WALKER MD; Attending Radiologist  This document has been electronically signed. Dec 15 2023 1:43PM

## 2023-12-15 NOTE — ED PROVIDER NOTE - OBJECTIVE STATEMENT
83-year-old female, history of hypertension, hyperlipidemia, diabetes, dementia, presents to ED from [facility] with right lower extremity pain status post fall.  Per nurse supervisor per pictures, patient stood up from her wheelchair and attempted to walk to her room and fell.  Unsure if patient hit her head but was unable to stand patient back up secondary to right lower extremity pain.  Per facility, patient otherwise was feeling well, no recent fever, cough, CP, SOB. 83-year-old female, history of hypertension, hyperlipidemia, diabetes, dementia, presents to ED from [facility] with right lower extremity pain status post fall.  Per nurse supervisor per pictures, patient stood up from her wheelchair and attempted to walk to her room and fell.  Unsure if patient hit her head but was unable to stand patient back up secondary to right lower extremity pain.  Per facility, patient otherwise was feeling well, no recent fever, cough, CP, SOB.

## 2023-12-15 NOTE — PATIENT PROFILE ADULT - NURSING HOMES
[facility]

## 2023-12-15 NOTE — PATIENT PROFILE ADULT - FALL HARM RISK - HARM RISK INTERVENTIONS
Assistance with ambulation/Assistance OOB with selected safe patient handling equipment/Communicate Risk of Fall with Harm to all staff/Discuss with provider need for PT consult/Monitor for mental status changes/Monitor gait and stability/Move patient closer to nurses' station/Provide patient with walking aids - walker, cane, crutches/Reinforce activity limits and safety measures with patient and family/Reorient to person, place and time as needed/Tailored Fall Risk Interventions/Toileting schedule using arm’s reach rule for commode and bathroom/Use of alarms - bed, chair and/or voice tab/Visual Cue: Yellow wristband and red socks/Bed in lowest position, wheels locked, appropriate side rails in place/Call bell, personal items and telephone in reach/Instruct patient to call for assistance before getting out of bed or chair/Non-slip footwear when patient is out of bed/[place] to call system/Physically safe environment - no spills, clutter or unnecessary equipment/Purposeful Proactive Rounding/Room/bathroom lighting operational, light cord in reach Assistance with ambulation/Assistance OOB with selected safe patient handling equipment/Communicate Risk of Fall with Harm to all staff/Discuss with provider need for PT consult/Monitor for mental status changes/Monitor gait and stability/Move patient closer to nurses' station/Provide patient with walking aids - walker, cane, crutches/Reinforce activity limits and safety measures with patient and family/Reorient to person, place and time as needed/Tailored Fall Risk Interventions/Toileting schedule using arm’s reach rule for commode and bathroom/Use of alarms - bed, chair and/or voice tab/Visual Cue: Yellow wristband and red socks/Bed in lowest position, wheels locked, appropriate side rails in place/Call bell, personal items and telephone in reach/Instruct patient to call for assistance before getting out of bed or chair/Non-slip footwear when patient is out of bed/[place] to call system/Physically safe environment - no spills, clutter or unnecessary equipment/Purposeful Proactive Rounding/Room/bathroom lighting operational, light cord in reach

## 2023-12-15 NOTE — CONSULT NOTE ADULT - PROBLEM SELECTOR RECOMMENDATION 9
-as per ED notes stood up from her wheelchair and attempted to walk to her room and fell. no LOC. unable to get up due to pain in RT LE   - xray + RT femur fracture  - CT head/CT c spine neg  - pain control as per primary with bowel regimen   - Plan for OR in AM

## 2023-12-15 NOTE — CONSULT NOTE ADULT - PROBLEM SELECTOR RECOMMENDATION 2
- Pt ambulatory at baseline with walker on prior dc. MS poor baseline AOX1 self;   - EKG non ischemic; CXR w/o overt pulmonary edema   - TTE normal EF no WMA; no overt valvular HD  - RCRI 0/6   - Pt at baseline with dementia now given Hip fracture stands at high risk of morbidity and mortality if not repaired.   - Pt is optimized for moderate risk surgery without further testing

## 2023-12-15 NOTE — CONSULT NOTE ADULT - SUBJECTIVE AND OBJECTIVE BOX
HPI:  Pt is a 85 yo F w/ hx Dementia poor historian baseline AOX1 hx obtained from chart (NM brain perfusion PET with hypometabolism in frontal and parietotemporal regions c/w dementia FTD), reported Hx DM, HTN not on any meds p/w mechanical fall after getting up from wheel chair.        PAST MEDICAL & SURGICAL HISTORY:  Dementia      No significant past surgical history          Social History: Lives at [facility]; unknown if smoked/drinks    FAMILY HISTORY:  No pertinent family history in first degree relatives        Allergies    No Known Allergies    Intolerances          MEDICATIONS  (STANDING):  acetaminophen     Tablet .. 975 milliGRAM(s) Oral every 8 hours  chlorhexidine 2% Cloths 1 Application(s) Topical once  povidone iodine 5% Nasal Swab 1 Application(s) Both Nostrils once  senna 2 Tablet(s) Oral at bedtime    MEDICATIONS  (PRN):  magnesium hydroxide Suspension 30 milliLiter(s) Oral daily PRN Constipation  oxyCODONE    IR 2.5 milliGRAM(s) Oral every 4 hours PRN Moderate Pain (4 - 6)  oxyCODONE    IR 5 milliGRAM(s) Oral every 4 hours PRN Severe Pain (7 - 10)      Review of Systems: unobtainable due to dementia but denies any compliants           CAPILLARY BLOOD GLUCOSE      POCT Blood Glucose.: 135 mg/dL (15 Dec 2023 14:40)    I&O's Summary    Vital Signs Last 24 Hrs  T(C): 36.8 (15 Dec 2023 11:53), Max: 36.8 (15 Dec 2023 11:53)  T(F): 98.2 (15 Dec 2023 11:53), Max: 98.2 (15 Dec 2023 11:53)  HR: 81 (15 Dec 2023 11:53) (68 - 81)  BP: 140/70 (15 Dec 2023 11:53) (131/71 - 144/70)  BP(mean): --  RR: 16 (15 Dec 2023 11:53) (16 - 20)  SpO2: 99% (15 Dec 2023 11:53) (98% - 100%)    Parameters below as of 15 Dec 2023 11:53  Patient On (Oxygen Delivery Method): room air        PHYSICAL EXAM:  GENERAL: NAD, well-developed  HEAD:  Atraumatic, Normocephalic  EYES: EOMI, PERRLA, conjunctiva and sclera clear  NECK: Supple, No JVD  CHEST/LUNG: Clear to auscultation bilaterally; No wheeze  HEART: Regular rate and rhythm; No murmurs, rubs, or gallops  ABDOMEN: Soft, Nontender, Nondistended; Bowel sounds present  EXTREMITIES:  2+ Peripheral Pulses, No clubbing, cyanosis, or edema  PSYCH: AAOx3  NEUROLOGY: non-focal  SKIN: No rashes or lesions    LABS:                        14.4   23.78 )-----------( 358      ( 15 Dec 2023 06:55 )             43.3     12-15    136  |  99  |  32<H>  ----------------------------<  153<H>  4.7   |  24  |  0.73    Ca    10.6<H>      15 Dec 2023 06:55    TPro  7.3  /  Alb  4.1  /  TBili  0.4  /  DBili  x   /  AST  28  /  ALT  17  /  AlkPhos  93  12-15    PT/INR - ( 15 Dec 2023 06:55 )   PT: 12.1 sec;   INR: 1.07 ratio         PTT - ( 15 Dec 2023 06:55 )  PTT:26.3 sec      Urinalysis Basic - ( 15 Dec 2023 06:55 )    Color: x / Appearance: x / SG: x / pH: x  Gluc: 153 mg/dL / Ketone: x  / Bili: x / Urobili: x   Blood: x / Protein: x / Nitrite: x   Leuk Esterase: x / RBC: x / WBC x   Sq Epi: x / Non Sq Epi: x / Bacteria: x        RADIOLOGY & ADDITIONAL TESTS:    Imaging Personally Reviewed: EKG NSR @ 65 BPM no acute ST/T wave changes     < from: Xray Chest 1 View AP/PA (12.15.23 @ 06:36) >  IMPRESSION:  No acute traumatic findings.    --- End of Report ---    < end of copied text >  < from: Xray Hip 2-3 Views, Right (12.15.23 @ 06:05) >    IMPRESSION:  Acute slightly offset proximal right femoral intertrochanteric fracture   with lesser trochanteric avulsion.    < end of copied text >  < from: CT Head No Cont (12.15.23 @ 05:32) >    IMPRESSION:    BRAIN:  No acute displaced calvarial fracture.  No acute hemorrhage or mass effect.    CERVICAL SPINE:  No acute displaced fracture or traumatic subluxation.    < end of copied text >  < from: TTE W or WO Ultrasound Enhancing Agent (12.11.23 @ 08:49) >  FINDINGS:     Left Ventricle:  The left ventricular cavity is normal. Left ventricular wall thickness is normal. Left ventricular systolic function is normal with a calculated ejection fraction of 63 % by the Carbajal's biplane method of disks. Impaired relaxation with normal filling pressure. There is normal LV mass and concentric remodeling.     Right Ventricle:  The right ventricular cavity is normal in size and normal systolic function. Tricuspid annular plane systolic excursion (TAPSE) is 2.0 cm (normal >=1.7 cm).     Left Atrium:  The left atrium is normal with an indexed volume of 20.80 ml/m².     Right Atrium:  The right atrium is normal in size with an indexed volume of 14.61 ml/m².     Interatrial Septum:  The interatrial septum appears intact.     Aortic Valve:  There is mild calcification of the aortic valve leaflets. There is trace aortic regurgitation.     Mitral Valve:  Structurally normal mitral valve with normal leaflet excursion. There is no mitral valve stenosis. There is trace mitral regurgitation.     Tricuspid Valve:  Structurally normal tricuspidvalve with normal leaflet excursion. There is trace tricuspid regurgitation.     Pulmonic Valve:  Structurally normal pulmonic valve with normal leaflet excursion. There is trace pulmonic regurgitation.     Aorta:  The aortic annulus and aortic root appear normal in size.     Pericardium:  No pericardial effusion seen.     Systemic Veins:  The inferior vena cava is normal in size measuring 1.10 cm in diameter, (normal <2.1cm) with abnormal inspiratory collapse (abnormal <50%) consistent with mildly elevated right atrial pressure (~8, range 5-10mmHg).  ____________________________________________________________________    < end of copied text >      Consultant(s) Notes Reviewed:      Care Discussed with Consultants/Other Providers:   HPI:  Pt is a 87 yo F w/ hx Dementia poor historian baseline AOX1 hx obtained from chart (NM brain perfusion PET with hypometabolism in frontal and parietotemporal regions c/w dementia FTD), reported Hx DM, HTN not on any meds p/w mechanical fall after getting up from wheel chair.        PAST MEDICAL & SURGICAL HISTORY:  Dementia      No significant past surgical history          Social History: Lives at [facility]; unknown if smoked/drinks    FAMILY HISTORY:  No pertinent family history in first degree relatives        Allergies    No Known Allergies    Intolerances          MEDICATIONS  (STANDING):  acetaminophen     Tablet .. 975 milliGRAM(s) Oral every 8 hours  chlorhexidine 2% Cloths 1 Application(s) Topical once  povidone iodine 5% Nasal Swab 1 Application(s) Both Nostrils once  senna 2 Tablet(s) Oral at bedtime    MEDICATIONS  (PRN):  magnesium hydroxide Suspension 30 milliLiter(s) Oral daily PRN Constipation  oxyCODONE    IR 2.5 milliGRAM(s) Oral every 4 hours PRN Moderate Pain (4 - 6)  oxyCODONE    IR 5 milliGRAM(s) Oral every 4 hours PRN Severe Pain (7 - 10)      Review of Systems: unobtainable due to dementia but denies any compliants           CAPILLARY BLOOD GLUCOSE      POCT Blood Glucose.: 135 mg/dL (15 Dec 2023 14:40)    I&O's Summary    Vital Signs Last 24 Hrs  T(C): 36.8 (15 Dec 2023 11:53), Max: 36.8 (15 Dec 2023 11:53)  T(F): 98.2 (15 Dec 2023 11:53), Max: 98.2 (15 Dec 2023 11:53)  HR: 81 (15 Dec 2023 11:53) (68 - 81)  BP: 140/70 (15 Dec 2023 11:53) (131/71 - 144/70)  BP(mean): --  RR: 16 (15 Dec 2023 11:53) (16 - 20)  SpO2: 99% (15 Dec 2023 11:53) (98% - 100%)    Parameters below as of 15 Dec 2023 11:53  Patient On (Oxygen Delivery Method): room air        PHYSICAL EXAM:  GENERAL: NAD, well-developed  HEAD:  Atraumatic, Normocephalic  EYES: EOMI, PERRLA, conjunctiva and sclera clear  NECK: Supple, No JVD  CHEST/LUNG: Clear to auscultation bilaterally; No wheeze  HEART: Regular rate and rhythm; No murmurs, rubs, or gallops  ABDOMEN: Soft, Nontender, Nondistended; Bowel sounds present  EXTREMITIES:  2+ Peripheral Pulses, No clubbing, cyanosis, or edema  PSYCH: AAOx3  NEUROLOGY: non-focal  SKIN: No rashes or lesions    LABS:                        14.4   23.78 )-----------( 358      ( 15 Dec 2023 06:55 )             43.3     12-15    136  |  99  |  32<H>  ----------------------------<  153<H>  4.7   |  24  |  0.73    Ca    10.6<H>      15 Dec 2023 06:55    TPro  7.3  /  Alb  4.1  /  TBili  0.4  /  DBili  x   /  AST  28  /  ALT  17  /  AlkPhos  93  12-15    PT/INR - ( 15 Dec 2023 06:55 )   PT: 12.1 sec;   INR: 1.07 ratio         PTT - ( 15 Dec 2023 06:55 )  PTT:26.3 sec      Urinalysis Basic - ( 15 Dec 2023 06:55 )    Color: x / Appearance: x / SG: x / pH: x  Gluc: 153 mg/dL / Ketone: x  / Bili: x / Urobili: x   Blood: x / Protein: x / Nitrite: x   Leuk Esterase: x / RBC: x / WBC x   Sq Epi: x / Non Sq Epi: x / Bacteria: x        RADIOLOGY & ADDITIONAL TESTS:    Imaging Personally Reviewed: EKG NSR @ 65 BPM no acute ST/T wave changes     < from: Xray Chest 1 View AP/PA (12.15.23 @ 06:36) >  IMPRESSION:  No acute traumatic findings.    --- End of Report ---    < end of copied text >  < from: Xray Hip 2-3 Views, Right (12.15.23 @ 06:05) >    IMPRESSION:  Acute slightly offset proximal right femoral intertrochanteric fracture   with lesser trochanteric avulsion.    < end of copied text >  < from: CT Head No Cont (12.15.23 @ 05:32) >    IMPRESSION:    BRAIN:  No acute displaced calvarial fracture.  No acute hemorrhage or mass effect.    CERVICAL SPINE:  No acute displaced fracture or traumatic subluxation.    < end of copied text >  < from: TTE W or WO Ultrasound Enhancing Agent (12.11.23 @ 08:49) >  FINDINGS:     Left Ventricle:  The left ventricular cavity is normal. Left ventricular wall thickness is normal. Left ventricular systolic function is normal with a calculated ejection fraction of 63 % by the Carbajal's biplane method of disks. Impaired relaxation with normal filling pressure. There is normal LV mass and concentric remodeling.     Right Ventricle:  The right ventricular cavity is normal in size and normal systolic function. Tricuspid annular plane systolic excursion (TAPSE) is 2.0 cm (normal >=1.7 cm).     Left Atrium:  The left atrium is normal with an indexed volume of 20.80 ml/m².     Right Atrium:  The right atrium is normal in size with an indexed volume of 14.61 ml/m².     Interatrial Septum:  The interatrial septum appears intact.     Aortic Valve:  There is mild calcification of the aortic valve leaflets. There is trace aortic regurgitation.     Mitral Valve:  Structurally normal mitral valve with normal leaflet excursion. There is no mitral valve stenosis. There is trace mitral regurgitation.     Tricuspid Valve:  Structurally normal tricuspidvalve with normal leaflet excursion. There is trace tricuspid regurgitation.     Pulmonic Valve:  Structurally normal pulmonic valve with normal leaflet excursion. There is trace pulmonic regurgitation.     Aorta:  The aortic annulus and aortic root appear normal in size.     Pericardium:  No pericardial effusion seen.     Systemic Veins:  The inferior vena cava is normal in size measuring 1.10 cm in diameter, (normal <2.1cm) with abnormal inspiratory collapse (abnormal <50%) consistent with mildly elevated right atrial pressure (~8, range 5-10mmHg).  ____________________________________________________________________    < end of copied text >      Consultant(s) Notes Reviewed:      Care Discussed with Consultants/Other Providers:

## 2023-12-15 NOTE — CONSULT NOTE ADULT - TIME BILLING
Reviewed lab data, radiology results, consultants' recommendations, documentation in [place], discussed with family, ACP, interdisciplinary staff and/or intervention were performed. Reviewed lab data, radiology results, consultants' recommendations, documentation in [place], discussed with family, ACP, interdisciplinary staff and/or intervention were performed.

## 2023-12-15 NOTE — ED ADULT NURSE REASSESSMENT NOTE - NSFALLRISKINTERV_ED_ALL_ED
Assistance OOB with selected safe patient handling equipment if applicable/Assistance with ambulation/Communicate fall risk and risk factors to all staff, patient, and family/Monitor gait and stability/Monitor for mental status changes and reorient to person, place, and time, as needed/Provide patient with walking aids/Provide visual cue: yellow wristband, yellow gown, etc/Reinforce activity limits and safety measures with patient and family/Toileting schedule using arm’s reach rule for commode and bathroom/Use of alarms - bed, stretcher, chair and/or video monitoring/Call bell, personal items and telephone in reach/Instruct patient to call for assistance before getting out of bed/chair/stretcher/Non-slip footwear applied when patient is off stretcher/[place] to call system/Physically safe environment - no spills, clutter or unnecessary equipment/Purposeful Proactive Rounding/Room/bathroom lighting operational, light cord in reach Assistance OOB with selected safe patient handling equipment if applicable/Assistance with ambulation/Communicate fall risk and risk factors to all staff, patient, and family/Monitor gait and stability/Monitor for mental status changes and reorient to person, place, and time, as needed/Provide patient with walking aids/Provide visual cue: yellow wristband, yellow gown, etc/Reinforce activity limits and safety measures with patient and family/Toileting schedule using arm’s reach rule for commode and bathroom/Use of alarms - bed, stretcher, chair and/or video monitoring/Call bell, personal items and telephone in reach/Instruct patient to call for assistance before getting out of bed/chair/stretcher/Non-slip footwear applied when patient is off stretcher/[place] to call system/Physically safe environment - no spills, clutter or unnecessary equipment/Purposeful Proactive Rounding/Room/bathroom lighting operational, light cord in reach

## 2023-12-15 NOTE — CONSULT NOTE ADULT - PROBLEM SELECTOR PROBLEM 3
"Physical Therapy  Treatment    Joseph Toussaint   MRN: [id number]   Admitting Diagnosis: Acute on chronic systolic congestive heart failure    PT Received On: 07/25/22  PT Start Time: 1150     PT Stop Time: 1220    PT Total Time (min): 30 min       Billable Minutes:  Therapeutic Activity 30    Treatment Type: Treatment  PT/PTA: PTA     PTA Visit Number: 1       General Precautions: Standard, fall  Orthopedic Precautions: N/A   Braces: N/A  Respiratory Status: Room air         Subjective:  Communicated with patient's nurse, Jeannette, and completed Epic chart review prior to session.  Patient resistant to participate in PT session. Highly confused and insisting to "wait until I get to the hospital"    Pain/Comfort  Pain Rating 1:  (C/O PAIN IN BLE BUT UNABLE TO QUANTIFY)    Objective:   Patient found with: bed alarm, telemetry, peripheral IV, Other (comments) (PIV)    Functional Mobility:  Supine <> sit EOB: Mod A (x2 trials of each due to patient laying himself back down)    Seated EOB x15 min total focusing on increased tolerance to upright position, core stability, trunk control and CV endurance.   Self supported sitting required CGA to maintain sitting balance with poor voluntary control     Max encourage patient to eat while sitting upright due to poor PO intake. Patient ate very little and continued to insist he would eat when he "got to the hospital".  Multiple attempts to re orient patient but all were unsuccessful as patient was resistant.     Chair T/F, standing and gait training not attempted today as patient was not appropriate for these activities.     Supine scoot towards HOB: Total A of 2    AM-PAC 6 CLICK MOBILITY  How much help from another person does this patient currently need?   1 = Unable, Total/Dependent Assistance  2 = A lot, Maximum/Moderate Assistance  3 = A little, Minimum/Contact Guard/Supervision  4 = None, Modified [place]/Independent    Turning over in bed (including adjusting " bedclothes, sheets and blankets)?: 2  Sitting down on and standing up from a chair with arms (e.g., wheelchair, bedside commode, etc.): 1 (REF)  Moving from lying on back to sitting on the side of the bed?: 1 (REF)  Moving to and from a bed to a chair (including a wheelchair)?: 1 (REF)  Need to walk in hospital room?: 1 (REF)  Climbing 3-5 steps with a railing?: 1  Basic Mobility Total Score: 7    AM-PAC Raw Score CMS G-Code Modifier Level of Impairment Assistance   6 % Total / Unable   7 - 9 CM 80 - 100% Maximal Assist   10 - 14 CL 60 - 80% Moderate Assist   15 - 19 CK 40 - 60% Moderate Assist   20 - 22 CJ 20 - 40% Minimal Assist   23 CI 1-20% SBA / CGA   24 CH 0% Independent/ Mod I     Patient left with bed in chair position with all lines intact, call button in reach, bed alarm on and AVASYS present.    Assessment:  Joseph Toussaint is a 89 y.o. male with a medical diagnosis of Acute on chronic systolic congestive heart failure and presents with overall decline in functional mobility. Patient would continue to benefit from skilled PT to address functional limitations listed below in order to return to PLOF/decrease caregiver burden.     Rehab identified problem list/impairments: Rehab identified problem list/impairments: weakness, impaired endurance, impaired self care skills, impaired functional mobility, impaired balance, impaired cognition, decreased coordination, decreased lower extremity function, decreased upper extremity function, decreased safety awareness, decreased ROM, impaired coordination, impaired cardiopulmonary response to activity    Rehab potential is fair.    Activity tolerance: Poor    Discharge recommendations: Discharge Facility/Level of Care Needs: nursing facility, skilled     Barriers to discharge:      Equipment recommendations: Equipment Needed After Discharge: other (see comments) (TBD)     GOALS:   Multidisciplinary Problems     Physical Therapy Goals        Problem: Physical  Therapy    Goal Priority Disciplines Outcome Goal Variances Interventions   Physical Therapy Goal     PT, PT/OT Ongoing, Progressing                     PLAN:    Patient to be seen 3 x/week  to address the above listed problems via gait training, therapeutic activities, therapeutic exercises, neuromuscular re-education  Plan of Care expires: 08/06/22  Plan of Care reviewed with: patient         07/25/2022     Leukocytosis

## 2023-12-15 NOTE — H&P ADULT - HISTORY OF PRESENT ILLNESS
87 yo f s/p witnessed fall at [facility].  Pt c/o R hip pain.  Pt brought to [facility] ER, XR revealed right intertrochanteric hip fracture .  Pt is poor historian 2/2 to dementia.  Pt w recent admission to [facility] for AMS, discharged to [facility].  85 yo f s/p witnessed fall at [place].  Pt c/o R hip pain.  Pt brought to [facility] ER, XR revealed right intertrochanteric hip fracture .  Pt is poor historian 2/2 to dementia.  Pt w recent admission to [facility] for AMS, discharged to [place].

## 2023-12-15 NOTE — ED PROVIDER NOTE - WR ORDER NAME 5
Pt reports intermittent elevated HR at home x10 days. Denies CP, palpitation or HELENA. Chronic swelling to bilateral legs. Xray Chest 1 View AP/PA

## 2023-12-15 NOTE — ED ADULT NURSE NOTE - OBJECTIVE STATEMENT
Pt A&Ox 1-2 ambulatory at baseline, PMH HTN, HLD, DM, Dementia, presenting to the ED (RM 10) c/o fall. Pt coming from [facility] after mechanical fall. As per nursing home, [facility] patient stood up from wheelchair and attempted to walk. [facility] patient fell, states are unsure of patient hit her head. [facility] patient has been endorsing RLE pain after fall. During assessment, tenderness noted upon palpation to RLE, denies any other complaints, pt placed on CM, PIV in place, labs sent by Peg ELIZONDO  Safety precautions implemented as per protocol, awaiting further MD orders, plan of care ongoing. Pt A&Ox 1-2 ambulatory at baseline, PMH HTN, HLD, DM, Dementia, presenting to the ED (RM 10) c/o fall. Pt coming from [facility] after mechanical fall. As per nursing home, [facility] patient stood up from wheelchair and attempted to walk. [facility] patient fell, states are unsure of patient hit her head. [facility] patient has been endorsing RLE pain after fall. During assessment, tenderness noted upon palpation to RLE, denies any other complaints, pt placed on CM, PIV in place, labs sent by Peg ELIZONDO  Safety precautions implemented as per protocol, awaiting further MD orders, plan of care ongoing.

## 2023-12-15 NOTE — H&P ADULT - ASSESSMENT
Admit to Ortho 9T , Dr Ayoub  NPO p MN for open reduction internal fixation right IT hip fracture   Medicine hospitalist consulted for optimization  Lovenox x 1 for DVT PPX  Pain control    Nephew Dr Osiel Farley: [phone]  ATTY/ HCP Meng Cates [phone] Admit to Ortho 9T , Dr Ayoub  NPO p MN for open reduction internal fixation right IT hip fracture   Medicine hospitalist consulted for optimization  Lovenox x 1 for DVT PPX  Pain control    Nephew Dr Osiel Farley: [phone]  ATTY/ HCP Meng Cates [phone]

## 2023-12-15 NOTE — ED ADULT NURSE NOTE - CHIEF COMPLAINT QUOTE
arrives from [facility]- s/p witnessed mechanical fall per staff, c/o RLE pain- hx dementia arrives from [facility]- s/p witnessed mechanical fall per staff, c/o RLE pain- hx dementia

## 2023-12-15 NOTE — ED ADULT NURSE REASSESSMENT NOTE - NS ED NURSE REASSESS COMMENT FT1
Joo RN: 20G IV placed on left hand. Labs drawn and sent.
Pt received in no acute distress, sleeping, resting calmly and comfortably, vital performed. Pt A&Ox1 to person. Pt reports having pain but difficult to for pain to verbalize and show where pain is. Tenderness noted to Right hip area and pt localizing to area. Pt pending ortho recommendation and admit. Will continue to monitor.

## 2023-12-16 ENCOUNTER — TRANSCRIPTION ENCOUNTER (OUTPATIENT)
Age: 83
End: 2023-12-16

## 2023-12-16 LAB
A1C WITH ESTIMATED AVERAGE GLUCOSE RESULT: 5.7 % — HIGH (ref 4–5.6)
A1C WITH ESTIMATED AVERAGE GLUCOSE RESULT: 5.7 % — HIGH (ref 4–5.6)
ALBUMIN SERPL ELPH-MCNC: 3.9 G/DL — SIGNIFICANT CHANGE UP (ref 3.3–5)
ALBUMIN SERPL ELPH-MCNC: 3.9 G/DL — SIGNIFICANT CHANGE UP (ref 3.3–5)
ALP SERPL-CCNC: 85 U/L — SIGNIFICANT CHANGE UP (ref 40–120)
ALP SERPL-CCNC: 85 U/L — SIGNIFICANT CHANGE UP (ref 40–120)
ALT FLD-CCNC: 14 U/L — SIGNIFICANT CHANGE UP (ref 4–33)
ALT FLD-CCNC: 14 U/L — SIGNIFICANT CHANGE UP (ref 4–33)
ANION GAP SERPL CALC-SCNC: 10 MMOL/L — SIGNIFICANT CHANGE UP (ref 7–14)
ANION GAP SERPL CALC-SCNC: 10 MMOL/L — SIGNIFICANT CHANGE UP (ref 7–14)
ANION GAP SERPL CALC-SCNC: 11 MMOL/L — SIGNIFICANT CHANGE UP (ref 7–14)
ANION GAP SERPL CALC-SCNC: 11 MMOL/L — SIGNIFICANT CHANGE UP (ref 7–14)
APTT BLD: 37.7 SEC — HIGH (ref 24.5–35.6)
APTT BLD: 37.7 SEC — HIGH (ref 24.5–35.6)
AST SERPL-CCNC: 22 U/L — SIGNIFICANT CHANGE UP (ref 4–32)
AST SERPL-CCNC: 22 U/L — SIGNIFICANT CHANGE UP (ref 4–32)
BILIRUB SERPL-MCNC: 0.6 MG/DL — SIGNIFICANT CHANGE UP (ref 0.2–1.2)
BILIRUB SERPL-MCNC: 0.6 MG/DL — SIGNIFICANT CHANGE UP (ref 0.2–1.2)
BUN SERPL-MCNC: 29 MG/DL — HIGH (ref 7–23)
BUN SERPL-MCNC: 29 MG/DL — HIGH (ref 7–23)
BUN SERPL-MCNC: 36 MG/DL — HIGH (ref 7–23)
BUN SERPL-MCNC: 36 MG/DL — HIGH (ref 7–23)
CALCIUM SERPL-MCNC: 10.5 MG/DL — SIGNIFICANT CHANGE UP (ref 8.4–10.5)
CALCIUM SERPL-MCNC: 10.5 MG/DL — SIGNIFICANT CHANGE UP (ref 8.4–10.5)
CALCIUM SERPL-MCNC: 9.9 MG/DL — SIGNIFICANT CHANGE UP (ref 8.4–10.5)
CALCIUM SERPL-MCNC: 9.9 MG/DL — SIGNIFICANT CHANGE UP (ref 8.4–10.5)
CHLORIDE SERPL-SCNC: 102 MMOL/L — SIGNIFICANT CHANGE UP (ref 98–107)
CHLORIDE SERPL-SCNC: 102 MMOL/L — SIGNIFICANT CHANGE UP (ref 98–107)
CHLORIDE SERPL-SCNC: 98 MMOL/L — SIGNIFICANT CHANGE UP (ref 98–107)
CHLORIDE SERPL-SCNC: 98 MMOL/L — SIGNIFICANT CHANGE UP (ref 98–107)
CO2 SERPL-SCNC: 26 MMOL/L — SIGNIFICANT CHANGE UP (ref 22–31)
CREAT SERPL-MCNC: 0.7 MG/DL — SIGNIFICANT CHANGE UP (ref 0.5–1.3)
CREAT SERPL-MCNC: 0.7 MG/DL — SIGNIFICANT CHANGE UP (ref 0.5–1.3)
CREAT SERPL-MCNC: 0.75 MG/DL — SIGNIFICANT CHANGE UP (ref 0.5–1.3)
CREAT SERPL-MCNC: 0.75 MG/DL — SIGNIFICANT CHANGE UP (ref 0.5–1.3)
EGFR: 79 ML/MIN/1.73M2 — SIGNIFICANT CHANGE UP
EGFR: 79 ML/MIN/1.73M2 — SIGNIFICANT CHANGE UP
EGFR: 86 ML/MIN/1.73M2 — SIGNIFICANT CHANGE UP
EGFR: 86 ML/MIN/1.73M2 — SIGNIFICANT CHANGE UP
ESTIMATED AVERAGE GLUCOSE: 117 — SIGNIFICANT CHANGE UP
ESTIMATED AVERAGE GLUCOSE: 117 — SIGNIFICANT CHANGE UP
GLUCOSE BLDC GLUCOMTR-MCNC: 125 MG/DL — HIGH (ref 70–99)
GLUCOSE BLDC GLUCOMTR-MCNC: 125 MG/DL — HIGH (ref 70–99)
GLUCOSE BLDC GLUCOMTR-MCNC: 132 MG/DL — HIGH (ref 70–99)
GLUCOSE BLDC GLUCOMTR-MCNC: 132 MG/DL — HIGH (ref 70–99)
GLUCOSE BLDC GLUCOMTR-MCNC: 142 MG/DL — HIGH (ref 70–99)
GLUCOSE BLDC GLUCOMTR-MCNC: 142 MG/DL — HIGH (ref 70–99)
GLUCOSE BLDC GLUCOMTR-MCNC: 167 MG/DL — HIGH (ref 70–99)
GLUCOSE BLDC GLUCOMTR-MCNC: 167 MG/DL — HIGH (ref 70–99)
GLUCOSE SERPL-MCNC: 139 MG/DL — HIGH (ref 70–99)
GLUCOSE SERPL-MCNC: 139 MG/DL — HIGH (ref 70–99)
GLUCOSE SERPL-MCNC: 164 MG/DL — HIGH (ref 70–99)
GLUCOSE SERPL-MCNC: 164 MG/DL — HIGH (ref 70–99)
HCT VFR BLD CALC: 36.5 % — SIGNIFICANT CHANGE UP (ref 34.5–45)
HCT VFR BLD CALC: 36.5 % — SIGNIFICANT CHANGE UP (ref 34.5–45)
HCT VFR BLD CALC: 38.6 % — SIGNIFICANT CHANGE UP (ref 34.5–45)
HCT VFR BLD CALC: 38.6 % — SIGNIFICANT CHANGE UP (ref 34.5–45)
HGB BLD-MCNC: 12.2 G/DL — SIGNIFICANT CHANGE UP (ref 11.5–15.5)
HGB BLD-MCNC: 12.2 G/DL — SIGNIFICANT CHANGE UP (ref 11.5–15.5)
HGB BLD-MCNC: 13.2 G/DL — SIGNIFICANT CHANGE UP (ref 11.5–15.5)
HGB BLD-MCNC: 13.2 G/DL — SIGNIFICANT CHANGE UP (ref 11.5–15.5)
INR BLD: 1.07 RATIO — SIGNIFICANT CHANGE UP (ref 0.85–1.18)
INR BLD: 1.07 RATIO — SIGNIFICANT CHANGE UP (ref 0.85–1.18)
MCHC RBC-ENTMCNC: 31.1 PG — SIGNIFICANT CHANGE UP (ref 27–34)
MCHC RBC-ENTMCNC: 31.1 PG — SIGNIFICANT CHANGE UP (ref 27–34)
MCHC RBC-ENTMCNC: 31.2 PG — SIGNIFICANT CHANGE UP (ref 27–34)
MCHC RBC-ENTMCNC: 31.2 PG — SIGNIFICANT CHANGE UP (ref 27–34)
MCHC RBC-ENTMCNC: 33.4 GM/DL — SIGNIFICANT CHANGE UP (ref 32–36)
MCHC RBC-ENTMCNC: 33.4 GM/DL — SIGNIFICANT CHANGE UP (ref 32–36)
MCHC RBC-ENTMCNC: 34.2 GM/DL — SIGNIFICANT CHANGE UP (ref 32–36)
MCHC RBC-ENTMCNC: 34.2 GM/DL — SIGNIFICANT CHANGE UP (ref 32–36)
MCV RBC AUTO: 91 FL — SIGNIFICANT CHANGE UP (ref 80–100)
MCV RBC AUTO: 91 FL — SIGNIFICANT CHANGE UP (ref 80–100)
MCV RBC AUTO: 93.4 FL — SIGNIFICANT CHANGE UP (ref 80–100)
MCV RBC AUTO: 93.4 FL — SIGNIFICANT CHANGE UP (ref 80–100)
NRBC # BLD: 0 /100 WBCS — SIGNIFICANT CHANGE UP (ref 0–0)
NRBC # FLD: 0 K/UL — SIGNIFICANT CHANGE UP (ref 0–0)
PLATELET # BLD AUTO: 309 K/UL — SIGNIFICANT CHANGE UP (ref 150–400)
PLATELET # BLD AUTO: 309 K/UL — SIGNIFICANT CHANGE UP (ref 150–400)
PLATELET # BLD AUTO: 336 K/UL — SIGNIFICANT CHANGE UP (ref 150–400)
PLATELET # BLD AUTO: 336 K/UL — SIGNIFICANT CHANGE UP (ref 150–400)
POTASSIUM SERPL-MCNC: 4.3 MMOL/L — SIGNIFICANT CHANGE UP (ref 3.5–5.3)
POTASSIUM SERPL-MCNC: 4.3 MMOL/L — SIGNIFICANT CHANGE UP (ref 3.5–5.3)
POTASSIUM SERPL-MCNC: 4.5 MMOL/L — SIGNIFICANT CHANGE UP (ref 3.5–5.3)
POTASSIUM SERPL-MCNC: 4.5 MMOL/L — SIGNIFICANT CHANGE UP (ref 3.5–5.3)
POTASSIUM SERPL-SCNC: 4.3 MMOL/L — SIGNIFICANT CHANGE UP (ref 3.5–5.3)
POTASSIUM SERPL-SCNC: 4.3 MMOL/L — SIGNIFICANT CHANGE UP (ref 3.5–5.3)
POTASSIUM SERPL-SCNC: 4.5 MMOL/L — SIGNIFICANT CHANGE UP (ref 3.5–5.3)
POTASSIUM SERPL-SCNC: 4.5 MMOL/L — SIGNIFICANT CHANGE UP (ref 3.5–5.3)
PROT SERPL-MCNC: 6.7 G/DL — SIGNIFICANT CHANGE UP (ref 6–8.3)
PROT SERPL-MCNC: 6.7 G/DL — SIGNIFICANT CHANGE UP (ref 6–8.3)
PROTHROM AB SERPL-ACNC: 12.1 SEC — SIGNIFICANT CHANGE UP (ref 9.5–13)
PROTHROM AB SERPL-ACNC: 12.1 SEC — SIGNIFICANT CHANGE UP (ref 9.5–13)
PTH-INTACT FLD-MCNC: 73 PG/ML — HIGH (ref 15–65)
PTH-INTACT FLD-MCNC: 73 PG/ML — HIGH (ref 15–65)
RBC # BLD: 3.91 M/UL — SIGNIFICANT CHANGE UP (ref 3.8–5.2)
RBC # BLD: 3.91 M/UL — SIGNIFICANT CHANGE UP (ref 3.8–5.2)
RBC # BLD: 4.24 M/UL — SIGNIFICANT CHANGE UP (ref 3.8–5.2)
RBC # BLD: 4.24 M/UL — SIGNIFICANT CHANGE UP (ref 3.8–5.2)
RBC # FLD: 12.6 % — SIGNIFICANT CHANGE UP (ref 10.3–14.5)
SODIUM SERPL-SCNC: 135 MMOL/L — SIGNIFICANT CHANGE UP (ref 135–145)
SODIUM SERPL-SCNC: 135 MMOL/L — SIGNIFICANT CHANGE UP (ref 135–145)
SODIUM SERPL-SCNC: 138 MMOL/L — SIGNIFICANT CHANGE UP (ref 135–145)
SODIUM SERPL-SCNC: 138 MMOL/L — SIGNIFICANT CHANGE UP (ref 135–145)
WBC # BLD: 14.65 K/UL — HIGH (ref 3.8–10.5)
WBC # BLD: 14.65 K/UL — HIGH (ref 3.8–10.5)
WBC # BLD: 21.58 K/UL — HIGH (ref 3.8–10.5)
WBC # BLD: 21.58 K/UL — HIGH (ref 3.8–10.5)
WBC # FLD AUTO: 14.65 K/UL — HIGH (ref 3.8–10.5)
WBC # FLD AUTO: 14.65 K/UL — HIGH (ref 3.8–10.5)
WBC # FLD AUTO: 21.58 K/UL — HIGH (ref 3.8–10.5)
WBC # FLD AUTO: 21.58 K/UL — HIGH (ref 3.8–10.5)

## 2023-12-16 PROCEDURE — 27245 TREAT THIGH FRACTURE: CPT | Mod: RT

## 2023-12-16 PROCEDURE — 99222 1ST HOSP IP/OBS MODERATE 55: CPT | Mod: 57

## 2023-12-16 DEVICE — NAIL CANN TFNA 130 DEG 11X235MM: Type: IMPLANTABLE DEVICE | Site: RIGHT | Status: FUNCTIONAL

## 2023-12-16 DEVICE — IMPLANTABLE DEVICE: Type: IMPLANTABLE DEVICE | Site: RIGHT | Status: FUNCTIONAL

## 2023-12-16 DEVICE — SCREW LOKG 5X36MM: Type: IMPLANTABLE DEVICE | Site: RIGHT | Status: FUNCTIONAL

## 2023-12-16 RX ORDER — ENOXAPARIN SODIUM 100 MG/ML
40 INJECTION SUBCUTANEOUS EVERY 24 HOURS
Refills: 0 | Status: DISCONTINUED | OUTPATIENT
Start: 2023-12-17 | End: 2023-12-20

## 2023-12-16 RX ORDER — LANOLIN ALCOHOL/MO/W.PET/CERES
3 CREAM (GRAM) TOPICAL AT BEDTIME
Refills: 0 | Status: DISCONTINUED | OUTPATIENT
Start: 2023-12-16 | End: 2023-12-20

## 2023-12-16 RX ORDER — FENTANYL CITRATE 50 UG/ML
25 INJECTION INTRAVENOUS
Refills: 0 | Status: DISCONTINUED | OUTPATIENT
Start: 2023-12-16 | End: 2023-12-16

## 2023-12-16 RX ORDER — ONDANSETRON 8 MG/1
4 TABLET, FILM COATED ORAL EVERY 6 HOURS
Refills: 0 | Status: DISCONTINUED | OUTPATIENT
Start: 2023-12-16 | End: 2023-12-20

## 2023-12-16 RX ORDER — SENNA PLUS 8.6 MG/1
2 TABLET ORAL AT BEDTIME
Refills: 0 | Status: DISCONTINUED | OUTPATIENT
Start: 2023-12-16 | End: 2023-12-16

## 2023-12-16 RX ORDER — ENOXAPARIN SODIUM 100 MG/ML
30 INJECTION SUBCUTANEOUS EVERY 24 HOURS
Refills: 0 | Status: DISCONTINUED | OUTPATIENT
Start: 2023-12-16 | End: 2023-12-16

## 2023-12-16 RX ORDER — ACETAMINOPHEN 500 MG
975 TABLET ORAL EVERY 8 HOURS
Refills: 0 | Status: DISCONTINUED | OUTPATIENT
Start: 2023-12-16 | End: 2023-12-20

## 2023-12-16 RX ORDER — MAGNESIUM HYDROXIDE 400 MG/1
30 TABLET, CHEWABLE ORAL DAILY
Refills: 0 | Status: DISCONTINUED | OUTPATIENT
Start: 2023-12-16 | End: 2023-12-16

## 2023-12-16 RX ORDER — POLYETHYLENE GLYCOL 3350 17 G/17G
17 POWDER, FOR SOLUTION ORAL DAILY
Refills: 0 | Status: DISCONTINUED | OUTPATIENT
Start: 2023-12-16 | End: 2023-12-20

## 2023-12-16 RX ORDER — OXYCODONE HYDROCHLORIDE 5 MG/1
2.5 TABLET ORAL EVERY 4 HOURS
Refills: 0 | Status: DISCONTINUED | OUTPATIENT
Start: 2023-12-16 | End: 2023-12-20

## 2023-12-16 RX ORDER — CEFAZOLIN SODIUM 1 G
2000 VIAL (EA) INJECTION EVERY 8 HOURS
Refills: 0 | Status: COMPLETED | OUTPATIENT
Start: 2023-12-16 | End: 2023-12-17

## 2023-12-16 RX ORDER — ONDANSETRON 8 MG/1
4 TABLET, FILM COATED ORAL ONCE
Refills: 0 | Status: DISCONTINUED | OUTPATIENT
Start: 2023-12-16 | End: 2023-12-16

## 2023-12-16 RX ORDER — OXYCODONE HYDROCHLORIDE 5 MG/1
5 TABLET ORAL EVERY 4 HOURS
Refills: 0 | Status: DISCONTINUED | OUTPATIENT
Start: 2023-12-16 | End: 2023-12-20

## 2023-12-16 RX ORDER — FENTANYL CITRATE 50 UG/ML
50 INJECTION INTRAVENOUS
Refills: 0 | Status: DISCONTINUED | OUTPATIENT
Start: 2023-12-16 | End: 2023-12-16

## 2023-12-16 RX ADMIN — Medication 975 MILLIGRAM(S): at 05:12

## 2023-12-16 RX ADMIN — Medication 100 MILLIGRAM(S): at 18:03

## 2023-12-16 RX ADMIN — POLYETHYLENE GLYCOL 3350 17 GRAM(S): 17 POWDER, FOR SOLUTION ORAL at 18:02

## 2023-12-16 RX ADMIN — Medication 975 MILLIGRAM(S): at 18:02

## 2023-12-16 RX ADMIN — SENNA PLUS 2 TABLET(S): 8.6 TABLET ORAL at 22:46

## 2023-12-16 RX ADMIN — CHLORHEXIDINE GLUCONATE 1 APPLICATION(S): 213 SOLUTION TOPICAL at 05:12

## 2023-12-16 RX ADMIN — Medication 1 APPLICATION(S): at 06:15

## 2023-12-16 RX ADMIN — Medication 975 MILLIGRAM(S): at 06:25

## 2023-12-16 NOTE — CHART NOTE - NSCHARTNOTEFT_GEN_A_CORE
ORTHOPEDIC SURGERY POST-OP CHECK    S: Patient seen and examined at bedside POD0 s/p R femur IMN Pain well controlled with current regimen. Denies numbness/tingling in the extremity. Denies shortness of breath and chest pain. Baseline mental status AOx1-2.    O: T(C): 36.5 (12-16-23 @ 13:50), Max: 37.1 (12-16-23 @ 05:13)  HR: 107 (12-16-23 @ 13:50) (80 - 107)  BP: 119/62 (12-16-23 @ 13:50) (119/62 - 174/75)  RR: 17 (12-16-23 @ 13:50) (12 - 20)  SpO2: 99% (12-16-23 @ 13:50) (95% - 100%)    Exam:   Gen: NAD, resting in bed  Resp: unlabored breathing  RLE: guaze and tegaderm dressing c/d/i        +EHL/FHL/TA/GS         SILT Fernández/Saph/Tib/DP/SP        2+ DP, cap refill <2 sec        12-15-23 @ 07:01  -  12-16-23 @ 07:00  --------------------------------------------------------  IN: 0 mL / OUT: 200 mL / NET: -200 mL          A/P: 83yFemale POD0 s/p R femur IMN, recovering well.    - Pain control  - WBAT LE  - DVT ppx:   - PT/OT  - OOB/AAT  - Diet: Reg  - Monitor I&Os  - Dispo: TBD

## 2023-12-16 NOTE — PRE-OP CHECKLIST - PATIENT'S PERSONAL PROPERTY REMOVED
2 rings and 1 bracelet watch to be picked up by Leela/yaz 3 rings and 1 bracelet watch to be picked up by Leela/yaz

## 2023-12-16 NOTE — PROGRESS NOTE ADULT - SUBJECTIVE AND OBJECTIVE BOX
83yFemale seen and assessed at bedside. Pain well controlled. Denies N/V SOB CP or dizziness    ROS: 10 point review of systems otherwise negative unless noted in HPI    PMH:  No pertinent past medical history    Dementia      PSH:  No significant past surgical history      AH:  No Known Allergies    Meds: See med rec    T(C): 37.1 (12-16-23 @ 05:13)  HR: 80 (12-16-23 @ 05:13)  BP: 147/61 (12-16-23 @ 05:13)  RR: 18 (12-16-23 @ 05:13)  SpO2: 97% (12-16-23 @ 05:13)  Wt(kg): --                        13.2   14.65 )-----------( 336      ( 16 Dec 2023 01:00 )             38.6     12-16    135  |  98  |  36<H>  ----------------------------<  139<H>  4.3   |  26  |  0.70    Ca    10.5      16 Dec 2023 01:00    TPro  6.7  /  Alb  3.9  /  TBili  0.6  /  DBili  x   /  AST  22  /  ALT  14  /  AlkPhos  85  12-16    PT/INR - ( 16 Dec 2023 01:00 )   PT: 12.1 sec;   INR: 1.07 ratio         PTT - ( 16 Dec 2023 01:00 )  PTT:37.7 sec  Urinalysis Basic - ( 16 Dec 2023 01:00 )    Color: x / Appearance: x / SG: x / pH: x  Gluc: 139 mg/dL / Ketone: x  / Bili: x / Urobili: x   Blood: x / Protein: x / Nitrite: x   Leuk Esterase: x / RBC: x / WBC x   Sq Epi: x / Non Sq Epi: x / Bacteria: x        PE  Gen: NAD, alert and oriented  Resp: Unlabored breathing  RLE: Skin intact, no ecchymosis,        SILT DP/SP/ [place]/Saph,        +EHL/FHL/TA/Gastroc,        Knee/ankle painless ROM,        hip ROM limited 2/2 pain,       DP+,        soft compartments, no calf ttp,        +log roll.      Secondary:  No TTP over bony landmarks, SILT BL, ROM intact BL, distal pulses palpable.    Imaging:  XR demonstrating R hip fracture     Assessment/Plan:  83yFemale  with right intertochanteric fx    - plan for OR for operative fixation doay  - NPO except medications  - IVF while NPO  - NWB RLE , bedrest  - Please hold chemical DVT ppx, SCDs okay  - FU Preop labs  - Medical comanagement appreciated, please document clearanceteam  - discussed with attending who agrees with above

## 2023-12-16 NOTE — BRIEF OPERATIVE NOTE - NSICDXBRIEFPROCEDURE_GEN_ALL_CORE_FT
PROCEDURES:  Open reduction and internal fixation (ORIF) of right hip with intramedullary nail 16-Dec-2023 10:53:14  Dash Ovalle

## 2023-12-16 NOTE — PRE-OP CHECKLIST - ASSESSMENT, HISTORY & PHYSICAL COMPLETED AND ON MEDICAL RECORD
My signature below certifies that the above stated patient is homebound and upon completion of the Face-To-Face encounter, has the need for intermittent skilled nursing, physical therapy and/or speech or occupational therapy services in their home for their current diagnosis as outlined in their initial plan of care. These services will continue to be monitored by myself or another physician.
done

## 2023-12-17 DIAGNOSIS — N39.0 URINARY TRACT INFECTION, SITE NOT SPECIFIED: ICD-10-CM

## 2023-12-17 DIAGNOSIS — D62 ACUTE POSTHEMORRHAGIC ANEMIA: ICD-10-CM

## 2023-12-17 LAB
ANION GAP SERPL CALC-SCNC: 11 MMOL/L — SIGNIFICANT CHANGE UP (ref 7–14)
ANION GAP SERPL CALC-SCNC: 11 MMOL/L — SIGNIFICANT CHANGE UP (ref 7–14)
BASOPHILS # BLD AUTO: 0.03 K/UL — SIGNIFICANT CHANGE UP (ref 0–0.2)
BASOPHILS # BLD AUTO: 0.03 K/UL — SIGNIFICANT CHANGE UP (ref 0–0.2)
BASOPHILS NFR BLD AUTO: 0.2 % — SIGNIFICANT CHANGE UP (ref 0–2)
BASOPHILS NFR BLD AUTO: 0.2 % — SIGNIFICANT CHANGE UP (ref 0–2)
BUN SERPL-MCNC: 34 MG/DL — HIGH (ref 7–23)
BUN SERPL-MCNC: 34 MG/DL — HIGH (ref 7–23)
CALCIUM SERPL-MCNC: 9.6 MG/DL — SIGNIFICANT CHANGE UP (ref 8.4–10.5)
CALCIUM SERPL-MCNC: 9.6 MG/DL — SIGNIFICANT CHANGE UP (ref 8.4–10.5)
CHLORIDE SERPL-SCNC: 101 MMOL/L — SIGNIFICANT CHANGE UP (ref 98–107)
CHLORIDE SERPL-SCNC: 101 MMOL/L — SIGNIFICANT CHANGE UP (ref 98–107)
CO2 SERPL-SCNC: 27 MMOL/L — SIGNIFICANT CHANGE UP (ref 22–31)
CO2 SERPL-SCNC: 27 MMOL/L — SIGNIFICANT CHANGE UP (ref 22–31)
CREAT SERPL-MCNC: 0.77 MG/DL — SIGNIFICANT CHANGE UP (ref 0.5–1.3)
CREAT SERPL-MCNC: 0.77 MG/DL — SIGNIFICANT CHANGE UP (ref 0.5–1.3)
EGFR: 77 ML/MIN/1.73M2 — SIGNIFICANT CHANGE UP
EGFR: 77 ML/MIN/1.73M2 — SIGNIFICANT CHANGE UP
EOSINOPHIL # BLD AUTO: 0 K/UL — SIGNIFICANT CHANGE UP (ref 0–0.5)
EOSINOPHIL # BLD AUTO: 0 K/UL — SIGNIFICANT CHANGE UP (ref 0–0.5)
EOSINOPHIL NFR BLD AUTO: 0 % — SIGNIFICANT CHANGE UP (ref 0–6)
EOSINOPHIL NFR BLD AUTO: 0 % — SIGNIFICANT CHANGE UP (ref 0–6)
GLUCOSE SERPL-MCNC: 148 MG/DL — HIGH (ref 70–99)
GLUCOSE SERPL-MCNC: 148 MG/DL — HIGH (ref 70–99)
HCT VFR BLD CALC: 31.5 % — LOW (ref 34.5–45)
HCT VFR BLD CALC: 31.5 % — LOW (ref 34.5–45)
HGB BLD-MCNC: 10.6 G/DL — LOW (ref 11.5–15.5)
HGB BLD-MCNC: 10.6 G/DL — LOW (ref 11.5–15.5)
IANC: 12.18 K/UL — HIGH (ref 1.8–7.4)
IANC: 12.18 K/UL — HIGH (ref 1.8–7.4)
IMM GRANULOCYTES NFR BLD AUTO: 0.5 % — SIGNIFICANT CHANGE UP (ref 0–0.9)
IMM GRANULOCYTES NFR BLD AUTO: 0.5 % — SIGNIFICANT CHANGE UP (ref 0–0.9)
LYMPHOCYTES # BLD AUTO: 1.32 K/UL — SIGNIFICANT CHANGE UP (ref 1–3.3)
LYMPHOCYTES # BLD AUTO: 1.32 K/UL — SIGNIFICANT CHANGE UP (ref 1–3.3)
LYMPHOCYTES # BLD AUTO: 8.7 % — LOW (ref 13–44)
LYMPHOCYTES # BLD AUTO: 8.7 % — LOW (ref 13–44)
MCHC RBC-ENTMCNC: 31.5 PG — SIGNIFICANT CHANGE UP (ref 27–34)
MCHC RBC-ENTMCNC: 31.5 PG — SIGNIFICANT CHANGE UP (ref 27–34)
MCHC RBC-ENTMCNC: 33.7 GM/DL — SIGNIFICANT CHANGE UP (ref 32–36)
MCHC RBC-ENTMCNC: 33.7 GM/DL — SIGNIFICANT CHANGE UP (ref 32–36)
MCV RBC AUTO: 93.5 FL — SIGNIFICANT CHANGE UP (ref 80–100)
MCV RBC AUTO: 93.5 FL — SIGNIFICANT CHANGE UP (ref 80–100)
MONOCYTES # BLD AUTO: 1.5 K/UL — HIGH (ref 0–0.9)
MONOCYTES # BLD AUTO: 1.5 K/UL — HIGH (ref 0–0.9)
MONOCYTES NFR BLD AUTO: 9.9 % — SIGNIFICANT CHANGE UP (ref 2–14)
MONOCYTES NFR BLD AUTO: 9.9 % — SIGNIFICANT CHANGE UP (ref 2–14)
NEUTROPHILS # BLD AUTO: 12.18 K/UL — HIGH (ref 1.8–7.4)
NEUTROPHILS # BLD AUTO: 12.18 K/UL — HIGH (ref 1.8–7.4)
NEUTROPHILS NFR BLD AUTO: 80.7 % — HIGH (ref 43–77)
NEUTROPHILS NFR BLD AUTO: 80.7 % — HIGH (ref 43–77)
NRBC # BLD: 0 /100 WBCS — SIGNIFICANT CHANGE UP (ref 0–0)
NRBC # BLD: 0 /100 WBCS — SIGNIFICANT CHANGE UP (ref 0–0)
NRBC # FLD: 0 K/UL — SIGNIFICANT CHANGE UP (ref 0–0)
NRBC # FLD: 0 K/UL — SIGNIFICANT CHANGE UP (ref 0–0)
PLATELET # BLD AUTO: 272 K/UL — SIGNIFICANT CHANGE UP (ref 150–400)
PLATELET # BLD AUTO: 272 K/UL — SIGNIFICANT CHANGE UP (ref 150–400)
POTASSIUM SERPL-MCNC: 4.4 MMOL/L — SIGNIFICANT CHANGE UP (ref 3.5–5.3)
POTASSIUM SERPL-MCNC: 4.4 MMOL/L — SIGNIFICANT CHANGE UP (ref 3.5–5.3)
POTASSIUM SERPL-SCNC: 4.4 MMOL/L — SIGNIFICANT CHANGE UP (ref 3.5–5.3)
POTASSIUM SERPL-SCNC: 4.4 MMOL/L — SIGNIFICANT CHANGE UP (ref 3.5–5.3)
RBC # BLD: 3.37 M/UL — LOW (ref 3.8–5.2)
RBC # BLD: 3.37 M/UL — LOW (ref 3.8–5.2)
RBC # FLD: 12.9 % — SIGNIFICANT CHANGE UP (ref 10.3–14.5)
RBC # FLD: 12.9 % — SIGNIFICANT CHANGE UP (ref 10.3–14.5)
SODIUM SERPL-SCNC: 139 MMOL/L — SIGNIFICANT CHANGE UP (ref 135–145)
SODIUM SERPL-SCNC: 139 MMOL/L — SIGNIFICANT CHANGE UP (ref 135–145)
WBC # BLD: 15.1 K/UL — HIGH (ref 3.8–10.5)
WBC # BLD: 15.1 K/UL — HIGH (ref 3.8–10.5)
WBC # FLD AUTO: 15.1 K/UL — HIGH (ref 3.8–10.5)
WBC # FLD AUTO: 15.1 K/UL — HIGH (ref 3.8–10.5)

## 2023-12-17 PROCEDURE — 99232 SBSQ HOSP IP/OBS MODERATE 35: CPT

## 2023-12-17 RX ORDER — CEFTRIAXONE 500 MG/1
1000 INJECTION, POWDER, FOR SOLUTION INTRAMUSCULAR; INTRAVENOUS EVERY 24 HOURS
Refills: 0 | Status: COMPLETED | OUTPATIENT
Start: 2023-12-17 | End: 2023-12-19

## 2023-12-17 RX ORDER — SODIUM CHLORIDE 9 MG/ML
1000 INJECTION INTRAMUSCULAR; INTRAVENOUS; SUBCUTANEOUS
Refills: 0 | Status: DISCONTINUED | OUTPATIENT
Start: 2023-12-17 | End: 2023-12-18

## 2023-12-17 RX ORDER — CEFAZOLIN SODIUM 1 G
2000 VIAL (EA) INJECTION EVERY 8 HOURS
Refills: 0 | Status: COMPLETED | OUTPATIENT
Start: 2023-12-17 | End: 2023-12-17

## 2023-12-17 RX ADMIN — Medication 975 MILLIGRAM(S): at 11:36

## 2023-12-17 RX ADMIN — CEFTRIAXONE 100 MILLIGRAM(S): 500 INJECTION, POWDER, FOR SOLUTION INTRAMUSCULAR; INTRAVENOUS at 10:53

## 2023-12-17 RX ADMIN — POLYETHYLENE GLYCOL 3350 17 GRAM(S): 17 POWDER, FOR SOLUTION ORAL at 18:00

## 2023-12-17 RX ADMIN — Medication 975 MILLIGRAM(S): at 18:00

## 2023-12-17 RX ADMIN — SODIUM CHLORIDE 100 MILLILITER(S): 9 INJECTION INTRAMUSCULAR; INTRAVENOUS; SUBCUTANEOUS at 10:55

## 2023-12-17 RX ADMIN — SENNA PLUS 2 TABLET(S): 8.6 TABLET ORAL at 21:11

## 2023-12-17 RX ADMIN — SODIUM CHLORIDE 100 MILLILITER(S): 9 INJECTION INTRAMUSCULAR; INTRAVENOUS; SUBCUTANEOUS at 00:37

## 2023-12-17 RX ADMIN — Medication 975 MILLIGRAM(S): at 10:36

## 2023-12-17 RX ADMIN — Medication 975 MILLIGRAM(S): at 03:35

## 2023-12-17 RX ADMIN — Medication 975 MILLIGRAM(S): at 19:00

## 2023-12-17 RX ADMIN — Medication 100 MILLIGRAM(S): at 02:41

## 2023-12-17 RX ADMIN — ENOXAPARIN SODIUM 40 MILLIGRAM(S): 100 INJECTION SUBCUTANEOUS at 07:27

## 2023-12-17 RX ADMIN — Medication 975 MILLIGRAM(S): at 02:35

## 2023-12-17 NOTE — PHYSICAL THERAPY INITIAL EVALUATION ADULT - PERTINENT HX OF CURRENT PROBLEM, REHAB EVAL
Pt is an 83 year old female presenting s/p witnessed fall. 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 Pt now s/p right hip ORIF.

## 2023-12-17 NOTE — PROGRESS NOTE ADULT - ASSESSMENT
A/P: 83yFemale s/p R femur IMN on 12/16, recovering well.    - Pain control  - WBAT LE  - DVT ppx: Lovenox  - PT/OT  - OOB/AAT  - Diet: Reg  - Monitor I&Os  - Dispo: TBD.

## 2023-12-17 NOTE — PROGRESS NOTE ADULT - SUBJECTIVE AND OBJECTIVE BOX
[facility] Division of Hospital Medicine  Gabi Potts MD  Pager (P-F, 9K-0H): [phone]  Other Times:  [phone]    Patient is a 83y old  Female who presents with a chief complaint of right  IT hip fracture (17 Dec 2023 05:42)      SUBJECTIVE / OVERNIGHT EVENTS: Pt in NAD no acute events ON;       MEDICATIONS  (STANDING):  acetaminophen     Tablet .. 975 milliGRAM(s) Oral every 8 hours  cefTRIAXone   IVPB 1000 milliGRAM(s) IV Intermittent every 24 hours  enoxaparin Injectable 40 milliGRAM(s) SubCutaneous every 24 hours  polyethylene glycol 3350 17 Gram(s) Oral daily  senna 2 Tablet(s) Oral at bedtime  sodium chloride 0.9%. 1000 milliLiter(s) (100 mL/Hr) IV Continuous <Continuous>    MEDICATIONS  (PRN):  bisacodyl Suppository 10 milliGRAM(s) Rectal daily PRN If no bowel movement by POD#2  magnesium hydroxide Suspension 30 milliLiter(s) Oral daily PRN Constipation  melatonin 3 milliGRAM(s) Oral at bedtime PRN Insomnia  ondansetron Injectable 4 milliGRAM(s) IV Push every 6 hours PRN Nausea and/or Vomiting  oxyCODONE    IR 2.5 milliGRAM(s) Oral every 4 hours PRN Moderate Pain (4 - 6)  oxyCODONE    IR 5 milliGRAM(s) Oral every 4 hours PRN Severe Pain (7 - 10)  oxyCODONE    IR 2.5 milliGRAM(s) Oral every 4 hours PRN Moderate Pain (4 - 6)  oxyCODONE    IR 5 milliGRAM(s) Oral every 4 hours PRN Severe Pain (7 - 10)      CAPILLARY BLOOD GLUCOSE      POCT Blood Glucose.: 167 mg/dL (16 Dec 2023 16:41)  POCT Blood Glucose.: 142 mg/dL (16 Dec 2023 12:37)    I&O's Summary    17 Dec 2023 07:01  -  17 Dec 2023 11:14  --------------------------------------------------------  IN: 800 mL / OUT: 0 mL / NET: 800 mL        PHYSICAL EXAM:  Vital Signs Last 24 Hrs  T(C): 36.6 (17 Dec 2023 09:31), Max: 37.3 (17 Dec 2023 07:01)  T(F): 97.8 (17 Dec 2023 09:31), Max: 99.1 (17 Dec 2023 07:01)  HR: 75 (17 Dec 2023 09:31) (75 - 107)  BP: 142/55 (17 Dec 2023 09:31) (119/62 - 153/65)  BP(mean): 74 (16 Dec 2023 12:15) (67 - 88)  RR: 18 (17 Dec 2023 09:31) (12 - 18)  SpO2: 98% (17 Dec 2023 09:31) (95% - 100%)    Parameters below as of 17 Dec 2023 09:31  Patient On (Oxygen Delivery Method): room air    GENERAL: NAD,  EYES: EOMI conjunctiva and sclera clear  NECK: Supple, No JVD  CHEST/LUNG: Clear to auscultation bilaterally; No wheeze  HEART: Regular rate and rhythm; No murmurs, rubs, or gallops  ABDOMEN: Soft, Nontender, Nondistended; Bowel sounds present  EXTREMITIES:  + RT hip bandage   PSYCH: AAOx1 self   NEUROLOGY: non-focal  SKIN: No rashes or lesions      LABS:                        10.6   15.10 )-----------( 272      ( 17 Dec 2023 05:13 )             31.5     12-17    139  |  101  |  34<H>  ----------------------------<  148<H>  4.4   |  27  |  0.77    Ca    9.6      17 Dec 2023 05:13    TPro  6.7  /  Alb  3.9  /  TBili  0.6  /  DBili  x   /  AST  22  /  ALT  14  /  AlkPhos  85  12-16    PT/INR - ( 16 Dec 2023 01:00 )   PT: 12.1 sec;   INR: 1.07 ratio         PTT - ( 16 Dec 2023 01:00 )  PTT:37.7 sec      Urinalysis Basic - ( 17 Dec 2023 05:13 )    Color: x / Appearance: x / SG: x / pH: x  Gluc: 148 mg/dL / Ketone: x  / Bili: x / Urobili: x   Blood: x / Protein: x / Nitrite: x   Leuk Esterase: x / RBC: x / WBC x   Sq Epi: x / Non Sq Epi: x / Bacteria: x          RADIOLOGY & ADDITIONAL TESTS:  Results Reviewed:   Imaging Personally Reviewed:  Electrocardiogram Personally Reviewed:    COORDINATION OF CARE:  Care Discussed with Consultants/Other Providers [Y/N]:  Prior or Outpatient Records Reviewed [Y/N]:   [facility] Division of Hospital Medicine  Gabi Potts MD  Pager (B-F, 2T-1F): [phone]  Other Times:  [phone]    Patient is a 83y old  Female who presents with a chief complaint of right  IT hip fracture (17 Dec 2023 05:42)      SUBJECTIVE / OVERNIGHT EVENTS: Pt in NAD no acute events ON;       MEDICATIONS  (STANDING):  acetaminophen     Tablet .. 975 milliGRAM(s) Oral every 8 hours  cefTRIAXone   IVPB 1000 milliGRAM(s) IV Intermittent every 24 hours  enoxaparin Injectable 40 milliGRAM(s) SubCutaneous every 24 hours  polyethylene glycol 3350 17 Gram(s) Oral daily  senna 2 Tablet(s) Oral at bedtime  sodium chloride 0.9%. 1000 milliLiter(s) (100 mL/Hr) IV Continuous <Continuous>    MEDICATIONS  (PRN):  bisacodyl Suppository 10 milliGRAM(s) Rectal daily PRN If no bowel movement by POD#2  magnesium hydroxide Suspension 30 milliLiter(s) Oral daily PRN Constipation  melatonin 3 milliGRAM(s) Oral at bedtime PRN Insomnia  ondansetron Injectable 4 milliGRAM(s) IV Push every 6 hours PRN Nausea and/or Vomiting  oxyCODONE    IR 2.5 milliGRAM(s) Oral every 4 hours PRN Moderate Pain (4 - 6)  oxyCODONE    IR 5 milliGRAM(s) Oral every 4 hours PRN Severe Pain (7 - 10)  oxyCODONE    IR 2.5 milliGRAM(s) Oral every 4 hours PRN Moderate Pain (4 - 6)  oxyCODONE    IR 5 milliGRAM(s) Oral every 4 hours PRN Severe Pain (7 - 10)      CAPILLARY BLOOD GLUCOSE      POCT Blood Glucose.: 167 mg/dL (16 Dec 2023 16:41)  POCT Blood Glucose.: 142 mg/dL (16 Dec 2023 12:37)    I&O's Summary    17 Dec 2023 07:01  -  17 Dec 2023 11:14  --------------------------------------------------------  IN: 800 mL / OUT: 0 mL / NET: 800 mL        PHYSICAL EXAM:  Vital Signs Last 24 Hrs  T(C): 36.6 (17 Dec 2023 09:31), Max: 37.3 (17 Dec 2023 07:01)  T(F): 97.8 (17 Dec 2023 09:31), Max: 99.1 (17 Dec 2023 07:01)  HR: 75 (17 Dec 2023 09:31) (75 - 107)  BP: 142/55 (17 Dec 2023 09:31) (119/62 - 153/65)  BP(mean): 74 (16 Dec 2023 12:15) (67 - 88)  RR: 18 (17 Dec 2023 09:31) (12 - 18)  SpO2: 98% (17 Dec 2023 09:31) (95% - 100%)    Parameters below as of 17 Dec 2023 09:31  Patient On (Oxygen Delivery Method): room air    GENERAL: NAD,  EYES: EOMI conjunctiva and sclera clear  NECK: Supple, No JVD  CHEST/LUNG: Clear to auscultation bilaterally; No wheeze  HEART: Regular rate and rhythm; No murmurs, rubs, or gallops  ABDOMEN: Soft, Nontender, Nondistended; Bowel sounds present  EXTREMITIES:  + RT hip bandage   PSYCH: AAOx1 self   NEUROLOGY: non-focal  SKIN: No rashes or lesions      LABS:                        10.6   15.10 )-----------( 272      ( 17 Dec 2023 05:13 )             31.5     12-17    139  |  101  |  34<H>  ----------------------------<  148<H>  4.4   |  27  |  0.77    Ca    9.6      17 Dec 2023 05:13    TPro  6.7  /  Alb  3.9  /  TBili  0.6  /  DBili  x   /  AST  22  /  ALT  14  /  AlkPhos  85  12-16    PT/INR - ( 16 Dec 2023 01:00 )   PT: 12.1 sec;   INR: 1.07 ratio         PTT - ( 16 Dec 2023 01:00 )  PTT:37.7 sec      Urinalysis Basic - ( 17 Dec 2023 05:13 )    Color: x / Appearance: x / SG: x / pH: x  Gluc: 148 mg/dL / Ketone: x  / Bili: x / Urobili: x   Blood: x / Protein: x / Nitrite: x   Leuk Esterase: x / RBC: x / WBC x   Sq Epi: x / Non Sq Epi: x / Bacteria: x          RADIOLOGY & ADDITIONAL TESTS:  Results Reviewed:   Imaging Personally Reviewed:  Electrocardiogram Personally Reviewed:    COORDINATION OF CARE:  Care Discussed with Consultants/Other Providers [Y/N]:  Prior or Outpatient Records Reviewed [Y/N]:

## 2023-12-17 NOTE — PHYSICAL THERAPY INITIAL EVALUATION ADULT - MANUAL MUSCLE TESTING RESULTS, REHAB EVAL
In supine, pt able to perform ankle pumps bilaterally, and left knee flexion to extension through limited ROM. Unable to actively manipulate right knee and hip joint; possibly due to cognitive impairments

## 2023-12-17 NOTE — PHYSICAL THERAPY INITIAL EVALUATION ADULT - IMPAIRMENTS FOUND, PT EVAL
aerobic capacity/endurance/cognitive impairment/decreased midline orientation/ergonomics and body mechanics/gait, locomotion, and balance/gross motor/joint integrity and mobility/muscle strength/neuromotor development and sensory integration/poor safety awareness/posture/ROM

## 2023-12-17 NOTE — PHYSICAL THERAPY INITIAL EVALUATION ADULT - DID THE PATIENT HAVE SURGERY?
[FreeTextEntry2] : rt hand pain Open reduction and internal fixation (ORIF) of right hip with intramedullary nail POD#1/yes

## 2023-12-17 NOTE — PHYSICAL THERAPY INITIAL EVALUATION ADULT - ADDITIONAL COMMENTS
Pt is AAOx1 to self only; unable to provide information regarding prior level of function and living situation. Recommend case management consultation. Per H&P, pt admitted from [place] ([facility] vs. LTC?) Pt is AAOx1 to self only; unable to provide information regarding prior level of function and living situation. Recommend case management consultation. Per H&P, pt admitted from [place] ([facility] vs. LTC?)

## 2023-12-17 NOTE — PHYSICAL THERAPY INITIAL EVALUATION ADULT - GENERAL OBSERVATIONS, REHAB EVAL
Upon entry, pt semi-supine in bed in NAD. HR 75 bpm. Pt left as received with all tubes/lines intact, bed alarm on, call bell in reach and in NAD.

## 2023-12-17 NOTE — PHYSICAL THERAPY INITIAL EVALUATION ADULT - NSPTDISCHREC_GEN_A_CORE
[facility]; Please follow therapy services for ongoing assessment of functional mobility to assess pt ability to actively participate in skilled PT services.

## 2023-12-17 NOTE — PROGRESS NOTE ADULT - PROBLEM SELECTOR PLAN 1
-stood up from her wheelchair and attempted to walk to her room and fell. no LOC. unable to get up due to pain in RT LE   - xray + RT femur fracture  - CT head/CT c spine neg  - pain control as per primary with bowel regimen   - 12/16 s/p OR Open reduction and internal fixation (ORIF) of right hip with intramedullary nail

## 2023-12-17 NOTE — PHYSICAL THERAPY INITIAL EVALUATION ADULT - PATIENT PROFILE REVIEW, REHAB EVAL
PT initial evaluation received and chart review completed. Pt agreeable to participate in PT evaluation. Pt cleared by CARO Woods. ACTIVITY: AMBULATE AS TOLERATED/yes

## 2023-12-17 NOTE — PROGRESS NOTE ADULT - TIME BILLING
Reviewed lab data, radiology results, consultants' recommendations, documentation in [place], discussed with family, ACP, interdisciplinary staff and/or intervention were performed. Reviewed lab data, radiology results, consultants' recommendations, documentation in [place], discussed with family, ACP, interdisciplinary staff and/or intervention were performed.

## 2023-12-17 NOTE — PROGRESS NOTE ADULT - ASSESSMENT
Pt is a 85 yo F w/ hx Dementia p/w mechanical fall found to have RT femur fracture.  Pt is a 87 yo F w/ hx Dementia p/w mechanical fall found to have RT femur fracture.

## 2023-12-17 NOTE — PROGRESS NOTE ADULT - SUBJECTIVE AND OBJECTIVE BOX
Orthopedic Surgery Progress Note     S: Patient seen and examined today. No acute events overnight. Pain is well controlled. Denies f/c, chest pain, shortness of breath, dizziness.    MEDICATIONS  (STANDING):  acetaminophen     Tablet .. 975 milliGRAM(s) Oral every 8 hours  enoxaparin Injectable 40 milliGRAM(s) SubCutaneous every 24 hours  polyethylene glycol 3350 17 Gram(s) Oral daily  senna 2 Tablet(s) Oral at bedtime  sodium chloride 0.9%. 1000 milliLiter(s) (100 mL/Hr) IV Continuous <Continuous>    MEDICATIONS  (PRN):  bisacodyl Suppository 10 milliGRAM(s) Rectal daily PRN If no bowel movement by POD#2  magnesium hydroxide Suspension 30 milliLiter(s) Oral daily PRN Constipation  melatonin 3 milliGRAM(s) Oral at bedtime PRN Insomnia  ondansetron Injectable 4 milliGRAM(s) IV Push every 6 hours PRN Nausea and/or Vomiting  oxyCODONE    IR 2.5 milliGRAM(s) Oral every 4 hours PRN Moderate Pain (4 - 6)  oxyCODONE    IR 5 milliGRAM(s) Oral every 4 hours PRN Severe Pain (7 - 10)  oxyCODONE    IR 2.5 milliGRAM(s) Oral every 4 hours PRN Moderate Pain (4 - 6)  oxyCODONE    IR 5 milliGRAM(s) Oral every 4 hours PRN Severe Pain (7 - 10)      Vital Signs Last 24 Hrs  T(C): 36.6 (17 Dec 2023 01:36), Max: 37.1 (16 Dec 2023 07:04)  T(F): 97.8 (17 Dec 2023 01:36), Max: 98.8 (16 Dec 2023 07:04)  HR: 81 (17 Dec 2023 01:36) (80 - 107)  BP: 128/81 (17 Dec 2023 01:36) (119/62 - 174/75)  BP(mean): 74 (16 Dec 2023 12:15) (67 - 100)  RR: 17 (17 Dec 2023 01:36) (12 - 20)  SpO2: 97% (17 Dec 2023 01:36) (95% - 100%)    Parameters below as of 17 Dec 2023 01:36  Patient On (Oxygen Delivery Method): room air        12-15-23 @ 07:01  -  12-16-23 @ 07:00  --------------------------------------------------------  IN: 0 mL / OUT: 200 mL / NET: -200 mL        Physical Exam:  Gen: NAD, resting in bed  Resp: unlabored breathing  RLE: guaze and tegaderm dressing c/d/i        +EHL/FHL/TA/GS         SILT Fernández/Saph/Tib/DP/SP        2+ DP, cap refill <2 sec      LABS:                        12.2   21.58 )-----------( 309      ( 16 Dec 2023 12:49 )             36.5     12-16    138  |  102  |  29<H>  ----------------------------<  164<H>  4.5   |  26  |  0.75    Ca    9.9      16 Dec 2023 12:49    TPro  6.7  /  Alb  3.9  /  TBili  0.6  /  DBili  x   /  AST  22  /  ALT  14  /  AlkPhos  85  12-16

## 2023-12-17 NOTE — PHYSICAL THERAPY INITIAL EVALUATION ADULT - ACTIVE RANGE OF MOTION EXAMINATION, REHAB EVAL
right ankle WFL; unable to actively move right knee or hip/bilateral upper extremity Active ROM was WFL (within functional limits)/Left LE Active ROM was WFL (within functional limits)

## 2023-12-17 NOTE — PHYSICAL THERAPY INITIAL EVALUATION ADULT - PASSIVE RANGE OF MOTION EXAMINATION, REHAB EVAL
right ankle WFL; right knee flexion ~40 degrees; right hip abduction ~20 degrees/bilateral upper extremity Passive ROM was WFL (within functional limits)/Left LE Passive ROM was WFL (within functional limits)

## 2023-12-18 LAB
ANION GAP SERPL CALC-SCNC: 8 MMOL/L — SIGNIFICANT CHANGE UP (ref 7–14)
ANION GAP SERPL CALC-SCNC: 8 MMOL/L — SIGNIFICANT CHANGE UP (ref 7–14)
BLD GP AB SCN SERPL QL: NEGATIVE — SIGNIFICANT CHANGE UP
BLD GP AB SCN SERPL QL: NEGATIVE — SIGNIFICANT CHANGE UP
BUN SERPL-MCNC: 22 MG/DL — SIGNIFICANT CHANGE UP (ref 7–23)
BUN SERPL-MCNC: 22 MG/DL — SIGNIFICANT CHANGE UP (ref 7–23)
CALCIUM SERPL-MCNC: 8.9 MG/DL — SIGNIFICANT CHANGE UP (ref 8.4–10.5)
CALCIUM SERPL-MCNC: 8.9 MG/DL — SIGNIFICANT CHANGE UP (ref 8.4–10.5)
CHLORIDE SERPL-SCNC: 108 MMOL/L — HIGH (ref 98–107)
CHLORIDE SERPL-SCNC: 108 MMOL/L — HIGH (ref 98–107)
CO2 SERPL-SCNC: 25 MMOL/L — SIGNIFICANT CHANGE UP (ref 22–31)
CO2 SERPL-SCNC: 25 MMOL/L — SIGNIFICANT CHANGE UP (ref 22–31)
CREAT SERPL-MCNC: 0.52 MG/DL — SIGNIFICANT CHANGE UP (ref 0.5–1.3)
CREAT SERPL-MCNC: 0.52 MG/DL — SIGNIFICANT CHANGE UP (ref 0.5–1.3)
EGFR: 93 ML/MIN/1.73M2 — SIGNIFICANT CHANGE UP
EGFR: 93 ML/MIN/1.73M2 — SIGNIFICANT CHANGE UP
GLUCOSE SERPL-MCNC: 97 MG/DL — SIGNIFICANT CHANGE UP (ref 70–99)
GLUCOSE SERPL-MCNC: 97 MG/DL — SIGNIFICANT CHANGE UP (ref 70–99)
HCT VFR BLD CALC: 24 % — LOW (ref 34.5–45)
HCT VFR BLD CALC: 24 % — LOW (ref 34.5–45)
HCT VFR BLD CALC: 27.1 % — LOW (ref 34.5–45)
HCT VFR BLD CALC: 27.1 % — LOW (ref 34.5–45)
HGB BLD-MCNC: 8 G/DL — LOW (ref 11.5–15.5)
HGB BLD-MCNC: 8 G/DL — LOW (ref 11.5–15.5)
HGB BLD-MCNC: 8.8 G/DL — LOW (ref 11.5–15.5)
HGB BLD-MCNC: 8.8 G/DL — LOW (ref 11.5–15.5)
MCHC RBC-ENTMCNC: 31.1 PG — SIGNIFICANT CHANGE UP (ref 27–34)
MCHC RBC-ENTMCNC: 31.1 PG — SIGNIFICANT CHANGE UP (ref 27–34)
MCHC RBC-ENTMCNC: 31.6 PG — SIGNIFICANT CHANGE UP (ref 27–34)
MCHC RBC-ENTMCNC: 31.6 PG — SIGNIFICANT CHANGE UP (ref 27–34)
MCHC RBC-ENTMCNC: 32.5 GM/DL — SIGNIFICANT CHANGE UP (ref 32–36)
MCHC RBC-ENTMCNC: 32.5 GM/DL — SIGNIFICANT CHANGE UP (ref 32–36)
MCHC RBC-ENTMCNC: 33.3 GM/DL — SIGNIFICANT CHANGE UP (ref 32–36)
MCHC RBC-ENTMCNC: 33.3 GM/DL — SIGNIFICANT CHANGE UP (ref 32–36)
MCV RBC AUTO: 94.9 FL — SIGNIFICANT CHANGE UP (ref 80–100)
MCV RBC AUTO: 94.9 FL — SIGNIFICANT CHANGE UP (ref 80–100)
MCV RBC AUTO: 95.8 FL — SIGNIFICANT CHANGE UP (ref 80–100)
MCV RBC AUTO: 95.8 FL — SIGNIFICANT CHANGE UP (ref 80–100)
NRBC # BLD: 0 /100 WBCS — SIGNIFICANT CHANGE UP (ref 0–0)
NRBC # FLD: 0 K/UL — SIGNIFICANT CHANGE UP (ref 0–0)
PLATELET # BLD AUTO: 231 K/UL — SIGNIFICANT CHANGE UP (ref 150–400)
PLATELET # BLD AUTO: 231 K/UL — SIGNIFICANT CHANGE UP (ref 150–400)
PLATELET # BLD AUTO: 263 K/UL — SIGNIFICANT CHANGE UP (ref 150–400)
PLATELET # BLD AUTO: 263 K/UL — SIGNIFICANT CHANGE UP (ref 150–400)
POTASSIUM SERPL-MCNC: 3.8 MMOL/L — SIGNIFICANT CHANGE UP (ref 3.5–5.3)
POTASSIUM SERPL-MCNC: 3.8 MMOL/L — SIGNIFICANT CHANGE UP (ref 3.5–5.3)
POTASSIUM SERPL-SCNC: 3.8 MMOL/L — SIGNIFICANT CHANGE UP (ref 3.5–5.3)
POTASSIUM SERPL-SCNC: 3.8 MMOL/L — SIGNIFICANT CHANGE UP (ref 3.5–5.3)
RBC # BLD: 2.53 M/UL — LOW (ref 3.8–5.2)
RBC # BLD: 2.53 M/UL — LOW (ref 3.8–5.2)
RBC # BLD: 2.83 M/UL — LOW (ref 3.8–5.2)
RBC # BLD: 2.83 M/UL — LOW (ref 3.8–5.2)
RBC # FLD: 13.1 % — SIGNIFICANT CHANGE UP (ref 10.3–14.5)
RBC # FLD: 13.1 % — SIGNIFICANT CHANGE UP (ref 10.3–14.5)
RBC # FLD: 13.2 % — SIGNIFICANT CHANGE UP (ref 10.3–14.5)
RBC # FLD: 13.2 % — SIGNIFICANT CHANGE UP (ref 10.3–14.5)
RH IG SCN BLD-IMP: POSITIVE — SIGNIFICANT CHANGE UP
RH IG SCN BLD-IMP: POSITIVE — SIGNIFICANT CHANGE UP
SODIUM SERPL-SCNC: 141 MMOL/L — SIGNIFICANT CHANGE UP (ref 135–145)
SODIUM SERPL-SCNC: 141 MMOL/L — SIGNIFICANT CHANGE UP (ref 135–145)
WBC # BLD: 9.71 K/UL — SIGNIFICANT CHANGE UP (ref 3.8–10.5)
WBC # BLD: 9.71 K/UL — SIGNIFICANT CHANGE UP (ref 3.8–10.5)
WBC # BLD: 9.95 K/UL — SIGNIFICANT CHANGE UP (ref 3.8–10.5)
WBC # BLD: 9.95 K/UL — SIGNIFICANT CHANGE UP (ref 3.8–10.5)
WBC # FLD AUTO: 9.71 K/UL — SIGNIFICANT CHANGE UP (ref 3.8–10.5)
WBC # FLD AUTO: 9.71 K/UL — SIGNIFICANT CHANGE UP (ref 3.8–10.5)
WBC # FLD AUTO: 9.95 K/UL — SIGNIFICANT CHANGE UP (ref 3.8–10.5)
WBC # FLD AUTO: 9.95 K/UL — SIGNIFICANT CHANGE UP (ref 3.8–10.5)

## 2023-12-18 PROCEDURE — 99232 SBSQ HOSP IP/OBS MODERATE 35: CPT

## 2023-12-18 RX ORDER — SODIUM CHLORIDE 9 MG/ML
1000 INJECTION INTRAMUSCULAR; INTRAVENOUS; SUBCUTANEOUS
Refills: 0 | Status: DISCONTINUED | OUTPATIENT
Start: 2023-12-18 | End: 2023-12-20

## 2023-12-18 RX ADMIN — Medication 975 MILLIGRAM(S): at 10:11

## 2023-12-18 RX ADMIN — CEFTRIAXONE 100 MILLIGRAM(S): 500 INJECTION, POWDER, FOR SOLUTION INTRAMUSCULAR; INTRAVENOUS at 10:11

## 2023-12-18 RX ADMIN — Medication 975 MILLIGRAM(S): at 18:39

## 2023-12-18 RX ADMIN — Medication 975 MILLIGRAM(S): at 11:11

## 2023-12-18 RX ADMIN — SENNA PLUS 2 TABLET(S): 8.6 TABLET ORAL at 22:43

## 2023-12-18 RX ADMIN — POLYETHYLENE GLYCOL 3350 17 GRAM(S): 17 POWDER, FOR SOLUTION ORAL at 18:43

## 2023-12-18 RX ADMIN — Medication 975 MILLIGRAM(S): at 01:30

## 2023-12-18 RX ADMIN — Medication 975 MILLIGRAM(S): at 00:37

## 2023-12-18 RX ADMIN — SODIUM CHLORIDE 100 MILLILITER(S): 9 INJECTION INTRAMUSCULAR; INTRAVENOUS; SUBCUTANEOUS at 00:41

## 2023-12-18 RX ADMIN — ENOXAPARIN SODIUM 40 MILLIGRAM(S): 100 INJECTION SUBCUTANEOUS at 06:33

## 2023-12-18 NOTE — PROGRESS NOTE ADULT - SUBJECTIVE AND OBJECTIVE BOX
Post op Day 2    Patient resting without complaints.  No chest pain, SOB, N/V. Pleasantly confused.    T(C): 37.2 (12-18-23 @ 06:36), Max: 37.3 (12-17-23 @ 07:01)  HR: 91 (12-18-23 @ 06:36) (75 - 100)  BP: 124/58 (12-18-23 @ 06:36) (107/53 - 143/57)  RR: 17 (12-18-23 @ 06:36) (16 - 18)  SpO2: 97% (12-18-23 @ 06:36) (97% - 99%)  Wt(kg): --    Exam:  Alert to person, No Acute Distress  Lower Extremities: Right thigh dressings with serousanguinous drainge  Calves Soft, Non-tender bilaterally  +PF/DF/EHL/FHL  SILT  +DP Pulse                          10.6   15.10 )-----------( 272      ( 17 Dec 2023 05:13 )             31.5    12-17    139  |  101  |  34<H>  ----------------------------<  148<H>  4.4   |  27  |  0.77    Ca    9.6      17 Dec 2023 05:13

## 2023-12-18 NOTE — PROGRESS NOTE ADULT - SUBJECTIVE AND OBJECTIVE BOX
self-care/home management CHIEF COMPLAINT: f/u right  IT hip fracture    SUBJECTIVE / OVERNIGHT EVENTS: Patient seen and examined. No acute events overnight. Pain well controlled. Patient pleasantly confused.    MEDICATIONS  (STANDING):  acetaminophen     Tablet .. 975 milliGRAM(s) Oral every 8 hours  cefTRIAXone   IVPB 1000 milliGRAM(s) IV Intermittent every 24 hours  enoxaparin Injectable 40 milliGRAM(s) SubCutaneous every 24 hours  polyethylene glycol 3350 17 Gram(s) Oral daily  senna 2 Tablet(s) Oral at bedtime  sodium chloride 0.9%. 1000 milliLiter(s) (100 mL/Hr) IV Continuous <Continuous>    MEDICATIONS  (PRN):  bisacodyl Suppository 10 milliGRAM(s) Rectal daily PRN If no bowel movement by POD#2  magnesium hydroxide Suspension 30 milliLiter(s) Oral daily PRN Constipation  melatonin 3 milliGRAM(s) Oral at bedtime PRN Insomnia  ondansetron Injectable 4 milliGRAM(s) IV Push every 6 hours PRN Nausea and/or Vomiting  oxyCODONE    IR 2.5 milliGRAM(s) Oral every 4 hours PRN Moderate Pain (4 - 6)  oxyCODONE    IR 5 milliGRAM(s) Oral every 4 hours PRN Severe Pain (7 - 10)  oxyCODONE    IR 2.5 milliGRAM(s) Oral every 4 hours PRN Moderate Pain (4 - 6)  oxyCODONE    IR 5 milliGRAM(s) Oral every 4 hours PRN Severe Pain (7 - 10)    VITALS:  T(F): 98.8 (12-18-23 @ 09:43), Max: 99.1 (12-18-23 @ 02:21)  HR: 91 (12-18-23 @ 09:43) (79 - 100)  BP: 145/50 (12-18-23 @ 09:43) (107/53 - 145/50)  RR: 17 (12-18-23 @ 09:43) (16 - 18)  SpO2: 95% (12-18-23 @ 09:43)    PHYSICAL EXAM:  GENERAL: NAD,  CHEST/LUNG: Clear to auscultation bilaterally; No wheeze  HEART: Regular rate and rhythm; No murmurs, rubs, or gallops  ABDOMEN: Soft, Nontender, Nondistended; Bowel sounds present  EXTREMITIES:  + RT hip bandage   PSYCH: AAOx1 self   SKIN: Dressing C/D/I    LABS:              8.8                  x    | x    | x            9.95  >-----------< 263     ------------------------< x                     27.1                 x    | x    | x                                            Ca x     Mg x     Ph x        H/H trend  12-18 @ 09:23   HgB  8.8   HCT  27.1  12-18 @ 07:17   HgB  8.0   HCT  24.0  12-17 @ 05:13   HgB  10.6  HCT  31.5  12-16 @ 12:49   HgB  12.2  HCT  36.5  12-16 @ 01:00   HgB  13.2  HCT  38.6  12-15 @ 06:55   HgB  14.4  HCT  43.3    Urinalysis Basic - ( 18 Dec 2023 07:17 )  Color: x / Appearance: x / SG: x / pH: x  Gluc: 97 mg/dL / Ketone: x  / Bili: x / Urobili: x   Blood: x / Protein: x / Nitrite: x   Leuk Esterase: x / RBC: x / WBC x   Sq Epi: x / Non Sq Epi: x / Bacteria: x  Urine culture: Culture - Urine (12.15.23 @ 22:46)   Specimen Source: Clean Catch Clean Catch (Midstream)  Culture Results: >100,000 CFU/ml Escherichia coli  [ ] Consultant(s) Notes Reviewed:  [x] Care Discussed with Consultants/Other Providers: Orthopedic PA - discussed disposition

## 2023-12-18 NOTE — DIETITIAN INITIAL EVALUATION ADULT - OTHER INFO
Nutrition assessment for MST Score 2 or Greater.    86F h/o dementia p/w mechanical fall found to have RT femur fracture s/p right hip IMN (12/16/23) course c/b ecoli UTI.     Patient with fair PO intake, assisted with feeding as needed.  No GI distress reported i.e. nausea, vomiting, diarrhea. No chewing or swallowing difficulties reported. No food allergies noted or reported. Weight on a previous hospitalization 12/7 noted at 72kgs.  Previous weights per HIE from Oct 2022--->April 2023 - 57-63.5kg. Current weight on admission 66.4kg 12/17/23, inconsistent weight pattern noted. Patient noted with preDM, a1c 5.7%.     Nutrition assessment for MST Score 2 or Greater.    86F h/o dementia p/w mechanical fall found to have RT femur fracture s/p right hip IMN (12/16/23) course c/b ecoli UTI.     Patient with fair PO intake, assisted with feeding as needed.  No GI distress reported i.e. nausea, vomiting, diarrhea. No chewing or swallowing difficulties reported. No food allergies noted or reported. Weight on a previous hospitalization 12/7 noted at 72kgs.  Previous weights per HIE from Oct 2022--->April 2023 - 57-63.5kg. Current weight on admission 66.4kg 12/17/23, inconsistent weight pattern noted, continue to trend inpatient weights. Patient noted with preDM, a1c 5.7%.     Patient a poor historian, appeared confused at time of visit and unable to provide usual diet and weight history.

## 2023-12-18 NOTE — PROGRESS NOTE ADULT - SUBJECTIVE AND OBJECTIVE BOX
ANESTHESIA POSTOP CHECK    83y Female POSTOP DAY 1 S/P     Vital Signs Last 24 Hrs  T(C): 37.1 (18 Dec 2023 09:43), Max: 37.3 (18 Dec 2023 02:21)  T(F): 98.8 (18 Dec 2023 09:43), Max: 99.1 (18 Dec 2023 02:21)  HR: 91 (18 Dec 2023 09:43) (79 - 100)  BP: 145/50 (18 Dec 2023 09:43) (107/53 - 145/50)  BP(mean): --  RR: 17 (18 Dec 2023 09:43) (16 - 18)  SpO2: 95% (18 Dec 2023 09:43) (95% - 99%)    Parameters below as of 18 Dec 2023 09:43  Patient On (Oxygen Delivery Method): room air      I&O's Summary    17 Dec 2023 07:01  -  18 Dec 2023 07:00  --------------------------------------------------------  IN: 1800 mL / OUT: 450 mL / NET: 1350 mL    18 Dec 2023 07:01  -  18 Dec 2023 10:45  --------------------------------------------------------  IN: 0 mL / OUT: 100 mL / NET: -100 mL        [x ] NO APPARENT ANESTHESIA COMPLICATIONS      Comments:

## 2023-12-18 NOTE — DIETITIAN INITIAL EVALUATION ADULT - NS FNS DIET ORDER
Team: please convey to patient: CT reviewed by Dr MACIEL and findings as per CT impression pasted below; findings seen on most recent CT Chest. To schedule clinic appt with me or Dr MACIEL and order follow up CT 11/2023 prior to appt.  Thanks, Chevy       IMPRESSION: Patient has developed     2 new nodules in the right upper lobe as outlined above. The remainder of  the nodules appear stable as outlined above  
Diet, Regular (12-17-23 @ 10:51)

## 2023-12-18 NOTE — PROGRESS NOTE ADULT - PROBLEM SELECTOR PLAN 1
PT/OT-WBAT to RLE  IS  DVT PPx - Lovenox  Pain Control  Continue Ceftriaxone until 12/19/23 for +UTI  Continue Current Tx.  Dispo planning - GENARO Clemons PA-C  Team Pager: #[phone] PT/OT-WBAT to RLE  IS  DVT PPx - Lovenox  Pain Control  Continue Ceftriaxone until 12/19/23 for +UTI  Continue Current Tx.  Dispo planning - GENARO Clemons PA-C  Team Pager: #[phone]

## 2023-12-18 NOTE — DIETITIAN INITIAL EVALUATION ADULT - ENERGY INTAKE
Psychiatric progress note    CC: Psych F/U/ reason for consultation-major neurocognitive stroke with behavioral disturbance and anxiety.  Treatment recommendation    Subjective:    Patient seen and chart reviewed, case cussed staff.  Patient most recent labs, vital signs or medication list.  Patient did not require any para medication she is sitting in the chair calmly she is alert and oriented to person and place not to situation.  Patient sleep and appetite are normal, her mood is good denies any anxiety rating is a 0-10.  Patient denies any suicidal homicidal nation, denies any visual auditory hallucination paranoid delusions.  Patient has not had adequate relief with Seroquel at a moderate dose therefore we will discontinue Seroquel and start Zyprexa 2.5 twice daily and titrate up to optimal dose.  Also recommendation for continued tapering off Ativan which may have been contributing to sleepiness and confusion.    Labs:  Recent Results (from the past 24 hour(s))   GLUCOSE, BEDSIDE - POINT OF CARE    Collection Time: 05/04/23 11:25 AM   Result Value Ref Range    GLUCOSE, BEDSIDE - POINT OF CARE 107 (H) 70 - 99 mg/dL   GLUCOSE, BEDSIDE - POINT OF CARE    Collection Time: 05/04/23  5:43 PM   Result Value Ref Range    GLUCOSE, BEDSIDE - POINT OF CARE 111 (H) 70 - 99 mg/dL   GLUCOSE, BEDSIDE - POINT OF CARE    Collection Time: 05/04/23 10:58 PM   Result Value Ref Range    GLUCOSE, BEDSIDE - POINT OF CARE 114 (H) 70 - 99 mg/dL   GLUCOSE, BEDSIDE - POINT OF CARE    Collection Time: 05/05/23  8:15 AM   Result Value Ref Range    GLUCOSE, BEDSIDE - POINT OF CARE 99 70 - 99 mg/dL         Medications:    Current Facility-Administered Medications   Medication Dose Route Frequency Provider Last Rate Last Admin   • cefTRIAXone (ROCEPHIN) syringe 2,000 mg  2,000 mg Intravenous Daily Lena Paige MD       • acetaminophen (TYLENOL) tablet 650 mg  650 mg Oral Q4H PRN Vaishnavi Macias DO   650 mg at 05/05/23  0814   • melatonin tablet 3 mg  3 mg Oral QHS PRN Vaishnavi Macias R, DO       • enoxaparin (LOVENOX) injection 40 mg  40 mg Subcutaneous QHS Vaishnavi Macias R, DO   40 mg at 05/04/23 2013   • sodium chloride 0.9 % flush bag 25 mL  25 mL Intravenous PRN Afsaneh Nixon MD       • Potassium Standard Replacement Protocol (Levels 3.5 and lower)   Does not apply See Admin Instructions Afsaneh Nixon MD       • Magnesium Standard Replacement Protocol   Does not apply See Admin Instructions Afsaneh Nixon MD       • Phosphorus Standard Replacement Protocol   Does not apply See Admin Instructions Afsaneh Nixon MD       • hydrALAZINE (APRESOLINE) injection 10 mg  10 mg Intravenous Q6H PRN Afsaneh Nixon MD       • LORazepam (ATIVAN) tablet 0.5 mg  0.5 mg Oral BID Sherita Bill MD   0.5 mg at 05/05/23 0811   • haloperidol lactate (HALDOL) 5 MG/ML short-acting injection 2 mg  2 mg Intravenous Q6H PRN Sherita Bill MD       • dextrose 50 % injection 25 g  25 g Intravenous PRN Afsaneh Nixon MD       • dextrose 50 % injection 12.5 g  12.5 g Intravenous PRN Afsaneh Nixon MD       • glucagon (GLUCAGEN) injection 1 mg  1 mg Intramuscular PRN Afsaneh Nixon MD       • dextrose (GLUTOSE) 40 % gel 15 g  15 g Oral PRN Afsaneh Nixon MD       • dextrose (GLUTOSE) 40 % gel 30 g  30 g Oral PRN Afsaneh Nixon MD       • insulin lispro (ADMELOG,HumaLOG) - Correction Dose   Subcutaneous TID WC Afsaneh Nixon MD       • sertraline (ZOLOFT) tablet 100 mg  100 mg Oral Daily Vaishnavi Macias R, DO   100 mg at 05/05/23 0812   • QUEtiapine (SEROquel) tablet 50 mg  50 mg Oral Nightly Vaishnavi Macias R, DO   50 mg at 05/04/23 2013   • QUEtiapine (SEROquel) tablet 25 mg  25 mg Oral BID Vaishnavi Macias DO   25 mg at 05/05/23 0812   • amLODIPine (NORVASC) tablet 7.5 mg  7.5 mg Oral Daily Vaishnavi Macias DO   7.5 mg at 05/05/23 0812       Vitals:  Vitals with min/max:      Vital  Last Value 24 Hour Range   Temperature 97.7 °F (36.5 °C) (05/05/23 0810) Temp  Min: 97.7 °F (36.5 °C)  Max: 98.6 °F (37 °C)   Pulse 76 (05/05/23 0810) Pulse  Min: 72  Max: 77   Respiratory 16 (05/05/23 0810) Resp  Min: 16  Max: 16   Non-Invasive  Blood Pressure 117/67 (05/05/23 0810) BP  Min: 117/67  Max: 164/76   Pulse Oximetry 93 % (05/05/23 0810) SpO2  Min: 93 %  Max: 95 %   Arterial   Blood Pressure   No data recorded              Mental Status Exam:     APPEARANCE: appears stated age, fair grooming/hygiene, no acute distress      BEHAVIOR:  calm, engaged, cooperative with interview. Fair eye contact.    SPEECH: RRR, normal volume, normal tone.  Spontaneous speech without stuttering/stammering.    MOTOR: No PMR or PMA noted.  No tics/tremors or other abnormal motor movements visible.    MOOD: Good    AFFECT: Reactive    THOUGHT PROCESS: Linear, logical, goal directed in conversation towards treatment    THOUGHT CONTENT: Patient denies SI/HI, paranoia, or delusions.    PERCEPTIONS: Denies AVH, does not appear to be responding to internal stimuli    INSIGHT: Fair    JUDGMENT: Fair    COGNITION: A and Ox2, fair concentration,    Assessment:     Pt is a 89yo female with pmhx of dementia, T2DM, and HTN who [facility] [place] [facility] [place] for agitation and involuntary petition from [place] for aggressive behavior. Psychiatry consulted due to agitation. Pt is a poor historian 2/2 dementia and her daughter is her POA. Pt has become increasingly more agitated over the past year and exhibiting worsening dementia but does not follow with a neurologist. Labs upon admission were notable for few leukocyte esterase and bacteria in UA, pt denies UTI sx; Ucx pending. Pt currently taking seroquel, ativan, zoloft, and luvox for agitation and OCD concerns. Safety assessment: Patient denies SI, HI, AVH. Does not demonstrate grossly intact cognitive faculties and ability to reason abstractly given  progression of dementia. Recommend optimization of medication regimen for better control of agitation and mood stabilization. Not recommended for inpatient psychiatric admission at this time. Daughter in verbal agreement and does not have acute safety concerns at this time. Attempted to call patients daughter x3 later in day to discuss medications and was unable to reach.     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  • Scheduled psychotropic medications   ? Discontinue scheduled Seroquel as patient was on a good dose with minimal benefit  ? Start Zyprexa 2.5 mg p.o. twice daily titrate up to optimal dose  ? Did attempt to call patient's daughter to assess efficacy of Seroquel however was unable to reach.  We will continue with current Seroquel regimen at this time with plan to either optimizing Seroquel or switch to Zyprexa once further information can be obtained from patient's daughter  ? Continues sertraline 100 mg daily  ? Decrease Ativan to 0.5 mg twice daily scheduled with plan to taper off week by week  • PRN psychotropic medications  ? Continue Haldol 2 mg every 6 hours as needed for acute agitation  ? Can continue melatonin 3mg at bedtime prn     Moderate level medical decision make utilized during patient visit today   Fair (50-75%)

## 2023-12-18 NOTE — PROGRESS NOTE ADULT - ASSESSMENT
86F h/o dementia p/w mechanical fall found to have RT femur fracture s/p right hip IMN (12/16/23) course c/b ecoli UTI.

## 2023-12-18 NOTE — DIETITIAN INITIAL EVALUATION ADULT - ORAL NUTRITION SUPPLEMENTS
Suggest Ensure Max Protein Nutrition Shake 2x day (150kcal, 30gm protein, 6gm carbohydrate per 11 fl oz) to optimize nutrition.

## 2023-12-18 NOTE — DIETITIAN INITIAL EVALUATION ADULT - NAME AND PHONE
Peggy Molina, MS, RDN, CDN  Pager [phone]  Also available on MS Teams  Peggy Molina, MS, RDN, CDN  Pager [phone]  Also available on MS Teams

## 2023-12-18 NOTE — DIETITIAN INITIAL EVALUATION ADULT - LAB (SPECIFY)
Electrolytes, Glucose. If glucose trends up, suggest diet modification of Consistent Carb with evening snack.

## 2023-12-18 NOTE — DIETITIAN INITIAL EVALUATION ADULT - PERTINENT MEDS FT
MEDICATIONS  (STANDING):  acetaminophen     Tablet .. 975 milliGRAM(s) Oral every 8 hours  cefTRIAXone   IVPB 1000 milliGRAM(s) IV Intermittent every 24 hours  enoxaparin Injectable 40 milliGRAM(s) SubCutaneous every 24 hours  polyethylene glycol 3350 17 Gram(s) Oral daily  senna 2 Tablet(s) Oral at bedtime  sodium chloride 0.9%. 1000 milliLiter(s) (100 mL/Hr) IV Continuous <Continuous>    MEDICATIONS  (PRN):  bisacodyl Suppository 10 milliGRAM(s) Rectal daily PRN If no bowel movement by POD#2  magnesium hydroxide Suspension 30 milliLiter(s) Oral daily PRN Constipation  melatonin 3 milliGRAM(s) Oral at bedtime PRN Insomnia  ondansetron Injectable 4 milliGRAM(s) IV Push every 6 hours PRN Nausea and/or Vomiting  oxyCODONE    IR 2.5 milliGRAM(s) Oral every 4 hours PRN Moderate Pain (4 - 6)  oxyCODONE    IR 5 milliGRAM(s) Oral every 4 hours PRN Severe Pain (7 - 10)  oxyCODONE    IR 2.5 milliGRAM(s) Oral every 4 hours PRN Moderate Pain (4 - 6)  oxyCODONE    IR 5 milliGRAM(s) Oral every 4 hours PRN Severe Pain (7 - 10)

## 2023-12-18 NOTE — DIETITIAN INITIAL EVALUATION ADULT - PERTINENT LABORATORY DATA
12-18    141  |  108<H>  |  22  ----------------------------<  97  3.8   |  25  |  0.52    Ca    8.9      18 Dec 2023 07:17    A1C with Estimated Average Glucose Result: 5.7 % (12-16-23 @ 01:00)

## 2023-12-18 NOTE — DIETITIAN INITIAL EVALUATION ADULT - ENTER FROM (CAL/KG)
59yo M with PMHx of CAD s/p PCI to LAD, aortic regurgitation, HTN, thoracic aortic dissection s/p emergent surgery c/b CVA with residual left-sided weakness, colostomy for bowel ischemia s/p reversal, and psoriasis on Humira, who presented to [facility] on 11/02/2020 with worsening SOB with associated chest pressure and LE edema; found to have mod-severe MR and AR, now s/p sternotomy for CABG x2, AVR, and MVR. Hospital Course complicated by SANTOS now requiring HD, acute blood loss anemia s/p multiple PRBCs, and atrial flutter. Admitted for multidisciplinary rehab program.    #Comprehensive Multidisciplinary Rehab Program:  - PT/OT/ 3 hours a day 5 days a week    #CAD with AR and MR, s/p sternotomy  with CABG x2, AVR, and MVR  - repeat TTE prior to discharge on 11/23 with EF 40%  - Daily INR; c/w Coumadin  - c/w ASA 81mg daily  - c/w atorvastatin 40mg qhs  - c/w Metoprolol tartrate 50mg q8h  on ritalin - will dc     Atrial flutter:- c/w Coumadin and Metoprolol, - c/w Amiodarone 200mg daily    ATN:- s/p Permcath by IR on 11/20  - renal consult; HD MWF  - Retacrit with HD    History of seizures:- c/w Keppra 750mg BID    HLD:- c/w Atorvastatin    Mood:- c/w Seroquel 25mg qhs    Sleep: Melatonin PRN, on quetiapine - ? for sleep     Pain:- Tylenol PRN, - c/w gabapentin 100mg qhs    GI/Bowel:- Senna 2 tabs daily  - GI ppx: Protonix 40mg daily    /Bladder: Toileting schedule prn    #Diet: Regular, DASH/TLC with fluid restriction 1.5L- ? reason for fluid restriction   - Nutrition to follow    Skin/ Pressure Injury Prevention:- Incisions: sternotomy and left LE vein harvest incisions  - Turn Q2hrs in bed while awake, OOB to Chair, PT/OT    DVT prophylaxis:- Coumadin    Labs with leucocytosis: Improving    Hospitalist consult noted    Disp: Team conference 12/2/20   Goals of modified independent   TDD 12/15 25

## 2023-12-19 LAB
-  AMOXICILLIN/CLAVULANIC ACID: SIGNIFICANT CHANGE UP
-  AMOXICILLIN/CLAVULANIC ACID: SIGNIFICANT CHANGE UP
-  AMPICILLIN/SULBACTAM: SIGNIFICANT CHANGE UP
-  AMPICILLIN/SULBACTAM: SIGNIFICANT CHANGE UP
-  AMPICILLIN: SIGNIFICANT CHANGE UP
-  AMPICILLIN: SIGNIFICANT CHANGE UP
-  AZTREONAM: SIGNIFICANT CHANGE UP
-  AZTREONAM: SIGNIFICANT CHANGE UP
-  CEFAZOLIN: SIGNIFICANT CHANGE UP
-  CEFAZOLIN: SIGNIFICANT CHANGE UP
-  CEFEPIME: SIGNIFICANT CHANGE UP
-  CEFEPIME: SIGNIFICANT CHANGE UP
-  CEFOXITIN: SIGNIFICANT CHANGE UP
-  CEFOXITIN: SIGNIFICANT CHANGE UP
-  CEFTRIAXONE: SIGNIFICANT CHANGE UP
-  CEFTRIAXONE: SIGNIFICANT CHANGE UP
-  CEFUROXIME: SIGNIFICANT CHANGE UP
-  CEFUROXIME: SIGNIFICANT CHANGE UP
-  CIPROFLOXACIN: SIGNIFICANT CHANGE UP
-  CIPROFLOXACIN: SIGNIFICANT CHANGE UP
-  ERTAPENEM: SIGNIFICANT CHANGE UP
-  ERTAPENEM: SIGNIFICANT CHANGE UP
-  GENTAMICIN: SIGNIFICANT CHANGE UP
-  GENTAMICIN: SIGNIFICANT CHANGE UP
-  IMIPENEM: SIGNIFICANT CHANGE UP
-  IMIPENEM: SIGNIFICANT CHANGE UP
-  LEVOFLOXACIN: SIGNIFICANT CHANGE UP
-  LEVOFLOXACIN: SIGNIFICANT CHANGE UP
-  MEROPENEM: SIGNIFICANT CHANGE UP
-  MEROPENEM: SIGNIFICANT CHANGE UP
-  NITROFURANTOIN: SIGNIFICANT CHANGE UP
-  NITROFURANTOIN: SIGNIFICANT CHANGE UP
-  PIPERACILLIN/TAZOBACTAM: SIGNIFICANT CHANGE UP
-  PIPERACILLIN/TAZOBACTAM: SIGNIFICANT CHANGE UP
-  TOBRAMYCIN: SIGNIFICANT CHANGE UP
-  TOBRAMYCIN: SIGNIFICANT CHANGE UP
-  TRIMETHOPRIM/SULFAMETHOXAZOLE: SIGNIFICANT CHANGE UP
-  TRIMETHOPRIM/SULFAMETHOXAZOLE: SIGNIFICANT CHANGE UP
ANION GAP SERPL CALC-SCNC: 7 MMOL/L — SIGNIFICANT CHANGE UP (ref 7–14)
ANION GAP SERPL CALC-SCNC: 7 MMOL/L — SIGNIFICANT CHANGE UP (ref 7–14)
BASOPHILS # BLD AUTO: 0.03 K/UL — SIGNIFICANT CHANGE UP (ref 0–0.2)
BASOPHILS # BLD AUTO: 0.03 K/UL — SIGNIFICANT CHANGE UP (ref 0–0.2)
BASOPHILS NFR BLD AUTO: 0.3 % — SIGNIFICANT CHANGE UP (ref 0–2)
BASOPHILS NFR BLD AUTO: 0.3 % — SIGNIFICANT CHANGE UP (ref 0–2)
BUN SERPL-MCNC: 15 MG/DL — SIGNIFICANT CHANGE UP (ref 7–23)
BUN SERPL-MCNC: 15 MG/DL — SIGNIFICANT CHANGE UP (ref 7–23)
CALCIUM SERPL-MCNC: 9 MG/DL — SIGNIFICANT CHANGE UP (ref 8.4–10.5)
CALCIUM SERPL-MCNC: 9 MG/DL — SIGNIFICANT CHANGE UP (ref 8.4–10.5)
CHLORIDE SERPL-SCNC: 106 MMOL/L — SIGNIFICANT CHANGE UP (ref 98–107)
CHLORIDE SERPL-SCNC: 106 MMOL/L — SIGNIFICANT CHANGE UP (ref 98–107)
CO2 SERPL-SCNC: 25 MMOL/L — SIGNIFICANT CHANGE UP (ref 22–31)
CO2 SERPL-SCNC: 25 MMOL/L — SIGNIFICANT CHANGE UP (ref 22–31)
CREAT SERPL-MCNC: 0.5 MG/DL — SIGNIFICANT CHANGE UP (ref 0.5–1.3)
CREAT SERPL-MCNC: 0.5 MG/DL — SIGNIFICANT CHANGE UP (ref 0.5–1.3)
CULTURE RESULTS: ABNORMAL
CULTURE RESULTS: ABNORMAL
EGFR: 94 ML/MIN/1.73M2 — SIGNIFICANT CHANGE UP
EGFR: 94 ML/MIN/1.73M2 — SIGNIFICANT CHANGE UP
EOSINOPHIL # BLD AUTO: 0.06 K/UL — SIGNIFICANT CHANGE UP (ref 0–0.5)
EOSINOPHIL # BLD AUTO: 0.06 K/UL — SIGNIFICANT CHANGE UP (ref 0–0.5)
EOSINOPHIL NFR BLD AUTO: 0.6 % — SIGNIFICANT CHANGE UP (ref 0–6)
EOSINOPHIL NFR BLD AUTO: 0.6 % — SIGNIFICANT CHANGE UP (ref 0–6)
GLUCOSE SERPL-MCNC: 108 MG/DL — HIGH (ref 70–99)
GLUCOSE SERPL-MCNC: 108 MG/DL — HIGH (ref 70–99)
HCT VFR BLD CALC: 24.3 % — LOW (ref 34.5–45)
HCT VFR BLD CALC: 24.3 % — LOW (ref 34.5–45)
HGB BLD-MCNC: 7.9 G/DL — LOW (ref 11.5–15.5)
HGB BLD-MCNC: 7.9 G/DL — LOW (ref 11.5–15.5)
IANC: 7.52 K/UL — HIGH (ref 1.8–7.4)
IANC: 7.52 K/UL — HIGH (ref 1.8–7.4)
IMM GRANULOCYTES NFR BLD AUTO: 0.5 % — SIGNIFICANT CHANGE UP (ref 0–0.9)
IMM GRANULOCYTES NFR BLD AUTO: 0.5 % — SIGNIFICANT CHANGE UP (ref 0–0.9)
LYMPHOCYTES # BLD AUTO: 1.68 K/UL — SIGNIFICANT CHANGE UP (ref 1–3.3)
LYMPHOCYTES # BLD AUTO: 1.68 K/UL — SIGNIFICANT CHANGE UP (ref 1–3.3)
LYMPHOCYTES # BLD AUTO: 16.6 % — SIGNIFICANT CHANGE UP (ref 13–44)
LYMPHOCYTES # BLD AUTO: 16.6 % — SIGNIFICANT CHANGE UP (ref 13–44)
MCHC RBC-ENTMCNC: 30.7 PG — SIGNIFICANT CHANGE UP (ref 27–34)
MCHC RBC-ENTMCNC: 30.7 PG — SIGNIFICANT CHANGE UP (ref 27–34)
MCHC RBC-ENTMCNC: 32.5 GM/DL — SIGNIFICANT CHANGE UP (ref 32–36)
MCHC RBC-ENTMCNC: 32.5 GM/DL — SIGNIFICANT CHANGE UP (ref 32–36)
MCV RBC AUTO: 94.6 FL — SIGNIFICANT CHANGE UP (ref 80–100)
MCV RBC AUTO: 94.6 FL — SIGNIFICANT CHANGE UP (ref 80–100)
METHOD TYPE: SIGNIFICANT CHANGE UP
METHOD TYPE: SIGNIFICANT CHANGE UP
MONOCYTES # BLD AUTO: 0.79 K/UL — SIGNIFICANT CHANGE UP (ref 0–0.9)
MONOCYTES # BLD AUTO: 0.79 K/UL — SIGNIFICANT CHANGE UP (ref 0–0.9)
MONOCYTES NFR BLD AUTO: 7.8 % — SIGNIFICANT CHANGE UP (ref 2–14)
MONOCYTES NFR BLD AUTO: 7.8 % — SIGNIFICANT CHANGE UP (ref 2–14)
NEUTROPHILS # BLD AUTO: 7.52 K/UL — HIGH (ref 1.8–7.4)
NEUTROPHILS # BLD AUTO: 7.52 K/UL — HIGH (ref 1.8–7.4)
NEUTROPHILS NFR BLD AUTO: 74.2 % — SIGNIFICANT CHANGE UP (ref 43–77)
NEUTROPHILS NFR BLD AUTO: 74.2 % — SIGNIFICANT CHANGE UP (ref 43–77)
NRBC # BLD: 0 /100 WBCS — SIGNIFICANT CHANGE UP (ref 0–0)
NRBC # BLD: 0 /100 WBCS — SIGNIFICANT CHANGE UP (ref 0–0)
NRBC # FLD: 0 K/UL — SIGNIFICANT CHANGE UP (ref 0–0)
NRBC # FLD: 0 K/UL — SIGNIFICANT CHANGE UP (ref 0–0)
ORGANISM # SPEC MICROSCOPIC CNT: ABNORMAL
PLATELET # BLD AUTO: 269 K/UL — SIGNIFICANT CHANGE UP (ref 150–400)
PLATELET # BLD AUTO: 269 K/UL — SIGNIFICANT CHANGE UP (ref 150–400)
POTASSIUM SERPL-MCNC: 3.9 MMOL/L — SIGNIFICANT CHANGE UP (ref 3.5–5.3)
POTASSIUM SERPL-MCNC: 3.9 MMOL/L — SIGNIFICANT CHANGE UP (ref 3.5–5.3)
POTASSIUM SERPL-SCNC: 3.9 MMOL/L — SIGNIFICANT CHANGE UP (ref 3.5–5.3)
POTASSIUM SERPL-SCNC: 3.9 MMOL/L — SIGNIFICANT CHANGE UP (ref 3.5–5.3)
RBC # BLD: 2.57 M/UL — LOW (ref 3.8–5.2)
RBC # BLD: 2.57 M/UL — LOW (ref 3.8–5.2)
RBC # FLD: 13 % — SIGNIFICANT CHANGE UP (ref 10.3–14.5)
RBC # FLD: 13 % — SIGNIFICANT CHANGE UP (ref 10.3–14.5)
SODIUM SERPL-SCNC: 138 MMOL/L — SIGNIFICANT CHANGE UP (ref 135–145)
SODIUM SERPL-SCNC: 138 MMOL/L — SIGNIFICANT CHANGE UP (ref 135–145)
SPECIMEN SOURCE: SIGNIFICANT CHANGE UP
SPECIMEN SOURCE: SIGNIFICANT CHANGE UP
WBC # BLD: 10.13 K/UL — SIGNIFICANT CHANGE UP (ref 3.8–10.5)
WBC # BLD: 10.13 K/UL — SIGNIFICANT CHANGE UP (ref 3.8–10.5)
WBC # FLD AUTO: 10.13 K/UL — SIGNIFICANT CHANGE UP (ref 3.8–10.5)
WBC # FLD AUTO: 10.13 K/UL — SIGNIFICANT CHANGE UP (ref 3.8–10.5)

## 2023-12-19 PROCEDURE — 99233 SBSQ HOSP IP/OBS HIGH 50: CPT

## 2023-12-19 RX ORDER — CEFTRIAXONE 500 MG/1
1000 INJECTION, POWDER, FOR SOLUTION INTRAMUSCULAR; INTRAVENOUS EVERY 24 HOURS
Refills: 0 | Status: DISCONTINUED | OUTPATIENT
Start: 2023-12-20 | End: 2023-12-20

## 2023-12-19 RX ADMIN — Medication 975 MILLIGRAM(S): at 03:46

## 2023-12-19 RX ADMIN — CEFTRIAXONE 100 MILLIGRAM(S): 500 INJECTION, POWDER, FOR SOLUTION INTRAMUSCULAR; INTRAVENOUS at 09:05

## 2023-12-19 RX ADMIN — SENNA PLUS 2 TABLET(S): 8.6 TABLET ORAL at 21:04

## 2023-12-19 RX ADMIN — Medication 975 MILLIGRAM(S): at 02:46

## 2023-12-19 RX ADMIN — ENOXAPARIN SODIUM 40 MILLIGRAM(S): 100 INJECTION SUBCUTANEOUS at 05:01

## 2023-12-19 RX ADMIN — Medication 975 MILLIGRAM(S): at 17:09

## 2023-12-19 RX ADMIN — POLYETHYLENE GLYCOL 3350 17 GRAM(S): 17 POWDER, FOR SOLUTION ORAL at 13:51

## 2023-12-19 RX ADMIN — Medication 975 MILLIGRAM(S): at 09:05

## 2023-12-19 RX ADMIN — Medication 975 MILLIGRAM(S): at 09:10

## 2023-12-19 NOTE — PROGRESS NOTE ADULT - ASSESSMENT
86F h/o dementia p/w mechanical fall found to have RT femur fracture s/p right hip IMN (12/16/23) course c/b ecoli UTI and acute blood loss anemia.

## 2023-12-19 NOTE — PROGRESS NOTE ADULT - TIME BILLING
Time-based billing (NON-critical care).     More than 50% of the visit was spent counseling and / or coordinating care by the attending physician.      The necessity of the time spent during the encounter on this date of service was due to: documentation in [place], reviewing chart and coordinating care with patient/resident and interdisciplinary staff (such as , social workers, etc) as well as reviewing vitals, laboratory data, radiology, medication list, consultants' recommendations and prior records. Interventions were performed as documented above. Time-based billing (NON-critical care).     More than 50% of the visit was spent counseling and / or coordinating care by the attending physician.      The necessity of the time spent during the encounter on this date of service was due to: documentation in [place], reviewing chart and coordinating care with patient/resident and interdisciplinary staff (such as , social workers, etc) as well as reviewing vitals, laboratory data, radiology, medication list, consultants' recommendations and prior records. Interventions were performed as documented above.

## 2023-12-19 NOTE — PROGRESS NOTE ADULT - SUBJECTIVE AND OBJECTIVE BOX
CHIEF COMPLAINT: f/u right  IT hip fracture    SUBJECTIVE / OVERNIGHT EVENTS: Patient seen and examined. No acute events overnight. Pain controlled and patient without any complaints. Pleasantly demented on exam.    MEDICATIONS  (STANDING):  acetaminophen     Tablet .. 975 milliGRAM(s) Oral every 8 hours  enoxaparin Injectable 40 milliGRAM(s) SubCutaneous every 24 hours  polyethylene glycol 3350 17 Gram(s) Oral daily  senna 2 Tablet(s) Oral at bedtime  sodium chloride 0.9%. 1000 milliLiter(s) (100 mL/Hr) IV Continuous <Continuous>    MEDICATIONS  (PRN):  bisacodyl Suppository 10 milliGRAM(s) Rectal daily PRN If no bowel movement by POD#2  magnesium hydroxide Suspension 30 milliLiter(s) Oral daily PRN Constipation  melatonin 3 milliGRAM(s) Oral at bedtime PRN Insomnia  ondansetron Injectable 4 milliGRAM(s) IV Push every 6 hours PRN Nausea and/or Vomiting  oxyCODONE    IR 2.5 milliGRAM(s) Oral every 4 hours PRN Moderate Pain (4 - 6)  oxyCODONE    IR 5 milliGRAM(s) Oral every 4 hours PRN Severe Pain (7 - 10)  oxyCODONE    IR 2.5 milliGRAM(s) Oral every 4 hours PRN Moderate Pain (4 - 6)  oxyCODONE    IR 5 milliGRAM(s) Oral every 4 hours PRN Severe Pain (7 - 10)    VITALS:  T(F): 98.6 (12-19-23 @ 09:09), Max: 99.5 (12-18-23 @ 14:18)  HR: 71 (12-19-23 @ 09:09) (71 - 94)  BP: 136/51 (12-19-23 @ 09:09) (111/55 - 142/66)  RR: 16 (12-19-23 @ 09:09) (16 - 17)  SpO2: 98% (12-19-23 @ 09:09)    PHYSICAL EXAM:  GENERAL: NAD  CHEST/LUNG: Clear to auscultation bilaterally; No wheeze  HEART: Regular rate and rhythm; No murmurs, rubs, or gallops  ABDOMEN: Soft, Nontender, Nondistended; Bowel sounds present  EXTREMITIES:  + RT hip bandage   PSYCH: AAOx1 self   SKIN: Dressing C/D/I    LABS:              7.9                  138  | 25   | 15           10.13 >-----------< 269     ------------------------< 108                   24.3                 3.9  | 106  | 0.50                                         Ca 9.0   Mg x     Ph x        H/H trend  12-19 @ 05:08   HgB  7.9   HCT  24.3  12-18 @ 09:23   HgB  8.8   HCT  27.1  12-18 @ 07:17   HgB  8.0   HCT  24.0  12-17 @ 05:13   HgB  10.6  HCT  31.5  12-16 @ 12:49   HgB  12.2  HCT  36.5  12-16 @ 01:00   HgB  13.2  HCT  38.6  12-15 @ 06:55   HgB  14.4  HCT  43.3    Urinalysis Basic - ( 19 Dec 2023 05:08 )  Color: x / Appearance: x / SG: x / pH: x  Gluc: 108 mg/dL / Ketone: x  / Bili: x / Urobili: x   Blood: x / Protein: x / Nitrite: x   Leuk Esterase: x / RBC: x / WBC x   Sq Epi: x / Non Sq Epi: x / Bacteria: x    [ ] Consultant(s) Notes Reviewed:  [x] Care Discussed with Consultants/Other Providers: Orthopedic PA - discussed management of anemia and UTI

## 2023-12-19 NOTE — PROGRESS NOTE ADULT - PROBLEM SELECTOR PLAN 1
- XRay + RT femur fracture  - CT head/CT c spine neg  - s/p OR Open reduction and internal fixation (ORIF) of right hip with intramedullary nail (12/16)  -Pain well controlled; continue management and pain control per ortho recs with tylenol tid, oxycodone IR prn  -c/w bowel regimen  -c/w incentive spirometer use  -c/w PT

## 2023-12-19 NOTE — PROGRESS NOTE ADULT - SUBJECTIVE AND OBJECTIVE BOX
Pt seen/examined. Doing well. Pain controlled. No acute overnight complaints or events.    T(C): 36.6 (12-19-23 @ 05:10), Max: 37.5 (12-18-23 @ 14:18)  HR: 90 (12-19-23 @ 05:10) (87 - 94)  BP: 130/62 (12-19-23 @ 05:10) (111/55 - 145/50)  RR: 16 (12-19-23 @ 05:10) (16 - 17)  SpO2: 98% (12-19-23 @ 05:10) (95% - 98%)  Wt(kg): --    Gen: awake, alert, NAD  Resp: no increased work of breathing  RLE:  Dressing c/d/i  +EHL/FHL/TA/GS  SILT S/S/SP/DP  +DP Pulses  Compartments soft  No calf TTP                         8.8    9.95  )-----------( 263      ( 18 Dec 2023 09:23 )             27.1                         8.0    9.71  )-----------( 231      ( 18 Dec 2023 07:17 )             24.0       12-18    141  |  108<H>  |  22  ----------------------------<  97  3.8   |  25  |  0.52      A/P: 84yo Female s/p right femoral IMN    - Pain control  - mechanical/DVT ppx  - OOB/PT  - WBAT RLE  - Completed course of antibiotics for UTI  - Dispo planning: rehab

## 2023-12-20 ENCOUNTER — TRANSCRIPTION ENCOUNTER (OUTPATIENT)
Age: 83
End: 2023-12-20

## 2023-12-20 VITALS
DIASTOLIC BLOOD PRESSURE: 59 MMHG | TEMPERATURE: 98 F | OXYGEN SATURATION: 95 % | SYSTOLIC BLOOD PRESSURE: 125 MMHG | RESPIRATION RATE: 16 BRPM | HEART RATE: 81 BPM

## 2023-12-20 LAB
ANION GAP SERPL CALC-SCNC: 10 MMOL/L — SIGNIFICANT CHANGE UP (ref 7–14)
ANION GAP SERPL CALC-SCNC: 10 MMOL/L — SIGNIFICANT CHANGE UP (ref 7–14)
BASOPHILS # BLD AUTO: 0.04 K/UL — SIGNIFICANT CHANGE UP (ref 0–0.2)
BASOPHILS # BLD AUTO: 0.04 K/UL — SIGNIFICANT CHANGE UP (ref 0–0.2)
BASOPHILS NFR BLD AUTO: 0.4 % — SIGNIFICANT CHANGE UP (ref 0–2)
BASOPHILS NFR BLD AUTO: 0.4 % — SIGNIFICANT CHANGE UP (ref 0–2)
BUN SERPL-MCNC: 14 MG/DL — SIGNIFICANT CHANGE UP (ref 7–23)
BUN SERPL-MCNC: 14 MG/DL — SIGNIFICANT CHANGE UP (ref 7–23)
CALCIUM SERPL-MCNC: 8.7 MG/DL — SIGNIFICANT CHANGE UP (ref 8.4–10.5)
CALCIUM SERPL-MCNC: 8.7 MG/DL — SIGNIFICANT CHANGE UP (ref 8.4–10.5)
CHLORIDE SERPL-SCNC: 102 MMOL/L — SIGNIFICANT CHANGE UP (ref 98–107)
CHLORIDE SERPL-SCNC: 102 MMOL/L — SIGNIFICANT CHANGE UP (ref 98–107)
CO2 SERPL-SCNC: 24 MMOL/L — SIGNIFICANT CHANGE UP (ref 22–31)
CO2 SERPL-SCNC: 24 MMOL/L — SIGNIFICANT CHANGE UP (ref 22–31)
CREAT SERPL-MCNC: 0.51 MG/DL — SIGNIFICANT CHANGE UP (ref 0.5–1.3)
CREAT SERPL-MCNC: 0.51 MG/DL — SIGNIFICANT CHANGE UP (ref 0.5–1.3)
EGFR: 93 ML/MIN/1.73M2 — SIGNIFICANT CHANGE UP
EGFR: 93 ML/MIN/1.73M2 — SIGNIFICANT CHANGE UP
EOSINOPHIL # BLD AUTO: 0.21 K/UL — SIGNIFICANT CHANGE UP (ref 0–0.5)
EOSINOPHIL # BLD AUTO: 0.21 K/UL — SIGNIFICANT CHANGE UP (ref 0–0.5)
EOSINOPHIL NFR BLD AUTO: 2 % — SIGNIFICANT CHANGE UP (ref 0–6)
EOSINOPHIL NFR BLD AUTO: 2 % — SIGNIFICANT CHANGE UP (ref 0–6)
GLUCOSE SERPL-MCNC: 116 MG/DL — HIGH (ref 70–99)
GLUCOSE SERPL-MCNC: 116 MG/DL — HIGH (ref 70–99)
HCT VFR BLD CALC: 30 % — LOW (ref 34.5–45)
HCT VFR BLD CALC: 30 % — LOW (ref 34.5–45)
HGB BLD-MCNC: 10 G/DL — LOW (ref 11.5–15.5)
HGB BLD-MCNC: 10 G/DL — LOW (ref 11.5–15.5)
IANC: 7.88 K/UL — HIGH (ref 1.8–7.4)
IANC: 7.88 K/UL — HIGH (ref 1.8–7.4)
IMM GRANULOCYTES NFR BLD AUTO: 0.6 % — SIGNIFICANT CHANGE UP (ref 0–0.9)
IMM GRANULOCYTES NFR BLD AUTO: 0.6 % — SIGNIFICANT CHANGE UP (ref 0–0.9)
LYMPHOCYTES # BLD AUTO: 1.42 K/UL — SIGNIFICANT CHANGE UP (ref 1–3.3)
LYMPHOCYTES # BLD AUTO: 1.42 K/UL — SIGNIFICANT CHANGE UP (ref 1–3.3)
LYMPHOCYTES # BLD AUTO: 13.5 % — SIGNIFICANT CHANGE UP (ref 13–44)
LYMPHOCYTES # BLD AUTO: 13.5 % — SIGNIFICANT CHANGE UP (ref 13–44)
MCHC RBC-ENTMCNC: 30.1 PG — SIGNIFICANT CHANGE UP (ref 27–34)
MCHC RBC-ENTMCNC: 30.1 PG — SIGNIFICANT CHANGE UP (ref 27–34)
MCHC RBC-ENTMCNC: 33.3 GM/DL — SIGNIFICANT CHANGE UP (ref 32–36)
MCHC RBC-ENTMCNC: 33.3 GM/DL — SIGNIFICANT CHANGE UP (ref 32–36)
MCV RBC AUTO: 90.4 FL — SIGNIFICANT CHANGE UP (ref 80–100)
MCV RBC AUTO: 90.4 FL — SIGNIFICANT CHANGE UP (ref 80–100)
MONOCYTES # BLD AUTO: 0.92 K/UL — HIGH (ref 0–0.9)
MONOCYTES # BLD AUTO: 0.92 K/UL — HIGH (ref 0–0.9)
MONOCYTES NFR BLD AUTO: 8.7 % — SIGNIFICANT CHANGE UP (ref 2–14)
MONOCYTES NFR BLD AUTO: 8.7 % — SIGNIFICANT CHANGE UP (ref 2–14)
NEUTROPHILS # BLD AUTO: 7.88 K/UL — HIGH (ref 1.8–7.4)
NEUTROPHILS # BLD AUTO: 7.88 K/UL — HIGH (ref 1.8–7.4)
NEUTROPHILS NFR BLD AUTO: 74.8 % — SIGNIFICANT CHANGE UP (ref 43–77)
NEUTROPHILS NFR BLD AUTO: 74.8 % — SIGNIFICANT CHANGE UP (ref 43–77)
NRBC # BLD: 0 /100 WBCS — SIGNIFICANT CHANGE UP (ref 0–0)
NRBC # BLD: 0 /100 WBCS — SIGNIFICANT CHANGE UP (ref 0–0)
NRBC # FLD: 0 K/UL — SIGNIFICANT CHANGE UP (ref 0–0)
NRBC # FLD: 0 K/UL — SIGNIFICANT CHANGE UP (ref 0–0)
PLATELET # BLD AUTO: 311 K/UL — SIGNIFICANT CHANGE UP (ref 150–400)
PLATELET # BLD AUTO: 311 K/UL — SIGNIFICANT CHANGE UP (ref 150–400)
POTASSIUM SERPL-MCNC: 3.7 MMOL/L — SIGNIFICANT CHANGE UP (ref 3.5–5.3)
POTASSIUM SERPL-MCNC: 3.7 MMOL/L — SIGNIFICANT CHANGE UP (ref 3.5–5.3)
POTASSIUM SERPL-SCNC: 3.7 MMOL/L — SIGNIFICANT CHANGE UP (ref 3.5–5.3)
POTASSIUM SERPL-SCNC: 3.7 MMOL/L — SIGNIFICANT CHANGE UP (ref 3.5–5.3)
RBC # BLD: 3.32 M/UL — LOW (ref 3.8–5.2)
RBC # BLD: 3.32 M/UL — LOW (ref 3.8–5.2)
RBC # FLD: 15.4 % — HIGH (ref 10.3–14.5)
RBC # FLD: 15.4 % — HIGH (ref 10.3–14.5)
SODIUM SERPL-SCNC: 136 MMOL/L — SIGNIFICANT CHANGE UP (ref 135–145)
SODIUM SERPL-SCNC: 136 MMOL/L — SIGNIFICANT CHANGE UP (ref 135–145)
WBC # BLD: 10.53 K/UL — HIGH (ref 3.8–10.5)
WBC # BLD: 10.53 K/UL — HIGH (ref 3.8–10.5)
WBC # FLD AUTO: 10.53 K/UL — HIGH (ref 3.8–10.5)
WBC # FLD AUTO: 10.53 K/UL — HIGH (ref 3.8–10.5)

## 2023-12-20 PROCEDURE — 99232 SBSQ HOSP IP/OBS MODERATE 35: CPT

## 2023-12-20 RX ORDER — ENOXAPARIN SODIUM 100 MG/ML
0 INJECTION SUBCUTANEOUS
Qty: 0 | Refills: 0 | DISCHARGE
Start: 2023-12-20

## 2023-12-20 RX ORDER — ACETAMINOPHEN 500 MG
3 TABLET ORAL
Qty: 0 | Refills: 0 | DISCHARGE
Start: 2023-12-20

## 2023-12-20 RX ORDER — SENNA PLUS 8.6 MG/1
2 TABLET ORAL
Qty: 0 | Refills: 0 | DISCHARGE
Start: 2023-12-20

## 2023-12-20 RX ORDER — OXYCODONE HYDROCHLORIDE 5 MG/1
1 TABLET ORAL
Qty: 0 | Refills: 0 | DISCHARGE
Start: 2023-12-20

## 2023-12-20 RX ORDER — POLYETHYLENE GLYCOL 3350 17 G/17G
17 POWDER, FOR SOLUTION ORAL
Qty: 0 | Refills: 0 | DISCHARGE
Start: 2023-12-20

## 2023-12-20 RX ORDER — ENOXAPARIN SODIUM 100 MG/ML
40 INJECTION SUBCUTANEOUS
Qty: 0 | Refills: 0 | DISCHARGE
Start: 2023-12-20

## 2023-12-20 RX ORDER — LANOLIN ALCOHOL/MO/W.PET/CERES
1 CREAM (GRAM) TOPICAL
Qty: 0 | Refills: 0 | DISCHARGE
Start: 2023-12-20

## 2023-12-20 RX ADMIN — CEFTRIAXONE 100 MILLIGRAM(S): 500 INJECTION, POWDER, FOR SOLUTION INTRAMUSCULAR; INTRAVENOUS at 08:32

## 2023-12-20 RX ADMIN — POLYETHYLENE GLYCOL 3350 17 GRAM(S): 17 POWDER, FOR SOLUTION ORAL at 13:14

## 2023-12-20 RX ADMIN — Medication 975 MILLIGRAM(S): at 02:32

## 2023-12-20 RX ADMIN — Medication 975 MILLIGRAM(S): at 09:25

## 2023-12-20 RX ADMIN — ENOXAPARIN SODIUM 40 MILLIGRAM(S): 100 INJECTION SUBCUTANEOUS at 05:02

## 2023-12-20 RX ADMIN — Medication 975 MILLIGRAM(S): at 10:07

## 2023-12-20 RX ADMIN — Medication 975 MILLIGRAM(S): at 01:32

## 2023-12-20 NOTE — DISCHARGE NOTE NURSING/CASE MANAGEMENT/SOCIAL WORK - NSDCPNINST_GEN_ALL_CORE
Please be sure to schedule/attend all post-op/follow-up appointments with your doctors/surgeons. You may resume your regular diet. Continue to take your medications as prescribed. If you experience any constipation you may use OTC stool softeners for relief. If you develop any pain/redness/drainage at incision site, pain unrelieved by medications, fever >100.4 notify your surgeon right away.

## 2023-12-20 NOTE — PROGRESS NOTE ADULT - TIME BILLING
Time-based billing (NON-critical care).     More than 50% of the visit was spent counseling and / or coordinating care by the attending physician.      The necessity of the time spent during the encounter on this date of service was due to: documentation in [place], reviewing chart and coordinating care with patient/resident and interdisciplinary staff (such as , social workers, etc) as well as reviewing vitals, laboratory data, radiology, medication list, consultants' recommendations and prior records. Interventions were performed as documented above. Time-based billing (NON-critical care).     More than 50% of the visit was spent counseling and / or coordinating care by the attending physician.      The necessity of the time spent during the encounter on this date of service was due to: documentation in [place], reviewing chart and coordinating care with patient/resident and interdisciplinary staff (such as , social workers, etc) as well as reviewing vitals, laboratory data, radiology, medication list, consultants' recommendations and prior records. Interventions were performed as documented above.

## 2023-12-20 NOTE — DISCHARGE NOTE NURSING/CASE MANAGEMENT/SOCIAL WORK - PATIENT PORTAL LINK FT
You can access the FollowMyHealth Patient Portal offered by Health system by registering at the following website: [email or web address]. By joining [employer]’s FollowMyHealth portal, you will also be able to view your health information using other applications (apps) compatible with our system. You can access the FollowMyHealth Patient Portal offered by [facility] by registering at the following website: [email or web address]. By joining [employer]’s FollowMyHealth portal, you will also be able to view your health information using other applications (apps) compatible with our system.

## 2023-12-20 NOTE — DISCHARGE NOTE PROVIDER - NSDCCPCAREPLAN_GEN_ALL_CORE_FT
PRINCIPAL DISCHARGE DIAGNOSIS  Diagnosis: Closed hip fracture  Assessment and Plan of Treatment:

## 2023-12-20 NOTE — DISCHARGE NOTE PROVIDER - NSDCMRMEDTOKEN_GEN_ALL_CORE_FT
acetaminophen 325 mg oral tablet: 3 tab(s) orally every 8 hours  ciprofloxacin 500 mg oral tablet: 1 tab(s) orally 2 times a day  cyanocobalamin 1000 mcg oral tablet: 1 tab(s) orally once a day  enoxaparin: 40 mg SQ daily  melatonin 3 mg oral tablet: 1 tab(s) orally once a day (at bedtime) As needed Insomnia  Multiple Vitamins oral tablet: 1 tab(s) orally once a day  polyethylene glycol 3350 oral powder for reconstitution: 17 gram(s) orally once a day  senna leaf extract oral tablet: 2 tab(s) orally once a day (at bedtime)   acetaminophen 325 mg oral tablet: 3 tab(s) orally every 8 hours  ciprofloxacin 500 mg oral tablet: 1 tab(s) orally 2 times a day  cyanocobalamin 1000 mcg oral tablet: 1 tab(s) orally once a day  enoxaparin 40 mg/0.4 mL injectable solution: 40 milligram(s) injectable once a day  melatonin 3 mg oral tablet: 1 tab(s) orally once a day (at bedtime) As needed Insomnia  Multiple Vitamins oral tablet: 1 tab(s) orally once a day  oxyCODONE 5 mg oral tablet: 1 tab(s) orally every 4 hours As needed Severe Pain (7 - 10)  polyethylene glycol 3350 oral powder for reconstitution: 17 gram(s) orally once a day  senna leaf extract oral tablet: 2 tab(s) orally once a day (at bedtime)

## 2023-12-20 NOTE — PROGRESS NOTE ADULT - PROBLEM SELECTOR PLAN 7
-  A1c 5.7. Not diabetic. pre diabetes   - likely stress induced

## 2023-12-20 NOTE — DISCHARGE NOTE PROVIDER - PROVIDER TOKENS
PROVIDER:[TOKEN:[[id number]],ESTABLISHEDPATIENT:[T]] PROVIDER:[TOKEN:[[id number]],ESTABLISHEDPATIENT:[T]]

## 2023-12-20 NOTE — PROGRESS NOTE ADULT - ASSESSMENT
A/p: Patient is a 83y Female s/p Right IMN.  VSS. NAD.      Plan:  PT/OT-WBAT to RLE  IS  DVT PPx - Lovenox  Pain Control  Continue Ceftriaxone until 12/19/23 for +UTI  Continue Current Tx.  Dispo planning - [facility]      For all questions related to patient care, please reach out to the on-call team via the pager.     Ashley Maciel, PGY 2  Orthopaedic Surgery  [facility] [phone]  [facility] [phone]  [facility] [phone]   A/p: Patient is a 83y Female s/p Right IMN.  VSS. NAD.      Plan:  PT/OT-WBAT to RLE  IS  DVT PPx - Lovenox  Pain Control  Continue Ceftriaxone until 12/19/23 for +UTI  Continue Current Tx.  Dispo planning - [facility]      For all questions related to patient care, please reach out to the on-call team via the pager.     Ashley Maciel, PGY 2  Orthopaedic Surgery  [facility] [phone]  [facility] [phone]  [facility] [phone]

## 2023-12-20 NOTE — PROGRESS NOTE ADULT - PROBLEM SELECTOR PLAN 4
- suspect likely from acute fracture/UTI  - CXR neg; no overt cough or URI symptoms.  - leukocytosis trending down
- suspect likely from acute fracture/UTI  - CXR neg; no overt cough or URI symptoms.   - see above
- suspect likely from acute fracture/UTI  - CXR neg; no overt cough or URI symptoms.  - leukocytosis trending down
- suspect likely from acute fracture/UTI  - CXR neg; no overt cough or URI symptoms.  - leukocytosis trending down

## 2023-12-20 NOTE — PROGRESS NOTE ADULT - REASON FOR ADMISSION
right  IT hip fracture

## 2023-12-20 NOTE — PROGRESS NOTE ADULT - PROBLEM SELECTOR PLAN 6
- baseline AOX1  - minimize teathers/restraint frequent re-orientation.

## 2023-12-20 NOTE — PROGRESS NOTE ADULT - SUBJECTIVE AND OBJECTIVE BOX
CHIEF COMPLAINT: f/u right  IT hip fracture    SUBJECTIVE / OVERNIGHT EVENTS: Patient seen and examined. No acute events overnight. Pain well controlled and patient without any complaints. Pleasantly confused.    MEDICATIONS  (STANDING):  acetaminophen     Tablet .. 975 milliGRAM(s) Oral every 8 hours  cefTRIAXone   IVPB 1000 milliGRAM(s) IV Intermittent every 24 hours  enoxaparin Injectable 40 milliGRAM(s) SubCutaneous every 24 hours  polyethylene glycol 3350 17 Gram(s) Oral daily  senna 2 Tablet(s) Oral at bedtime  sodium chloride 0.9%. 1000 milliLiter(s) (100 mL/Hr) IV Continuous <Continuous>    MEDICATIONS  (PRN):  bisacodyl Suppository 10 milliGRAM(s) Rectal daily PRN If no bowel movement by POD#2  magnesium hydroxide Suspension 30 milliLiter(s) Oral daily PRN Constipation  melatonin 3 milliGRAM(s) Oral at bedtime PRN Insomnia  ondansetron Injectable 4 milliGRAM(s) IV Push every 6 hours PRN Nausea and/or Vomiting  oxyCODONE    IR 2.5 milliGRAM(s) Oral every 4 hours PRN Moderate Pain (4 - 6)  oxyCODONE    IR 5 milliGRAM(s) Oral every 4 hours PRN Severe Pain (7 - 10)  oxyCODONE    IR 2.5 milliGRAM(s) Oral every 4 hours PRN Moderate Pain (4 - 6)  oxyCODONE    IR 5 milliGRAM(s) Oral every 4 hours PRN Severe Pain (7 - 10)    VITALS:  T(F): 98.9 (12-20-23 @ 09:00), Max: 99.4 (12-19-23 @ 12:38)  HR: 78 (12-20-23 @ 09:00) (72 - 93)  BP: 132/55 (12-20-23 @ 09:00) (123/80 - 160/69)  RR: 17 (12-20-23 @ 09:00) (16 - 17)  SpO2: 95% (12-20-23 @ 09:00)    PHYSICAL EXAM:  GENERAL: NAD  CHEST/LUNG: Clear to auscultation bilaterally; No wheeze  HEART: Regular rate and rhythm; No murmurs, rubs, or gallops  ABDOMEN: Soft, Nontender, Nondistended; Bowel sounds present  EXTREMITIES:  + RT hip bandage   PSYCH: AAOx1 self   SKIN: Dressing C/D/I    LABS:              10.0                 136  | 24   | 14           10.53 >-----------< 311     ------------------------< 116                   30.0                 3.7  | 102  | 0.51                                         Ca 8.7   Mg x     Ph x      Culture - Urine (12.15.23 @ 22:46)   - Amoxicillin/Clavulanic Acid: S <=8/4  - Ampicillin: S <=8 These ampicillin results predict results for amoxicillin  - Ampicillin/Sulbactam: S <=4/2  - Aztreonam: S <=4  - Cefazolin: S <=2 For uncomplicated UTI with K. pneumoniae, E. coli, or P. mirablis: JOSE <=16 is sensitive and JOSE >=32 is resistant. This also predicts results for oral agents cefaclor, cefdinir, cefpodoxime, cefprozil, cefuroxime axetil, cephalexin and locarbef for uncomplicated UTI. Note that some isolates may be susceptible to these agents while testing resistant to cefazolin.  - Cefepime: S <=2  - Cefoxitin: S <=8  - Ceftriaxone: S <=1  - Cefuroxime: S <=4  - Ciprofloxacin: S <=0.25  - Ertapenem: S <=0.5  - Gentamicin: S <=2  - Imipenem: S <=1  - Levofloxacin: S <=0.5  - Meropenem: S <=1  - Nitrofurantoin: S <=32 Should not be used to treat pyelonephritis  - Piperacillin/Tazobactam: S <=8  - Tobramycin: S <=2  - Trimethoprim/Sulfamethoxazole: S <=0.5/9.5  Specimen Source: Clean Catch Clean Catch (Midstream)  Culture Results:   >100,000 CFU/ml Escherichia coli  Organism Identification: Escherichia coli  Organism: Escherichia coli  Method Type: JOSE  [ ] Consultant(s) Notes Reviewed:  [x] Care Discussed with Consultants/Other Providers: Orthopedic PA - discussed management of UTI

## 2023-12-20 NOTE — PROGRESS NOTE ADULT - PROBLEM SELECTOR PLAN 2
- likely due to operative procedure  - Keep H/H >7/21
-likely secondary to post-op changes; pt hemodynamically stable; will monitor CBC in am
-likely secondary to post-op changes; pt hemodynamically stable  -will transfuse 1unit pRBC today  -will monitor CBC in am
-likely secondary to post-op changes; pt hemodynamically stable  -will transfuse 1unit pRBC (12/19) with good response  -will monitor CBC in am

## 2023-12-20 NOTE — DISCHARGE NOTE PROVIDER - CARE PROVIDER_API CALL
Zain Ayoub  Orthopaedic Surgery  [address], [address]  [place] [address]  Phone: [phone]  Fax: [phone]  Established Patient  Follow Up Time:    Zain Ayoub  Orthopaedic Surgery  [address], [address]  [place] [address]  Phone: [phone]  Fax: [phone]  Established Patient  Follow Up Time:

## 2023-12-20 NOTE — PROGRESS NOTE ADULT - PROBLEM SELECTOR PLAN 9
- lovenox    10. Dispo:  -d/c to rehab per primary team
- lovenox

## 2023-12-20 NOTE — PROGRESS NOTE ADULT - PROBLEM SELECTOR PLAN 8
- BP currently controlled off meds  - goal BP<150.

## 2023-12-20 NOTE — PROGRESS NOTE ADULT - PROVIDER SPECIALTY LIST ADULT
Anesthesia
Orthopedics
Hospitalist

## 2023-12-20 NOTE — PROGRESS NOTE ADULT - SUBJECTIVE AND OBJECTIVE BOX
ORTHOPEDIC PROGRESS NOTE    Overnight events: None    SUBJECTIVE: Pt seen and examined at bedside. Patient is doing well, no acute complaints this AM. Pain is controlled with medication      OBJECTIVE:  Vital Signs Last 24 Hrs  T(C): 36.8 (20 Dec 2023 01:52), Max: 37.4 (19 Dec 2023 12:38)  T(F): 98.3 (20 Dec 2023 01:52), Max: 99.4 (19 Dec 2023 12:38)  HR: 85 (20 Dec 2023 01:52) (71 - 93)  BP: 160/69 (20 Dec 2023 01:52) (123/80 - 160/69)  BP(mean): --  RR: 16 (20 Dec 2023 01:52) (16 - 16)  SpO2: 98% (20 Dec 2023 01:52) (97% - 99%)    Parameters below as of 20 Dec 2023 01:52  Patient On (Oxygen Delivery Method): room air          12-18-23 @ 07:01  -  12-19-23 @ 07:00  --------------------------------------------------------  IN: 120 mL / OUT: 550 mL / NET: -430 mL    12-19-23 @ 07:01  -  12-20-23 @ 04:57  --------------------------------------------------------  IN: 300 mL / OUT: 600 mL / NET: -300 mL        Physical Examination:  GEN: NAD, resting quietly  PULM: symmetric chest rise bilaterally, no increased WOB  ABD: nondistended  EXTR:   Lower Extremities: Right thigh dressings with serousanguinous drainge  Calves Soft, Non-tender bilaterally  +PF/DF/EHL/FHL  SILT  +DP Pulse      LABS:                        7.9    10.13 )-----------( 269      ( 19 Dec 2023 05:08 )             24.3       12-19    138  |  106  |  15  ----------------------------<  108<H>  3.9   |  25  |  0.50    Ca    9.0      19 Dec 2023 05:08

## 2023-12-20 NOTE — PROGRESS NOTE ADULT - PROBLEM SELECTOR PROBLEM 6
Set to eprescribe pending your approval    LAST SEEN: 4/30/19    NEXT APPOINTMENT: NONE
Dementia

## 2023-12-20 NOTE — DISCHARGE NOTE PROVIDER - HOSPITAL COURSE
83 Pt is s/p IMN R hip fx without any intraoperative complications.  Pt is doing well and stable for discharge.  Pt is tolerating physical therapy: WBAT with cane/walker, gait training.  Leave dressing on until post op office visit(POD#14).  Have sutures/staples removed POD#14 if present.  Pt is on Lovenox for DVT prophylaxis take for 6 weeks unless otherwise directed by surgeon.  follow up with Dr. Ayoub in two weeks.  Follow up with primary care doctor in 1-2 weeks for continuity of care.  Pt was treated for E. coli UTI with ceftriaxone, may continue treatment with Cipro for last 3 days.  The patient was safely and appropriately discharged home.

## 2023-12-20 NOTE — DISCHARGE NOTE PROVIDER - CARE PROVIDERS DIRECT ADDRESSES
[email or web address]

## 2023-12-20 NOTE — DISCHARGE NOTE PROVIDER - NSDCFUSCHEDAPPT_GEN_ALL_CORE_FT
[facility] Physician [facility]  CARDIOLOGY [address] [place] [address]   Scheduled Appointment: 01/15/2024     [facility] Physician [facility]  CARDIOLOGY [address] [place] [address]   Scheduled Appointment: 01/15/2024

## 2023-12-20 NOTE — DISCHARGE NOTE NURSING/CASE MANAGEMENT/SOCIAL WORK - NSDCPEFALRISK_GEN_ALL_CORE
For information on Fall & Injury Prevention, visit: [email or web address] OR  [email or web address] OR  https://www.cdc.gov/steadi/patient.html For information on Fall & Injury Prevention, visit: [email or web address] OR  [email or web address] OR  https://www.cdc.gov/steadi/patient.html

## 2023-12-20 NOTE — DISCHARGE NOTE PROVIDER - DETAILS OF MALNUTRITION DIAGNOSIS/DIAGNOSES
This patient has been assessed with a concern for Malnutrition and was treated during this hospitalization for the following Nutrition diagnosis/diagnoses:     -  12/18/2023: Moderate protein-calorie malnutrition

## 2023-12-20 NOTE — PROGRESS NOTE ADULT - PROBLEM SELECTOR PLAN 3
- UA + given dementia and unable to elicit symptoms will treat ceftriaxone x 2 days; received antibiotics prior to OR  - f/u Ucx
- UA + given dementia and unable to elicit symptoms will treat ceftriaxone x3 days; received antibiotics prior to OR  - f/u Ucx -- growing >100,000 Ecoli  -f/u sensitivities
- UA + given dementia and unable to elicit symptoms will treat ceftriaxone x3 days; received antibiotics prior to OR  - f/u Ucx -- growing >100,000 Ecoli  -will treat with ceftriaxone day #3/7  -f/u sensitivities
- UA + given dementia and unable to elicit symptoms will treat ceftriaxone x3 days; received antibiotics prior to OR  - f/u Ucx -- growing >100,000 Ecoli pan-sensitive to Cipro  -will treat with ceftriaxone day #4/7 and d/c on PO cipro when rehab bed available

## 2023-12-21 RX ORDER — CIPROFLOXACIN LACTATE 400MG/40ML
1 VIAL (ML) INTRAVENOUS
Qty: 0 | Refills: 0 | DISCHARGE
Start: 2023-12-21 | End: 2023-12-23

## 2023-12-28 ENCOUNTER — NON-APPOINTMENT (OUTPATIENT)
Age: 83
End: 2023-12-28

## 2024-01-12 PROBLEM — F03.90 UNSPECIFIED DEMENTIA WITHOUT BEHAVIORAL DISTURBANCE: Chronic | Status: ACTIVE | Noted: 2023-12-15

## 2024-01-15 ENCOUNTER — APPOINTMENT (OUTPATIENT)
Dept: CARDIOLOGY | Facility: CLINIC | Age: 84
End: 2024-01-15

## 2024-01-16 ENCOUNTER — APPOINTMENT (OUTPATIENT)
Dept: ORTHOPEDIC SURGERY | Facility: CLINIC | Age: 84
End: 2024-01-16

## 2024-02-05 ENCOUNTER — APPOINTMENT (OUTPATIENT)
Dept: ORTHOPEDIC SURGERY | Facility: CLINIC | Age: 84
End: 2024-02-05
Payer: COMMERCIAL

## 2024-02-05 VITALS — HEIGHT: 66 IN | WEIGHT: 115 LBS | BODY MASS INDEX: 18.48 KG/M2

## 2024-02-05 PROCEDURE — 99024 POSTOP FOLLOW-UP VISIT: CPT

## 2024-02-05 PROCEDURE — 73502 X-RAY EXAM HIP UNI 2-3 VIEWS: CPT

## 2024-02-15 ENCOUNTER — TRANSCRIPTION ENCOUNTER (OUTPATIENT)
Age: 84
End: 2024-02-15

## 2024-02-15 ENCOUNTER — INPATIENT (INPATIENT)
Facility: HOSPITAL | Age: 84
LOS: 3 days | Discharge: NURSING, REHAB OR LONG-TERM CARE | End: 2024-02-19
Attending: ORTHOPAEDIC SURGERY | Admitting: ORTHOPAEDIC SURGERY
Payer: MEDICARE

## 2024-02-15 VITALS
RESPIRATION RATE: 16 BRPM | OXYGEN SATURATION: 99 % | SYSTOLIC BLOOD PRESSURE: 151 MMHG | TEMPERATURE: 98 F | DIASTOLIC BLOOD PRESSURE: 64 MMHG | HEART RATE: 65 BPM

## 2024-02-15 DIAGNOSIS — S72.002A FRACTURE OF UNSPECIFIED PART OF NECK OF LEFT FEMUR, INITIAL ENCOUNTER FOR CLOSED FRACTURE: ICD-10-CM

## 2024-02-15 LAB
ALBUMIN SERPL ELPH-MCNC: 3.2 G/DL — LOW (ref 3.3–5)
ALP SERPL-CCNC: 266 U/L — HIGH (ref 40–120)
ALT FLD-CCNC: 24 U/L — SIGNIFICANT CHANGE UP (ref 4–33)
ANION GAP SERPL CALC-SCNC: 12 MMOL/L — SIGNIFICANT CHANGE UP (ref 7–14)
APTT BLD: 30.8 SEC — SIGNIFICANT CHANGE UP (ref 24.5–35.6)
AST SERPL-CCNC: 30 U/L — SIGNIFICANT CHANGE UP (ref 4–32)
BASOPHILS # BLD AUTO: 0.05 K/UL — SIGNIFICANT CHANGE UP (ref 0–0.2)
BASOPHILS NFR BLD AUTO: 0.6 % — SIGNIFICANT CHANGE UP (ref 0–2)
BILIRUB SERPL-MCNC: 0.3 MG/DL — SIGNIFICANT CHANGE UP (ref 0.2–1.2)
BLD GP AB SCN SERPL QL: NEGATIVE — SIGNIFICANT CHANGE UP
BUN SERPL-MCNC: 25 MG/DL — HIGH (ref 7–23)
CALCIUM SERPL-MCNC: 9.6 MG/DL — SIGNIFICANT CHANGE UP (ref 8.4–10.5)
CHLORIDE SERPL-SCNC: 101 MMOL/L — SIGNIFICANT CHANGE UP (ref 98–107)
CO2 SERPL-SCNC: 26 MMOL/L — SIGNIFICANT CHANGE UP (ref 22–31)
CREAT SERPL-MCNC: 0.54 MG/DL — SIGNIFICANT CHANGE UP (ref 0.5–1.3)
EGFR: 92 ML/MIN/1.73M2 — SIGNIFICANT CHANGE UP
EOSINOPHIL # BLD AUTO: 0.1 K/UL — SIGNIFICANT CHANGE UP (ref 0–0.5)
EOSINOPHIL NFR BLD AUTO: 1.2 % — SIGNIFICANT CHANGE UP (ref 0–6)
GLUCOSE SERPL-MCNC: 111 MG/DL — HIGH (ref 70–99)
HCT VFR BLD CALC: 35.2 % — SIGNIFICANT CHANGE UP (ref 34.5–45)
HGB BLD-MCNC: 11.7 G/DL — SIGNIFICANT CHANGE UP (ref 11.5–15.5)
IANC: 5.82 K/UL — SIGNIFICANT CHANGE UP (ref 1.8–7.4)
IMM GRANULOCYTES NFR BLD AUTO: 0.2 % — SIGNIFICANT CHANGE UP (ref 0–0.9)
INR BLD: 1.03 RATIO — SIGNIFICANT CHANGE UP (ref 0.85–1.18)
LYMPHOCYTES # BLD AUTO: 1.71 K/UL — SIGNIFICANT CHANGE UP (ref 1–3.3)
LYMPHOCYTES # BLD AUTO: 20.5 % — SIGNIFICANT CHANGE UP (ref 13–44)
MCHC RBC-ENTMCNC: 31.9 PG — SIGNIFICANT CHANGE UP (ref 27–34)
MCHC RBC-ENTMCNC: 33.2 GM/DL — SIGNIFICANT CHANGE UP (ref 32–36)
MCV RBC AUTO: 95.9 FL — SIGNIFICANT CHANGE UP (ref 80–100)
MONOCYTES # BLD AUTO: 0.64 K/UL — SIGNIFICANT CHANGE UP (ref 0–0.9)
MONOCYTES NFR BLD AUTO: 7.7 % — SIGNIFICANT CHANGE UP (ref 2–14)
NEUTROPHILS # BLD AUTO: 5.82 K/UL — SIGNIFICANT CHANGE UP (ref 1.8–7.4)
NEUTROPHILS NFR BLD AUTO: 69.8 % — SIGNIFICANT CHANGE UP (ref 43–77)
NRBC # BLD: 0 /100 WBCS — SIGNIFICANT CHANGE UP (ref 0–0)
NRBC # FLD: 0 K/UL — SIGNIFICANT CHANGE UP (ref 0–0)
PLATELET # BLD AUTO: 600 K/UL — HIGH (ref 150–400)
POTASSIUM SERPL-MCNC: 4 MMOL/L — SIGNIFICANT CHANGE UP (ref 3.5–5.3)
POTASSIUM SERPL-SCNC: 4 MMOL/L — SIGNIFICANT CHANGE UP (ref 3.5–5.3)
PROT SERPL-MCNC: 6.2 G/DL — SIGNIFICANT CHANGE UP (ref 6–8.3)
PROTHROM AB SERPL-ACNC: 11.6 SEC — SIGNIFICANT CHANGE UP (ref 9.5–13)
RBC # BLD: 3.67 M/UL — LOW (ref 3.8–5.2)
RBC # FLD: 15.2 % — HIGH (ref 10.3–14.5)
RH IG SCN BLD-IMP: POSITIVE — SIGNIFICANT CHANGE UP
SODIUM SERPL-SCNC: 139 MMOL/L — SIGNIFICANT CHANGE UP (ref 135–145)
WBC # BLD: 8.34 K/UL — SIGNIFICANT CHANGE UP (ref 3.8–10.5)
WBC # FLD AUTO: 8.34 K/UL — SIGNIFICANT CHANGE UP (ref 3.8–10.5)

## 2024-02-15 PROCEDURE — 72170 X-RAY EXAM OF PELVIS: CPT | Mod: 26,XE

## 2024-02-15 PROCEDURE — 72192 CT PELVIS W/O DYE: CPT | Mod: 26,MA

## 2024-02-15 PROCEDURE — 73502 X-RAY EXAM HIP UNI 2-3 VIEWS: CPT | Mod: 26,LT

## 2024-02-15 PROCEDURE — 73552 X-RAY EXAM OF FEMUR 2/>: CPT | Mod: 26,LT

## 2024-02-15 PROCEDURE — 99285 EMERGENCY DEPT VISIT HI MDM: CPT

## 2024-02-15 PROCEDURE — 73564 X-RAY EXAM KNEE 4 OR MORE: CPT | Mod: 26,LT

## 2024-02-15 PROCEDURE — 99223 1ST HOSP IP/OBS HIGH 75: CPT

## 2024-02-15 PROCEDURE — 71045 X-RAY EXAM CHEST 1 VIEW: CPT | Mod: 26

## 2024-02-15 PROCEDURE — 73552 X-RAY EXAM OF FEMUR 2/>: CPT | Mod: 26,LT,77

## 2024-02-15 RX ORDER — OXYCODONE HYDROCHLORIDE 5 MG/1
2.5 TABLET ORAL EVERY 4 HOURS
Refills: 0 | Status: DISCONTINUED | OUTPATIENT
Start: 2024-02-15 | End: 2024-02-19

## 2024-02-15 RX ORDER — SENNA PLUS 8.6 MG/1
2 TABLET ORAL AT BEDTIME
Refills: 0 | Status: DISCONTINUED | OUTPATIENT
Start: 2024-02-15 | End: 2024-02-19

## 2024-02-15 RX ORDER — SODIUM CHLORIDE 9 MG/ML
1000 INJECTION INTRAMUSCULAR; INTRAVENOUS; SUBCUTANEOUS
Refills: 0 | Status: DISCONTINUED | OUTPATIENT
Start: 2024-02-15 | End: 2024-02-18

## 2024-02-15 RX ORDER — ACETAMINOPHEN 500 MG
1000 TABLET ORAL ONCE
Refills: 0 | Status: COMPLETED | OUTPATIENT
Start: 2024-02-15 | End: 2024-02-15

## 2024-02-15 RX ORDER — ACETAMINOPHEN 500 MG
975 TABLET ORAL EVERY 8 HOURS
Refills: 0 | Status: DISCONTINUED | OUTPATIENT
Start: 2024-02-15 | End: 2024-02-19

## 2024-02-15 RX ORDER — LANOLIN ALCOHOL/MO/W.PET/CERES
3 CREAM (GRAM) TOPICAL AT BEDTIME
Refills: 0 | Status: DISCONTINUED | OUTPATIENT
Start: 2024-02-15 | End: 2024-02-19

## 2024-02-15 RX ORDER — MEMANTINE HYDROCHLORIDE 10 MG/1
10 TABLET ORAL
Refills: 0 | Status: DISCONTINUED | OUTPATIENT
Start: 2024-02-15 | End: 2024-02-19

## 2024-02-15 RX ORDER — OXYCODONE HYDROCHLORIDE 5 MG/1
5 TABLET ORAL EVERY 4 HOURS
Refills: 0 | Status: DISCONTINUED | OUTPATIENT
Start: 2024-02-15 | End: 2024-02-19

## 2024-02-15 RX ADMIN — Medication 400 MILLIGRAM(S): at 19:45

## 2024-02-15 NOTE — ED ADULT NURSE REASSESSMENT NOTE - NS ED NURSE REASSESS COMMENT FT1
Pt received from previous shift on stretcher, A/Ox1 to self only. Pt denies chest pain and SOB during reassessment, breathing is even and unlabored on room air. Abdomen is soft and non-distended, non-tender to touch. Pt bed bound at baseline, moves all four extremities on command, skin is intact. Pt resting comfortably at baseline, no apparent visible acute distress noted on reassessment. Vital signs stable, NKA to medications. Pending further provider orders

## 2024-02-15 NOTE — CONSULT NOTE ADULT - SUBJECTIVE AND OBJECTIVE BOX
DIAMOND LAGUNAS  83y  Female      Patient is a 83y old  Female w/ hx of dementia, Right IT fx s/p IMN 12/16 who presents with a chief complaint of left hip pain. Pt poor historian 2/2 dementia, A&Ox1 at baseline. Per chart review, pt fell out of bed this morning, found on floor at NH. No head trauma. Pt at baseline mental status. Sent to ED for evaluation. Pt found to have left femoral neck fx. Plan for OR in AM. Medicine consulted for preop evaluation.       PAST MEDICAL/SURGICAL HISTORY  PAST MEDICAL & SURGICAL HISTORY:  Dementia          REVIEW OF SYSTEMS:  CONSTITUTIONAL: No fever, weight loss, or fatigue  EYES: No eye pain, visual disturbances, or discharge  ENMT:  No difficulty hearing, tinnitus, vertigo; No sinus or throat pain  NECK: No pain or stiffness  BREASTS: No pain, masses, or nipple discharge  RESPIRATORY: No cough, wheezing, chills or hemoptysis; No shortness of breath  CARDIOVASCULAR: No chest pain, palpitations, dizziness, or leg swelling  GASTROINTESTINAL: No abdominal or epigastric pain. No nausea, vomiting, or hematemesis; No diarrhea or constipation. No melena or hematochezia.  GENITOURINARY: No dysuria, frequency, hematuria, or incontinence  NEUROLOGICAL: No headaches, memory loss, loss of strength, numbness, or tremors  SKIN: No itching, burning, rashes, or lesions   LYMPH NODES: No enlarged glands  ENDOCRINE: No heat or cold intolerance; No hair loss  MUSCULOSKELETAL: No joint pain or swelling; No muscle, back, or extremity pain  PSYCHIATRIC: No depression, anxiety, mood swings, or difficulty sleeping  HEME/LYMPH: No easy bruising, or bleeding gums  ALLERY AND IMMUNOLOGIC: No hives or eczema    VITAL SIGNS:  Vital Signs Last 24 Hrs  T(C): 36.8 (15 Feb 2024 17:39), Max: 36.8 (15 Feb 2024 17:39)  T(F): 98.2 (15 Feb 2024 17:39), Max: 98.2 (15 Feb 2024 17:39)  HR: 65 (15 Feb 2024 17:39) (65 - 65)  BP: 151/64 (15 Feb 2024 17:39) (151/64 - 151/64)  BP(mean): --  RR: 16 (15 Feb 2024 17:39) (16 - 16)  SpO2: 99% (15 Feb 2024 17:39) (99% - 99%)    Parameters below as of 15 Feb 2024 17:39  Patient On (Oxygen Delivery Method): room air        PHYSICAL EXAM:    General: elderly female, NAD, resting comfortably in bed  HEENT: NC/AT; anicteric sclera; MMM  Neck: supple  Cardiovascular: +S1/S2; RRR  Respiratory: CTA B/L; no W/R/R  Gastrointestinal: soft, NT/ND; +BSx4  Extremities: WWP; no edema, clubbing or cyanosis. LLE shortened and externally rotated, ttp to left hip.   Vascular: distal pulses intact  Neurological: AAOx1 to self.     MEDICATIONS:  MEDICATIONS  (STANDING):    MEDICATIONS  (PRN):      ALLERGIES:  Allergies    No Known Allergies    Intolerances        LABS:                        11.7   8.34  )-----------( 600      ( 15 Feb 2024 19:05 )             35.2     02-15    139  |  101  |  25<H>  ----------------------------<  111<H>  4.0   |  26  |  0.54    Ca    9.6      15 Feb 2024 19:05    TPro  6.2  /  Alb  3.2<L>  /  TBili  0.3  /  DBili  x   /  AST  30  /  ALT  24  /  AlkPhos  266<H>  02-15    PT/INR - ( 15 Feb 2024 19:05 )   PT: 11.6 sec;   INR: 1.03 ratio         PTT - ( 15 Feb 2024 19:05 )  PTT:30.8 sec  Urinalysis Basic - ( 15 Feb 2024 19:05 )    Color: x / Appearance: x / SG: x / pH: x  Gluc: 111 mg/dL / Ketone: x  / Bili: x / Urobili: x   Blood: x / Protein: x / Nitrite: x   Leuk Esterase: x / RBC: x / WBC x   Sq Epi: x / Non Sq Epi: x / Bacteria: x      CAPILLARY BLOOD GLUCOSE          RADIOLOGY & ADDITIONAL TESTS: Reviewed.   DIAMOND LAGUNAS  83y  Female      Patient is an 83y old  Female w/ hx of dementia, HTN, Right IT fx s/p IMN 12/16 who presents with a chief complaint of left hip pain. Pt poor historian 2/2 dementia, A&Ox1 at baseline. Per chart review, pt fell out of bed this morning, found on floor at NH. No head trauma. Pt at baseline mental status. Sent to ED for evaluation. Pt found to have left femoral neck fx. Plan for OR in AM. Pt currently denies any complaints of SOB or chest pain. Medicine consulted for preop evaluation.       PAST MEDICAL/SURGICAL HISTORY  PAST MEDICAL & SURGICAL HISTORY:  Dementia          REVIEW OF SYSTEMS: limited 2/2 dementia  EYES: No eye pain, visual disturbances, or discharge  ENMT:  No difficulty hearing, tinnitus, vertigo; No sinus or throat pain  NECK: No pain or stiffness  RESPIRATORY:  No shortness of breath  CARDIOVASCULAR: No chest pain, palpitations, dizziness  GASTROINTESTINAL: No abdominal or epigastric pain. No nausea  GENITOURINARY: No dysuria, frequency, hematuria, or incontinence  NEUROLOGICAL: No headaches  SKIN: No itching, burning  MUSCULOSKELETAL: + left hip pain    VITAL SIGNS:  Vital Signs Last 24 Hrs  T(C): 36.8 (15 Feb 2024 17:39), Max: 36.8 (15 Feb 2024 17:39)  T(F): 98.2 (15 Feb 2024 17:39), Max: 98.2 (15 Feb 2024 17:39)  HR: 65 (15 Feb 2024 17:39) (65 - 65)  BP: 151/64 (15 Feb 2024 17:39) (151/64 - 151/64)  BP(mean): --  RR: 16 (15 Feb 2024 17:39) (16 - 16)  SpO2: 99% (15 Feb 2024 17:39) (99% - 99%)    Parameters below as of 15 Feb 2024 17:39  Patient On (Oxygen Delivery Method): room air        PHYSICAL EXAM:    General: elderly female, NAD, resting comfortably in bed  HEENT: NC/AT; anicteric sclera; MMM  Neck: supple  Cardiovascular: +S1/S2; RRR  Respiratory: CTA B/L; no W/R/R  Gastrointestinal: soft, NT/ND; +BSx4  Extremities: WWP; no edema, clubbing or cyanosis. LLE shortened and externally rotated, ttp to left hip.   Vascular: distal pulses intact  Neurological: AAOx1 to self.     MEDICATIONS:  MEDICATIONS  (STANDING):    MEDICATIONS  (PRN):      ALLERGIES:  Allergies    No Known Allergies    Intolerances        LABS:                        11.7   8.34  )-----------( 600      ( 15 Feb 2024 19:05 )             35.2     02-15    139  |  101  |  25<H>  ----------------------------<  111<H>  4.0   |  26  |  0.54    Ca    9.6      15 Feb 2024 19:05    TPro  6.2  /  Alb  3.2<L>  /  TBili  0.3  /  DBili  x   /  AST  30  /  ALT  24  /  AlkPhos  266<H>  02-15    PT/INR - ( 15 Feb 2024 19:05 )   PT: 11.6 sec;   INR: 1.03 ratio         PTT - ( 15 Feb 2024 19:05 )  PTT:30.8 sec  Urinalysis Basic - ( 15 Feb 2024 19:05 )    Color: x / Appearance: x / SG: x / pH: x  Gluc: 111 mg/dL / Ketone: x  / Bili: x / Urobili: x   Blood: x / Protein: x / Nitrite: x   Leuk Esterase: x / RBC: x / WBC x   Sq Epi: x / Non Sq Epi: x / Bacteria: x      CAPILLARY BLOOD GLUCOSE          RADIOLOGY & ADDITIONAL TESTS: Reviewed.

## 2024-02-15 NOTE — H&P ADULT - NSHPPHYSICALEXAM_GEN_ALL_CORE
VITALS  Vital Signs Last 24 Hrs  T(C): 36.8 (15 Feb 2024 17:39), Max: 36.8 (15 Feb 2024 17:39)  T(F): 98.2 (15 Feb 2024 17:39), Max: 98.2 (15 Feb 2024 17:39)  HR: 65 (15 Feb 2024 17:39) (65 - 65)  BP: 151/64 (15 Feb 2024 17:39) (151/64 - 151/64)  BP(mean): --  RR: 16 (15 Feb 2024 17:39) (16 - 16)  SpO2: 99% (15 Feb 2024 17:39) (99% - 99%)    Parameters below as of 15 Feb 2024 17:39  Patient On (Oxygen Delivery Method): room air        PHYSICAL EXAM  Gen: Lying in bed, NAD  Resp: No increased WOB  LLE:  Skin intact, shortened and externally rotated, +edema and +ecchymosis over L hip  +TTP over L hip, no TTP along remainder of extremity; compartments soft  Limited ROM at hip 2/2 pain  +Log roll test  +Pain with axial loading  Motor: TA/EHL/GS/FHL intact  Sensory: DP/SP/Tib/Duke/Saph SILT  +DP pulse, WWP    Secondary survey:  No TTP along spine or other extremities, pelvis grossly stable, SILT and compartments soft throughout VITALS  Vital Signs Last 24 Hrs  T(C): 36.8 (15 Feb 2024 17:39), Max: 36.8 (15 Feb 2024 17:39)  T(F): 98.2 (15 Feb 2024 17:39), Max: 98.2 (15 Feb 2024 17:39)  HR: 65 (15 Feb 2024 17:39) (65 - 65)  BP: 151/64 (15 Feb 2024 17:39) (151/64 - 151/64)  BP(mean): --  RR: 16 (15 Feb 2024 17:39) (16 - 16)  SpO2: 99% (15 Feb 2024 17:39) (99% - 99%)    Parameters below as of 15 Feb 2024 17:39  Patient On (Oxygen Delivery Method): room air        PHYSICAL EXAM  Gen: Lying in bed, NAD  Resp: No increased WOB  LLE:  Skin intact, shortened and externally rotated  No noted edema or ecchymoses   +TTP over L hip, no TTP along remainder of extremity; compartments soft  Limited ROM at hip 2/2 pain  +Log roll test  No ain with axial loading  Motor: TA/EHL/GS/FHL intact  Sensory: DP/SP/Tib/Duke/Saph SILT  +DP pulse, WWP    RLE  Well healing surgical incisions from prior procedure   Secondary survey:  No TTP along spine or other extremities, pelvis grossly stable, SILT and compartments soft throughout

## 2024-02-15 NOTE — CONSULT NOTE ADULT - TIME BILLING
I have spent a total of 75 minutes time spent to prepare to see the patient, obtaining and reviewing history, physical examination, interpreting result, explaining the diagnosis and treatment plan, and documentation and with the patient/family/caregiver.

## 2024-02-15 NOTE — H&P ADULT - ASSESSMENT
ASSESSMENT & PLAN  83yFemale w/ L basicervical fem neck fx    PLAN  -NWB LLE, bedrest  -OR on 2/16/24  -f/u preop: CBC, BMP, coags, T&S x2,   -NPO past midnight  -IVF while NPO   -hold chemical DVT ppx for OR; SCDs OK  -please document medical clearance ASAP for OR  -pain control  -ice/cold compress

## 2024-02-15 NOTE — H&P ADULT - NSHPLABSRESULTS_GEN_ALL_CORE
LABS                        11.7   8.34  )-----------( 600      ( 15 Feb 2024 19:05 )             35.2     02-15    139  |  101  |  25<H>  ----------------------------<  111<H>  4.0   |  26  |  0.54    Ca    9.6      15 Feb 2024 19:05    TPro  6.2  /  Alb  3.2<L>  /  TBili  0.3  /  DBili  x   /  AST  30  /  ALT  24  /  AlkPhos  266<H>  02-15    PT/INR - ( 15 Feb 2024 19:05 )   PT: 11.6 sec;   INR: 1.03 ratio         PTT - ( 15 Feb 2024 19:05 )  PTT:30.8 sec    IMAGING  XRs: L basicervical fe fx (personal read)

## 2024-02-15 NOTE — ED ADULT TRIAGE NOTE - CHIEF COMPLAINT QUOTE
Pt Hx of dementia, fell out of bed this morning, unwitnessed, endorsed pain to Lt hip had X-ray done showing fracture to Lt hip, pt endorses pain at present moment, no external rotation noted to Lt leg. Afebrile.

## 2024-02-15 NOTE — CONSULT NOTE ADULT - ASSESSMENT
Patient is an 83y old female w/ hx of dementia, HTN, Right IT fx s/p IMN 12/16 who presents with a chief complaint of left hip pain after fall at NH. Pt found to have left femoral neck fx. Pt admitted to ortho and plan for OR on 2/16/24. Medicine consulted for preop eval.

## 2024-02-15 NOTE — CONSULT NOTE ADULT - PROBLEM SELECTOR PROBLEM 5
"Reason for call:  Patient reporting a symptom    Symptom or request: problems with nerves throughout his body,  Bottoms of feet. \"Sparking nerves\", painful.     Duration (how long have symptoms been present): 18 months    Have you been treated for this before? He's been seeing doctors  Previously but no real relief.    Additional comments: wants to ask questions about what to do next and what to  Do for pain.    Phone Number patient can be reached at:  Home number on file [phone] (home)    Best Time:  anytime    Can we leave a detailed message on this number:  YES    Call taken on 11/9/2017 at 9:05 AM by Dee Dee Briseno    " HTN (hypertension)

## 2024-02-15 NOTE — CONSULT NOTE ADULT - PROBLEM SELECTOR RECOMMENDATION 9
S/p fall at NH, no reported head trauma or LOC. Plan for OR with ortho on 2/16/24.  - pain control and care per ortho

## 2024-02-15 NOTE — ED ADULT NURSE REASSESSMENT NOTE - NSFALLHARMRISKINTERV_ED_ALL_ED
Assistance OOB with selected safe patient handling equipment if applicable/Communicate risk of Fall with Harm to all staff, patient, and family/Provide patient with walking aids/Provide visual cue: red socks, yellow wristband, yellow gown, etc/Reinforce activity limits and safety measures with patient and family/Bed in lowest position, wheels locked, appropriate side rails in place/Call bell, personal items and telephone in reach/Instruct patient to call for assistance before getting out of bed/chair/stretcher/Non-slip footwear applied when patient is off stretcher/[place] to call system/Physically safe environment - no spills, clutter or unnecessary equipment/Purposeful Proactive Rounding/Room/bathroom lighting operational, light cord in reach

## 2024-02-15 NOTE — H&P ADULT - HISTORY OF PRESENT ILLNESS
HPI  83yFemale w PMH of dementia, HTN, [facility] now w left hip pain.  On exam pt A&Ox1 w nonsensical tangential speech and not accurately answering questions.  She says her L side hurts but does not know what happened.  Per note, unable to bear weight in the LLE since the fall. No reported headstrike or LOC. Of note pt had mechanical fall December 2023 w R IT fx s/p IMN 12/16.

## 2024-02-15 NOTE — ED PROVIDER NOTE - OBJECTIVE STATEMENT
[place] - 84 y/o F transfer from NH where she reportedly fell out of bed this morning.  Subsequent outpatient xray showed left hip fracture and referred to ED.  Patient c/o pain in left hip.  Does not recall falling.  Denies headache, blurry vision.

## 2024-02-15 NOTE — ED ADULT NURSE NOTE - NSFALLHARMRISKINTERV_ED_ALL_ED
Assistance OOB with selected safe patient handling equipment if applicable/Assistance with ambulation/Communicate risk of Fall with Harm to all staff, patient, and family/Monitor gait and stability/Monitor for mental status changes and reorient to person, place, and time, as needed/Move patient closer to nursing station/within visual sight of ED staff/Provide visual cue: red socks, yellow wristband, yellow gown, etc/Reinforce activity limits and safety measures with patient and family/Toileting schedule using arm’s reach rule for commode and bathroom/Use of alarms - bed, stretcher, chair and/or video monitoring/Bed in lowest position, wheels locked, appropriate side rails in place/Call bell, personal items and telephone in reach/Instruct patient to call for assistance before getting out of bed/chair/stretcher/Non-slip footwear applied when patient is off stretcher/[place] to call system/Physically safe environment - no spills, clutter or unnecessary equipment/Purposeful Proactive Rounding/Room/bathroom lighting operational, light cord in reach

## 2024-02-15 NOTE — CONSULT NOTE ADULT - PROBLEM SELECTOR RECOMMENDATION 2
EKG showing NSR without acute ischemic changes and with normal intervals. TTE from 12/11/23 w/ nml LVEF and w/o evidence of significant valvular disease. Pt tolerated recent surgery 12/16/24 for right IT fx s/p IMN.   Pt w/ baseline dementia, A&Ox1 however pt w/o any evidence of active cardiac conditions, including unstable coronary syndrome, decompensated CHF, significant arrhythmias, or severe valvular disease.   - RCRI score of 0, which equates to a 3.9% 30-day risk of death, MI, or cardiac arrest  - Per nursing home documentation, pt dependent for all ADLs and nonambulatory.  - Pt does not require any further evaluation prior to surgery. Pt medically optimized for OR.

## 2024-02-15 NOTE — CONSULT NOTE ADULT - PROBLEM SELECTOR RECOMMENDATION 3
Prelim read of CT pelvis w/ possible evidence of fecal impaction and stercoral colitis.  - recommend starting bowel regimen, miralax, senna. May need disimpaction.

## 2024-02-15 NOTE — ED PROVIDER NOTE - PHYSICAL EXAMINATION
ATTENDING PHYSICAL EXAM  GEN - NAD; well appearing; answers all questions appropriately.  HEAD - NC/AT; EYES/NOSE - PERRL, EOMI, mucous membranes moist, no discharge; THROAT: Oral cavity and pharynx normal. No inflammation, swelling, exudate, or lesions  NECK: Neck supple, non-tender without lymphadenopathy, no masses, no JVD  PULMONARY - CTA b/l, symmetric breath sounds, no w/r/r  CARDIAC -s1s2, RRR, no M,R,G  ABDOMEN - +NABS, ND, NT, soft, no guarding, no rebound, no masses   BACK - no CVA tenderness, No vertebral or paravertebral tenderness  EXTREMITIES - + left hip TTP.  LLE externally rotated.  Distal PMS intact.  SKIN - no rash or bruising   NEUROLOGIC - alert, CN 2-12 intact, no motor or sensory deficits (besides left hip due to fracture).
Pt recommended for puree with moderate thick liquids during rehab admission 12/12. Noted with hx constipation, fair appetite per previous RD note.

## 2024-02-15 NOTE — ED PROVIDER NOTE - CLINICAL SUMMARY MEDICAL DECISION MAKING FREE TEXT BOX
82 y/o F with left hip fracture on outpatient xray after fall out of bed this morning.  Will repeat xray as do not have access to images.  Check pre-op labs.  No CTH indicated as patient without evidence of head trauma, baseline appropriate mental status, no neuro deficits.  Ortho eval.  Admit.

## 2024-02-16 DIAGNOSIS — I10 ESSENTIAL (PRIMARY) HYPERTENSION: ICD-10-CM

## 2024-02-16 DIAGNOSIS — K59.00 CONSTIPATION, UNSPECIFIED: ICD-10-CM

## 2024-02-16 DIAGNOSIS — S72.002A FRACTURE OF UNSPECIFIED PART OF NECK OF LEFT FEMUR, INITIAL ENCOUNTER FOR CLOSED FRACTURE: ICD-10-CM

## 2024-02-16 DIAGNOSIS — Z29.9 ENCOUNTER FOR PROPHYLACTIC MEASURES, UNSPECIFIED: ICD-10-CM

## 2024-02-16 DIAGNOSIS — Z01.818 ENCOUNTER FOR OTHER PREPROCEDURAL EXAMINATION: ICD-10-CM

## 2024-02-16 DIAGNOSIS — F03.90 UNSPECIFIED DEMENTIA WITHOUT BEHAVIORAL DISTURBANCE: ICD-10-CM

## 2024-02-16 LAB
ANION GAP SERPL CALC-SCNC: 11 MMOL/L — SIGNIFICANT CHANGE UP (ref 7–14)
ANION GAP SERPL CALC-SCNC: 12 MMOL/L — SIGNIFICANT CHANGE UP (ref 7–14)
APPEARANCE UR: CLEAR — SIGNIFICANT CHANGE UP
APTT BLD: 28.9 SEC — SIGNIFICANT CHANGE UP (ref 24.5–35.6)
BACTERIA # UR AUTO: NEGATIVE /HPF — SIGNIFICANT CHANGE UP
BASE EXCESS BLDV CALC-SCNC: 2.8 MMOL/L — SIGNIFICANT CHANGE UP (ref -2–3)
BASOPHILS # BLD AUTO: 0.04 K/UL — SIGNIFICANT CHANGE UP (ref 0–0.2)
BASOPHILS NFR BLD AUTO: 0.4 % — SIGNIFICANT CHANGE UP (ref 0–2)
BILIRUB UR-MCNC: NEGATIVE — SIGNIFICANT CHANGE UP
BLD GP AB SCN SERPL QL: NEGATIVE — SIGNIFICANT CHANGE UP
BLOOD GAS VENOUS COMPREHENSIVE RESULT: SIGNIFICANT CHANGE UP
BUN SERPL-MCNC: 17 MG/DL — SIGNIFICANT CHANGE UP (ref 7–23)
BUN SERPL-MCNC: 24 MG/DL — HIGH (ref 7–23)
CALCIUM SERPL-MCNC: 7.5 MG/DL — LOW (ref 8.4–10.5)
CALCIUM SERPL-MCNC: 9.6 MG/DL — SIGNIFICANT CHANGE UP (ref 8.4–10.5)
CAST: 0 /LPF — SIGNIFICANT CHANGE UP (ref 0–4)
CHLORIDE BLDV-SCNC: 102 MMOL/L — SIGNIFICANT CHANGE UP (ref 96–108)
CHLORIDE SERPL-SCNC: 102 MMOL/L — SIGNIFICANT CHANGE UP (ref 98–107)
CHLORIDE SERPL-SCNC: 109 MMOL/L — HIGH (ref 98–107)
CO2 BLDV-SCNC: 29.3 MMOL/L — HIGH (ref 22–26)
CO2 SERPL-SCNC: 18 MMOL/L — LOW (ref 22–31)
CO2 SERPL-SCNC: 25 MMOL/L — SIGNIFICANT CHANGE UP (ref 22–31)
COD CRY URNS QL: PRESENT
COLOR SPEC: YELLOW — SIGNIFICANT CHANGE UP
CREAT SERPL-MCNC: 0.35 MG/DL — LOW (ref 0.5–1.3)
CREAT SERPL-MCNC: 0.49 MG/DL — LOW (ref 0.5–1.3)
CULTURE RESULTS: NO GROWTH — SIGNIFICANT CHANGE UP
DIFF PNL FLD: NEGATIVE — SIGNIFICANT CHANGE UP
EGFR: 102 ML/MIN/1.73M2 — SIGNIFICANT CHANGE UP
EGFR: 94 ML/MIN/1.73M2 — SIGNIFICANT CHANGE UP
EOSINOPHIL # BLD AUTO: 0.1 K/UL — SIGNIFICANT CHANGE UP (ref 0–0.5)
EOSINOPHIL NFR BLD AUTO: 1.1 % — SIGNIFICANT CHANGE UP (ref 0–6)
GAS PNL BLDV: 133 MMOL/L — LOW (ref 136–145)
GLUCOSE BLDV-MCNC: 108 MG/DL — HIGH (ref 70–99)
GLUCOSE SERPL-MCNC: 107 MG/DL — HIGH (ref 70–99)
GLUCOSE SERPL-MCNC: 79 MG/DL — SIGNIFICANT CHANGE UP (ref 70–99)
GLUCOSE UR QL: NEGATIVE MG/DL — SIGNIFICANT CHANGE UP
HCO3 BLDV-SCNC: 28 MMOL/L — SIGNIFICANT CHANGE UP (ref 22–29)
HCT VFR BLD CALC: 33.6 % — LOW (ref 34.5–45)
HCT VFR BLD CALC: 36 % — SIGNIFICANT CHANGE UP (ref 34.5–45)
HCT VFR BLDA CALC: 34 % — LOW (ref 34.5–46.5)
HGB BLD CALC-MCNC: 11.3 G/DL — LOW (ref 11.7–16.1)
HGB BLD-MCNC: 10.6 G/DL — LOW (ref 11.5–15.5)
HGB BLD-MCNC: 11.6 G/DL — SIGNIFICANT CHANGE UP (ref 11.5–15.5)
IANC: 6.36 K/UL — SIGNIFICANT CHANGE UP (ref 1.8–7.4)
IMM GRANULOCYTES NFR BLD AUTO: 0.2 % — SIGNIFICANT CHANGE UP (ref 0–0.9)
INR BLD: 1.04 RATIO — SIGNIFICANT CHANGE UP (ref 0.85–1.18)
KETONES UR-MCNC: 15 MG/DL
LACTATE BLDV-MCNC: 1.4 MMOL/L — SIGNIFICANT CHANGE UP (ref 0.5–2)
LEUKOCYTE ESTERASE UR-ACNC: NEGATIVE — SIGNIFICANT CHANGE UP
LYMPHOCYTES # BLD AUTO: 1.88 K/UL — SIGNIFICANT CHANGE UP (ref 1–3.3)
LYMPHOCYTES # BLD AUTO: 20.8 % — SIGNIFICANT CHANGE UP (ref 13–44)
MCHC RBC-ENTMCNC: 30.9 PG — SIGNIFICANT CHANGE UP (ref 27–34)
MCHC RBC-ENTMCNC: 30.9 PG — SIGNIFICANT CHANGE UP (ref 27–34)
MCHC RBC-ENTMCNC: 31.5 GM/DL — LOW (ref 32–36)
MCHC RBC-ENTMCNC: 32.2 GM/DL — SIGNIFICANT CHANGE UP (ref 32–36)
MCV RBC AUTO: 95.7 FL — SIGNIFICANT CHANGE UP (ref 80–100)
MCV RBC AUTO: 98 FL — SIGNIFICANT CHANGE UP (ref 80–100)
MONOCYTES # BLD AUTO: 0.65 K/UL — SIGNIFICANT CHANGE UP (ref 0–0.9)
MONOCYTES NFR BLD AUTO: 7.2 % — SIGNIFICANT CHANGE UP (ref 2–14)
NEUTROPHILS # BLD AUTO: 6.36 K/UL — SIGNIFICANT CHANGE UP (ref 1.8–7.4)
NEUTROPHILS NFR BLD AUTO: 70.3 % — SIGNIFICANT CHANGE UP (ref 43–77)
NITRITE UR-MCNC: NEGATIVE — SIGNIFICANT CHANGE UP
NRBC # BLD: 0 /100 WBCS — SIGNIFICANT CHANGE UP (ref 0–0)
NRBC # BLD: 0 /100 WBCS — SIGNIFICANT CHANGE UP (ref 0–0)
NRBC # FLD: 0 K/UL — SIGNIFICANT CHANGE UP (ref 0–0)
NRBC # FLD: 0 K/UL — SIGNIFICANT CHANGE UP (ref 0–0)
PCO2 BLDV: 44 MMHG — SIGNIFICANT CHANGE UP (ref 39–52)
PH BLDV: 7.41 — SIGNIFICANT CHANGE UP (ref 7.32–7.43)
PH UR: 5.5 — SIGNIFICANT CHANGE UP (ref 5–8)
PLATELET # BLD AUTO: 512 K/UL — HIGH (ref 150–400)
PLATELET # BLD AUTO: 572 K/UL — HIGH (ref 150–400)
PO2 BLDV: 48 MMHG — HIGH (ref 25–45)
POTASSIUM BLDV-SCNC: 4.3 MMOL/L — SIGNIFICANT CHANGE UP (ref 3.5–5.1)
POTASSIUM SERPL-MCNC: 3.5 MMOL/L — SIGNIFICANT CHANGE UP (ref 3.5–5.3)
POTASSIUM SERPL-MCNC: 4.2 MMOL/L — SIGNIFICANT CHANGE UP (ref 3.5–5.3)
POTASSIUM SERPL-SCNC: 3.5 MMOL/L — SIGNIFICANT CHANGE UP (ref 3.5–5.3)
POTASSIUM SERPL-SCNC: 4.2 MMOL/L — SIGNIFICANT CHANGE UP (ref 3.5–5.3)
PROT UR-MCNC: SIGNIFICANT CHANGE UP MG/DL
PROTHROM AB SERPL-ACNC: 11.6 SEC — SIGNIFICANT CHANGE UP (ref 9.5–13)
RBC # BLD: 3.43 M/UL — LOW (ref 3.8–5.2)
RBC # BLD: 3.76 M/UL — LOW (ref 3.8–5.2)
RBC # FLD: 15.2 % — HIGH (ref 10.3–14.5)
RBC # FLD: 15.3 % — HIGH (ref 10.3–14.5)
RBC CASTS # UR COMP ASSIST: 3 /HPF — SIGNIFICANT CHANGE UP (ref 0–4)
REVIEW: SIGNIFICANT CHANGE UP
RH IG SCN BLD-IMP: POSITIVE — SIGNIFICANT CHANGE UP
SAO2 % BLDV: 80.9 % — SIGNIFICANT CHANGE UP (ref 67–88)
SODIUM SERPL-SCNC: 138 MMOL/L — SIGNIFICANT CHANGE UP (ref 135–145)
SODIUM SERPL-SCNC: 139 MMOL/L — SIGNIFICANT CHANGE UP (ref 135–145)
SP GR SPEC: 1.04 — HIGH (ref 1–1.03)
SPECIMEN SOURCE: SIGNIFICANT CHANGE UP
SQUAMOUS # UR AUTO: 4 /HPF — SIGNIFICANT CHANGE UP (ref 0–5)
UROBILINOGEN FLD QL: 1 MG/DL — SIGNIFICANT CHANGE UP (ref 0.2–1)
WBC # BLD: 13.74 K/UL — HIGH (ref 3.8–10.5)
WBC # BLD: 9.05 K/UL — SIGNIFICANT CHANGE UP (ref 3.8–10.5)
WBC # FLD AUTO: 13.74 K/UL — HIGH (ref 3.8–10.5)
WBC # FLD AUTO: 9.05 K/UL — SIGNIFICANT CHANGE UP (ref 3.8–10.5)
WBC UR QL: 1 /HPF — SIGNIFICANT CHANGE UP (ref 0–5)

## 2024-02-16 PROCEDURE — 99232 SBSQ HOSP IP/OBS MODERATE 35: CPT

## 2024-02-16 PROCEDURE — 27245 TREAT THIGH FRACTURE: CPT | Mod: LT

## 2024-02-16 DEVICE — IMPLANTABLE DEVICE: Type: IMPLANTABLE DEVICE | Site: LEFT | Status: FUNCTIONAL

## 2024-02-16 DEVICE — BLADE SYNTHES TI HELICAL 85MM: Type: IMPLANTABLE DEVICE | Site: LEFT | Status: FUNCTIONAL

## 2024-02-16 DEVICE — SCREW LOCKING 5X30MM: Type: IMPLANTABLE DEVICE | Site: LEFT | Status: FUNCTIONAL

## 2024-02-16 RX ORDER — SODIUM CHLORIDE 9 MG/ML
500 INJECTION, SOLUTION INTRAVENOUS ONCE
Refills: 0 | Status: COMPLETED | OUTPATIENT
Start: 2024-02-16 | End: 2024-02-16

## 2024-02-16 RX ORDER — SODIUM CHLORIDE 9 MG/ML
1000 INJECTION INTRAMUSCULAR; INTRAVENOUS; SUBCUTANEOUS
Refills: 0 | Status: DISCONTINUED | OUTPATIENT
Start: 2024-02-16 | End: 2024-02-16

## 2024-02-16 RX ORDER — ONDANSETRON 8 MG/1
4 TABLET, FILM COATED ORAL EVERY 6 HOURS
Refills: 0 | Status: DISCONTINUED | OUTPATIENT
Start: 2024-02-16 | End: 2024-02-19

## 2024-02-16 RX ORDER — ENOXAPARIN SODIUM 100 MG/ML
40 INJECTION SUBCUTANEOUS EVERY 24 HOURS
Refills: 0 | Status: DISCONTINUED | OUTPATIENT
Start: 2024-02-17 | End: 2024-02-19

## 2024-02-16 RX ORDER — CEFAZOLIN SODIUM 1 G
1000 VIAL (EA) INJECTION EVERY 8 HOURS
Refills: 0 | Status: COMPLETED | OUTPATIENT
Start: 2024-02-16 | End: 2024-02-17

## 2024-02-16 RX ORDER — FENTANYL CITRATE 50 UG/ML
25 INJECTION INTRAVENOUS
Refills: 0 | Status: DISCONTINUED | OUTPATIENT
Start: 2024-02-16 | End: 2024-02-16

## 2024-02-16 RX ADMIN — SODIUM CHLORIDE 75 MILLILITER(S): 9 INJECTION INTRAMUSCULAR; INTRAVENOUS; SUBCUTANEOUS at 00:49

## 2024-02-16 RX ADMIN — MEMANTINE HYDROCHLORIDE 10 MILLIGRAM(S): 10 TABLET ORAL at 05:10

## 2024-02-16 RX ADMIN — OXYCODONE HYDROCHLORIDE 5 MILLIGRAM(S): 5 TABLET ORAL at 05:37

## 2024-02-16 RX ADMIN — MEMANTINE HYDROCHLORIDE 10 MILLIGRAM(S): 10 TABLET ORAL at 22:02

## 2024-02-16 RX ADMIN — Medication 975 MILLIGRAM(S): at 05:10

## 2024-02-16 RX ADMIN — Medication 975 MILLIGRAM(S): at 13:30

## 2024-02-16 RX ADMIN — SODIUM CHLORIDE 1000 MILLILITER(S): 9 INJECTION, SOLUTION INTRAVENOUS at 18:24

## 2024-02-16 RX ADMIN — Medication 1000 MILLIGRAM(S): at 00:05

## 2024-02-16 NOTE — PATIENT PROFILE ADULT - FUNCTIONAL ASSESSMENT - DAILY ACTIVITY SECTION LABEL
[facility]  INFECTIOUS DISEASES at [place] and [place]  ===============================================================                               Joe Oviedo MD*     Merlene Ghosh MD*                         Fuentes Luke MD*       Martha Pollard MD*            Diplomates American Board of Internal Medicine & Infectious Diseases                * [place] Office - Appt - Tel  [phone] Fax [phone]                * [place] Office - Appt - Tel [phone] Fax [phone]                                  Hospital Consult line:  [phone]  ==============================================================    [place] [id number]    Follow up: RLE cellulitis    Seen earlier today   Worsening right leg erythema  Afebrile  hemodynamically stable     I have personally reviewed the labs and data; pertinent labs and data are listed in this note; please see below.     _______________________________________________________________  REVIEW OF SYSTEMS  No side effects from abx. Worsening swelling, erythema and tenderness on right leg   ________________________________________________________________  Allergies:  fish (Unknown)  No Known Drug Allergies  shellfish (Vomiting; Nausea)        ________________________________________________________________  PHYSICAL EXAM  GEN: NAD, lying in bed. Obese  HEENT: Anicteric sclerae,   LUNGS: eupneic.   : No Crowley catheter  NEUROLOGIC: Grossly no focal deficits   PSYCHIATRIC: Appropriate affect and mood  SKIN: RLE with worsening erythema, tenderness and swelling. Discrete areas appear to be forming bullae  LINES: PIV  ________________________________________________________________  Vitals:  T(F): 99 (09 Mar 2023 12:17), Max: 99.6 (08 Mar 2023 21:35)  HR: 79 (09 Mar 2023 12:17)  BP: 121/74 (09 Mar 2023 12:17)  RR: 19 (09 Mar 2023 12:17)  SpO2: 92% (09 Mar 2023 12:17) (92% - 97%)  temp max in last 48H T(F): , Max: 99.6 (03-08-23 @ 21:35)    Current Antibiotics:  DAPTOmycin IVPB 450 milliGRAM(s) IV Intermittent every 24 hours  piperacillin/tazobactam IVPB.. 3.375 Gram(s) IV Intermittent every 8 hours    Other medications:  allopurinol 300 milliGRAM(s) Oral daily  aMIOdarone    Tablet 200 milliGRAM(s) Oral daily  amLODIPine   Tablet 10 milliGRAM(s) Oral daily  aspirin  chewable 81 milliGRAM(s) Oral daily  atorvastatin 20 milliGRAM(s) Oral at bedtime  dextrose 5%. 1000 milliLiter(s) IV Continuous <Continuous>  dextrose 50% Injectable 25 Gram(s) IV Push once  DULoxetine 60 milliGRAM(s) Oral daily  glucagon  Injectable 1 milliGRAM(s) IntraMuscular once  influenza  Vaccine (HIGH DOSE) 0.7 milliLiter(s) IntraMuscular once  insulin glargine Injectable (LANTUS) 10 Unit(s) SubCutaneous at bedtime  insulin lispro (ADMELOG) corrective regimen sliding scale   SubCutaneous Before meals and at bedtime  insulin lispro Injectable (ADMELOG) 4 Unit(s) SubCutaneous before dinner  insulin lispro Injectable (ADMELOG) 4 Unit(s) SubCutaneous before breakfast  insulin lispro Injectable (ADMELOG) 4 Unit(s) SubCutaneous before lunch  levothyroxine 50 MICROGram(s) Oral daily  lisinopril 40 milliGRAM(s) Oral daily  pantoprazole    Tablet 40 milliGRAM(s) Oral before breakfast  rivaroxaban 15 milliGRAM(s) Oral with dinner  tamsulosin 0.4 milliGRAM(s) Oral at bedtime                            11.6   10.94 )-----------( 288      ( 09 Mar 2023 05:38 )             37.1     03-09    136  |  101  |  21.2<H>  ----------------------------<  184<H>  4.1   |  24.0  |  1.44<H>    Ca    9.0      09 Mar 2023 05:38  Phos  3.0     03-09  Mg     2.3     03-09    TPro  7.2  /  Alb  3.2<L>  /  TBili  0.4  /  DBili  x   /  AST  56<H>  /  ALT  67<H>  /  AlkPhos  112  03-09    RECENT CULTURES:  03-05 @ 22:50    RVP with SARS-CoV-2   NotDetec      03-05 @ 22:00 .Blood Blood-Peripheral     No growth to date.      03-05 @ 21:50 .Blood Blood-Peripheral     No growth to date.        WBC Count: 10.94 K/uL (03-09-23 @ 05:38)  WBC Count: 11.75 K/uL (03-08-23 @ 05:01)  WBC Count: 15.22 K/uL (03-07-23 @ 06:21)  WBC Count: 18.41 K/uL (03-06-23 @ 08:34)  WBC Count: 20.88 K/uL (03-05-23 @ 22:50)    Creatinine, Serum: 1.44 mg/dL (03-09-23 @ 05:38)  Creatinine, Serum: 1.44 mg/dL (03-08-23 @ 05:01)  Creatinine, Serum: 1.53 mg/dL (03-07-23 @ 06:21)  Creatinine, Serum: 1.60 mg/dL (03-06-23 @ 08:34)  Creatinine, Serum: 1.57 mg/dL (03-05-23 @ 22:50)       SARS-CoV-2: NotDetec (03-05-23 @ 22:50)    ________________________________________________________________  RADIOLOGY    < from: CT Lower Extremity No Cont, Right (03.09.23 @ 10:03) >  IMPRESSION:    Subcutaneous edema and skin thickening throughout the right lower   extremity which is circumferential and confluent from the level of the   proximal tibias/fibula to the level of the foot. This may be related to   underlying cellulitis. Evaluation for underlying fluid collection is   limited by lack of intravenous contrast. No subcutaneous air. No CT   evidence of osteomyelitis.    < end of copied text >     .

## 2024-02-16 NOTE — PATIENT PROFILE ADULT - FALL HARM RISK - HARM RISK INTERVENTIONS
Assistance with ambulation/Assistance OOB with selected safe patient handling equipment/Communicate Risk of Fall with Harm to all staff/Discuss with provider need for PT consult/Monitor gait and stability/Provide patient with walking aids - walker, cane, crutches/Reinforce activity limits and safety measures with patient and family/Sit up slowly, dangle for a short time, stand at bedside before walking/Tailored Fall Risk Interventions/Use of alarms - bed, chair and/or voice tab/Visual Cue: Yellow wristband and red socks/Bed in lowest position, wheels locked, appropriate side rails in place/Call bell, personal items and telephone in reach/Instruct patient to call for assistance before getting out of bed or chair/Non-slip footwear when patient is out of bed/[place] to call system/Physically safe environment - no spills, clutter or unnecessary equipment/Purposeful Proactive Rounding/Room/bathroom lighting operational, light cord in reach

## 2024-02-16 NOTE — PROGRESS NOTE ADULT - SUBJECTIVE AND OBJECTIVE BOX
CHIEF COMPLAINT: f/u     SUBJECTIVE / OVERNIGHT EVENTS: Patient seen and examined. Confused, unable to participate in HPI. Denies any hip pain at this time. Also denies chest pain and shortness of breath.     MEDICATIONS  (STANDING):  acetaminophen     Tablet .. 975 milliGRAM(s) Oral every 8 hours  memantine 10 milliGRAM(s) Oral two times a day  senna 2 Tablet(s) Oral at bedtime  sodium chloride 0.9%. 1000 milliLiter(s) (75 mL/Hr) IV Continuous <Continuous>    MEDICATIONS  (PRN):  fentaNYL    Injectable 25 MICROGram(s) IV Push every 5 minutes PRN Moderate Pain (4 - 6)  melatonin 3 milliGRAM(s) Oral at bedtime PRN Insomnia  oxyCODONE    IR 2.5 milliGRAM(s) Oral every 4 hours PRN Moderate Pain (4 - 6)  oxyCODONE    IR 5 milliGRAM(s) Oral every 4 hours PRN Severe Pain (7 - 10)      VITALS:  T(F): 98.6 (24 @ 14:15), Max: 98.6 (24 @ 14:15)  HR: 95 (24 @ 14:15) (65 - 95)  BP: 146/57 (24 @ 14:15) (119/67 - 151/64)  RR: 16 (24 @ 14:15) (16 - 16)  SpO2: 97% (24 @ 14:15)  Wt(kg): --    Weight (kg): 53.569 (14:15)    PHYSICAL EXAM:  GENERAL: elderly female in NAD  HEAD:  Atraumatic, Normocephalic  EYES: Conjunctiva and sclera clear  CHEST/LUNG: Clear to auscultation bilaterally; No wheeze  HEART: Regular rate and rhythm; No murmurs, rubs, or gallops  ABDOMEN: Soft, Nontender, Nondistended; Bowel sounds present  EXTREMITIES:  2+ Peripheral Pulses, No clubbing, cyanosis, or edema  PSYCH: AAOx1 (knows name and , does not know the year but knows month, knows she is not at home but not sure where she is, would like for me to speak to her mother)  NEUROLOGY: sensation of bilateral LE intact     LABS:              11.6                 139  | 25   | 24           9.05  >-----------< 572     ------------------------< 107                   36.0                 4.2  | 102  | 0.49                                         Ca 9.6   Mg x     Ph x           TPro  6.2  /  Alb  3.2      TBili  0.3  /  DBili  x         AST  30  /  ALT  24            AlkPhos  266      INR: 1.04 ratio;    PT: 11.6 sec;    PTT: 28.9 sec        Urinalysis Basic - ( 2024 02:26 )    Color: x / Appearance: x / SG: x / pH: x  Gluc: 107 mg/dL / Ketone: x  / Bili: x / Urobili: x   Blood: x / Protein: x / Nitrite: x   Leuk Esterase: x / RBC: x / WBC x   Sq Epi: x / Non Sq Epi: x / Bacteria: x        RADIOLOGY & ADDITIONAL TESTS:  Imaging Personally Reviewed: [x] Yes    [x] Care Discussed with Consultants/Other Providers: Orthopedic PA - discussed

## 2024-02-16 NOTE — BRIEF OPERATIVE NOTE - NSICDXBRIEFPROCEDURE_GEN_ALL_CORE_FT
PROCEDURES:  Intramedullary nailing of left hip using locking nail with lag screw in femoral neck with distal locking screw 16-Feb-2024 19:54:30  Melissa Johnson

## 2024-02-16 NOTE — PATIENT PROFILE ADULT - SAFE PLACE TO LIVE
Please call patient's pharmacy and refill her Estradiol as is ( I believe it is twice a week) give 1 month with 5 refills. Problem is that it is not showing up on med rec.   
no

## 2024-02-16 NOTE — PROGRESS NOTE ADULT - SUBJECTIVE AND OBJECTIVE BOX
ORTHOPAEDIC PROGRESS NOTE    SUBJECTIVE:  Pt seen and examined at bedside in ED this morning.  A&O x 1 at this time.  Reports minimal pain. No acute events overnight.     OBJECTIVE:  Vital Signs Last 24 Hrs  T(C): 36.8 (16 Feb 2024 05:14), Max: 36.8 (15 Feb 2024 17:39)  T(F): 98.2 (16 Feb 2024 05:14), Max: 98.2 (15 Feb 2024 17:39)  HR: 92 (16 Feb 2024 05:14) (65 - 92)  BP: 120/64 (16 Feb 2024 05:14) (119/67 - 151/64)  BP(mean): --  RR: 16 (16 Feb 2024 05:14) (16 - 16)  SpO2: 98% (16 Feb 2024 05:14) (98% - 99%)    Parameters below as of 16 Feb 2024 05:14  Patient On (Oxygen Delivery Method): room air        Physical Exam:  Gen: Lying in bed, NAD  Resp: No increased WOB  LLE:  Skin intact, shortened and externally rotated  No appreciable edema or ecchymoses noted over L hip  +TTP over L hip, no TTP along remainder of extremity; compartments soft  Limited ROM at hip 2/2 pain  +Log roll test  No Pain with axial loading  Motor: TA/EHL/GS/FHL intact  Sensory: DP/SP/Tib/Duke/Saph SILT  +DP pulse, WWP      LABS                        11.6   9.05  )-----------( 572      ( 16 Feb 2024 02:26 )             36.0       02-16    139  |  102  |  24<H>  ----------------------------<  107<H>  4.2   |  25  |  0.49<L>    Ca    9.6      16 Feb 2024 02:26    TPro  6.2  /  Alb  3.2<L>  /  TBili  0.3  /  DBili  x   /  AST  30  /  ALT  24  /  AlkPhos  266<H>  02-15      PT/INR - ( 16 Feb 2024 02:26 )   PT: 11.6 sec;   INR: 1.04 ratio         PTT - ( 16 Feb 2024 02:26 )  PTT:28.9 sec    I&O's Summary

## 2024-02-16 NOTE — PROGRESS NOTE ADULT - SUBJECTIVE AND OBJECTIVE BOX
ORTHO ATTENDING POST OP    S/P IM FIXATION  L  HIP  WBAT  L  LE  lovenox 40 QD  venodynes  ancef 1 g x 24 h  OOB to chair in AM  rehab consult   f/u medicine  CBC in RR and AM

## 2024-02-16 NOTE — ASU PREOP CHECKLIST - ALLERGY BAND ON
no known allergies
Principal Discharge DX:	Unilateral primary osteoarthritis, left hip  Goal:	Pain reduction, improve ambulation and ADLs  Assessment and plan of treatment:	Please follow up with Dr De La Torre in 1-2 weeks. Call office to make an appointment. Please follow up with your PMD for continuity of care and management. Please keep aquacel dressing in place until post op day # 14. Any sutures/staples to be removed on post op day # 14.  Please take aspirin twice daily for DVT Prophylaxis. Weight bearing as tolerated. Use total hip precautions. Call orthopaedics with any questions.

## 2024-02-16 NOTE — CHART NOTE - NSCHARTNOTEFT_GEN_A_CORE
Post-operative Check    SUBJECTIVE:   No acute events in the immediate post-operative period.  Patient feeling well.    Laying in bed comfortably  Pain well controlled.     OBJECTIVE:  T(C): 36.1 (02-16-24 @ 17:05), Max: 37 (02-16-24 @ 14:15)  HR: 96 (02-16-24 @ 19:00) (72 - 108)  BP: 125/66 (02-16-24 @ 19:00) (119/67 - 162/72)  RR: 16 (02-16-24 @ 19:00) (11 - 16)  SpO2: 97% (02-16-24 @ 19:00) (97% - 100%)      Physical Exam:   - Constitutional:  NAD  - Respiratory: nonlabored  - Left Lowe Extremity:      Dressing CDI     + TA/Gas/EHL/FHL      SILT     DP palpable        ASSESSMENT:   DIAMOND LAGUNAS is a 83y Female sp Left IMN on 2/16 for Basicervical Femoral neck fracture. Recovering appropriately     PLAN:  - Multimodal pain control  - WBAT  - DVT: Lovenox  - Abx 24hr  - PT/OT  - AM labs  - Resume home meds    Orthopaedic Surgery  [facility] [id number]  J        [id number]  [facility]  [id number]/ [phone]

## 2024-02-17 LAB
ANION GAP SERPL CALC-SCNC: 14 MMOL/L — SIGNIFICANT CHANGE UP (ref 7–14)
BUN SERPL-MCNC: 14 MG/DL — SIGNIFICANT CHANGE UP (ref 7–23)
CALCIUM SERPL-MCNC: 9.1 MG/DL — SIGNIFICANT CHANGE UP (ref 8.4–10.5)
CHLORIDE SERPL-SCNC: 100 MMOL/L — SIGNIFICANT CHANGE UP (ref 98–107)
CO2 SERPL-SCNC: 21 MMOL/L — LOW (ref 22–31)
CREAT SERPL-MCNC: 0.42 MG/DL — LOW (ref 0.5–1.3)
EGFR: 98 ML/MIN/1.73M2 — SIGNIFICANT CHANGE UP
GLUCOSE SERPL-MCNC: 94 MG/DL — SIGNIFICANT CHANGE UP (ref 70–99)
HCT VFR BLD CALC: 32.2 % — LOW (ref 34.5–45)
HGB BLD-MCNC: 10.4 G/DL — LOW (ref 11.5–15.5)
MCHC RBC-ENTMCNC: 31.7 PG — SIGNIFICANT CHANGE UP (ref 27–34)
MCHC RBC-ENTMCNC: 32.3 GM/DL — SIGNIFICANT CHANGE UP (ref 32–36)
MCV RBC AUTO: 98.2 FL — SIGNIFICANT CHANGE UP (ref 80–100)
NRBC # BLD: 0 /100 WBCS — SIGNIFICANT CHANGE UP (ref 0–0)
NRBC # FLD: 0 K/UL — SIGNIFICANT CHANGE UP (ref 0–0)
PLATELET # BLD AUTO: 527 K/UL — HIGH (ref 150–400)
POTASSIUM SERPL-MCNC: 4.4 MMOL/L — SIGNIFICANT CHANGE UP (ref 3.5–5.3)
POTASSIUM SERPL-SCNC: 4.4 MMOL/L — SIGNIFICANT CHANGE UP (ref 3.5–5.3)
RBC # BLD: 3.28 M/UL — LOW (ref 3.8–5.2)
RBC # FLD: 15.3 % — HIGH (ref 10.3–14.5)
SODIUM SERPL-SCNC: 135 MMOL/L — SIGNIFICANT CHANGE UP (ref 135–145)
WBC # BLD: 10 K/UL — SIGNIFICANT CHANGE UP (ref 3.8–10.5)
WBC # FLD AUTO: 10 K/UL — SIGNIFICANT CHANGE UP (ref 3.8–10.5)

## 2024-02-17 PROCEDURE — 99232 SBSQ HOSP IP/OBS MODERATE 35: CPT

## 2024-02-17 RX ORDER — POLYETHYLENE GLYCOL 3350 17 G/17G
17 POWDER, FOR SOLUTION ORAL DAILY
Refills: 0 | Status: DISCONTINUED | OUTPATIENT
Start: 2024-02-17 | End: 2024-02-19

## 2024-02-17 RX ADMIN — Medication 100 MILLIGRAM(S): at 07:10

## 2024-02-17 RX ADMIN — MEMANTINE HYDROCHLORIDE 10 MILLIGRAM(S): 10 TABLET ORAL at 17:15

## 2024-02-17 RX ADMIN — OXYCODONE HYDROCHLORIDE 5 MILLIGRAM(S): 5 TABLET ORAL at 08:10

## 2024-02-17 RX ADMIN — Medication 100 MILLIGRAM(S): at 00:17

## 2024-02-17 RX ADMIN — OXYCODONE HYDROCHLORIDE 5 MILLIGRAM(S): 5 TABLET ORAL at 07:10

## 2024-02-17 RX ADMIN — ENOXAPARIN SODIUM 40 MILLIGRAM(S): 100 INJECTION SUBCUTANEOUS at 05:34

## 2024-02-17 RX ADMIN — Medication 975 MILLIGRAM(S): at 14:06

## 2024-02-17 RX ADMIN — Medication 975 MILLIGRAM(S): at 06:34

## 2024-02-17 RX ADMIN — Medication 975 MILLIGRAM(S): at 21:05

## 2024-02-17 RX ADMIN — Medication 975 MILLIGRAM(S): at 22:01

## 2024-02-17 RX ADMIN — Medication 975 MILLIGRAM(S): at 05:34

## 2024-02-17 RX ADMIN — Medication 975 MILLIGRAM(S): at 15:06

## 2024-02-17 RX ADMIN — MEMANTINE HYDROCHLORIDE 10 MILLIGRAM(S): 10 TABLET ORAL at 05:34

## 2024-02-17 NOTE — DIETITIAN INITIAL EVALUATION ADULT - NS FNS DIET ORDER
Diet, Soft and Bite Sized:   Supplement Feeding Modality:  Oral  Ensure Enlive Cans or Servings Per Day:  1       Frequency:  Two Times a day (02-17-24 @ 13:55)

## 2024-02-17 NOTE — OCCUPATIONAL THERAPY INITIAL EVALUATION ADULT - PERTINENT HX OF CURRENT PROBLEM, REHAB EVAL
83 year old female with history of dementia, HTN, [facility] now with left hip pain. Found to have left basicervical femoral neck fracture. Now s/p left hip IMN on 2/16/24.

## 2024-02-17 NOTE — PROVIDER CONTACT NOTE (OTHER) - SITUATION
Pt is in no s/s of distress, resting in bed. initial HR of 110, with 1 hr recheck with Vitals of , /54, temp 97.7, 96o2 on RA.

## 2024-02-17 NOTE — PROGRESS NOTE ADULT - SUBJECTIVE AND OBJECTIVE BOX
SUBJECTIVE:   Laying in bed comfortably  Pain well controlled.   No issues overnight    OBJECTIVE:  Vital Signs Last 24 Hrs  T(C): 36.4 (16 Feb 2024 21:24), Max: 37 (16 Feb 2024 14:15)  T(F): 97.6 (16 Feb 2024 21:24), Max: 98.6 (16 Feb 2024 14:15)  HR: 100 (16 Feb 2024 21:24) (72 - 108)  BP: 133/57 (16 Feb 2024 21:24) (120/64 - 162/72)  BP(mean): 71 (16 Feb 2024 20:00) (70 - 95)  RR: 16 (16 Feb 2024 21:24) (11 - 16)  SpO2: 97% (16 Feb 2024 21:24) (97% - 100%)    Parameters below as of 16 Feb 2024 21:24  Patient On (Oxygen Delivery Method): room air        Physical Exam:   - Constitutional:  NAD, follows commands with a fair amount of prompting  - Respiratory: nonlabored  - Left Lowe Extremity:      Dressing CDI     + TA/Gas/EHL/FHL      SILT     DP palpable

## 2024-02-17 NOTE — DIETITIAN INITIAL EVALUATION ADULT - OTHER INFO
84 y/o female from [place] with dementia and hx of HTN, HLD admitted with fracture of L hip. Visited with pt to obtain nutrition hx, however she was confused and appeared disoriented to situation. Spoke with PCA who said that pt has been eating well so far. NKFA listed. Pt had an [facility] admission in December 2023 with recorded weight of 66.4 kg (146 lbs) with now admit wt of 53.6 kg (118 lbs) indicating a concerning 19% weight loss over the past 2 months PTA. She also exhibits moderate subcutaneous fat store loss and muscle mass wasting which presents her at severe risk for malnutrition, along with noted weight loss. Appropriate oral supplementation has been ordered for patient and PCA verified that pt has been consuming it. Continue to encourage optimal oral intake, as tolerated. No GI distress reported. Pt identified with Stage I pressure injury - consider multivitamin for micronutrient support to aid in optimizing skin integirity of patient. Education not warranted at this time. RDN services to remain available as needed.

## 2024-02-17 NOTE — PHYSICAL THERAPY INITIAL EVALUATION ADULT - LEVEL OF INDEPENDENCE: SIT/STAND, REHAB EVAL
LE weakness.  Pt also refusing OOB acvitivy.  Will continue to monitor patients functional abilities./unable to perform

## 2024-02-17 NOTE — DIETITIAN NUTRITION RISK NOTIFICATION - TREATMENT: THE FOLLOWING DIET HAS BEEN RECOMMENDED
Diet, Soft and Bite Sized:   Supplement Feeding Modality:  Oral  Ensure Enlive Cans or Servings Per Day:  1       Frequency:  Two Times a day (02-17-24 @ 13:55) [Active]

## 2024-02-17 NOTE — OCCUPATIONAL THERAPY INITIAL EVALUATION ADULT - GENERAL OBSERVATIONS, REHAB EVAL
Patient received semisupine in bed in NAD; agreeable to participate in OT evaluation. +Mon. +IV. /51 mmHg. HR 97 bpm.

## 2024-02-17 NOTE — DIETITIAN INITIAL EVALUATION ADULT - PERTINENT LABORATORY DATA
02-17    135  |  100  |  14  ----------------------------<  94  4.4   |  21<L>  |  0.42<L>    Ca    9.1      17 Feb 2024 06:10    TPro  6.2  /  Alb  3.2<L>  /  TBili  0.3  /  DBili  x   /  AST  30  /  ALT  24  /  AlkPhos  266<H>  02-15  A1C with Estimated Average Glucose Result: 5.7 % (12-16-23 @ 01:00)

## 2024-02-17 NOTE — DIETITIAN INITIAL EVALUATION ADULT - PERTINENT MEDS FT
MEDICATIONS  (STANDING):  acetaminophen     Tablet .. 975 milliGRAM(s) Oral every 8 hours  enoxaparin Injectable 40 milliGRAM(s) SubCutaneous every 24 hours  memantine 10 milliGRAM(s) Oral two times a day  polyethylene glycol 3350 17 Gram(s) Oral daily  senna 2 Tablet(s) Oral at bedtime  sodium chloride 0.9%. 1000 milliLiter(s) (75 mL/Hr) IV Continuous <Continuous>    MEDICATIONS  (PRN):  bisacodyl Suppository 10 milliGRAM(s) Rectal daily PRN If no bowel movement by POD#2  melatonin 3 milliGRAM(s) Oral at bedtime PRN Insomnia  ondansetron Injectable 4 milliGRAM(s) IV Push every 6 hours PRN Nausea and/or Vomiting  oxyCODONE    IR 2.5 milliGRAM(s) Oral every 4 hours PRN Moderate Pain (4 - 6)  oxyCODONE    IR 5 milliGRAM(s) Oral every 4 hours PRN Severe Pain (7 - 10)

## 2024-02-17 NOTE — DIETITIAN INITIAL EVALUATION ADULT - CALCULATED FROM (CAL/KG)
Spoke with mother:  This has been going on for the past 1 month   Mother initially thought it was stress related.   Pt will throw up one to many times in an hour time frame.   Happens 3-4 times a week.   Cici stomach pain just feels nauseas  Happenes sometimes when she first wakes up or at work.   Denies any other symptoms  Per mother neg preg test.    1500

## 2024-02-17 NOTE — PHYSICAL THERAPY INITIAL EVALUATION ADULT - DID THE PATIENT HAVE SURGERY?
Intramedullary nailing of left hip using locking nail with lag screw in femoral neck with distal locking screw/yes

## 2024-02-17 NOTE — PROGRESS NOTE ADULT - SUBJECTIVE AND OBJECTIVE BOX
CHIEF COMPLAINT: f/u     SUBJECTIVE / OVERNIGHT EVENTS: Patient seen and examined. Niece and nephew at bedside. HPI from patient given dementia, does report L hip pain. Per family, patient normally does not eat and drink much on her own.     MEDICATIONS  (STANDING):  acetaminophen     Tablet .. 975 milliGRAM(s) Oral every 8 hours  enoxaparin Injectable 40 milliGRAM(s) SubCutaneous every 24 hours  memantine 10 milliGRAM(s) Oral two times a day  polyethylene glycol 3350 17 Gram(s) Oral daily  senna 2 Tablet(s) Oral at bedtime  sodium chloride 0.9%. 1000 milliLiter(s) (75 mL/Hr) IV Continuous <Continuous>    MEDICATIONS  (PRN):  bisacodyl Suppository 10 milliGRAM(s) Rectal daily PRN If no bowel movement by POD#2  melatonin 3 milliGRAM(s) Oral at bedtime PRN Insomnia  ondansetron Injectable 4 milliGRAM(s) IV Push every 6 hours PRN Nausea and/or Vomiting  oxyCODONE    IR 2.5 milliGRAM(s) Oral every 4 hours PRN Moderate Pain (4 - 6)  oxyCODONE    IR 5 milliGRAM(s) Oral every 4 hours PRN Severe Pain (7 - 10)      VITALS:  T(F): 98.2 (02-17-24 @ 09:00), Max: 99 (02-17-24 @ 05:37)  HR: 102 (02-17-24 @ 09:00) (72 - 110)  BP: 122/58 (02-17-24 @ 09:00) (119/69 - 162/72)  RR: 18 (02-17-24 @ 09:00) (11 - 19)  SpO2: 96% (02-17-24 @ 09:00)  Wt(kg): --    Weight (kg): 53.569 (14:15)    PHYSICAL EXAM:  GENERAL: frail elderly female NAD, well-developed  EYES: conjunctiva and sclera clear  CHEST/LUNG: Clear to auscultation bilaterally; No wheeze  HEART: mildly tachycardic and rhythm; No murmurs, rubs, or gallops  ABDOMEN: Soft, Nontender, Nondistended; Bowel sounds present  EXTREMITIES:  2+ Peripheral Pulses, No clubbing, cyanosis, or edema; L hip dressed c/d/i, compartments soft   PSYCH: AAOx1  NEUROLOGY: non-focal, moving all ext, confused     LABS:              10.4                 135  | 21   | 14           10.00 >-----------< 527     ------------------------< 94                    32.2                 4.4  | 100  | 0.42                                         Ca 9.1   Mg x     Ph x           TPro  6.2  /  Alb  3.2      TBili  0.3  /  DBili  x         AST  30  /  ALT  24            AlkPhos  266      INR: 1.04 ratio;    PT: 11.6 sec;    PTT: 28.9 sec        Urinalysis Basic - ( 17 Feb 2024 06:10 )    Color: x / Appearance: x / SG: x / pH: x  Gluc: 94 mg/dL / Ketone: x  / Bili: x / Urobili: x   Blood: x / Protein: x / Nitrite: x   Leuk Esterase: x / RBC: x / WBC x   Sq Epi: x / Non Sq Epi: x / Bacteria: x        RADIOLOGY & ADDITIONAL TESTS:  Imaging Personally Reviewed: [x] Yes    [x] Care Discussed with Consultants/Other Providers: Orthopedic resident- discussed

## 2024-02-17 NOTE — OCCUPATIONAL THERAPY INITIAL EVALUATION ADULT - ADDITIONAL COMMENTS
Patient unable to provide social history due to cognitive status. Per chart review, patient admitted from [place]. Has been there s/p fall in December 2023 resulting in right hip fracture s/p IMN. Further level of functioning unclear at this time.

## 2024-02-17 NOTE — PHYSICAL THERAPY INITIAL EVALUATION ADULT - ADDITIONAL COMMENTS
Pt comes from [place], is AOX1, has had falls in the past.   Patients Current SpO2: 99%   L hip ROM grossly 60 degrees Passively

## 2024-02-17 NOTE — DIETITIAN INITIAL EVALUATION ADULT - NSFNSPHYEXAMSKINFT_GEN_A_CORE
Pressure Injury 1: Right:, buttocks, Stage I  Pressure Injury 2: none, none  Pressure Injury 3: none, none  Pressure Injury 4: none, none  Pressure Injury 5: none, none  Pressure Injury 6: none, none  Pressure Injury 7: none, none  Pressure Injury 8: none, none  Pressure Injury 9: none, none  Pressure Injury 10: none, none  Pressure Injury 11: none, none

## 2024-02-17 NOTE — PHYSICAL THERAPY INITIAL EVALUATION ADULT - DIAGNOSIS, PT EVAL
Intramedullary nailing of left hip using locking nail with lag screw in femoral neck with distal locking screw

## 2024-02-18 DIAGNOSIS — R00.0 TACHYCARDIA, UNSPECIFIED: ICD-10-CM

## 2024-02-18 LAB
ANION GAP SERPL CALC-SCNC: 13 MMOL/L — SIGNIFICANT CHANGE UP (ref 7–14)
BUN SERPL-MCNC: 10 MG/DL — SIGNIFICANT CHANGE UP (ref 7–23)
CALCIUM SERPL-MCNC: 9.2 MG/DL — SIGNIFICANT CHANGE UP (ref 8.4–10.5)
CHLORIDE SERPL-SCNC: 102 MMOL/L — SIGNIFICANT CHANGE UP (ref 98–107)
CO2 SERPL-SCNC: 23 MMOL/L — SIGNIFICANT CHANGE UP (ref 22–31)
CREAT SERPL-MCNC: 0.38 MG/DL — LOW (ref 0.5–1.3)
EGFR: 100 ML/MIN/1.73M2 — SIGNIFICANT CHANGE UP
GLUCOSE SERPL-MCNC: 80 MG/DL — SIGNIFICANT CHANGE UP (ref 70–99)
HCT VFR BLD CALC: 31.4 % — LOW (ref 34.5–45)
HGB BLD-MCNC: 10.2 G/DL — LOW (ref 11.5–15.5)
MCHC RBC-ENTMCNC: 31 PG — SIGNIFICANT CHANGE UP (ref 27–34)
MCHC RBC-ENTMCNC: 32.5 GM/DL — SIGNIFICANT CHANGE UP (ref 32–36)
MCV RBC AUTO: 95.4 FL — SIGNIFICANT CHANGE UP (ref 80–100)
NRBC # BLD: 0 /100 WBCS — SIGNIFICANT CHANGE UP (ref 0–0)
NRBC # FLD: 0 K/UL — SIGNIFICANT CHANGE UP (ref 0–0)
PLATELET # BLD AUTO: 453 K/UL — HIGH (ref 150–400)
POTASSIUM SERPL-MCNC: 3.9 MMOL/L — SIGNIFICANT CHANGE UP (ref 3.5–5.3)
POTASSIUM SERPL-SCNC: 3.9 MMOL/L — SIGNIFICANT CHANGE UP (ref 3.5–5.3)
RBC # BLD: 3.29 M/UL — LOW (ref 3.8–5.2)
RBC # FLD: 15.6 % — HIGH (ref 10.3–14.5)
SODIUM SERPL-SCNC: 138 MMOL/L — SIGNIFICANT CHANGE UP (ref 135–145)
WBC # BLD: 8.49 K/UL — SIGNIFICANT CHANGE UP (ref 3.8–10.5)
WBC # FLD AUTO: 8.49 K/UL — SIGNIFICANT CHANGE UP (ref 3.8–10.5)

## 2024-02-18 PROCEDURE — 93010 ELECTROCARDIOGRAM REPORT: CPT

## 2024-02-18 PROCEDURE — 99232 SBSQ HOSP IP/OBS MODERATE 35: CPT

## 2024-02-18 RX ORDER — SODIUM CHLORIDE 9 MG/ML
1000 INJECTION INTRAMUSCULAR; INTRAVENOUS; SUBCUTANEOUS ONCE
Refills: 0 | Status: COMPLETED | OUTPATIENT
Start: 2024-02-18 | End: 2024-02-18

## 2024-02-18 RX ORDER — LACTULOSE 10 G/15ML
10 SOLUTION ORAL THREE TIMES A DAY
Refills: 0 | Status: DISCONTINUED | OUTPATIENT
Start: 2024-02-18 | End: 2024-02-19

## 2024-02-18 RX ADMIN — SENNA PLUS 2 TABLET(S): 8.6 TABLET ORAL at 23:40

## 2024-02-18 RX ADMIN — POLYETHYLENE GLYCOL 3350 17 GRAM(S): 17 POWDER, FOR SOLUTION ORAL at 12:11

## 2024-02-18 RX ADMIN — Medication 975 MILLIGRAM(S): at 14:09

## 2024-02-18 RX ADMIN — MEMANTINE HYDROCHLORIDE 10 MILLIGRAM(S): 10 TABLET ORAL at 05:36

## 2024-02-18 RX ADMIN — Medication 975 MILLIGRAM(S): at 15:00

## 2024-02-18 RX ADMIN — MEMANTINE HYDROCHLORIDE 10 MILLIGRAM(S): 10 TABLET ORAL at 17:56

## 2024-02-18 RX ADMIN — LACTULOSE 10 GRAM(S): 10 SOLUTION ORAL at 23:40

## 2024-02-18 RX ADMIN — OXYCODONE HYDROCHLORIDE 5 MILLIGRAM(S): 5 TABLET ORAL at 15:50

## 2024-02-18 RX ADMIN — OXYCODONE HYDROCHLORIDE 5 MILLIGRAM(S): 5 TABLET ORAL at 16:50

## 2024-02-18 RX ADMIN — LACTULOSE 10 GRAM(S): 10 SOLUTION ORAL at 14:09

## 2024-02-18 RX ADMIN — Medication 3 MILLIGRAM(S): at 23:40

## 2024-02-18 RX ADMIN — Medication 975 MILLIGRAM(S): at 23:41

## 2024-02-18 RX ADMIN — ENOXAPARIN SODIUM 40 MILLIGRAM(S): 100 INJECTION SUBCUTANEOUS at 05:36

## 2024-02-18 RX ADMIN — SODIUM CHLORIDE 100 MILLILITER(S): 9 INJECTION INTRAMUSCULAR; INTRAVENOUS; SUBCUTANEOUS at 12:11

## 2024-02-18 NOTE — PROGRESS NOTE ADULT - SUBJECTIVE AND OBJECTIVE BOX
CHIEF COMPLAINT: f/u     SUBJECTIVE / OVERNIGHT EVENTS: Patient seen and examined. Unable to participate in HPI due to dementia. Per PCA at bedside, patient not taking much PO at all. Patient denies pain but when her L hip is palpated she does endorse pain.     MEDICATIONS  (STANDING):  acetaminophen     Tablet .. 975 milliGRAM(s) Oral every 8 hours  enoxaparin Injectable 40 milliGRAM(s) SubCutaneous every 24 hours  lactulose Syrup 10 Gram(s) Oral three times a day  memantine 10 milliGRAM(s) Oral two times a day  polyethylene glycol 3350 17 Gram(s) Oral daily  senna 2 Tablet(s) Oral at bedtime    MEDICATIONS  (PRN):  bisacodyl Suppository 10 milliGRAM(s) Rectal daily PRN If no bowel movement by POD#2  melatonin 3 milliGRAM(s) Oral at bedtime PRN Insomnia  ondansetron Injectable 4 milliGRAM(s) IV Push every 6 hours PRN Nausea and/or Vomiting  oxyCODONE    IR 2.5 milliGRAM(s) Oral every 4 hours PRN Moderate Pain (4 - 6)  oxyCODONE    IR 5 milliGRAM(s) Oral every 4 hours PRN Severe Pain (7 - 10)      VITALS:  T(F): 97.6 (02-18-24 @ 08:58), Max: 98.9 (02-17-24 @ 21:02)  HR: 114 (02-18-24 @ 08:58) (96 - 115)  BP: 124/53 (02-18-24 @ 08:58) (124/53 - 145/60)  RR: 19 (02-18-24 @ 08:58) (18 - 20)  SpO2: 99% (02-18-24 @ 08:58)  Wt(kg): --      PHYSICAL EXAM:  GENERAL: frail elderly female NAD, well-developed  EYES: conjunctiva and sclera clear  CHEST/LUNG: Clear to auscultation bilaterally; No wheeze  HEART: mildly tachycardic and rhythm; No murmurs, rubs, or gallops  ABDOMEN: Soft, Nontender, Nondistended; Bowel sounds present  EXTREMITIES:  2+ Peripheral Pulses, No clubbing, cyanosis, or edema; L hip dressed c/d/i, compartments soft   PSYCH: AAOx1  NEUROLOGY: non-focal, moving all ext, confused     LABS:              10.2                 138  | 23   | 10           8.49  >-----------< 453     ------------------------< 80                    31.4                 3.9  | 102  | 0.38                                         Ca 9.2   Mg x     Ph x                  Urinalysis Basic - ( 18 Feb 2024 05:29 )    Color: x / Appearance: x / SG: x / pH: x  Gluc: 80 mg/dL / Ketone: x  / Bili: x / Urobili: x   Blood: x / Protein: x / Nitrite: x   Leuk Esterase: x / RBC: x / WBC x   Sq Epi: x / Non Sq Epi: x / Bacteria: x        RADIOLOGY & ADDITIONAL TESTS:  Imaging Personally Reviewed: [x] Yes      [x] Care Discussed with Consultants/Other Providers: Orthopedic PA - discussed

## 2024-02-18 NOTE — PROGRESS NOTE ADULT - SUBJECTIVE AND OBJECTIVE BOX
ORTHOPEDIC PROGRESS NOTE      Overnight events: None    SUBJECTIVE: Pt seen and examined at bedside. Patient is doing well, no acute complaints this AM. Pain is controlled with medication      OBJECTIVE:  Vital Signs Last 24 Hrs  T(C): 37.2 (18 Feb 2024 01:50), Max: 37.2 (17 Feb 2024 05:37)  T(F): 98.9 (18 Feb 2024 01:50), Max: 99 (17 Feb 2024 05:37)  HR: 112 (18 Feb 2024 01:50) (96 - 115)  BP: 145/60 (18 Feb 2024 01:50) (120/54 - 145/60)  BP(mean): --  RR: 18 (18 Feb 2024 01:50) (18 - 20)  SpO2: 97% (18 Feb 2024 01:50) (96% - 100%)    Parameters below as of 18 Feb 2024 01:50  Patient On (Oxygen Delivery Method): room air          02-16-24 @ 07:01  -  02-17-24 @ 07:00  --------------------------------------------------------  IN: 0 mL / OUT: 650 mL / NET: -650 mL    02-17-24 @ 07:01  -  02-18-24 @ 05:19  --------------------------------------------------------  IN: 0 mL / OUT: 750 mL / NET: -750 mL        Physical Examination:  GEN: NAD, resting quietly  PULM: symmetric chest rise bilaterally, no increased WOB  ABD: nondistended  EXTR:   - Left Lowe Extremity:      Dressing CDI     + TA/Gas/EHL/FHL      SILT     DP palpable        LABS:                        10.4   10.00 )-----------( 527      ( 17 Feb 2024 06:10 )             32.2       02-17    135  |  100  |  14  ----------------------------<  94  4.4   |  21<L>  |  0.42<L>    Ca    9.1      17 Feb 2024 06:10

## 2024-02-19 ENCOUNTER — NON-APPOINTMENT (OUTPATIENT)
Age: 84
End: 2024-02-19

## 2024-02-19 ENCOUNTER — RESULT REVIEW (OUTPATIENT)
Age: 84
End: 2024-02-19

## 2024-02-19 ENCOUNTER — TRANSCRIPTION ENCOUNTER (OUTPATIENT)
Age: 84
End: 2024-02-19

## 2024-02-19 VITALS
SYSTOLIC BLOOD PRESSURE: 124 MMHG | RESPIRATION RATE: 18 BRPM | TEMPERATURE: 98 F | HEART RATE: 87 BPM | DIASTOLIC BLOOD PRESSURE: 66 MMHG | OXYGEN SATURATION: 98 %

## 2024-02-19 DIAGNOSIS — I82.409 ACUTE EMBOLISM AND THROMBOSIS OF UNSPECIFIED DEEP VEINS OF UNSPECIFIED LOWER EXTREMITY: ICD-10-CM

## 2024-02-19 LAB
ANION GAP SERPL CALC-SCNC: 13 MMOL/L — SIGNIFICANT CHANGE UP (ref 7–14)
BUN SERPL-MCNC: 10 MG/DL — SIGNIFICANT CHANGE UP (ref 7–23)
CALCIUM SERPL-MCNC: 9.2 MG/DL — SIGNIFICANT CHANGE UP (ref 8.4–10.5)
CHLORIDE SERPL-SCNC: 101 MMOL/L — SIGNIFICANT CHANGE UP (ref 98–107)
CO2 SERPL-SCNC: 23 MMOL/L — SIGNIFICANT CHANGE UP (ref 22–31)
CREAT SERPL-MCNC: 0.37 MG/DL — LOW (ref 0.5–1.3)
EGFR: 101 ML/MIN/1.73M2 — SIGNIFICANT CHANGE UP
GLUCOSE SERPL-MCNC: 105 MG/DL — HIGH (ref 70–99)
HCT VFR BLD CALC: 29.9 % — LOW (ref 34.5–45)
HGB BLD-MCNC: 9.7 G/DL — LOW (ref 11.5–15.5)
MCHC RBC-ENTMCNC: 31.5 PG — SIGNIFICANT CHANGE UP (ref 27–34)
MCHC RBC-ENTMCNC: 32.4 GM/DL — SIGNIFICANT CHANGE UP (ref 32–36)
MCV RBC AUTO: 97.1 FL — SIGNIFICANT CHANGE UP (ref 80–100)
NRBC # BLD: 0 /100 WBCS — SIGNIFICANT CHANGE UP (ref 0–0)
NRBC # FLD: 0 K/UL — SIGNIFICANT CHANGE UP (ref 0–0)
PLATELET # BLD AUTO: 406 K/UL — HIGH (ref 150–400)
POTASSIUM SERPL-MCNC: 3.8 MMOL/L — SIGNIFICANT CHANGE UP (ref 3.5–5.3)
POTASSIUM SERPL-SCNC: 3.8 MMOL/L — SIGNIFICANT CHANGE UP (ref 3.5–5.3)
RBC # BLD: 3.08 M/UL — LOW (ref 3.8–5.2)
RBC # FLD: 15.6 % — HIGH (ref 10.3–14.5)
SARS-COV-2 RNA SPEC QL NAA+PROBE: SIGNIFICANT CHANGE UP
SODIUM SERPL-SCNC: 137 MMOL/L — SIGNIFICANT CHANGE UP (ref 135–145)
WBC # BLD: 6.95 K/UL — SIGNIFICANT CHANGE UP (ref 3.8–10.5)
WBC # FLD AUTO: 6.95 K/UL — SIGNIFICANT CHANGE UP (ref 3.8–10.5)

## 2024-02-19 PROCEDURE — 93970 EXTREMITY STUDY: CPT | Mod: 26

## 2024-02-19 PROCEDURE — 99232 SBSQ HOSP IP/OBS MODERATE 35: CPT

## 2024-02-19 RX ORDER — APIXABAN 2.5 MG/1
2 TABLET, FILM COATED ORAL
Qty: 52 | Refills: 0
Start: 2024-02-19 | End: 2024-03-02

## 2024-02-19 RX ORDER — OXYCODONE HYDROCHLORIDE 5 MG/1
1 TABLET ORAL
Qty: 20 | Refills: 0
Start: 2024-02-19 | End: 2024-02-23

## 2024-02-19 RX ORDER — APIXABAN 2.5 MG/1
2 TABLET, FILM COATED ORAL
Qty: 0 | Refills: 0 | DISCHARGE
Start: 2024-02-19

## 2024-02-19 RX ORDER — APIXABAN 2.5 MG/1
10 TABLET, FILM COATED ORAL
Refills: 0 | Status: DISCONTINUED | OUTPATIENT
Start: 2024-02-19 | End: 2024-02-19

## 2024-02-19 RX ORDER — APIXABAN 2.5 MG/1
10 TABLET, FILM COATED ORAL EVERY 12 HOURS
Refills: 0 | Status: DISCONTINUED | OUTPATIENT
Start: 2024-02-19 | End: 2024-02-19

## 2024-02-19 RX ADMIN — Medication 975 MILLIGRAM(S): at 13:13

## 2024-02-19 RX ADMIN — APIXABAN 10 MILLIGRAM(S): 2.5 TABLET, FILM COATED ORAL at 14:39

## 2024-02-19 RX ADMIN — ENOXAPARIN SODIUM 40 MILLIGRAM(S): 100 INJECTION SUBCUTANEOUS at 06:46

## 2024-02-19 RX ADMIN — OXYCODONE HYDROCHLORIDE 5 MILLIGRAM(S): 5 TABLET ORAL at 14:55

## 2024-02-19 RX ADMIN — Medication 10 MILLIGRAM(S): at 13:09

## 2024-02-19 RX ADMIN — Medication 975 MILLIGRAM(S): at 14:13

## 2024-02-19 RX ADMIN — MEMANTINE HYDROCHLORIDE 10 MILLIGRAM(S): 10 TABLET ORAL at 06:48

## 2024-02-19 RX ADMIN — LACTULOSE 10 GRAM(S): 10 SOLUTION ORAL at 13:12

## 2024-02-19 RX ADMIN — POLYETHYLENE GLYCOL 3350 17 GRAM(S): 17 POWDER, FOR SOLUTION ORAL at 13:13

## 2024-02-19 RX ADMIN — LACTULOSE 10 GRAM(S): 10 SOLUTION ORAL at 06:46

## 2024-02-19 RX ADMIN — Medication 975 MILLIGRAM(S): at 06:46

## 2024-02-19 NOTE — DISCHARGE NOTE PROVIDER - NSDCFUSCHEDAPPT_GEN_ALL_CORE_FT
Zain Ayoub  [facility]  ORTHOSURG [address] [facility]  Scheduled Appointment: 05/06/2024

## 2024-02-19 NOTE — PROGRESS NOTE ADULT - ASSESSMENT
DIAMOND LAGUNAS is a 83y Female sp Left IMN on 2/16 for Basicervical Femoral neck fracture. Recovering appropriately     PLAN:  - Multimodal pain control  - WBAT  - DVT: Lovenox  - Abx 24hr  - PT/OT  - AM labs  - Resume home meds    Orthopaedic Surgery  [facility] [id number]  [facility]        [id number]  [facility]  [id number]/ [phone].    
DIAMOND LAGUNAS is a 83y Female sp Left IMN on 2/16 for Basicervical Femoral neck fracture. Recovering appropriately     PLAN:  - Multimodal pain control  - WBAT  - DVT: Lovenox  - Abx 24hr  - PT/OT  - AM labs  - Resume home meds    Orthopaedic Surgery  [facility] [id number]  [facility]        [id number]  [facility]  [id number]/ [phone].
83yFemale w/ L basicervical fem neck fx    PLAN  -NWB LLE  -Bedrest  -OR today   -NPO  -IVF   -hold chemical DVT ppx for OR; SCDs OK  -pain control  -ice/cold compress
83F w/ hx of dementia, HTN, Right IT fx s/p IMN (12/16/23), presenting after fall found to have left femoral neck fx s/p IMN  on 2/16/24.
DIAMOND LAGUNAS is a 83y Female sp Left IMN on 2/16 for Basicervical Femoral neck fracture. Recovering appropriately     PLAN:  - Multimodal pain control  - WBAT  - DVT: Lovenox  - PT/OT  - AM labs  - Resume home meds  - Dispo planning: back to [place]    Orthopaedic Surgery  [place] [id number]  [facility]        [id number]  [facility]  [id number]/ [phone].    
83F w/ hx of dementia, HTN, Right IT fx s/p IMN (12/16/23), presenting after fall found to have left femoral neck fx. Pt admitted to ortho and plan for OR on 2/16/24.
83F w/ hx of dementia, HTN, Right IT fx s/p IMN (12/16/23), presenting after fall found to have left femoral neck fx s/p IMN  on 2/16/24.
83F w/ hx of dementia, HTN, Right IT fx s/p IMN (12/16/23), presenting after fall found to have left femoral neck fx s/p IMN  on 2/16/24.

## 2024-02-19 NOTE — DISCHARGE NOTE PROVIDER - HOSPITAL COURSE
83yFemale w PMH of dementia, HTN, HLD presents [facility] s/p unwitnessed mechanical fall presenting with left hip fracture. On 2/16 patient underwent left hip intramedullary nail with Dr Valle. Patient tolerated the procedure well and after brief PACU stay patient was transferred to the floor in stable condition      On day of discharge, the patient was tolerating diet, ambulating well and pain controlled.  All questions were answered and patient safely discharged. Patient to follow-up with Dr. Valle within 2 weeks after discharge. Patient is weight bearing as tolerated of the left lower extremity. Patient must leave dressing on until follow up with Dr Valle. Patient may bathe with dressing but should not scrub directly on dressing

## 2024-02-19 NOTE — PROGRESS NOTE ADULT - PROBLEM SELECTOR PLAN 2
CT pelvis w/ possible evidence of fecal impaction and stercoral colitis.  - recommend starting aggressive bowel regimen, miralax BID, senna, dulcolax.  - May need disimpaction.
CT showing Intratrochanteric fracture of the left femur. S/p IMN on 2/16   - care per primary orthopedic team   - multimodal pain control + bowel regimen.   - PT/OT    - Encourage incentive spirometry   - DVT ppx: Lovenox   - Post-op labs reviewed and stable
CT pelvis w/ possible evidence of fecal impaction and stercoral colitis.  - recommend starting aggressive bowel regimen, miralax BID, senna, dulcolax. Can add lactulose if still no BM   - May need disimpaction.
EKG personally reviewed, sinus tachycardia to 110s  - Suspect may be multifactorial in setting of pain and decreased PO intake, however now found to have acute DVT. It is possible she has a PE as well. Treatment would be with AC as above. Patient HD stable and saturating well on room air, checking CTA at this time unlikely to .

## 2024-02-19 NOTE — PROGRESS NOTE ADULT - PROBLEM SELECTOR PLAN 7
DVT ppx: Lovenox  Dispo: TBD, PT eval. Of note, patient is estranged from her children,  is HCP. Will need  assistance for dispo planning

## 2024-02-19 NOTE — PROGRESS NOTE ADULT - NUTRITIONAL ASSESSMENT
This patient has been assessed with a concern for Malnutrition and has been determined to have a diagnosis/diagnoses of Severe protein-calorie malnutrition.    This patient is being managed with:   Diet Soft and Bite Sized-  Supplement Feeding Modality:  Oral  Ensure Enlive Cans or Servings Per Day:  1       Frequency:  Two Times a day  Entered: Feb 17 2024  1:55PM

## 2024-02-19 NOTE — PROGRESS NOTE ADULT - PROVIDER SPECIALTY LIST ADULT
Orthopedics
Orthopedics
Hospitalist
Orthopedics
Hospitalist
Orthopedics
Orthopedics
Hospitalist
Hospitalist

## 2024-02-19 NOTE — PROGRESS NOTE ADULT - PROBLEM SELECTOR PLAN 4
Meds from [facility] reviewed, not on BP meds  - Monitor BP
At baseline mental status, A&Ox1.  - c/w home Namenda.
CT pelvis w/ possible evidence of fecal impaction and stercoral colitis.  - recommend starting aggressive bowel regimen, miralax BID, senna, dulcolax. Added lactulose today  - Placed on stool count, will montior   - May need disimpaction.
Meds from [facility] reviewed, not on BP meds  - Monitor BP

## 2024-02-19 NOTE — PROGRESS NOTE ADULT - PROBLEM SELECTOR PLAN 5
DVT ppx: Lovenox  Dispo: TBD, PT eval. Of note, patient is estranged from her children,  is HCP. Will need  assistance for dispo planning
At baseline mental status, A&Ox1.  - c/w home Namenda.
DVT ppx: start per ortho protocol after OR
Meds from [facility] reviewed, not on BP meds  - Monitor BP

## 2024-02-19 NOTE — PROGRESS NOTE ADULT - PROBLEM SELECTOR PLAN 3
CT showing Intratrochanteric fracture of the left femur. S/p IMN on 2/16   - care per primary orthopedic team   - multimodal pain control + bowel regimen.   - PT/OT    - Encourage incentive spirometry   - Post-op labs reviewed and stable
At baseline mental status, A&Ox1.  - c/w home Namenda.
At baseline mental status, A&Ox1.  - c/w home Namenda.
CT pelvis w/ possible evidence of fecal impaction and stercoral colitis.  - recommend starting aggressive bowel regimen, miralax BID, senna, dulcolax. Adding lactulose today  - Placed on stool count, will montior   - May need disimpaction.

## 2024-02-19 NOTE — DISCHARGE NOTE PROVIDER - NSDCCPCAREPLAN_GEN_ALL_CORE_FT
PRINCIPAL DISCHARGE DIAGNOSIS  Diagnosis: Closed fracture of left hip  Assessment and Plan of Treatment:

## 2024-02-19 NOTE — DISCHARGE NOTE PROVIDER - CARE PROVIDER_API CALL
Radames Valle  Orthopaedic Surgery  [address]  [place] [address]  Phone: [phone]  Fax: [phone]  Follow Up Time: 2 weeks

## 2024-02-19 NOTE — PROGRESS NOTE ADULT - PROBLEM SELECTOR PLAN 1
EKG personally reviewed, sinus tachycardia to 110s  - Suspect may be multifactorial in setting of pain and decreased PO intake  - Recommend: NS bolus over 10 hrs, encourage pain medication use as appropriate  - Monitor HR  - lower suspicion of PE at this time as patient on DVT ppx and not hypoxic. Will continue to reassess
CT showing Intratrochanteric fracture of the left femur. S/p IMN on 2/16   - care per primary orthopedic team   - multimodal pain control + bowel regimen. Recommend adding miralax. Consider adding lactulose if still no BM   - PT/OT    - Encourage incentive spirometry   - DVT ppx: Lovenox   - Post-op labs reviewed and stable
CT showing Intratrochanteric fracture of the left femur. Plan for hemiarthroplasty 2/16   - care per primary orthopedic team   - multimodal pain control + bowel regimen   - PT/OT after OR. Currently NWB LLE   - Encourage incentive spirometry   - DVT ppx: SCDs, start AC after OR per orthopedic eval
Dopplers showing acute, occlusive deep vein thrombosis noted in the right femoral vein.  - D/w ortho resident, recommend starting full dose AC with Eliquis (loading dose x1 week) as per protocol followed by 5mg BID for at least 3-6 months.

## 2024-02-19 NOTE — DISCHARGE NOTE PROVIDER - NSDCCPTREATMENT_GEN_ALL_CORE_FT
PRINCIPAL PROCEDURE  Procedure: Intramedullary nailing for fracture of left femur  Findings and Treatment:

## 2024-02-19 NOTE — DISCHARGE NOTE NURSING/CASE MANAGEMENT/SOCIAL WORK - NSDCPNINST_GEN_ALL_CORE
Notify Dr Valle if you experience any increase in pain not relieved with medication, any redness, drainage or swelling around incision or any fever >100.5.  Drink plenty of fluids.  No heavy lifting or straining.  Use over the counter stool softeners to assist with constipation.

## 2024-02-19 NOTE — DISCHARGE NOTE NURSING/CASE MANAGEMENT/SOCIAL WORK - PATIENT PORTAL LINK FT
You can access the FollowMyHealth Patient Portal offered by [facility] [place] by registering at the following website: [email or web address]. By joining [employer]’s FollowMyHealth portal, you will also be able to view your health information using other applications (apps) compatible with our system.

## 2024-02-19 NOTE — DISCHARGE NOTE PROVIDER - NSDCMRMEDTOKEN_GEN_ALL_CORE_FT
acetaminophen 325 mg oral tablet: 3 tab(s) orally every 8 hours  ciprofloxacin 500 mg oral tablet: 1 tab(s) orally 2 times a day  cyanocobalamin 1000 mcg oral tablet: 1 tab(s) orally once a day  enoxaparin 40 mg/0.4 mL injectable solution: 40 milligram(s) injectable once a day  melatonin 3 mg oral tablet: 1 tab(s) orally once a day (at bedtime) As needed Insomnia  Multiple Vitamins oral tablet: 1 tab(s) orally once a day  oxyCODONE 5 mg oral tablet: 1 tab(s) orally every 4 hours As needed Severe Pain (7 - 10)  polyethylene glycol 3350 oral powder for reconstitution: 17 gram(s) orally once a day  senna leaf extract oral tablet: 2 tab(s) orally once a day (at bedtime)   apixaban 5 mg oral tablet: 2 tab(s) orally 2 times a day  ciprofloxacin 500 mg oral tablet: 1 tab(s) orally 2 times a day  cyanocobalamin 1000 mcg oral tablet: 1 tab(s) orally once a day  Multiple Vitamins oral tablet: 1 tab(s) orally once a day

## 2024-02-19 NOTE — PROGRESS NOTE ADULT - PROBLEM SELECTOR PLAN 6
Meds from [facility] reviewed, not on BP meds  - Monitor BP
DVT ppx: Lovenox  Dispo: TBD, PT eval. Of note, patient is estranged from her children,  is HCP. Will need  assistance for dispo planning

## 2024-02-19 NOTE — PROGRESS NOTE ADULT - SUBJECTIVE AND OBJECTIVE BOX
[facility] Division of Hospital Medicine  Peggy Kaur MD  Available on [employer] TEAMS    SUBJECTIVE / OVERNIGHT EVENTS: Patient seen and examined. HPI limited due to dementia. She denies pain, chest pain or trouble breathing. Per RN, she is not eating much, even with assistance.       MEDICATIONS  (STANDING):  acetaminophen     Tablet .. 975 milliGRAM(s) Oral every 8 hours  apixaban 10 milliGRAM(s) Oral every 12 hours  lactulose Syrup 10 Gram(s) Oral three times a day  memantine 10 milliGRAM(s) Oral two times a day  polyethylene glycol 3350 17 Gram(s) Oral daily  senna 2 Tablet(s) Oral at bedtime    MEDICATIONS  (PRN):  bisacodyl Suppository 10 milliGRAM(s) Rectal daily PRN If no bowel movement by POD#2  melatonin 3 milliGRAM(s) Oral at bedtime PRN Insomnia  ondansetron Injectable 4 milliGRAM(s) IV Push every 6 hours PRN Nausea and/or Vomiting  oxyCODONE    IR 2.5 milliGRAM(s) Oral every 4 hours PRN Moderate Pain (4 - 6)  oxyCODONE    IR 5 milliGRAM(s) Oral every 4 hours PRN Severe Pain (7 - 10)      I&O's Summary    19 Feb 2024 07:01  -  19 Feb 2024 14:10  --------------------------------------------------------  IN: 0 mL / OUT: 300 mL / NET: -300 mL        PHYSICAL EXAM:  Vital Signs Last 24 Hrs  T(C): 36.8 (19 Feb 2024 13:32), Max: 37.1 (18 Feb 2024 17:15)  T(F): 98.3 (19 Feb 2024 13:32), Max: 98.8 (18 Feb 2024 17:15)  HR: 87 (19 Feb 2024 13:32) (87 - 114)  BP: 124/66 (19 Feb 2024 13:32) (120/58 - 136/69)  BP(mean): --  RR: 18 (19 Feb 2024 13:32) (18 - 18)  SpO2: 98% (19 Feb 2024 13:32) (95% - 98%)    Parameters below as of 19 Feb 2024 13:32  Patient On (Oxygen Delivery Method): room air      CONSTITUTIONAL: NAD, well-developed, well-groomed  EYES: Conjunctiva and sclera clear  ENMT: Moist oral mucosa, no pharyngeal injection or exudates; normal dentition  RESPIRATORY: Normal respiratory effort; lungs are clear to auscultation bilaterally; No wheezes or rales  CARDIOVASCULAR: Regular rate and rhythm; Normal S1 and S2; No murmurs, rubs, or gallops; No lower extremity edema  ABDOMEN: Soft, Nontender, Nondistended; Bowel sounds present  MUSCULOSKELETAL:  No clubbing or cyanosis of digits; No joint swelling or tenderness to palpation  PSYCH: AAOx3 (oriented to person, place, and time); affect appropriate  NEUROLOGY: No focal deficits  SKIN: No rashes; No palpable lesions    LABS:                        9.7    6.95  )-----------( 406      ( 19 Feb 2024 05:48 )             29.9     02-19    137  |  101  |  10  ----------------------------<  105<H>  3.8   |  23  |  0.37<L>    Ca    9.2      19 Feb 2024 05:48            Urinalysis Basic - ( 19 Feb 2024 05:48 )    Color: x / Appearance: x / SG: x / pH: x  Gluc: 105 mg/dL / Ketone: x  / Bili: x / Urobili: x   Blood: x / Protein: x / Nitrite: x   Leuk Esterase: x / RBC: x / WBC x   Sq Epi: x / Non Sq Epi: x / Bacteria: x        COVID-19 PCR: NotDetec (19 Feb 2024 11:55)      RADIOLOGY & ADDITIONAL TESTS:  New Results Reviewed Today:   New Imaging Personally Reviewed Today:  New Electrocardiogram Personally Reviewed Today:  Prior or Outpatient Records Reviewed Today:    COMMUNICATION:  Care Discussed with Consultants/Other Providers and Details of Discussion:  Discussions with Patient/Family:  PCP Communication:

## 2024-02-19 NOTE — PROGRESS NOTE ADULT - SUBJECTIVE AND OBJECTIVE BOX
ORTHOPEDIC PROGRESS NOTE    Overnight events: None    SUBJECTIVE: Pt seen and examined at bedside. Patient is doing well, no acute complaints this AM. Pain is controlled with medication. Walking in the hallway with PT.       OBJECTIVE:  Vital Signs Last 24 Hrs  T(C): 36.7 (18 Feb 2024 21:00), Max: 37.1 (18 Feb 2024 17:15)  T(F): 98.1 (18 Feb 2024 21:00), Max: 98.8 (18 Feb 2024 17:15)  HR: 91 (18 Feb 2024 21:00) (91 - 114)  BP: 136/57 (18 Feb 2024 21:00) (120/58 - 136/57)  BP(mean): --  RR: 18 (18 Feb 2024 21:00) (18 - 19)  SpO2: 97% (18 Feb 2024 21:00) (95% - 99%)    Parameters below as of 18 Feb 2024 21:00  Patient On (Oxygen Delivery Method): room air        Physical Examination:  GEN: NAD, resting quietly  PULM: symmetric chest rise bilaterally, no increased WOB  ABD: nondistended  EXTR:   - Left Lowe Extremity:      Dressing CDI     + TA/Gas/EHL/FHL      SILT     DP palpable        LABS:                                      10.2   8.49  )-----------( 453      ( 18 Feb 2024 05:29 )             31.4          ORTHOPEDIC PROGRESS NOTE    Overnight events: None    SUBJECTIVE: Pt seen and examined at bedside. Patient is doing well, no acute complaints this AM. Pain is controlled with medication.      OBJECTIVE:  Vital Signs Last 24 Hrs  T(C): 36.7 (18 Feb 2024 21:00), Max: 37.1 (18 Feb 2024 17:15)  T(F): 98.1 (18 Feb 2024 21:00), Max: 98.8 (18 Feb 2024 17:15)  HR: 91 (18 Feb 2024 21:00) (91 - 114)  BP: 136/57 (18 Feb 2024 21:00) (120/58 - 136/57)  BP(mean): --  RR: 18 (18 Feb 2024 21:00) (18 - 19)  SpO2: 97% (18 Feb 2024 21:00) (95% - 99%)    Parameters below as of 18 Feb 2024 21:00  Patient On (Oxygen Delivery Method): room air        Physical Examination:  GEN: NAD, resting quietly  PULM: symmetric chest rise bilaterally, no increased WOB  ABD: nondistended  EXTR:   - Left Lowe Extremity:      Dressing CDI     + TA/Gas/EHL/FHL      SILT     DP palpable        LABS:                                      10.2   8.49  )-----------( 453      ( 18 Feb 2024 05:29 )             31.4

## 2024-02-19 NOTE — DISCHARGE NOTE NURSING/CASE MANAGEMENT/SOCIAL WORK - NSDCPECAREGIVERED_GEN_ALL_CORE
fall prevention, Im Nail hip fracture, pain after surgery, carenotes on IM nail, pain after surgery, incision action plan, falls prevention

## 2024-02-19 NOTE — PROGRESS NOTE ADULT - PROBLEM SELECTOR PROBLEM 1
Sinus tachycardia
Fracture of femoral neck, left
Fracture of femoral neck, left
Acute deep vein thrombosis (DVT)

## 2024-03-24 NOTE — PROGRESS NOTE ADULT - ASSESSMENT
Cardiology Cardiology Internal Medicine Cardiology     79 yo F with unk pmhx presents with EMS - found on floor sitting in urine. Pt states she was getting out of the bed and fell on the ground. Does not recall how long she was on the ground. Complains of back pain.   History obtained from friend, Gary Baeza - [phone]; [phone]: Saw her on Saturday evening for family dinner. Usually calls every day. Did not return calls since Monday (4days ago). Went to check in on today. Has been forgetful over past some time. MRI and some other tests. Was at [facility] 3 weeks ago for stiff neck - sent home. Goes to a lot of doctors - not sure which ones. Has been following with orthopedics for hip problems. Has not talked to her children for many years. Does not drink.   Dr. Millan - cardiologist, but says she has no cardiac probelms. alert to name and place     ER vitals:  Tmax 99.3, P 100, /88.  WBC 12.8.  PCT 0.13.  Lact 3.1.  UA (-).  RVP (-).  Covid pcr (-).  Ucx (-).  CT chest no pna, partial atelectasis of b/l lower lobes, RML mucoid impaction.  CTA chest limited evaluation for segmental and subsegmental pulmonary embolism. No main, left right, or lobar pulmonary embolism.  LE doppler (-) DVT.  CT pelvis with acute fractures of the right superior and inferior pubic rami and of the right sacral ala.    SIRS:  -resolved  -s/p Low grade temp, borderline tachycardia (P 100), elevated WBC and lactate have resolved, infectious etiology has been ruled out.   - all Cx NTD, pan CT neg for infectious focus.  - Pt with stage 2 sacral wound, seen by Wound care.  Cont offloading and topical therapy, no acute infection/cellulitis.  - Cont to monitor WBC and temp curve.   - CTA chest and LE doppler (-) for PE/DVT.  - CT pelvis with sacral fracture.  Ortho eval, cont pain control. DC to [facility]      Hypernatremia  - resolved  - DC IVF,  euvolemic    Pelvic and Sacral Fractures  - ortho input appreciated, PT ordered    Hypokalemia  - due to total body depletion, check magnesium, start daily supplements    DC planning    dvt ppx w sc lovenox Internal Medicine Internal Medicine

## 2024-05-03 NOTE — H&P ADULT - NSICDXPILOT_GEN_ALL_CORE
05/03/24 0904   Team Meeting   Meeting Type Daily Rounds   Team Members Present   Team Members Present Physician;Nurse;   Physician Team Member Valerie   Nursing Team Member José Manuel   Care Management Team Member Dustin   Patient/Family Present   Patient Present No   Patient's Family Present No     Pt is med/meal compliant. Pt denies all symptoms. Pt is visible in the unit and social with peers. Pt is pleasant on approach. Discharge TBD.   [place]

## 2024-05-06 ENCOUNTER — APPOINTMENT (OUTPATIENT)
Dept: ORTHOPEDIC SURGERY | Facility: CLINIC | Age: 84
End: 2024-05-06

## 2024-05-07 ENCOUNTER — APPOINTMENT (OUTPATIENT)
Dept: ORTHOPEDIC SURGERY | Facility: CLINIC | Age: 84
End: 2024-05-07

## 2024-05-07 ENCOUNTER — APPOINTMENT (OUTPATIENT)
Dept: ORTHOPEDIC SURGERY | Facility: CLINIC | Age: 84
End: 2024-05-07
Payer: MEDICARE

## 2024-05-07 VITALS — HEIGHT: 66 IN | WEIGHT: 115 LBS | BODY MASS INDEX: 18.48 KG/M2

## 2024-05-07 DIAGNOSIS — Z98.890 OTHER SPECIFIED POSTPROCEDURAL STATES: ICD-10-CM

## 2024-05-07 DIAGNOSIS — S72.001D FRACTURE OF UNSPECIFIED PART OF NECK OF RIGHT FEMUR, SUBSEQUENT ENCOUNTER FOR CLOSED FRACTURE WITH ROUTINE HEALING: ICD-10-CM

## 2024-05-07 PROCEDURE — 73521 X-RAY EXAM HIPS BI 2 VIEWS: CPT

## 2024-05-07 PROCEDURE — 99024 POSTOP FOLLOW-UP VISIT: CPT

## 2024-05-08 PROBLEM — S72.001D CLOSED FRACTURE OF RIGHT HIP WITH ROUTINE HEALING, SUBSEQUENT ENCOUNTER: Status: ACTIVE | Noted: 2024-05-08

## 2024-05-08 PROBLEM — Z98.890 HISTORY OF HIP SURGERY: Status: ACTIVE | Noted: 2024-02-11

## 2024-05-08 NOTE — DISCUSSION/SUMMARY
[de-identified] : Options reviewed, NB shoe wear, physical therapy / rehabilitation and associated modalities, weight bearing as tolerated.  Return to office in 2 - 3 months / PRN, all questions answered.

## 2024-05-08 NOTE — HISTORY OF PRESENT ILLNESS
[FreeTextEntry1] : Follow-up status-post ORIF / TFN R hip fracture (12/16/23), as well as ORIF / TFN L hip fracture by Dr. Valle (2/16/24).

## 2024-05-08 NOTE — PHYSICAL EXAM
[de-identified] : Extremity: incisions well healed B hips, neutral alignment B LE, good range of motion / painless B hips, calves soft and nontender, sensorimotor unchanged B LE. [de-identified] : Radiographs (AP Pelvis and 2v B hips) reveal good alignment, hardware intact, sufficient healing fractures B hips.

## 2024-05-13 ENCOUNTER — APPOINTMENT (OUTPATIENT)
Dept: ORTHOPEDIC SURGERY | Facility: CLINIC | Age: 84
End: 2024-05-13
Payer: MEDICARE

## 2024-05-13 DIAGNOSIS — S72.002D FRACTURE OF UNSPECIFIED PART OF NECK OF LEFT FEMUR, SUBSEQUENT ENCOUNTER FOR CLOSED FRACTURE WITH ROUTINE HEALING: ICD-10-CM

## 2024-05-13 PROCEDURE — 99024 POSTOP FOLLOW-UP VISIT: CPT

## 2024-05-13 PROCEDURE — 73502 X-RAY EXAM HIP UNI 2-3 VIEWS: CPT

## 2024-05-17 PROBLEM — S72.002D CLOSED FRACTURE OF LEFT HIP WITH ROUTINE HEALING, SUBSEQUENT ENCOUNTER: Status: ACTIVE | Noted: 2024-05-08

## 2024-06-10 ENCOUNTER — APPOINTMENT (OUTPATIENT)
Dept: ORTHOPEDIC SURGERY | Facility: CLINIC | Age: 84
End: 2024-06-10

## 2024-08-12 ENCOUNTER — APPOINTMENT (OUTPATIENT)
Dept: ORTHOPEDIC SURGERY | Facility: CLINIC | Age: 84
End: 2024-08-12

## 2025-06-17 NOTE — ED ADULT NURSE NOTE - ABDOMEN
Perfect served consult to General Surgery Nikole Bettencourt NP, to see patient regarding Right gluteus hematoma.    soft

## (undated) DEVICE — SUT VICRYL 0 27" OS-6 UNDYED

## (undated) DEVICE — DRAPE HIP W POUCHES 87X115X134"

## (undated) DEVICE — VENODYNE/SCD SLEEVE CALF MEDIUM

## (undated) DEVICE — SUT VICRYL 2-0 27" FS-1 UNDYED

## (undated) DEVICE — DRAPE SHOWER CURTAIN ISOLATION

## (undated) DEVICE — DRSG AQUACEL 3.5 X 6"

## (undated) DEVICE — ANESTHESIA CIRCUIT ADULT HMEF

## (undated) DEVICE — GLV 8 PROTEXIS (CREAM) MICRO

## (undated) DEVICE — SUT QUILL PDO 2 36CM 40MM

## (undated) DEVICE — SUCTION YANKAUER OPEN TIP NO VENT CURVE

## (undated) DEVICE — Device

## (undated) DEVICE — DRSG COBAN 6"

## (undated) DEVICE — SOL IRR BAG NS 0.9% 3000ML

## (undated) DEVICE — POSITIONER FOAM ABDUCTION PILLOW MED (PINK)

## (undated) DEVICE — DRSG XEROFORM 5 X 9"

## (undated) DEVICE — DRSG WEBRIL 4"

## (undated) DEVICE — ELCTR BOVIE PENCIL SMOKE EVACUATION

## (undated) DEVICE — DRSG ADAPTIC 3 X 8"

## (undated) DEVICE — POSITIONER CLIP ON PAD FOR UNIVERSAL LATERAL

## (undated) DEVICE — GUIDEWIRE SYNTHES 3.2MM X 400MM

## (undated) DEVICE — STAPLER SKIN MULTI DIRECTION W35

## (undated) DEVICE — LABELS BLANK W PEN

## (undated) DEVICE — SUT VICRYL 1 36" CTX UNDYED

## (undated) DEVICE — DRSG TELFA 3 X 8

## (undated) DEVICE — POSITIONER FOAM PAD FOR LATERAL HIP

## (undated) DEVICE — DRAPE U POLY BLUE 60"X60"

## (undated) DEVICE — DRSG AQUACEL 3.5 X 10"

## (undated) DEVICE — LIJ-CONSIGN SYNTHES TITANIUM TFN LOCKING SET: Type: DURABLE MEDICAL EQUIPMENT

## (undated) DEVICE — POSITIONER PAD HIP

## (undated) DEVICE — SYNTHES REAMING ROD WITH BALL TIP 2.5MM 950MM

## (undated) DEVICE — SOL IRR POUR H2O 1500ML

## (undated) DEVICE — POSITIONER STRAP ARMBOARD VELCRO TS-30

## (undated) DEVICE — PREP CHLORAPREP HI-LITE ORANGE 26ML

## (undated) DEVICE — ELCTR GROUNDING PAD ADULT COVIDIEN

## (undated) DEVICE — PACK LIJ BASIC ORTHO

## (undated) DEVICE — SUT VICRYL 2-0 27" CP-1 UNDYED

## (undated) DEVICE — TAPE SILK 3"

## (undated) DEVICE — DRILL BIT STRYKER ORTHO 4.2 X 340MM

## (undated) DEVICE — DRAIN PENROSE .5" X 18" LATEX

## (undated) DEVICE — DRSG TAPE MICROFOAM 4"

## (undated) DEVICE — DRSG STOCKINETTE IMPERVIOUS XL

## (undated) DEVICE — PACK MAJOR ABDOMINAL WITH LAP

## (undated) DEVICE — ELCTR BOVIE BLADE 3/4" EXTENDED LENGTH 6"

## (undated) DEVICE — SUT ETHIBOND 2 30" V37

## (undated) DEVICE — SUT QUILL PDO 0 45CM 36MM

## (undated) DEVICE — DRAPE IOBAN 33" X 23"

## (undated) DEVICE — SUT VICRYL 1 36" CT-1 UNDYED

## (undated) DEVICE — DRAPE COVER SNAP 36X30"

## (undated) DEVICE — PACK TOTAL JOINT

## (undated) DEVICE — DRILL BIT SYNTHES ORTHO CALIBRATED 4.2MM X 330MM

## (undated) DEVICE — CANISTER DISPOSABLE THIN WALL 3000CC

## (undated) DEVICE — HOOD T5 PEELAWAY

## (undated) DEVICE — DRSG COBAN 4" LF NONSTERILE

## (undated) DEVICE — LAP PAD W RING 18 X 18"

## (undated) DEVICE — DRILL BIT SYNTHES ORTHO 4.2MM

## (undated) DEVICE — SOL IRR POUR NS 0.9% 1500ML

## (undated) DEVICE — ELCTR BOVIE TIP BLADE INSULATED 2.75" EDGE